# Patient Record
Sex: MALE | Race: WHITE | URBAN - METROPOLITAN AREA
[De-identification: names, ages, dates, MRNs, and addresses within clinical notes are randomized per-mention and may not be internally consistent; named-entity substitution may affect disease eponyms.]

---

## 2017-03-22 ENCOUNTER — EMERGENCY (EMERGENCY)
Facility: HOSPITAL | Age: 82
LOS: 0 days | Discharge: HOME | End: 2017-03-22
Attending: EMERGENCY MEDICINE | Admitting: EMERGENCY MEDICINE
Payer: MEDICARE

## 2017-03-22 VITALS
HEART RATE: 72 BPM | SYSTOLIC BLOOD PRESSURE: 144 MMHG | TEMPERATURE: 98 F | DIASTOLIC BLOOD PRESSURE: 51 MMHG | OXYGEN SATURATION: 97 %

## 2017-03-22 VITALS
DIASTOLIC BLOOD PRESSURE: 51 MMHG | HEIGHT: 72 IN | HEART RATE: 78 BPM | WEIGHT: 160.06 LBS | RESPIRATION RATE: 16 BRPM | TEMPERATURE: 98 F | SYSTOLIC BLOOD PRESSURE: 169 MMHG | OXYGEN SATURATION: 169 %

## 2017-03-22 DIAGNOSIS — Z98.89 OTHER SPECIFIED POSTPROCEDURAL STATES: Chronic | ICD-10-CM

## 2017-03-22 DIAGNOSIS — R68.83 CHILLS (WITHOUT FEVER): ICD-10-CM

## 2017-03-22 DIAGNOSIS — Z85.51 PERSONAL HISTORY OF MALIGNANT NEOPLASM OF BLADDER: Chronic | ICD-10-CM

## 2017-03-22 DIAGNOSIS — R53.1 WEAKNESS: ICD-10-CM

## 2017-03-22 DIAGNOSIS — I12.0 HYPERTENSIVE CHRONIC KIDNEY DISEASE WITH STAGE 5 CHRONIC KIDNEY DISEASE OR END STAGE RENAL DISEASE: ICD-10-CM

## 2017-03-22 DIAGNOSIS — N18.6 END STAGE RENAL DISEASE: ICD-10-CM

## 2017-03-22 DIAGNOSIS — J18.9 PNEUMONIA, UNSPECIFIED ORGANISM: ICD-10-CM

## 2017-03-22 DIAGNOSIS — I77.0 ARTERIOVENOUS FISTULA, ACQUIRED: Chronic | ICD-10-CM

## 2017-03-22 LAB
ALBUMIN SERPL ELPH-MCNC: 4.1 G/DL — SIGNIFICANT CHANGE UP (ref 3.3–5)
ALP SERPL-CCNC: 91 U/L — SIGNIFICANT CHANGE UP (ref 40–120)
ALT FLD-CCNC: 15 U/L — SIGNIFICANT CHANGE UP (ref 12–78)
ANION GAP SERPL CALC-SCNC: 10 MMOL/L — SIGNIFICANT CHANGE UP (ref 5–17)
APPEARANCE UR: CLEAR — SIGNIFICANT CHANGE UP
AST SERPL-CCNC: 19 U/L — SIGNIFICANT CHANGE UP (ref 15–37)
BACTERIA # UR AUTO: (no result)
BASOPHILS # BLD AUTO: 0.1 K/UL — SIGNIFICANT CHANGE UP (ref 0–0.2)
BASOPHILS NFR BLD AUTO: 0.6 % — SIGNIFICANT CHANGE UP (ref 0–2)
BILIRUB SERPL-MCNC: 0.7 MG/DL — SIGNIFICANT CHANGE UP (ref 0.2–1.2)
BILIRUB UR-MCNC: NEGATIVE — SIGNIFICANT CHANGE UP
BUN SERPL-MCNC: 23 MG/DL — SIGNIFICANT CHANGE UP (ref 7–23)
CALCIUM SERPL-MCNC: 8.9 MG/DL — SIGNIFICANT CHANGE UP (ref 8.5–10.1)
CHLORIDE SERPL-SCNC: 106 MMOL/L — SIGNIFICANT CHANGE UP (ref 96–108)
CO2 SERPL-SCNC: 24 MMOL/L — SIGNIFICANT CHANGE UP (ref 22–31)
COLOR SPEC: YELLOW — SIGNIFICANT CHANGE UP
CREAT SERPL-MCNC: 3.69 MG/DL — HIGH (ref 0.5–1.3)
DIFF PNL FLD: (no result)
EOSINOPHIL # BLD AUTO: 0 K/UL — SIGNIFICANT CHANGE UP (ref 0–0.5)
EOSINOPHIL NFR BLD AUTO: 0.2 % — SIGNIFICANT CHANGE UP (ref 0–6)
EPI CELLS # UR: SIGNIFICANT CHANGE UP
GLUCOSE SERPL-MCNC: 218 MG/DL — HIGH (ref 70–99)
GLUCOSE UR QL: NEGATIVE MG/DL — SIGNIFICANT CHANGE UP
HCT VFR BLD CALC: 35.5 % — LOW (ref 39–50)
HGB BLD-MCNC: 12 G/DL — LOW (ref 13–17)
KETONES UR-MCNC: NEGATIVE — SIGNIFICANT CHANGE UP
LEUKOCYTE ESTERASE UR-ACNC: (no result)
LYMPHOCYTES # BLD AUTO: 0.8 K/UL — LOW (ref 1–3.3)
LYMPHOCYTES # BLD AUTO: 5.5 % — LOW (ref 13–44)
MCHC RBC-ENTMCNC: 33.2 PG — SIGNIFICANT CHANGE UP (ref 27–34)
MCHC RBC-ENTMCNC: 33.9 GM/DL — SIGNIFICANT CHANGE UP (ref 32–36)
MCV RBC AUTO: 98.1 FL — SIGNIFICANT CHANGE UP (ref 80–100)
MONOCYTES # BLD AUTO: 1.1 K/UL — HIGH (ref 0–0.9)
MONOCYTES NFR BLD AUTO: 8 % — SIGNIFICANT CHANGE UP (ref 2–14)
NEUTROPHILS # BLD AUTO: 11.8 K/UL — HIGH (ref 1.8–7.4)
NEUTROPHILS NFR BLD AUTO: 85.7 % — HIGH (ref 43–77)
NITRITE UR-MCNC: NEGATIVE — SIGNIFICANT CHANGE UP
PH UR: 8 — SIGNIFICANT CHANGE UP (ref 4.8–8)
PLATELET # BLD AUTO: 201 K/UL — SIGNIFICANT CHANGE UP (ref 150–400)
POTASSIUM SERPL-MCNC: 4.5 MMOL/L — SIGNIFICANT CHANGE UP (ref 3.5–5.3)
POTASSIUM SERPL-SCNC: 4.5 MMOL/L — SIGNIFICANT CHANGE UP (ref 3.5–5.3)
PROT SERPL-MCNC: 7.9 GM/DL — SIGNIFICANT CHANGE UP (ref 6–8.3)
PROT UR-MCNC: 500 MG/DL
RAPID RVP RESULT: SIGNIFICANT CHANGE UP
RBC # BLD: 3.62 M/UL — LOW (ref 4.2–5.8)
RBC # FLD: 13 % — SIGNIFICANT CHANGE UP (ref 10.3–14.5)
RBC CASTS # UR COMP ASSIST: (no result) /HPF (ref 0–4)
SODIUM SERPL-SCNC: 140 MMOL/L — SIGNIFICANT CHANGE UP (ref 135–145)
SP GR SPEC: 1.01 — SIGNIFICANT CHANGE UP (ref 1.01–1.02)
UROBILINOGEN FLD QL: NEGATIVE MG/DL — SIGNIFICANT CHANGE UP
WBC # BLD: 13.7 K/UL — HIGH (ref 3.8–10.5)
WBC # FLD AUTO: 13.7 K/UL — HIGH (ref 3.8–10.5)
WBC UR QL: (no result)

## 2017-03-22 PROCEDURE — 71020: CPT | Mod: 26

## 2017-03-22 PROCEDURE — 99284 EMERGENCY DEPT VISIT MOD MDM: CPT

## 2017-03-22 RX ORDER — AZITHROMYCIN 500 MG/1
1 TABLET, FILM COATED ORAL
Qty: 4 | Refills: 0 | OUTPATIENT
Start: 2017-03-22 | End: 2017-03-26

## 2017-03-22 RX ORDER — AZITHROMYCIN 500 MG/1
500 TABLET, FILM COATED ORAL ONCE
Qty: 0 | Refills: 0 | Status: COMPLETED | OUTPATIENT
Start: 2017-03-22 | End: 2017-03-22

## 2017-03-22 RX ORDER — CEFTRIAXONE 500 MG/1
1 INJECTION, POWDER, FOR SOLUTION INTRAMUSCULAR; INTRAVENOUS ONCE
Qty: 0 | Refills: 0 | Status: COMPLETED | OUTPATIENT
Start: 2017-03-22 | End: 2017-03-22

## 2017-03-22 RX ADMIN — AZITHROMYCIN 500 MILLIGRAM(S): 500 TABLET, FILM COATED ORAL at 15:57

## 2017-03-22 RX ADMIN — CEFTRIAXONE 100 GRAM(S): 500 INJECTION, POWDER, FOR SOLUTION INTRAMUSCULAR; INTRAVENOUS at 15:57

## 2017-03-22 NOTE — ED ADULT NURSE NOTE - OBJECTIVE STATEMENT
Pt presented to ED after referral from PCP. As per pt, pt received a call from Dr. office for questionable PNA. Upon arrival, pt afebrile. Breathing spontaneously on RA. No SOB or cough. PT A&Ox3. DONIS x4. Follows commands. B/L equal chest expansion. BBS clear. VS WNL. Fistula to left arm. +bruits and thrill. HD on MWF. presented after Dialysis today. Voiding freely. Skin Warm, dry, and intact. Will continue to monitor.

## 2017-03-22 NOTE — ED ADULT NURSE NOTE - ED STAT RN HANDOFF DETAILS
Received care of patient from [facility] Track RN, Jeancarlos Fatima. Patient resting comfortably will continue to monitor. Awaiting test results. Patient in no acute distress at this time.

## 2017-03-22 NOTE — ED STATDOCS - PROGRESS NOTE DETAILS
RANDA Gerber: Patient has been seen, evaluated and orders have been written by the attending in intake. Patient is stable.  I will follow up the results of orders written and I will continue to evaluate/observe the patient.

## 2017-03-22 NOTE — ED STATDOCS - NS ED MD SCRIBE ATTENDING SCRIBE SECTIONS
REVIEW OF SYSTEMS/DISPOSITION/PAST MEDICAL/SURGICAL/SOCIAL HISTORY/PROGRESS NOTE/PHYSICAL EXAM/RESULTS/HISTORY OF PRESENT ILLNESS

## 2017-03-22 NOTE — ED STATDOCS - ATTENDING CONTRIBUTION TO CARE
I, Jeancarlos Snell, performed the initial face to face bedside interview with this patient regarding history of present illness, review of symptoms and relevant past medical, social and family history.  I completed an independent physical examination.  I was the initial provider who evaluated this patient. I have signed out the follow up of any pending tests (i.e. labs, radiological studies) to the ACP.  I have communicated the patient’s plan of care and disposition with the ACP.  The history, relevant review of systems, past medical and surgical history, medical decision making, and physical examination was documented by the scribe in my presence and I attest to the accuracy of the documentation.

## 2017-03-22 NOTE — ED STATDOCS - OBJECTIVE STATEMENT
83 y/o male with PMHx of ESRD on dialysis had full treatment today, DM, CAD with stents, HTN presents to the ED c/o chills that started this morning. Pt states that he was told to come to the ED after dialysis today. He reports generalized weakness. Currently pt has no other complaints and denies cough, fever, chest pain, SOB and abd pain. Nephrologist Dr. Clancy.

## 2017-03-22 NOTE — ED STATDOCS - MEDICAL DECISION MAKING DETAILS
Plan blood work and CXR. Plan blood work and CXR.  Channing MDM:  84yr old male on dialysis.  Had chills before but NO symptoms now.  No fever, SOB, cough.  RR is regular.  Labs show mild leukocytosis and CXR shows RLL infiltrate.  Will treat pt for CAP. Will give IV dose and d/c home with Levaquin.  Pt will be informed he must return ASAP if symptoms worsen.  Given is well appearance despite age and medical problems, and lack of any symptoms now, I feel outpatient treatment appropriate at this time, which pt agrees.  Any SOB, fever, vomiting, inability to take po abx, or worsening condition - pt is to return immediately.

## 2017-09-25 ENCOUNTER — INPATIENT (INPATIENT)
Facility: HOSPITAL | Age: 82
LOS: 3 days | Discharge: HOME | End: 2017-09-29
Attending: FAMILY MEDICINE | Admitting: FAMILY MEDICINE
Payer: MEDICARE

## 2017-09-25 VITALS — WEIGHT: 154.98 LBS

## 2017-09-25 DIAGNOSIS — Z85.51 PERSONAL HISTORY OF MALIGNANT NEOPLASM OF BLADDER: Chronic | ICD-10-CM

## 2017-09-25 DIAGNOSIS — Z98.89 OTHER SPECIFIED POSTPROCEDURAL STATES: Chronic | ICD-10-CM

## 2017-09-25 DIAGNOSIS — I77.0 ARTERIOVENOUS FISTULA, ACQUIRED: Chronic | ICD-10-CM

## 2017-09-25 LAB
ADD ON TEST-SPECIMEN IN LAB: SIGNIFICANT CHANGE UP
ALBUMIN SERPL ELPH-MCNC: 3.4 G/DL — SIGNIFICANT CHANGE UP (ref 3.3–5)
ALLERGY+IMMUNOLOGY DIAG STUDY NOTE: SIGNIFICANT CHANGE UP
ALLERGY+IMMUNOLOGY DIAG STUDY NOTE: SIGNIFICANT CHANGE UP
ALP SERPL-CCNC: 68 U/L — SIGNIFICANT CHANGE UP (ref 40–120)
ALT FLD-CCNC: 13 U/L — SIGNIFICANT CHANGE UP (ref 12–78)
ANION GAP SERPL CALC-SCNC: 14 MMOL/L — SIGNIFICANT CHANGE UP (ref 5–17)
ANISOCYTOSIS BLD QL: SIGNIFICANT CHANGE UP
ANTIBODY INTERPRETATION 2: SIGNIFICANT CHANGE UP
ANTIBODY INTERPRETATION 2: SIGNIFICANT CHANGE UP
ANTIBODY INTERPRETATION 3: SIGNIFICANT CHANGE UP
APTT BLD: 29.6 SEC — SIGNIFICANT CHANGE UP (ref 27.5–37.4)
AST SERPL-CCNC: 7 U/L — LOW (ref 15–37)
BASO STIPL BLD QL SMEAR: SLIGHT — SIGNIFICANT CHANGE UP
BASOPHILS # BLD AUTO: 0.1 K/UL — SIGNIFICANT CHANGE UP (ref 0–0.2)
BASOPHILS NFR BLD AUTO: 1.5 % — SIGNIFICANT CHANGE UP (ref 0–2)
BILIRUB SERPL-MCNC: 0.5 MG/DL — SIGNIFICANT CHANGE UP (ref 0.2–1.2)
BLD GP AB SCN SERPL QL: (no result)
BUN SERPL-MCNC: 91 MG/DL — HIGH (ref 7–23)
CALCIUM SERPL-MCNC: 8.4 MG/DL — LOW (ref 8.5–10.1)
CHLORIDE SERPL-SCNC: 107 MMOL/L — SIGNIFICANT CHANGE UP (ref 96–108)
CO2 SERPL-SCNC: 20 MMOL/L — LOW (ref 22–31)
CREAT SERPL-MCNC: 9.67 MG/DL — HIGH (ref 0.5–1.3)
DIR ANTIGLOB POLYSPECIFIC INTERPRETATION: SIGNIFICANT CHANGE UP
EOSINOPHIL # BLD AUTO: 0.3 K/UL — SIGNIFICANT CHANGE UP (ref 0–0.5)
EOSINOPHIL NFR BLD AUTO: 4 % — SIGNIFICANT CHANGE UP (ref 0–6)
GLUCOSE SERPL-MCNC: 163 MG/DL — HIGH (ref 70–99)
HCT VFR BLD CALC: 16.7 % — CRITICAL LOW (ref 39–50)
HGB BLD-MCNC: 5.5 G/DL — CRITICAL LOW (ref 13–17)
INR BLD: 0.97 RATIO — SIGNIFICANT CHANGE UP (ref 0.88–1.16)
LG PLATELETS BLD QL AUTO: SLIGHT — SIGNIFICANT CHANGE UP
LYMPHOCYTES # BLD AUTO: 1.3 K/UL — SIGNIFICANT CHANGE UP (ref 1–3.3)
LYMPHOCYTES # BLD AUTO: 17 % — SIGNIFICANT CHANGE UP (ref 13–44)
MACROCYTES BLD QL: SLIGHT — SIGNIFICANT CHANGE UP
MAGNESIUM SERPL-MCNC: 2.4 MG/DL — SIGNIFICANT CHANGE UP (ref 1.6–2.6)
MANUAL DIF COMMENT BLD-IMP: SIGNIFICANT CHANGE UP
MCHC RBC-ENTMCNC: 33.3 GM/DL — SIGNIFICANT CHANGE UP (ref 32–36)
MCHC RBC-ENTMCNC: 33.5 PG — SIGNIFICANT CHANGE UP (ref 27–34)
MCV RBC AUTO: 100.6 FL — HIGH (ref 80–100)
MICROCYTES BLD QL: SLIGHT — SIGNIFICANT CHANGE UP
MONOCYTES # BLD AUTO: 0.6 K/UL — SIGNIFICANT CHANGE UP (ref 0–0.9)
MONOCYTES NFR BLD AUTO: 8 % — SIGNIFICANT CHANGE UP (ref 2–14)
NEUTROPHILS # BLD AUTO: 4.6 K/UL — SIGNIFICANT CHANGE UP (ref 1.8–7.4)
NEUTROPHILS NFR BLD AUTO: 70 % — SIGNIFICANT CHANGE UP (ref 43–77)
NEUTS BAND # BLD: 1 % — SIGNIFICANT CHANGE UP (ref 0–8)
PHOSPHATE SERPL-MCNC: 4.1 MG/DL — SIGNIFICANT CHANGE UP (ref 2.5–4.5)
PLAT MORPH BLD: (no result)
PLATELET # BLD AUTO: 153 K/UL — SIGNIFICANT CHANGE UP (ref 150–400)
PLATELET COUNT - ESTIMATE: NORMAL — SIGNIFICANT CHANGE UP
POLYCHROMASIA BLD QL SMEAR: SLIGHT — SIGNIFICANT CHANGE UP
POTASSIUM SERPL-MCNC: 5.1 MMOL/L — SIGNIFICANT CHANGE UP (ref 3.5–5.3)
POTASSIUM SERPL-SCNC: 5.1 MMOL/L — SIGNIFICANT CHANGE UP (ref 3.5–5.3)
PROT SERPL-MCNC: 5.9 GM/DL — LOW (ref 6–8.3)
PROTHROM AB SERPL-ACNC: 10.5 SEC — SIGNIFICANT CHANGE UP (ref 9.8–12.7)
RBC # BLD: 1.66 M/UL — LOW (ref 4.2–5.8)
RBC # FLD: 14.5 % — SIGNIFICANT CHANGE UP (ref 10.3–14.5)
RBC BLD AUTO: (no result)
SCHISTOCYTES BLD QL AUTO: SLIGHT — SIGNIFICANT CHANGE UP
SODIUM SERPL-SCNC: 141 MMOL/L — SIGNIFICANT CHANGE UP (ref 135–145)
TROPONIN I SERPL-MCNC: 0.03 NG/ML — SIGNIFICANT CHANGE UP (ref 0.01–0.04)
TYPE + AB SCN PNL BLD: SIGNIFICANT CHANGE UP
WBC # BLD: 6.8 K/UL — SIGNIFICANT CHANGE UP (ref 3.8–10.5)
WBC # FLD AUTO: 6.8 K/UL — SIGNIFICANT CHANGE UP (ref 3.8–10.5)

## 2017-09-25 PROCEDURE — 93010 ELECTROCARDIOGRAM REPORT: CPT

## 2017-09-25 PROCEDURE — 99285 EMERGENCY DEPT VISIT HI MDM: CPT

## 2017-09-25 PROCEDURE — 71010: CPT | Mod: 26

## 2017-09-25 PROCEDURE — 70450 CT HEAD/BRAIN W/O DYE: CPT | Mod: 26

## 2017-09-25 PROCEDURE — 86077 PHYS BLOOD BANK SERV XMATCH: CPT

## 2017-09-25 RX ORDER — PANTOPRAZOLE SODIUM 20 MG/1
8 TABLET, DELAYED RELEASE ORAL
Qty: 80 | Refills: 0 | Status: DISCONTINUED | OUTPATIENT
Start: 2017-09-25 | End: 2017-09-28

## 2017-09-25 RX ORDER — METOPROLOL TARTRATE 50 MG
50 TABLET ORAL DAILY
Qty: 0 | Refills: 0 | Status: DISCONTINUED | OUTPATIENT
Start: 2017-09-25 | End: 2017-09-29

## 2017-09-25 RX ORDER — ERYTHROPOIETIN 10000 [IU]/ML
4000 INJECTION, SOLUTION INTRAVENOUS; SUBCUTANEOUS
Qty: 0 | Refills: 0 | Status: DISCONTINUED | OUTPATIENT
Start: 2017-09-25 | End: 2017-09-29

## 2017-09-25 RX ORDER — INFLUENZA VIRUS VACCINE 15; 15; 15; 15 UG/.5ML; UG/.5ML; UG/.5ML; UG/.5ML
0.5 SUSPENSION INTRAMUSCULAR ONCE
Qty: 0 | Refills: 0 | Status: DISCONTINUED | OUTPATIENT
Start: 2017-09-25 | End: 2017-09-29

## 2017-09-25 RX ORDER — PANTOPRAZOLE SODIUM 20 MG/1
40 TABLET, DELAYED RELEASE ORAL ONCE
Qty: 0 | Refills: 0 | Status: COMPLETED | OUTPATIENT
Start: 2017-09-25 | End: 2017-09-25

## 2017-09-25 RX ORDER — DOXERCALCIFEROL 2.5 UG/1
3.5 CAPSULE ORAL
Qty: 0 | Refills: 0 | Status: DISCONTINUED | OUTPATIENT
Start: 2017-09-25 | End: 2017-09-29

## 2017-09-25 RX ORDER — ERYTHROPOIETIN 10000 [IU]/ML
3000 INJECTION, SOLUTION INTRAVENOUS; SUBCUTANEOUS
Qty: 0 | Refills: 0 | Status: DISCONTINUED | OUTPATIENT
Start: 2017-09-25 | End: 2017-09-29

## 2017-09-25 RX ORDER — GABAPENTIN 400 MG/1
100 CAPSULE ORAL THREE TIMES A DAY
Qty: 0 | Refills: 0 | Status: DISCONTINUED | OUTPATIENT
Start: 2017-09-25 | End: 2017-09-29

## 2017-09-25 RX ADMIN — ERYTHROPOIETIN 3000 UNIT(S): 10000 INJECTION, SOLUTION INTRAVENOUS; SUBCUTANEOUS at 18:11

## 2017-09-25 RX ADMIN — DOXERCALCIFEROL 3.5 MICROGRAM(S): 2.5 CAPSULE ORAL at 18:09

## 2017-09-25 RX ADMIN — ERYTHROPOIETIN 4000 UNIT(S): 10000 INJECTION, SOLUTION INTRAVENOUS; SUBCUTANEOUS at 18:10

## 2017-09-25 RX ADMIN — PANTOPRAZOLE SODIUM 40 MILLIGRAM(S): 20 TABLET, DELAYED RELEASE ORAL at 10:52

## 2017-09-25 RX ADMIN — PANTOPRAZOLE SODIUM 10 MG/HR: 20 TABLET, DELAYED RELEASE ORAL at 11:49

## 2017-09-25 RX ADMIN — PANTOPRAZOLE SODIUM 10 MG/HR: 20 TABLET, DELAYED RELEASE ORAL at 21:20

## 2017-09-25 RX ADMIN — GABAPENTIN 100 MILLIGRAM(S): 400 CAPSULE ORAL at 21:19

## 2017-09-25 NOTE — CONSULT NOTE ADULT - ASSESSMENT
acute blood loss anemia.  DW pt and wife  no clear source for sudden drop with brown stool  PPI gtt, keep HGB>8  HD    stool OB (+).  taking Plavix for secondary cardiovascular prophylaxis.  hx perforated gastric ulcer 20 years ago.      ESRD. HD        CAD-PCI RCA KAREN 12/2016.    type 2 DM.    -transfuse 2 units PRBCs w/ HD.  -Protonix gtt.  -trend HH.  -hold Plavix.        EGD DW pt and wife  await repeat labs and consider eval

## 2017-09-25 NOTE — CONSULT NOTE ADULT - SUBJECTIVE AND OBJECTIVE BOX
Patient is a 85y old  Male who presents with a chief complaint of weakness. (25 Sep 2017 13:56)      HPI:  85M.  c/o weakness and fatigue.  onset approximately 1 month ago.  past few days, started experiencing shortness of breath, dyspnea upon exertion and more profound weakness.  feeling poorly during last HD session of Friday and advised to go to ED if he continued to feel poorly.  today, wife called to cancel his HD session, and they proceeded to ED.  Hbg 5.5.  stool OB (+) but no eugenia bleeding.  ED ordered to transfuse 2 units PRBCs and placed on Protonix gtt.  arrangements for HD today have been orchestrated w/ Nephrology.    pt with fatigue an mild SUTHERLAND but neg cp or sob, neg change in BM  has HX of perf PUD and has been on ASA and plavix  BM unchanged and brown per pt    Hx per pt and wife    PMHx:  ESRD-HD MWF;  HFpEF;  CAD-PCI KAREN 12/2016;  HTN;  DM;  AOCD;  bladder CA;  perforated gastric ulcer;  choledocolithiasis; acute cholangitis.    PSHx:  12/04/2016 ERCP;  12/11/2016 laparoscopic cholecystectomy;  1990s gastric ulcer resection;  neobladder construction;  RUE AVF.    Rx:  reviewed.    ALL:  lisinopril.    SocHx:  .  lives in the community with his wife in a private residence.    FH:  NC. (25 Sep 2017 13:56)      PAST MEDICAL & SURGICAL HISTORY:  ESRD on dialysis  HTN (hypertension)  No significant past surgical history      MEDICATIONS  (STANDING):  pantoprazole Infusion 8 mG/Hr (10 mL/Hr) IV Continuous <Continuous>  gabapentin 100 milliGRAM(s) Oral three times a day  metoprolol succinate ER 50 milliGRAM(s) Oral daily  doxercalciferol Injectable 3.5 MICROGram(s) IV Push <User Schedule>  epoetin norman Injectable 3000 Unit(s) IV Push <User Schedule>  epoetin norman Injectable 4000 Unit(s) IV Push <User Schedule>    MEDICATIONS  (PRN):      Allergies    lisinopril (Other)    Intolerances        SOCIAL HISTORY:lives with wife    FAMILY HISTORY:NC      REVIEW OF SYSTEMS:    CONSTITUTIONAL: No weakness, fevers or chills, pos fatigue  EYES/ENT: No visual changes;  No vertigo or throat pain   NECK: No pain or stiffness  RESPIRATORY: No cough, wheezing, hemoptysis; No shortness of breath  CARDIOVASCULAR: No chest pain or palpitations  GENITOURINARY: No dysuria, frequency or hematuria  NEUROLOGICAL: No numbness or weakness  SKIN: No itching, burning, rashes, or lesions   All other review of systems is negative unless indicated above.    Vital Signs Last 24 Hrs  T(C): 36.3 (25 Sep 2017 19:04), Max: 36.5 (25 Sep 2017 07:38)  T(F): 97.4 (25 Sep 2017 19:04), Max: 97.7 (25 Sep 2017 07:38)  HR: 78 (25 Sep 2017 19:04) (53 - 78)  BP: 130/99 (25 Sep 2017 19:04) (119/52 - 147/65)  BP(mean): --  RR: 16 (25 Sep 2017 19:04) (16 - 19)  SpO2: 96% (25 Sep 2017 15:44) (96% - 100%)    PHYSICAL EXAM:    Constitutional: NAD, well-developed  HEENT: EOMI, throat clear  Neck: No LAD, supple  Respiratory: CTA and P  Cardiovascular: S1 and S2, RRR, no M  Gastrointestinal: BS+, soft, NT/ND, neg HSM,  Extremities: No peripheral edema, neg clubing, cyanosis  Vascular: 2+ peripheral pulses  Neurological: A/O x 3, no focal deficits  Psychiatric: Normal mood, normal affect  Skin: No rashes  refused repeat rectal exam byme  LABS:  CBC Full  -  ( 25 Sep 2017 08:09 )  WBC Count : 6.8 K/uL  Hemoglobin : 5.5 g/dL  Hematocrit : 16.7 %  Platelet Count - Automated : 153 K/uL  Mean Cell Volume : 100.6 fl  Mean Cell Hemoglobin : 33.5 pg  Mean Cell Hemoglobin Concentration : 33.3 gm/dL  Auto Neutrophil # : 4.6 K/uL  Auto Lymphocyte # : 1.3 K/uL  Auto Monocyte # : 0.6 K/uL  Auto Eosinophil # : 0.3 K/uL  Auto Basophil # : 0.1 K/uL  Auto Neutrophil % : 70.0 %  Auto Lymphocyte % : 17.0 %  Auto Monocyte % : 8.0 %  Auto Eosinophil % : 4.0 %  Auto Basophil % : 1.5 %    09-25    141  |  107  |  91<H>  ----------------------------<  163<H>  5.1   |  20<L>  |  9.67<H>    Ca    8.4<L>      25 Sep 2017 08:09  Phos  4.1     09-25  Mg     2.4     09-25    TPro  5.9<L>  /  Alb  3.4  /  TBili  0.5  /  DBili  x   /  AST  7<L>  /  ALT  13  /  AlkPhos  68  09-25    PT/INR - ( 25 Sep 2017 08:09 )   PT: 10.5 sec;   INR: 0.97 ratio         PTT - ( 25 Sep 2017 08:09 )  PTT:29.6 sec        RADIOLOGY & ADDITIONAL STUDIES:  < from: Xray Chest 1 View AP/PA. (09.25.17 @ 10:11) >  EXAM:  CHEST SINGLE VIEW FRONTAL                            PROCEDURE DATE:  09/25/2017          INTERPRETATION:  Views:1  Comparison: 03/22/17  History: Weakness    The lungs are clear of infiltrates and effusions.     The cardiac and   mediastinal contours appear unremarkable.     Impression:  1. No evidence of acute pulmonary disease.            < end of copied text >

## 2017-09-25 NOTE — CONSULT NOTE ADULT - SUBJECTIVE AND OBJECTIVE BOX
Chief complaints.: Felt weak for several days.    HPI:  86 yo man with ESRD on maintenance HD since 10/2014.   Reports feeling weak for several days.   Pt's wife reports frequent BMs  for about one year.  Pt's wife noted increased problems with pt having Bms right after eating any food.   Pt denies any tarry stool but unclear about this.  Denies any abdominal pain.   Denies any change in Bms recently.   Denies nausea and vomiting.    PMHX and PSHX.  1.ESRD  2.CAD  Post Stent (1/2016)  3.HTN  4.DM  5.Hx of Bladder CA with resection --Neobladder  6.Hx of SBO  7.Cholecystectomy in 2014    FAMILY HISTORY:  Non-contributory      SOCIAL HISTORY :  Allergies    lisinopril (Other)    REVIEW OF  SYSTEMS.  Feeling weak.    Denies SOB  Denies Chest discomfort  Denies abdominal pain  Denies nausea  Denies diarrhea  Did not notice stool color.      MEDICATIONS  (STANDING):  pantoprazole Infusion 8 mG/Hr (10 mL/Hr) IV Continuous <Continuous>  gabapentin 100 milliGRAM(s) Oral three times a day  metoprolol succinate ER 50 milliGRAM(s) Oral daily    MEDICATIONS  (PRN):         Vital Signs Last 24 Hrs  T(C): 36.5 (25 Sep 2017 11:14), Max: 36.5 (25 Sep 2017 07:38)  T(F): 97.7 (25 Sep 2017 11:14), Max: 97.7 (25 Sep 2017 07:38)  HR: 59 (25 Sep 2017 11:14) (59 - 61)  BP: 121/41 (25 Sep 2017 11:14) (121/41 - 123/40)  BP(mean): --  RR: 19 (25 Sep 2017 11:14) (18 - 19)  SpO2: 100% (25 Sep 2017 11:14) (99% - 100%)  Daily     Daily   I&O's Summary      PHYSICAL EXAM:  Alert and comfortable   GEN: No apparent distress  HEENT: WNL  NECK : supple  CV: S1S2 RRR  LUNGS: clear to aus  ABD: soft  EXT: no edema    LABS:                        5.5    6.8   )-----------( 153      ( 25 Sep 2017 08:09 )             16.7     09-25    141  |  107  |  91<H>  ----------------------------<  163<H>  5.1   |  20<L>  |  9.67<H>    Ca    8.4<L>      25 Sep 2017 08:09  Mg     2.4     09-25    TPro  5.9<L>  /  Alb  3.4  /  TBili  0.5  /  DBili  x   /  AST  7<L>  /  ALT  13  /  AlkPhos  68  09-25    PT/INR - ( 25 Sep 2017 08:09 )   PT: 10.5 sec;   INR: 0.97 ratio         PTT - ( 25 Sep 2017 08:09 )  PTT:29.6 sec    Magnesium, Serum: 2.4 mg/dL (09-25 @ 08:09)

## 2017-09-25 NOTE — H&P ADULT - GASTROINTESTINAL DETAILS
normal/no masses palpable/nontender/no distention/soft/bowel sounds normal/no bruit/no rebound tenderness/no guarding/no rigidity

## 2017-09-25 NOTE — H&P ADULT - NSHPPHYSICALEXAM_GEN_ALL_CORE
Vital Signs Last 24 Hrs  T(C): 36.5 (25 Sep 2017 11:14), Max: 36.5 (25 Sep 2017 07:38)  T(F): 97.7 (25 Sep 2017 11:14), Max: 97.7 (25 Sep 2017 07:38)  HR: 59 (25 Sep 2017 11:14) (59 - 61)  BP: 121/41 (25 Sep 2017 11:14) (121/41 - 123/40)  BP(mean): --  RR: 19 (25 Sep 2017 11:14) (18 - 19)  SpO2: 100% (25 Sep 2017 11:14) (99% - 100%)

## 2017-09-25 NOTE — H&P ADULT - HISTORY OF PRESENT ILLNESS
85M.  c/o weakness and fatigue.  onset approximately 1 month ago.  past few days, started experiencing shortness of breath, dyspnea upon exertion and more profound weakness.  feeling poorly during last HD session of Friday and advised to go to ED if he continued to feel poorly.  today, wife called to cancel his HD session, and they proceeded to ED.  Hbg 5.5.  stool OB (+) but no eugenia bleeding.  ED ordered to transfuse 2 units PRBCs and placed on Protonix gtt.  arrangements for HD today have been orchestrated w/ Nephrology.    PMHx:  ESRD-HD MWF;  HFpEF;  CAD-PCI KAREN 12/2016;  HTN;  DM;  AOCD;  bladder CA;  perforated gastric ulcer;  choledocolithiasis; acute cholangitis.    PSHx:  12/04/2016 ERCP;  12/11/2016 laparoscopic cholecystectomy;  1990s gastric ulcer resection;  neobladder construction;  RUE AVF.    Rx:  reviewed.    ALL:  lisinopril.    SocHx:  .  lives in the community with his wife in a private residence.    FH:  [place].

## 2017-09-25 NOTE — ED PROVIDER NOTE - OBJECTIVE STATEMENT
85M pmhx ESRD on dialysis (MWF) which he didn't attend, c/o SOB, dyspnea on exertion, and weakness over past week. Reports that stool has been dark brown. Denies smoking. Pt has no other complaints and denies CP, fever/chills, n/v/d and HA.

## 2017-09-25 NOTE — ED ADULT TRIAGE NOTE - CHIEF COMPLAINT QUOTE
I have been feeling weak x 1 week. I was supposed to go to dialysis   today. Denies nausea  or vomiting

## 2017-09-25 NOTE — H&P ADULT - ASSESSMENT
acute blood loss anemia.    stool OB (+).  taking Plavix for secondary cardiovascular prophylaxis.  hx perforated gastric ulcer 20 years ago.  hx AOCD.    ESRD.    HFpEF.    CAD-PCI RCA KAREN 12/2016.    type 2 DM.    -transfuse 2 units PRBCs w/ HD.  -Protonix gtt.  -trend HH.  -hold Plavix.  -clear liquid diet.  -A1C.  monitor FSBG.  correction insulin coverage.  -admit to telemetry delgado.  -d/w Joesph Kam + Nitish and Aston.

## 2017-09-25 NOTE — ED PROVIDER NOTE - MEDICAL DECISION MAKING DETAILS
85M pmhx ESRD on dialysis (MWF) which he didn't attend, c/o SOB, dyspnea on exertion, and weakness over past week. Plan protonics, 2 units of PRBC, Admit. Consult GI.

## 2017-09-25 NOTE — CONSULT NOTE ADULT - ASSESSMENT
84 yo man with ESRD admitted with weakness.  Likely due to GI blood loss.  Positive stool occult heme.  Will plan HD today with transfusion.  Pt's Vital signs stable.  Fluid and electrolytes stable  GI evaluation.

## 2017-09-26 LAB
ANION GAP SERPL CALC-SCNC: 7 MMOL/L — SIGNIFICANT CHANGE UP (ref 5–17)
BUN SERPL-MCNC: 43 MG/DL — HIGH (ref 7–23)
CALCIUM SERPL-MCNC: 7.7 MG/DL — LOW (ref 8.5–10.1)
CHLORIDE SERPL-SCNC: 104 MMOL/L — SIGNIFICANT CHANGE UP (ref 96–108)
CO2 SERPL-SCNC: 29 MMOL/L — SIGNIFICANT CHANGE UP (ref 22–31)
CREAT SERPL-MCNC: 5.8 MG/DL — HIGH (ref 0.5–1.3)
GLUCOSE SERPL-MCNC: 128 MG/DL — HIGH (ref 70–99)
HAV IGM SER-ACNC: SIGNIFICANT CHANGE UP
HBV CORE IGM SER-ACNC: SIGNIFICANT CHANGE UP
HBV SURFACE AG SER-ACNC: SIGNIFICANT CHANGE UP
HCT VFR BLD CALC: 22.7 % — LOW (ref 39–50)
HCT VFR BLD CALC: 25.3 % — LOW (ref 39–50)
HCT VFR BLD CALC: 30 % — LOW (ref 39–50)
HCV AB S/CO SERPL IA: 0.13 S/CO — SIGNIFICANT CHANGE UP
HCV AB SERPL-IMP: SIGNIFICANT CHANGE UP
HGB BLD-MCNC: 10.1 G/DL — LOW (ref 13–17)
HGB BLD-MCNC: 8 G/DL — LOW (ref 13–17)
HGB BLD-MCNC: 8.4 G/DL — LOW (ref 13–17)
MCHC RBC-ENTMCNC: 32.4 PG — SIGNIFICANT CHANGE UP (ref 27–34)
MCHC RBC-ENTMCNC: 32.6 PG — SIGNIFICANT CHANGE UP (ref 27–34)
MCHC RBC-ENTMCNC: 33.1 GM/DL — SIGNIFICANT CHANGE UP (ref 32–36)
MCHC RBC-ENTMCNC: 33.7 GM/DL — SIGNIFICANT CHANGE UP (ref 32–36)
MCHC RBC-ENTMCNC: 33.7 PG — SIGNIFICANT CHANGE UP (ref 27–34)
MCHC RBC-ENTMCNC: 35.1 GM/DL — SIGNIFICANT CHANGE UP (ref 32–36)
MCV RBC AUTO: 96 FL — SIGNIFICANT CHANGE UP (ref 80–100)
MCV RBC AUTO: 96.8 FL — SIGNIFICANT CHANGE UP (ref 80–100)
MCV RBC AUTO: 97.8 FL — SIGNIFICANT CHANGE UP (ref 80–100)
PLATELET # BLD AUTO: 141 K/UL — LOW (ref 150–400)
PLATELET # BLD AUTO: 144 K/UL — LOW (ref 150–400)
PLATELET # BLD AUTO: 148 K/UL — LOW (ref 150–400)
POTASSIUM SERPL-MCNC: 4.2 MMOL/L — SIGNIFICANT CHANGE UP (ref 3.5–5.3)
POTASSIUM SERPL-SCNC: 4.2 MMOL/L — SIGNIFICANT CHANGE UP (ref 3.5–5.3)
RBC # BLD: 2.37 M/UL — LOW (ref 4.2–5.8)
RBC # BLD: 2.59 M/UL — LOW (ref 4.2–5.8)
RBC # BLD: 3.1 M/UL — LOW (ref 4.2–5.8)
RBC # FLD: 15.7 % — HIGH (ref 10.3–14.5)
RBC # FLD: 16 % — HIGH (ref 10.3–14.5)
RBC # FLD: 16.7 % — HIGH (ref 10.3–14.5)
SODIUM SERPL-SCNC: 140 MMOL/L — SIGNIFICANT CHANGE UP (ref 135–145)
WBC # BLD: 5.8 K/UL — SIGNIFICANT CHANGE UP (ref 3.8–10.5)
WBC # BLD: 5.9 K/UL — SIGNIFICANT CHANGE UP (ref 3.8–10.5)
WBC # BLD: 6.8 K/UL — SIGNIFICANT CHANGE UP (ref 3.8–10.5)
WBC # FLD AUTO: 5.8 K/UL — SIGNIFICANT CHANGE UP (ref 3.8–10.5)
WBC # FLD AUTO: 5.9 K/UL — SIGNIFICANT CHANGE UP (ref 3.8–10.5)
WBC # FLD AUTO: 6.8 K/UL — SIGNIFICANT CHANGE UP (ref 3.8–10.5)

## 2017-09-26 RX ORDER — ATORVASTATIN CALCIUM 80 MG/1
40 TABLET, FILM COATED ORAL AT BEDTIME
Qty: 0 | Refills: 0 | Status: DISCONTINUED | OUTPATIENT
Start: 2017-09-26 | End: 2017-09-29

## 2017-09-26 RX ADMIN — DOXERCALCIFEROL 3.5 MICROGRAM(S): 2.5 CAPSULE ORAL at 16:52

## 2017-09-26 RX ADMIN — GABAPENTIN 100 MILLIGRAM(S): 400 CAPSULE ORAL at 06:32

## 2017-09-26 RX ADMIN — ATORVASTATIN CALCIUM 40 MILLIGRAM(S): 80 TABLET, FILM COATED ORAL at 21:20

## 2017-09-26 RX ADMIN — GABAPENTIN 100 MILLIGRAM(S): 400 CAPSULE ORAL at 21:20

## 2017-09-26 RX ADMIN — ERYTHROPOIETIN 4000 UNIT(S): 10000 INJECTION, SOLUTION INTRAVENOUS; SUBCUTANEOUS at 16:49

## 2017-09-26 RX ADMIN — ERYTHROPOIETIN 3000 UNIT(S): 10000 INJECTION, SOLUTION INTRAVENOUS; SUBCUTANEOUS at 16:49

## 2017-09-26 RX ADMIN — Medication 50 MILLIGRAM(S): at 06:32

## 2017-09-26 NOTE — CONSULT NOTE ADULT - SUBJECTIVE AND OBJECTIVE BOX
Patient is a 85y old  Male who presents with a chief complaint of Weakness/SUTHERLAND (25 Sep 2017 20:30)      HPI:  Patient is 85-year-old he has a history of hypertension, type II diabetes mellitus, high cholesterol, or any artery disease status post drug-eluting stent placed in December 2016, end-stage renal disease he is on hemodialysis, bladder cancer, he was admitted with complains of increasing fatigue and shortness of breath for the past 2-3 weeks. His symptoms have been gradually worsening. On admission he was diagnosed to have severe anemia and guaiac positive stool. He denies any abdominal pain, blood in the urine or stool. He denies any exertional chest pain. But he states he gets tired and short of breath easily. He has received blood transfusion and he feels better. He is currently comfortable and is in no acute distress he is in normal sinus rhythm on telemetry.          PAST MEDICAL & SURGICAL HISTORY:  ESRD on dialysis  HTN (hypertension)  Bladder cancer. Perforated gastric ulcer in the past.  Cholecystectomy.  Gastric ulcer resection in the past.  Type II diabetes mellitus.  coronary artery disease status post stents in the past        PREVIOUS DIAGNOSTIC TESTING:      ECHO  FINDINGS: December 2015     CONCLUSION:  --There is mild left ventricular hypertrophy.  --Global LV wall motion is borderline normal. The mid inferolateral segment and mid inferior   segment are hypokinetic.  --LV ejection fraction is borderline normal.  --The left ventricular filling pattern is normal.  --The left ventricular ejection fraction is 5o to 55%.  --The aortic valve is calcified. There is mild to moderate aortic stenosis. The estimated   aortic valve area is 1.40 cm².  --There is mitral annular calcification. There is mild mitral regurgitation.  --There is mild tricuspid regurgitation.  --The right atrial pressure is 6 - 10 mm Hg. There is moderate pulmonary hypertension.  --There is a small to moderate pericardial effusion.        MEDICATIONS  (STANDING):  pantoprazole Infusion 8 mG/Hr (10 mL/Hr) IV Continuous <Continuous>  gabapentin 100 milliGRAM(s) Oral three times a day  metoprolol succinate ER 50 milliGRAM(s) Oral daily  doxercalciferol Injectable 3.5 MICROGram(s) IV Push <User Schedule>  epoetin norman Injectable 3000 Unit(s) IV Push <User Schedule>  epoetin norman Injectable 4000 Unit(s) IV Push <User Schedule>  influenza   Vaccine 0.5 milliLiter(s) IntraMuscular once    MEDICATIONS  (PRN):      FAMILY HISTORY: Father disease at age of 89 with age related issues. Mother disease at age of 96.      Social history he quit smoking in 1995 and he denies any excessive alcohol consumption.          REVIEW OF SYSTEM:  Pertinent items are noted in HPI.  Constitutional	Negative for chills, fevers, sweats.    Eyes: 	Negative for visual disturbance.  Ears, nose, mouth, throat, and face: Negative for epistaxis, nasal congestion, sore throat and tinnitus.  Neck:	Negative for enlargement, pain and difficulty in swallowing  Respiration : Negative for cough,  pleuritic chest pain and wheezing  Cardiovascular: Negative for chest pain,  and palpitations , he complains of generalized weakness and shortness of breath on mild exertion.    Gastrointestinal : Negative for abdominal pain, diarrhea, nausea and vomiting  Genitourinary: Negative for dysuria, frequency and urinary incontinence .  Skin: Negative for  rash, pruritus, swelling, dryness .  	  Hematologic/lymphatic: Negative for bleeding and easy bruising  Musculoskeletal: Negative for arthralgias, back pain and muscle weakness.  Neurological: Negative for dizziness, headaches, seizures and tremors  Behavioral/Psych: Negative for mood change, depression.  Endocrine:	Negative for blurry vision, polydipsia and polyuria, diaphoresis.   Allergic/Immunologic:	Negative for anaphylaxis, angioedema and urticaria.      Vital Signs Last 24 Hrs  T(C): 36.9 (26 Sep 2017 04:18), Max: 36.9 (26 Sep 2017 04:18)  T(F): 98.5 (26 Sep 2017 04:18), Max: 98.5 (26 Sep 2017 04:18)  HR: 60 (26 Sep 2017 04:18) (53 - 78)  BP: 135/37 (26 Sep 2017 04:18) (119/52 - 147/65)  BP(mean): --  RR: 16 (26 Sep 2017 04:18) (16 - 19)  SpO2: 100% (26 Sep 2017 04:18) (96% - 100%)        PHYSICAL EXAM  General Appearance: cooperative, no acute distress,   HEENT: PERRL, conjunctiva clear, EOM's intact, non injected pharynx, no exudate .  Neck: Supple, , no adenopathy, thyroid: not enlarged, no carotid bruit or JVD  Back: Symmetric, no  tenderness,no soft tissue tenderness  Lungs: Clear to auscultation bilateral,no adventitious breath sounds, normal   expiratory phase  Heart: Regular rate and rhythm, S1, S2 normal ,grade 1 to 2/6 ejection systolic murmur, rub or gallop  Abdomen: Soft, non-tender, bowel sounds active , no hepatosplenomegaly  Extremities: no cyanosis or edema, no joint swelling  Skin: Skin color, texture normal, no rashes   Neurologic: Alert and oriented X3 , cranial nerves intact, sensory and motor normal,        INTERPRETATION OF TELEMETRY: NSR    ECG: NSR early transition.        LABS:                          8.0    5.8   )-----------( 148      ( 26 Sep 2017 05:23 )             22.7     09-26    140  |  104  |  43<H>  ----------------------------<  128<H>  4.2   |  29  |  5.80<H>    Ca    7.7<L>      26 Sep 2017 05:23  Phos  4.1     09-25  Mg     2.4     09-25    TPro  5.9<L>  /  Alb  3.4  /  TBili  0.5  /  DBili  x   /  AST  7<L>  /  ALT  13  /  AlkPhos  68  09-25    CARDIAC MARKERS ( 25 Sep 2017 08:09 )  0.034 ng/mL / x     / x     / x     / x              PT/INR - ( 25 Sep 2017 08:09 )   PT: 10.5 sec;   INR: 0.97 ratio         PTT - ( 25 Sep 2017 08:09 )  PTT:29.6 sec          RADIOLOGY & ADDITIONAL STUDIES: Patient is a 85y old  Male who presents with a chief complaint of Weakness/S[place] (25 Sep 2017 20:30)      HPI:  Patient is 85-year-old he has a history of hypertension, type II diabetes mellitus, high cholesterol, or any artery disease status post drug-eluting stent placed to proximal RCAin January 2016, end-stage renal disease he is on hemodialysis, bladder cancer, he was admitted with complains of increasing fatigue and shortness of breath for the past 2-3 weeks. His symptoms have been gradually worsening. On admission he was diagnosed to have severe anemia and guaiac positive stool. He denies any abdominal pain, blood in the urine or stool. He denies any exertional chest pain. But he states he gets tired and short of breath easily. He has received blood transfusion and he feels better. He is currently comfortable and is in no acute distress he is in normal sinus rhythm on telemetry.          PAST MEDICAL & SURGICAL HISTORY:  ESRD on dialysis  HTN (hypertension)  Bladder cancer. Perforated gastric ulcer in the past.  Cholecystectomy.  Gastric ulcer resection in the past.  Type II diabetes mellitus.  coronary artery disease status post stents in the past        PREVIOUS DIAGNOSTIC TESTING:      ECHO  FINDINGS: January 2016     CONCLUSION:  --There is mild left ventricular hypertrophy.  --Global LV wall motion is borderline normal. The mid inferolateral segment and mid inferior   segment are hypokinetic.  --LV ejection fraction is borderline normal.  --The left ventricular filling pattern is normal.  --The left ventricular ejection fraction is 5o to 55%.  --The aortic valve is calcified. There is mild to moderate aortic stenosis. The estimated   aortic valve area is 1.40 cm².  --There is mitral annular calcification. There is mild mitral regurgitation.  --There is mild tricuspid regurgitation.  --The right atrial pressure is 6 - 10 mm Hg. There is moderate pulmonary hypertension.  --There is a small to moderate pericardial effusion.        MEDICATIONS  (STANDING):  pantoprazole Infusion 8 mG/Hr (10 mL/Hr) IV Continuous <Continuous>  gabapentin 100 milliGRAM(s) Oral three times a day  metoprolol succinate ER 50 milliGRAM(s) Oral daily  doxercalciferol Injectable 3.5 MICROGram(s) IV Push <User Schedule>  epoetin norman Injectable 3000 Unit(s) IV Push <User Schedule>  epoetin norman Injectable 4000 Unit(s) IV Push <User Schedule>  influenza   Vaccine 0.5 milliLiter(s) IntraMuscular once    MEDICATIONS  (PRN):      FAMILY HISTORY: Father disease at age of 89 with age related issues. Mother disease at age of 96.      Social history he quit smoking in 1995 and he denies any excessive alcohol consumption.          REVIEW OF SYSTEM:  Pertinent items are noted in HPI.  Constitutional	Negative for chills, fevers, sweats.    Eyes: 	Negative for visual disturbance.  Ears, nose, mouth, throat, and face: Negative for epistaxis, nasal congestion, sore throat and tinnitus.  Neck:	Negative for enlargement, pain and difficulty in swallowing  Respiration : Negative for cough,  pleuritic chest pain and wheezing  Cardiovascular: Negative for chest pain,  and palpitations , he complains of generalized weakness and shortness of breath on mild exertion.    Gastrointestinal : Negative for abdominal pain, diarrhea, nausea and vomiting  Genitourinary: Negative for dysuria, frequency and urinary incontinence .  Skin: Negative for  rash, pruritus, swelling, dryness .  	  Hematologic/lymphatic: Negative for bleeding and easy bruising  Musculoskeletal: Negative for arthralgias, back pain and muscle weakness.  Neurological: Negative for dizziness, headaches, seizures and tremors  Behavioral/Psych: Negative for mood change, depression.  Endocrine:	Negative for blurry vision, polydipsia and polyuria, diaphoresis.   Allergic/Immunologic:	Negative for anaphylaxis, angioedema and urticaria.      Vital Signs Last 24 Hrs  T(C): 36.9 (26 Sep 2017 04:18), Max: 36.9 (26 Sep 2017 04:18)  T(F): 98.5 (26 Sep 2017 04:18), Max: 98.5 (26 Sep 2017 04:18)  HR: 60 (26 Sep 2017 04:18) (53 - 78)  BP: 135/37 (26 Sep 2017 04:18) (119/52 - 147/65)  BP(mean): --  RR: 16 (26 Sep 2017 04:18) (16 - 19)  SpO2: 100% (26 Sep 2017 04:18) (96% - 100%)        PHYSICAL EXAM  General Appearance: cooperative, no acute distress,   HEENT: PERRL, conjunctiva clear, EOM's intact, non injected pharynx, no exudate .  Neck: Supple, , no adenopathy, thyroid: not enlarged, no carotid bruit or JVD  Back: Symmetric, no  tenderness,no soft tissue tenderness  Lungs: Clear to auscultation bilateral,no adventitious breath sounds, normal   expiratory phase  Heart: Regular rate and rhythm, S1, S2 normal ,grade 1 to 2/6 ejection systolic murmur, rub or gallop  Abdomen: Soft, non-tender, bowel sounds active , no hepatosplenomegaly  Extremities: no cyanosis or edema, no joint swelling  Skin: Skin color, texture normal, no rashes   Neurologic: Alert and oriented X3 , cranial nerves intact, sensory and motor normal,        INTERPRETATION OF TELEMETRY: NSR    ECG: NSR early transition.        LABS:                          8.0    5.8   )-----------( 148      ( 26 Sep 2017 05:23 )             22.7     09-26    140  |  104  |  43<H>  ----------------------------<  128<H>  4.2   |  29  |  5.80<H>    Ca    7.7<L>      26 Sep 2017 05:23  Phos  4.1     09-25  Mg     2.4     09-25    TPro  5.9<L>  /  Alb  3.4  /  TBili  0.5  /  DBili  x   /  AST  7<L>  /  ALT  13  /  AlkPhos  68  09-25    CARDIAC MARKERS ( 25 Sep 2017 08:09 )  0.034 ng/mL / x     / x     / x     / x              PT/INR - ( 25 Sep 2017 08:09 )   PT: 10.5 sec;   INR: 0.97 ratio         PTT - ( 25 Sep 2017 08:09 )  PTT:29.6 sec          RADIOLOGY & ADDITIONAL STUDIES:

## 2017-09-26 NOTE — CONSULT NOTE ADULT - ASSESSMENT
GI bleed he has guaiac positive stool.  Dyspnea because of severe anemia.  Coronary artery disease status post stents in the past and he received drug-eluting stent to proximal RCA January 2016. No recent angina or congestive heart failure.  End-stage renal disease he is on hemodialysis.  Essential hypertension.  High cholesterol.  Peripheral arterial disease.  Diabetes mellitus.  Suggest  Continue with current medications.  Follow-up with H&H he will need further transfusion.  Follow-up with electrolytes.  Overall cardiac status is stable, there is no absolute contraindication from cardiac standpoint of view for endoscopy.  Due to multiple comorbid conditions he certainly at a moderate risk for any invasive intervention. All risks options discussed at length with the patient.  Discussed with the hospitalist and gastroenterologist. GI bleed he has guaiac positive stool.  Dyspnea because of severe anemia.  Coronary artery disease status post stents in the past and he received drug-eluting stent to proximal RCA January 2016. No recent angina or congestive heart failure.  Mild-to-moderate aortic stenosis.  End-stage renal disease he is on hemodialysis.  Essential hypertension.  High cholesterol.  Peripheral arterial disease.  Diabetes mellitus.  Suggest  Continue with current medications.  Follow-up with H&H he will need further transfusion.  Follow-up with electrolytes.  Overall cardiac status is stable, there is no absolute contraindication from cardiac standpoint of view for endoscopy.  Due to multiple comorbid conditions he certainly at a moderate risk for any invasive intervention. All risks options discussed at length with the patient.  Discussed with the hospitalist and gastroenterologist.

## 2017-09-27 ENCOUNTER — RESULT REVIEW (OUTPATIENT)
Age: 82
End: 2017-09-27

## 2017-09-27 LAB
ANION GAP SERPL CALC-SCNC: 8 MMOL/L — SIGNIFICANT CHANGE UP (ref 5–17)
BUN SERPL-MCNC: 20 MG/DL — SIGNIFICANT CHANGE UP (ref 7–23)
CALCIUM SERPL-MCNC: 8 MG/DL — LOW (ref 8.5–10.1)
CHLORIDE SERPL-SCNC: 104 MMOL/L — SIGNIFICANT CHANGE UP (ref 96–108)
CO2 SERPL-SCNC: 26 MMOL/L — SIGNIFICANT CHANGE UP (ref 22–31)
CREAT SERPL-MCNC: 4.41 MG/DL — HIGH (ref 0.5–1.3)
GLUCOSE SERPL-MCNC: 103 MG/DL — HIGH (ref 70–99)
HCT VFR BLD CALC: 27.5 % — LOW (ref 39–50)
HCT VFR BLD CALC: 27.7 % — LOW (ref 39–50)
HCT VFR BLD CALC: 28.2 % — LOW (ref 39–50)
HGB BLD-MCNC: 9.3 G/DL — LOW (ref 13–17)
HGB BLD-MCNC: 9.5 G/DL — LOW (ref 13–17)
HGB BLD-MCNC: 9.5 G/DL — LOW (ref 13–17)
MCHC RBC-ENTMCNC: 32.2 PG — SIGNIFICANT CHANGE UP (ref 27–34)
MCHC RBC-ENTMCNC: 32.8 PG — SIGNIFICANT CHANGE UP (ref 27–34)
MCHC RBC-ENTMCNC: 33.2 PG — SIGNIFICANT CHANGE UP (ref 27–34)
MCHC RBC-ENTMCNC: 33.5 GM/DL — SIGNIFICANT CHANGE UP (ref 32–36)
MCHC RBC-ENTMCNC: 33.8 GM/DL — SIGNIFICANT CHANGE UP (ref 32–36)
MCHC RBC-ENTMCNC: 34.4 GM/DL — SIGNIFICANT CHANGE UP (ref 32–36)
MCV RBC AUTO: 96.1 FL — SIGNIFICANT CHANGE UP (ref 80–100)
MCV RBC AUTO: 96.6 FL — SIGNIFICANT CHANGE UP (ref 80–100)
MCV RBC AUTO: 97.1 FL — SIGNIFICANT CHANGE UP (ref 80–100)
PLATELET # BLD AUTO: 135 K/UL — LOW (ref 150–400)
PLATELET # BLD AUTO: 140 K/UL — LOW (ref 150–400)
PLATELET # BLD AUTO: 141 K/UL — LOW (ref 150–400)
POTASSIUM SERPL-MCNC: 3.8 MMOL/L — SIGNIFICANT CHANGE UP (ref 3.5–5.3)
POTASSIUM SERPL-SCNC: 3.8 MMOL/L — SIGNIFICANT CHANGE UP (ref 3.5–5.3)
RBC # BLD: 2.85 M/UL — LOW (ref 4.2–5.8)
RBC # BLD: 2.88 M/UL — LOW (ref 4.2–5.8)
RBC # BLD: 2.91 M/UL — LOW (ref 4.2–5.8)
RBC # FLD: 15.2 % — HIGH (ref 10.3–14.5)
RBC # FLD: 15.3 % — HIGH (ref 10.3–14.5)
RBC # FLD: 15.4 % — HIGH (ref 10.3–14.5)
SODIUM SERPL-SCNC: 138 MMOL/L — SIGNIFICANT CHANGE UP (ref 135–145)
WBC # BLD: 4.7 K/UL — SIGNIFICANT CHANGE UP (ref 3.8–10.5)
WBC # BLD: 5.1 K/UL — SIGNIFICANT CHANGE UP (ref 3.8–10.5)
WBC # BLD: 5.3 K/UL — SIGNIFICANT CHANGE UP (ref 3.8–10.5)
WBC # FLD AUTO: 4.7 K/UL — SIGNIFICANT CHANGE UP (ref 3.8–10.5)
WBC # FLD AUTO: 5.1 K/UL — SIGNIFICANT CHANGE UP (ref 3.8–10.5)
WBC # FLD AUTO: 5.3 K/UL — SIGNIFICANT CHANGE UP (ref 3.8–10.5)

## 2017-09-27 PROCEDURE — 88312 SPECIAL STAINS GROUP 1: CPT | Mod: 26

## 2017-09-27 PROCEDURE — 88342 IMHCHEM/IMCYTCHM 1ST ANTB: CPT | Mod: 26

## 2017-09-27 PROCEDURE — 88305 TISSUE EXAM BY PATHOLOGIST: CPT | Mod: 26

## 2017-09-27 RX ORDER — SOD SULF/SODIUM/NAHCO3/KCL/PEG
4000 SOLUTION, RECONSTITUTED, ORAL ORAL ONCE
Qty: 0 | Refills: 0 | Status: COMPLETED | OUTPATIENT
Start: 2017-09-27 | End: 2017-09-27

## 2017-09-27 RX ORDER — ONDANSETRON 8 MG/1
4 TABLET, FILM COATED ORAL EVERY 6 HOURS
Qty: 0 | Refills: 0 | Status: DISCONTINUED | OUTPATIENT
Start: 2017-09-27 | End: 2017-09-29

## 2017-09-27 RX ADMIN — PANTOPRAZOLE SODIUM 10 MG/HR: 20 TABLET, DELAYED RELEASE ORAL at 16:39

## 2017-09-27 RX ADMIN — Medication 4000 MILLILITER(S): at 18:08

## 2017-09-27 RX ADMIN — ATORVASTATIN CALCIUM 40 MILLIGRAM(S): 80 TABLET, FILM COATED ORAL at 22:13

## 2017-09-27 RX ADMIN — GABAPENTIN 100 MILLIGRAM(S): 400 CAPSULE ORAL at 15:06

## 2017-09-27 RX ADMIN — GABAPENTIN 100 MILLIGRAM(S): 400 CAPSULE ORAL at 22:13

## 2017-09-27 NOTE — DIETITIAN INITIAL EVALUATION ADULT. - OTHER INFO
Consult for dialysis. Pt denies chewing and swallowing difficulty. Pt denies n/v/d/c. pt reports great appetite. Pt with good knowledge of renal diet. Pt wife at home prepares meals.

## 2017-09-28 ENCOUNTER — RESULT REVIEW (OUTPATIENT)
Age: 82
End: 2017-09-28

## 2017-09-28 LAB
ALBUMIN SERPL ELPH-MCNC: 3.1 G/DL — LOW (ref 3.3–5)
ALBUMIN SERPL ELPH-MCNC: 3.2 G/DL — LOW (ref 3.3–5)
ANION GAP SERPL CALC-SCNC: 10 MMOL/L — SIGNIFICANT CHANGE UP (ref 5–17)
ANION GAP SERPL CALC-SCNC: 10 MMOL/L — SIGNIFICANT CHANGE UP (ref 5–17)
ANION GAP SERPL CALC-SCNC: 14 MMOL/L — SIGNIFICANT CHANGE UP (ref 5–17)
BUN SERPL-MCNC: 27 MG/DL — HIGH (ref 7–23)
BUN SERPL-MCNC: 27 MG/DL — HIGH (ref 7–23)
BUN SERPL-MCNC: 31 MG/DL — HIGH (ref 7–23)
CALCIUM SERPL-MCNC: 7.9 MG/DL — LOW (ref 8.5–10.1)
CALCIUM SERPL-MCNC: 8 MG/DL — LOW (ref 8.5–10.1)
CALCIUM SERPL-MCNC: 8.2 MG/DL — LOW (ref 8.5–10.1)
CHLORIDE SERPL-SCNC: 101 MMOL/L — SIGNIFICANT CHANGE UP (ref 96–108)
CHLORIDE SERPL-SCNC: 104 MMOL/L — SIGNIFICANT CHANGE UP (ref 96–108)
CHLORIDE SERPL-SCNC: 104 MMOL/L — SIGNIFICANT CHANGE UP (ref 96–108)
CO2 SERPL-SCNC: 18 MMOL/L — LOW (ref 22–31)
CO2 SERPL-SCNC: 24 MMOL/L — SIGNIFICANT CHANGE UP (ref 22–31)
CO2 SERPL-SCNC: 24 MMOL/L — SIGNIFICANT CHANGE UP (ref 22–31)
CREAT SERPL-MCNC: 6.37 MG/DL — HIGH (ref 0.5–1.3)
CREAT SERPL-MCNC: 6.38 MG/DL — HIGH (ref 0.5–1.3)
CREAT SERPL-MCNC: 6.86 MG/DL — HIGH (ref 0.5–1.3)
GLUCOSE SERPL-MCNC: 93 MG/DL — SIGNIFICANT CHANGE UP (ref 70–99)
GLUCOSE SERPL-MCNC: 94 MG/DL — SIGNIFICANT CHANGE UP (ref 70–99)
GLUCOSE SERPL-MCNC: 96 MG/DL — SIGNIFICANT CHANGE UP (ref 70–99)
HCT VFR BLD CALC: 27.2 % — LOW (ref 39–50)
HGB BLD-MCNC: 9.4 G/DL — LOW (ref 13–17)
MCHC RBC-ENTMCNC: 33.2 PG — SIGNIFICANT CHANGE UP (ref 27–34)
MCHC RBC-ENTMCNC: 34.7 GM/DL — SIGNIFICANT CHANGE UP (ref 32–36)
MCV RBC AUTO: 95.9 FL — SIGNIFICANT CHANGE UP (ref 80–100)
PHOSPHATE SERPL-MCNC: 4.2 MG/DL — SIGNIFICANT CHANGE UP (ref 2.5–4.5)
PHOSPHATE SERPL-MCNC: 4.4 MG/DL — SIGNIFICANT CHANGE UP (ref 2.5–4.5)
PLATELET # BLD AUTO: 156 K/UL — SIGNIFICANT CHANGE UP (ref 150–400)
POTASSIUM SERPL-MCNC: 3.9 MMOL/L — SIGNIFICANT CHANGE UP (ref 3.5–5.3)
POTASSIUM SERPL-MCNC: 3.9 MMOL/L — SIGNIFICANT CHANGE UP (ref 3.5–5.3)
POTASSIUM SERPL-MCNC: 4.4 MMOL/L — SIGNIFICANT CHANGE UP (ref 3.5–5.3)
POTASSIUM SERPL-SCNC: 3.9 MMOL/L — SIGNIFICANT CHANGE UP (ref 3.5–5.3)
POTASSIUM SERPL-SCNC: 3.9 MMOL/L — SIGNIFICANT CHANGE UP (ref 3.5–5.3)
POTASSIUM SERPL-SCNC: 4.4 MMOL/L — SIGNIFICANT CHANGE UP (ref 3.5–5.3)
RBC # BLD: 2.84 M/UL — LOW (ref 4.2–5.8)
RBC # FLD: 14.8 % — HIGH (ref 10.3–14.5)
SODIUM SERPL-SCNC: 133 MMOL/L — LOW (ref 135–145)
SODIUM SERPL-SCNC: 138 MMOL/L — SIGNIFICANT CHANGE UP (ref 135–145)
SODIUM SERPL-SCNC: 138 MMOL/L — SIGNIFICANT CHANGE UP (ref 135–145)
WBC # BLD: 6.3 K/UL — SIGNIFICANT CHANGE UP (ref 3.8–10.5)
WBC # FLD AUTO: 6.3 K/UL — SIGNIFICANT CHANGE UP (ref 3.8–10.5)

## 2017-09-28 PROCEDURE — 72191 CT ANGIOGRAPH PELV W/O&W/DYE: CPT | Mod: 26

## 2017-09-28 PROCEDURE — 88305 TISSUE EXAM BY PATHOLOGIST: CPT | Mod: 26

## 2017-09-28 RX ORDER — POTASSIUM CHLORIDE 20 MEQ
20 PACKET (EA) ORAL
Qty: 0 | Refills: 0 | Status: COMPLETED | OUTPATIENT
Start: 2017-09-28 | End: 2017-09-28

## 2017-09-28 RX ORDER — PANTOPRAZOLE SODIUM 20 MG/1
40 TABLET, DELAYED RELEASE ORAL
Qty: 0 | Refills: 0 | Status: DISCONTINUED | OUTPATIENT
Start: 2017-09-28 | End: 2017-09-29

## 2017-09-28 RX ADMIN — Medication 20 MILLIEQUIVALENT(S): at 23:10

## 2017-09-28 RX ADMIN — Medication 50 MILLIGRAM(S): at 05:37

## 2017-09-28 RX ADMIN — ATORVASTATIN CALCIUM 40 MILLIGRAM(S): 80 TABLET, FILM COATED ORAL at 21:38

## 2017-09-28 RX ADMIN — GABAPENTIN 100 MILLIGRAM(S): 400 CAPSULE ORAL at 05:37

## 2017-09-28 RX ADMIN — GABAPENTIN 100 MILLIGRAM(S): 400 CAPSULE ORAL at 21:37

## 2017-09-28 RX ADMIN — PANTOPRAZOLE SODIUM 40 MILLIGRAM(S): 20 TABLET, DELAYED RELEASE ORAL at 21:36

## 2017-09-28 RX ADMIN — Medication 20 MILLIEQUIVALENT(S): at 21:53

## 2017-09-29 ENCOUNTER — TRANSCRIPTION ENCOUNTER (OUTPATIENT)
Age: 82
End: 2017-09-29

## 2017-09-29 VITALS
SYSTOLIC BLOOD PRESSURE: 141 MMHG | HEART RATE: 66 BPM | RESPIRATION RATE: 18 BRPM | TEMPERATURE: 98 F | DIASTOLIC BLOOD PRESSURE: 44 MMHG | OXYGEN SATURATION: 100 %

## 2017-09-29 LAB
ADD ON TEST-SPECIMEN IN LAB: SIGNIFICANT CHANGE UP
ANION GAP SERPL CALC-SCNC: 10 MMOL/L — SIGNIFICANT CHANGE UP (ref 5–17)
BUN SERPL-MCNC: 19 MG/DL — SIGNIFICANT CHANGE UP (ref 7–23)
CALCIUM SERPL-MCNC: 8 MG/DL — LOW (ref 8.5–10.1)
CHLORIDE SERPL-SCNC: 106 MMOL/L — SIGNIFICANT CHANGE UP (ref 96–108)
CO2 SERPL-SCNC: 23 MMOL/L — SIGNIFICANT CHANGE UP (ref 22–31)
CREAT SERPL-MCNC: 5.59 MG/DL — HIGH (ref 0.5–1.3)
GLUCOSE SERPL-MCNC: 86 MG/DL — SIGNIFICANT CHANGE UP (ref 70–99)
HCT VFR BLD CALC: 27 % — LOW (ref 39–50)
HGB BLD-MCNC: 9 G/DL — LOW (ref 13–17)
MAGNESIUM SERPL-MCNC: 2.2 MG/DL — SIGNIFICANT CHANGE UP (ref 1.6–2.6)
MCHC RBC-ENTMCNC: 32.4 PG — SIGNIFICANT CHANGE UP (ref 27–34)
MCHC RBC-ENTMCNC: 33.2 GM/DL — SIGNIFICANT CHANGE UP (ref 32–36)
MCV RBC AUTO: 97.5 FL — SIGNIFICANT CHANGE UP (ref 80–100)
PLATELET # BLD AUTO: 138 K/UL — LOW (ref 150–400)
POTASSIUM SERPL-MCNC: 4.1 MMOL/L — SIGNIFICANT CHANGE UP (ref 3.5–5.3)
POTASSIUM SERPL-SCNC: 4.1 MMOL/L — SIGNIFICANT CHANGE UP (ref 3.5–5.3)
RBC # BLD: 2.77 M/UL — LOW (ref 4.2–5.8)
RBC # FLD: 15.6 % — HIGH (ref 10.3–14.5)
SODIUM SERPL-SCNC: 139 MMOL/L — SIGNIFICANT CHANGE UP (ref 135–145)
SURGICAL PATHOLOGY FINAL REPORT - CH: SIGNIFICANT CHANGE UP
WBC # BLD: 4.3 K/UL — SIGNIFICANT CHANGE UP (ref 3.8–10.5)
WBC # FLD AUTO: 4.3 K/UL — SIGNIFICANT CHANGE UP (ref 3.8–10.5)

## 2017-09-29 RX ORDER — PANTOPRAZOLE SODIUM 20 MG/1
1 TABLET, DELAYED RELEASE ORAL
Qty: 30 | Refills: 0
Start: 2017-09-29

## 2017-09-29 RX ORDER — ASPIRIN/CALCIUM CARB/MAGNESIUM 324 MG
81 TABLET ORAL DAILY
Qty: 0 | Refills: 0 | Status: DISCONTINUED | OUTPATIENT
Start: 2017-09-29 | End: 2017-09-29

## 2017-09-29 RX ORDER — CLOPIDOGREL BISULFATE 75 MG/1
1 TABLET, FILM COATED ORAL
Qty: 0 | Refills: 0 | COMMUNITY

## 2017-09-29 RX ORDER — MAGNESIUM OXIDE 400 MG ORAL TABLET 241.3 MG
400 TABLET ORAL ONCE
Qty: 0 | Refills: 0 | Status: COMPLETED | OUTPATIENT
Start: 2017-09-29 | End: 2017-09-29

## 2017-09-29 RX ORDER — ASPIRIN/CALCIUM CARB/MAGNESIUM 324 MG
1 TABLET ORAL
Qty: 0 | Refills: 0 | DISCHARGE
Start: 2017-09-29

## 2017-09-29 RX ADMIN — GABAPENTIN 100 MILLIGRAM(S): 400 CAPSULE ORAL at 05:18

## 2017-09-29 RX ADMIN — PANTOPRAZOLE SODIUM 40 MILLIGRAM(S): 20 TABLET, DELAYED RELEASE ORAL at 05:20

## 2017-09-29 RX ADMIN — Medication 50 MILLIGRAM(S): at 05:18

## 2017-09-29 RX ADMIN — ERYTHROPOIETIN 4000 UNIT(S): 10000 INJECTION, SOLUTION INTRAVENOUS; SUBCUTANEOUS at 11:21

## 2017-09-29 RX ADMIN — GABAPENTIN 100 MILLIGRAM(S): 400 CAPSULE ORAL at 13:06

## 2017-09-29 RX ADMIN — ERYTHROPOIETIN 3000 UNIT(S): 10000 INJECTION, SOLUTION INTRAVENOUS; SUBCUTANEOUS at 11:21

## 2017-09-29 RX ADMIN — DOXERCALCIFEROL 3.5 MICROGRAM(S): 2.5 CAPSULE ORAL at 11:28

## 2017-09-29 RX ADMIN — MAGNESIUM OXIDE 400 MG ORAL TABLET 400 MILLIGRAM(S): 241.3 TABLET ORAL at 17:46

## 2017-09-29 RX ADMIN — Medication 81 MILLIGRAM(S): at 13:10

## 2017-09-29 NOTE — DISCHARGE NOTE ADULT - CARE PROVIDER_API CALL
Black Kay), Internal Medicine  [address]  [address]  [place] [address]  Phone: [phone]  Fax: [phone]    Bc Kam), Gastroenterology  [address] [place] [address]  Phone: [phone]  Fax: [phone]

## 2017-09-29 NOTE — DISCHARGE NOTE ADULT - MEDICATION SUMMARY - MEDICATIONS TO TAKE
I will START or STAY ON the medications listed below when I get home from the hospital:    aspirin 81 mg oral delayed release tablet  -- 1 tab(s) by mouth once a day  -- Indication: For cad    gabapentin 100 mg oral capsule  -- 1 cap(s) by mouth 3 times a day  -- Indication: For Neuropathy    atorvastatin 40 mg oral tablet  -- 1 tab(s) by mouth once a day (at bedtime)  -- Indication: For dyslipidemia    metoprolol succinate 50 mg oral tablet, extended release  -- 1 tab(s) by mouth once a day  -- Indication: For htn    pantoprazole 40 mg oral delayed release tablet  -- 1 tab(s) by mouth once a day (before a meal)  -- Indication: For gi bleed

## 2017-09-29 NOTE — PROGRESS NOTE ADULT - SUBJECTIVE AND OBJECTIVE BOX
NEPHROLOGY INTERVAL HPI/OVERNIGHT EVENTS:    9/28  for CT enterography today  pt should be dialyzed after procedure due to not tolerating extra volume  d/w pt and wife    9/27 SY  Post EGD.  Per Dr Dillon, no clear source of bleeding.  For colonoscopy in am.  Ok for colon prep with Golytely..  Pt feeling fairly ok.  No SOB.    9/26  s/p hd and transfusion yesterday  feels well, d/w dR dillon  for upper endoscopy tomorrow  Will repeat cbc at 11 am and hd w possible transfusion at  1 pm  feels well today    9/25 SY  Seen on dialysis.  Hd initiated via AVF uneventfully.    HPI:  84 yo man with ESRD on maintenance HD since 10/2014.   Reports feeling weak for several days.   Pt's wife reports frequent BMs  for about one year.  Pt's wife noted increased problems with pt having Bms right after eating any food.   Pt denies any tarry stool but unclear about this.  Denies any abdominal pain.   Denies any change in Bms recently.   Denies nausea and vomiting.    PMHX and PSHX.  1.ESRD  2.CAD  Post Stent (1/2016)  3.HTN  4.DM  5.Hx of Bladder CA with resection --Neobladder  6.Hx of SBO      MEDICATIONS  (STANDING):  gabapentin 100 milliGRAM(s) Oral three times a day  metoprolol succinate ER 50 milliGRAM(s) Oral daily  doxercalciferol Injectable 3.5 MICROGram(s) IV Push <User Schedule>  epoetin norman Injectable 3000 Unit(s) IV Push <User Schedule>  epoetin norman Injectable 4000 Unit(s) IV Push <User Schedule>  influenza   Vaccine 0.5 milliLiter(s) IntraMuscular once  atorvastatin 40 milliGRAM(s) Oral at bedtime  potassium chloride    Tablet ER 20 milliEquivalent(s) Oral every 2 hours  pantoprazole    Tablet 40 milliGRAM(s) Oral before breakfast    MEDICATIONS  (PRN):  ondansetron Injectable 4 milliGRAM(s) IV Push every 6 hours PRN Nausea and/or Vomiting            Vital Signs Last 24 Hrs  T(C): 36.1 (28 Sep 2017 16:08), Max: 36.8 (28 Sep 2017 10:22)  T(F): 97 (28 Sep 2017 16:08), Max: 98.3 (28 Sep 2017 10:22)  HR: 61 (28 Sep 2017 16:36) (56 - 65)  BP: 155/57 (28 Sep 2017 16:36) (152/44 - 168/49)  BP(mean): --  RR: 16 (28 Sep 2017 16:08) (16 - 17)  SpO2: 96% (28 Sep 2017 10:22) (95% - 100%)        PHYSICAL EXAM:  Alert and appropriate  GENERAL: No apparent distress  CHEST/LUNG: clear to aus  HEART: S1S2 RRR  ABDOMEN: soft  EXTREMITIES: no edema  SKIN:     LABS:                                   9.4    6.3   )-----------( 156      ( 28 Sep 2017 16:15 )             27.2       09-28    133<L>  |  101  |  31<H>  ----------------------------<  94  4.4   |  18<L>  |  6.86<H>    Ca    8.2<L>      28 Sep 2017 16:15  Phos  4.2     09-28    TPro  x   /  Alb  3.2<L>  /  TBili  x   /  DBili  x   /  AST  x   /  ALT  x   /  AlkPhos  x   09-28
PCP- [facility] [place]    CC- Patient is a 85y old  Male who presents with a chief complaint of Weakness/[place]    HPI:  85M.  c/o weakness and fatigue.  Onset approximately 1 month ago.  past few days, started experiencing shortness of breath, dyspnea upon exertion and more profound weakness.  feeling poorly during last HD session of Friday and advised to go to ED if he continued to feel poorly.  today, wife called to cancel his HD session, and they proceeded to ED.  Hbg 5.5.  stool OB (+) but no eugenia bleeding.  ED ordered to transfuse 2 units PRBCs and placed on Protonix gtt.  arrangements for HD today have been orchestrated w/ Nephrology.    9/26/17- states had black stool last night, none today  9/27/17- no dark stools. HH stable. S/p EGD- few erosions, no acute source of bleeding    Review of system- All 10 systems reviewed and is as per HPI otherwise negative.     Vital Signs Last 24 Hrs  T(C): 36.7 (27 Sep 2017 10:55), Max: 36.7 (27 Sep 2017 10:55)  T(F): 98 (27 Sep 2017 10:55), Max: 98 (27 Sep 2017 10:55)  HR: 61 (27 Sep 2017 10:55) (52 - 68)  BP: 160/43 (27 Sep 2017 10:55) (127/54 - 167/44)  BP(mean): 75 (27 Sep 2017 05:03) (75 - 75)  RR: 18 (27 Sep 2017 10:55) (16 - 18)  SpO2: 97% (27 Sep 2017 10:55) (97% - 100%)    LABS:                         9.5    4.7   )-----------( 140      ( 27 Sep 2017 12:05 )             28.2     27 Sep 2017 05:32    138    |  104    |  20     ----------------------------<  103    3.8     |  26     |  4.41     Ca    8.0        27 Sep 2017 05:32    CAPILLARY BLOOD GLUCOSE  103 (27 Sep 2017 05:34)  116 (26 Sep 2017 21:19)    PHYSICAL EXAM:  GENERAL: NAD, well-groomed, well-developed  HEAD:  Atraumatic, Normocephalic  EYES: EOMI, PERRLA, conjunctiva and sclera clear  HEENT: Moist mucous membranes  NECK: Supple, No JVD  NERVOUS SYSTEM:  Alert & Oriented X3, Motor Strength 5/5 B/L upper and lower extremities; DTRs 2+ intact and symmetric  CHEST/LUNG: Clear to auscultation bilaterally; No rales, rhonchi, wheezing, or rubs  HEART: Regular rate and rhythm; No murmurs, rubs, or gallops  ABDOMEN: Soft, Nontender, Nondistended; Bowel sounds present  GENITOURINARY- Voiding, no palpable bladder  EXTREMITIES:  2+ Peripheral Pulses, No clubbing, cyanosis, or edema  MUSCULOSKELTAL- No muscle tenderness, Muscle tone normal, No joint tenderness, no Joint swelling, Joint range of motion-normal  SKIN-no rash, no lesion  CNS- alert, oriented X3, non focal     pantoprazole Infusion 8 mG/Hr IV Continuous <Continuous>  gabapentin 100 milliGRAM(s) Oral three times a day  metoprolol succinate ER 50 milliGRAM(s) Oral daily  doxercalciferol Injectable 3.5 MICROGram(s) IV Push <User Schedule>  epoetin norman Injectable 3000 Unit(s) IV Push <User Schedule>  epoetin norman Injectable 4000 Unit(s) IV Push <User Schedule>  influenza   Vaccine 0.5 milliLiter(s) IntraMuscular once  atorvastatin 40 milliGRAM(s) Oral at bedtime    Assessment/Plan  #GI bleed/anemia acute blood loss  S/p 3 units PRBC transfusion total  HH stable  EGD- hiatal hernia, mild erosions, no active bleeding  For colonoscopy tomorrow  Hold Plavix    #CAD s/p stent  Card eval appreciated  Anti-platelets on hold in view of acute bleeding    #Chr diastolic CHF- compensated    #ESRD on HD  Renal f/u appreciated  For HD as per schedule    #Dispo- for colonoscopy in am
Chief complaints.: Felt weak for several days.    HPI:  86 yo man with ESRD on maintenance HD since 10/2014.   Reports feeling weak for several days.   Pt's wife reports frequent BMs  for about one year.  Pt's wife noted increased problems with pt having Bms right after eating any food.   Pt denies any tarry stool but unclear about this.  Denies any abdominal pain.   Denies any change in Bms recently.   Denies nausea and vomiting.    9/26  s/p hd and transfusion yesterday  feels well, d/w dR dillon  for upper endoscopy tomorrow  Will repeat cbc at 11 am and hd w possible transfusion at  1 pm  feels well today    PMHX and PSHX.  1.ESRD  2.CAD  Post Stent (1/2016)  3.HTN  4.DM  5.Hx of Bladder CA with resection --Neobladder  6.Hx of SBO  7.Cholecystectomy in 2014    FAMILY HISTORY:  Non-contributory      SOCIAL HISTORY :  Allergies    lisinopril (Other)    REVIEW OF  SYSTEMS.  Feeling weak.    Denies SOB  Denies Chest discomfort  Denies abdominal pain  Denies nausea  Denies diarrhea  Did not notice stool color.      MEDICATIONS  (STANDING):  pantoprazole Infusion 8 mG/Hr (10 mL/Hr) IV Continuous <Continuous>  gabapentin 100 milliGRAM(s) Oral three times a day  metoprolol succinate ER 50 milliGRAM(s) Oral daily  doxercalciferol Injectable 3.5 MICROGram(s) IV Push <User Schedule>  epoetin norman Injectable 3000 Unit(s) IV Push <User Schedule>  epoetin norman Injectable 4000 Unit(s) IV Push <User Schedule>  influenza   Vaccine 0.5 milliLiter(s) IntraMuscular once    Vital Signs Last 24 Hrs  T(C): 36.9 (26 Sep 2017 04:18), Max: 36.9 (26 Sep 2017 04:18)  T(F): 98.5 (26 Sep 2017 04:18), Max: 98.5 (26 Sep 2017 04:18)  HR: 60 (26 Sep 2017 04:18) (53 - 78)  BP: 135/37 (26 Sep 2017 04:18) (119/52 - 147/65)  BP(mean): --  RR: 16 (26 Sep 2017 04:18) (16 - 19)  SpO2: 100% (26 Sep 2017 04:18) (96% - 100%)         PHYSICAL EXAM:  Alert and comfortable   GEN: No apparent distress  HEENT: WNL  NECK : supple  CV: S1S2 RRR  LUNGS: clear to aus  ABD: soft  EXT: no edema    LABS:                                   8.0    5.8   )-----------( 148      ( 26 Sep 2017 05:23 )             22.7     09-26    140  |  104  |  43<H>  ----------------------------<  128<H>  4.2   |  29  |  5.80<H>    Ca    7.7<L>      26 Sep 2017 05:23  Phos  4.1     09-25  Mg     2.4     09-25    TPro  5.9<L>  /  Alb  3.4  /  TBili  0.5  /  DBili  x   /  AST  7<L>  /  ALT  13  /  AlkPhos  68  09-25         PTT - ( 25 Sep 2017 08:09 )  PTT:29.6 sec    Magnesium, Serum: 2.4 mg/dL (09-25 @ 08:09)
HPI:  Patient is 85-year-old he has a history of hypertension, type II diabetes mellitus, high cholesterol, or any artery disease status post drug-eluting stent placed to proximal RCA , January 2016, end-stage renal disease he is on hemodialysis, bladder cancer, he was admitted with complains of increasing fatigue and shortness of breath for the past 2-3 weeks.  On admission he was diagnosed to have severe anemia and guaiac positive stool. After admission he has received blood transfusion and he feels improved. He status post upper endoscopy /colonoscopy . He denies any chest pain or shortness of breath. He continues to be normal sinus rhythm on telemetry. No blood in the urine, stool or black colored stool.  He is hemodynamically stable.          PAST MEDICAL & SURGICAL HISTORY:  ESRD on dialysis  HTN (hypertension)  Bladder cancer. Perforated gastric ulcer in the past.  Cholecystectomy.  Gastric ulcer resection in the past.  Type II diabetes mellitus.  coronary artery disease status post stents in the past        PREVIOUS DIAGNOSTIC TESTING:      ECHO  FINDINGS: January 2016     CONCLUSION:  --There is mild left ventricular hypertrophy.  --Global LV wall motion is borderline normal. The mid inferolateral segment and mid inferior   segment are hypokinetic.  --LV ejection fraction is borderline normal.  --The left ventricular filling pattern is normal.  --The left ventricular ejection fraction is 5o to 55%.  --The aortic valve is calcified. There is mild to moderate aortic stenosis. The estimated   aortic valve area is 1.40 cm².  --There is mitral annular calcification. There is mild mitral regurgitation.  --There is mild tricuspid regurgitation.  --The right atrial pressure is 6 - 10 mm Hg. There is moderate pulmonary hypertension.  --There is a small to moderate pericardial effusio)      Medications  pantoprazole Infusion 8 mG/Hr (10 mL/Hr) IV Continuous <Continuous>  gabapentin 100 milliGRAM(s) Oral three times a day  metoprolol succinate ER 50 milliGRAM(s) Oral daily  doxercalciferol Injectable 3.5 MICROGram(s) IV Push <User Schedule>  epoetin norman Injectable 3000 Unit(s) IV Push <User Schedule>  epoetin norman Injectable 4000 Unit(s) IV Push <User Schedule>  influenza   Vaccine 0.5 milliLiter(s) IntraMuscular once  atorvastatin 40 milliGRAM(s) Oral at bedtime  potassium chloride    Tablet ER 20 milliEquivalent(s) Oral every 2 hours    MEDICATIONS  (PRN):  ondansetron Injectable 4 milliGRAM(s) IV Push every 6 hours PRN Nausea and/or Vomiting          REVIEW OF SYSTEM:  Pertinent items are noted in HPI.  Constitutional	Negative for chills, fevers, sweats.    Eyes: 	Negative for visual disturbance.  Ears, nose, mouth, throat, and face: Negative for epistaxis, nasal congestion, sore throat and tinnitus.  Neck:	Negative for enlargement, pain and difficulty in swallowing  Respiration : Negative for cough, dyspnea on exertion, pleuritic chest pain and wheezing  Cardiovascular: Negative for chest pain, dyspnea and palpitations    Gastrointestinal : Negative for abdominal pain, diarrhea, nausea and vomiting  Genitourinary: Negative for dysuria, frequency and urinary incontinence .  Skin: Negative for  rash, pruritus, swelling, dryness .  	  Hematologic/lymphatic: Negative for bleeding and easy bruising  Musculoskeletal: Negative for arthralgias, back pain and muscle weakness.  Neurological: Negative for dizziness, headaches, seizures and tremors  Behavioral/Psych: Negative for mood change, depression.  Endocrine:	Negative for blurry vision, polydipsia and polyuria, diaphoresis.   Allergic/Immunologic:	Negative for anaphylaxis, angioedema and urticaria.      Vital Signs Last 24 Hrs  T(C): 36.7 (28 Sep 2017 05:21), Max: 36.7 (27 Sep 2017 10:55)  T(F): 98 (28 Sep 2017 05:21), Max: 98 (27 Sep 2017 10:55)  HR: 65 (28 Sep 2017 05:21) (56 - 65)  BP: 159/44 (28 Sep 2017 05:21) (159/44 - 168/49)  BP(mean): --  RR: 16 (27 Sep 2017 21:03) (16 - 18)  SpO2: 97% (28 Sep 2017 05:21) (95% - 100%)        PHYSICAL EXAM  General Appearance: cooperative, no acute distress,   HEENT: PERRL, conjunctiva clear, EOM's intact, non injected pharynx, no exudate .  Neck: Supple, , no adenopathy, thyroid: not enlarged, no carotid bruit or JVD  Back: Symmetric, no  tenderness,no soft tissue tenderness  Lungs: Clear to auscultation bilateral,no adventitious breath sounds, normal   expiratory phase  Heart: Regular rate and rhythm, S1, S2 normal, grade 1/6 holosystolic  murmur,  no rub or gallop  Abdomen: Soft, non-tender, bowel sounds active , no hepatosplenomegaly  Extremities: no cyanosis or edema, no joint swelling  Skin: Skin color, texture normal, no rashes   Neurologic: Alert and oriented X3 , cranial nerves intact, sensory and motor normal,        INTERPRETATION OF TELEMETRY: NSR            LABS:                          9.5    4.7   )-----------( 140      ( 27 Sep 2017 12:05 )             28.2     09-28    138  |  104  |  27<H>  ----------------------------<  96  3.9   |  24  |  6.37<H>    Ca    8.0<L>      28 Sep 2017 04:36  Phos  4.4     09-28    TPro  x   /  Alb  3.1<L>  /  TBili  x   /  DBili  x   /  AST  x   /  ALT  x   /  AlkPhos  x   09-28
HPI:  Patient is 85-year-old he has a history of hypertension, type II diabetes mellitus, high cholesterol, or any artery disease status post drug-eluting stent placed to proximal RCA , January 2016, end-stage renal disease he is on hemodialysis, bladder cancer, he was admitted with complains of increasing fatigue and shortness of breath for the past 2-3 weeks.  On admission he was diagnosed to have severe anemia and guaiac positive stool. After admission he has received blood transfusion and he feels improved. He status post upper endoscopy /colonoscopy . No blood in the stool or black colored stool. His H&H has been stable. He he denies any chest pain or shortness of breath. He is here a week is stable. He is in normal sinus rhythm on telemetry. He is undergoing dialysis. I discussed with gastroenterologist , it  is okay to start Ecotrin 81 mg by mouth once daily.        PAST MEDICAL & SURGICAL HISTORY:  ESRD on dialysis  HTN (hypertension)  Bladder cancer. Perforated gastric ulcer in the past.  Cholecystectomy.  Gastric ulcer resection in the past.  Type II diabetes mellitus.  coronary artery disease status post stents in the past        PREVIOUS DIAGNOSTIC TESTING:      ECHO  FINDINGS: January 2016     CONCLUSION:  --There is mild left ventricular hypertrophy.  --Global LV wall motion is borderline normal. The mid inferolateral segment and mid inferior   segment are hypokinetic.  --LV ejection fraction is borderline normal.  --The left ventricular filling pattern is normal.  --The left ventricular ejection fraction is 5o to 55%.  --The aortic valve is calcified. There is mild to moderate aortic stenosis. The estimated   aortic valve area is 1.40 cm².  --There is mitral annular calcification. There is mild mitral regurgitation.  --There is mild tricuspid regurgitation.  --The right atrial pressure is 6 - 10 mm Hg. There is moderate pulmonary hypertension.  --There is a small to moderate pericardial effusio)      MEDICATIONS  (STANDING):  gabapentin 100 milliGRAM(s) Oral three times a day  metoprolol succinate ER 50 milliGRAM(s) Oral daily  doxercalciferol Injectable 3.5 MICROGram(s) IV Push <User Schedule>  epoetin norman Injectable 3000 Unit(s) IV Push <User Schedule>  epoetin norman Injectable 4000 Unit(s) IV Push <User Schedule>  influenza   Vaccine 0.5 milliLiter(s) IntraMuscular once  atorvastatin 40 milliGRAM(s) Oral at bedtime  pantoprazole    Tablet 40 milliGRAM(s) Oral before breakfast  aspirin enteric coated 81 milliGRAM(s) Oral daily    MEDICATIONS  (PRN):  ondansetron Injectable 4 milliGRAM(s) IV Push every 6 hours PRN Nausea and/or Vomiting        REVIEW OF SYSTEM:  Pertinent items are noted in HPI.  Constitutional	Negative for chills, fevers, sweats.    Eyes: 	Negative for visual disturbance.  Ears, nose, mouth, throat, and face: Negative for epistaxis, nasal congestion, sore throat and tinnitus.  Neck:	Negative for enlargement, pain and difficulty in swallowing  Respiration : Negative for cough, dyspnea on exertion, pleuritic chest pain and wheezing  Cardiovascular: Negative for chest pain, dyspnea and palpitations    Gastrointestinal : Negative for abdominal pain, diarrhea, nausea and vomiting  Genitourinary: Negative for dysuria, frequency and urinary incontinence .  Skin: Negative for  rash, pruritus, swelling, dryness .  	  Hematologic/lymphatic: Negative for bleeding and easy bruising  Musculoskeletal: Negative for arthralgias, back pain and muscle weakness.  Neurological: Negative for dizziness, headaches, seizures and tremors  Behavioral/Psych: Negative for mood change, depression.  Endocrine:	Negative for blurry vision, polydipsia and polyuria, diaphoresis.   Allergic/Immunologic:	Negative for anaphylaxis, angioedema and urticaria.      Vital Signs Last 24 Hrs  T(C): 36.1 (29 Sep 2017 08:37), Max: 36.8 (28 Sep 2017 10:22)  T(F): 97 (29 Sep 2017 08:37), Max: 98.3 (28 Sep 2017 10:22)  HR: 58 (29 Sep 2017 09:25) (53 - 70)  BP: 148/59 (29 Sep 2017 09:25) (143/61 - 161/62)  BP(mean): --  RR: 17 (29 Sep 2017 08:37) (16 - 18)  SpO2: 96% (28 Sep 2017 10:22) (96% - 96%)      PHYSICAL EXAM  General Appearance: cooperative, no acute distress,   HEENT: PERRL, conjunctiva clear, EOM's intact, non injected pharynx, no exudate .  Neck: Supple, , no adenopathy, thyroid: not enlarged, no carotid bruit or JVD  Back: Symmetric, no  tenderness,no soft tissue tenderness  Lungs: Clear to auscultation bilateral,no adventitious breath sounds, normal   expiratory phase  Heart: Regular rate and rhythm, S1, S2 normal, grade 1/6 holosystolic  murmur, no  rub or gallop  Abdomen: Soft, non-tender, bowel sounds active , no hepatosplenomegaly  Extremities: no cyanosis or edema, no joint swelling  Skin: Skin color, texture normal, no rashes   Neurologic: Alert and oriented X3 , cranial nerves intact, sensory and motor normal,        INTERPRETATION OF TELEMETRY: NSR            LABS:                          9.0    4.3   )-----------( 138      ( 29 Sep 2017 06:14 )             27.0     09-29    139  |  106  |  19  ----------------------------<  86  4.1   |  23  |  5.59<H>    Ca    8.0<L>      29 Sep 2017 06:14  Phos  4.2     09-28    TPro  x   /  Alb  3.2<L>  /  TBili  x   /  DBili  x   /  AST  x   /  ALT  x   /  AlkPhos  x   09-28
NEPHROLOGY INTERVAL HPI/OVERNIGHT EVENTS:    9/29  feels well  no source of bleed noted on CT enterography  for outpt w/u  tolerating hd    9/28  for CT enterography today  pt should be dialyzed after procedure due to not tolerating extra volume  d/w pt and wife    9/27 SY  Post EGD.  Per Dr Dillon, no clear source of bleeding.  For colonoscopy in am.  Ok for colon prep with Golytely..  Pt feeling fairly ok.  No SOB.    9/26  s/p hd and transfusion yesterday  feels well, d/w dR dillon  for upper endoscopy tomorrow  Will repeat cbc at 11 am and hd w possible transfusion at  1 pm  feels well today    9/25 SY  Seen on dialysis.  Hd initiated via AVF uneventfully.    HPI:  86 yo man with ESRD on maintenance HD since 10/2014.   Reports feeling weak for several days.   Pt's wife reports frequent BMs  for about one year.  Pt's wife noted increased problems with pt having Bms right after eating any food.   Pt denies any tarry stool but unclear about this.  Denies any abdominal pain.   Denies any change in Bms recently.   Denies nausea and vomiting.    PMHX and PSHX.  1.ESRD  2.CAD  Post Stent (1/2016)  3.HTN  4.DM  5.Hx of Bladder CA with resection --Neobladder  6.Hx of SBO      MEDICATIONS  (STANDING):  gabapentin 100 milliGRAM(s) Oral three times a day  metoprolol succinate ER 50 milliGRAM(s) Oral daily  doxercalciferol Injectable 3.5 MICROGram(s) IV Push <User Schedule>  epoetin norman Injectable 3000 Unit(s) IV Push <User Schedule>  epoetin norman Injectable 4000 Unit(s) IV Push <User Schedule>  influenza   Vaccine 0.5 milliLiter(s) IntraMuscular once  atorvastatin 40 milliGRAM(s) Oral at bedtime  potassium chloride    Tablet ER 20 milliEquivalent(s) Oral every 2 hours  pantoprazole    Tablet 40 milliGRAM(s) Oral before breakfast    MEDICATIONS  (PRN):  ondansetron Injectable 4 milliGRAM(s) IV Push every 6 hours PRN Nausea and/or Vomiting            Vital Signs Last 24 Hrs  T(C): 36.1 (29 Sep 2017 08:37), Max: 36.8 (28 Sep 2017 18:34)  T(F): 97 (29 Sep 2017 08:37), Max: 98.3 (28 Sep 2017 18:34)  HR: 66 (29 Sep 2017 10:24) (53 - 70)  BP: 154/60 (29 Sep 2017 10:24) (143/61 - 161/62)  BP(mean): --  RR: 17 (29 Sep 2017 08:37) (16 - 18)  SpO2: --        PHYSICAL EXAM:  Alert and appropriate  GENERAL: No apparent distress  CHEST/LUNG: clear to aus  HEART: S1S2 RRR  ABDOMEN: soft  EXTREMITIES: no edema  SKIN:     LABS:                                              9.0    4.3   )-----------( 138      ( 29 Sep 2017 06:14 )             27.0       09-29    139  |  106  |  19  ----------------------------<  86  4.1   |  23  |  5.59<H>    Ca    8.0<L>      29 Sep 2017 06:14  Phos  4.2     09-28    TPro  x   /  Alb  3.2<L>  /  TBili  x   /  DBili  x   /  AST  x   /  ALT  x   /  AlkPhos  x   09-28
NEPHROLOGY INTERVAL HPI/OVERNIGHT EVENTS:  9/25 SY  Seen on dialysis.  Hd initiated via AVF uneventfully.    HPI:  86 yo man with ESRD on maintenance HD since 10/2014.   Reports feeling weak for several days.   Pt's wife reports frequent BMs  for about one year.  Pt's wife noted increased problems with pt having Bms right after eating any food.   Pt denies any tarry stool but unclear about this.  Denies any abdominal pain.   Denies any change in Bms recently.   Denies nausea and vomiting.    PMHX and PSHX.  1.ESRD  2.CAD  Post Stent (1/2016)  3.HTN  4.DM  5.Hx of Bladder CA with resection --Neobladder  6.Hx of SBO  7.Cholecystectomy in 2014    MEDICATIONS  (STANDING):  pantoprazole Infusion 8 mG/Hr (10 mL/Hr) IV Continuous <Continuous>  gabapentin 100 milliGRAM(s) Oral three times a day  metoprolol succinate ER 50 milliGRAM(s) Oral daily  doxercalciferol Injectable 3.5 MICROGram(s) IV Push <User Schedule>  epoetin norman Injectable 3000 Unit(s) IV Push <User Schedule>  epoetin norman Injectable 4000 Unit(s) IV Push <User Schedule>    MEDICATIONS  (PRN):          Vital Signs Last 24 Hrs  T(C): 36.1 (25 Sep 2017 14:57), Max: 36.5 (25 Sep 2017 07:38)  T(F): 97 (25 Sep 2017 14:57), Max: 97.7 (25 Sep 2017 07:38)  HR: 62 (25 Sep 2017 14:57) (58 - 64)  BP: 147/65 (25 Sep 2017 14:57) (121/41 - 147/65)  BP(mean): --  RR: 17 (25 Sep 2017 14:57) (16 - 19)  SpO2: 99% (25 Sep 2017 14:01) (99% - 100%)  Daily     Daily       PHYSICAL EXAM:  Alert. comfortable  GENERAL: No acute distress  CHEST/LUNG: clear to aus  HEART: S1S2 RRR  ABDOMEN: soft  EXTREMITIES: no edema  SKIN:     LABS:                        5.5    6.8   )-----------( 153      ( 25 Sep 2017 08:09 )             16.7     09-25    141  |  107  |  91<H>  ----------------------------<  163<H>  5.1   |  20<L>  |  9.67<H>    Ca    8.4<L>      25 Sep 2017 08:09  Mg     2.4     09-25    TPro  5.9<L>  /  Alb  3.4  /  TBili  0.5  /  DBili  x   /  AST  7<L>  /  ALT  13  /  AlkPhos  68  09-25    PT/INR - ( 25 Sep 2017 08:09 )   PT: 10.5 sec;   INR: 0.97 ratio         PTT - ( 25 Sep 2017 08:09 )  PTT:29.6 sec    Magnesium, Serum: 2.4 mg/dL (09-25 @ 08:09)          RADIOLOGY & ADDITIONAL TESTS:
NEPHROLOGY INTERVAL HPI/OVERNIGHT EVENTS:  9/27 SY  Post EGD.  Per Dr Steiner, no clear source of bleeding.  For colonoscopy in am.  Ok for colon prep with Golytely..  Pt feeling fairly ok.  No SOB.    9/26  s/p hd and transfusion yesterday  feels well, d/w dR dillon  for upper endoscopy tomorrow  Will repeat cbc at 11 am and hd w possible transfusion at  1 pm  feels well today    9/25 SY  Seen on dialysis.  Hd initiated via AVF uneventfully.    HPI:  86 yo man with ESRD on maintenance HD since 10/2014.   Reports feeling weak for several days.   Pt's wife reports frequent BMs  for about one year.  Pt's wife noted increased problems with pt having Bms right after eating any food.   Pt denies any tarry stool but unclear about this.  Denies any abdominal pain.   Denies any change in Bms recently.   Denies nausea and vomiting.    PMHX and PSHX.  1.ESRD  2.CAD  Post Stent (1/2016)  3.HTN  4.DM  5.Hx of Bladder CA with resection --Neobladder  6.Hx of SBO      MEDICATIONS  (STANDING):  pantoprazole Infusion 8 mG/Hr (10 mL/Hr) IV Continuous <Continuous>  gabapentin 100 milliGRAM(s) Oral three times a day  metoprolol succinate ER 50 milliGRAM(s) Oral daily  doxercalciferol Injectable 3.5 MICROGram(s) IV Push <User Schedule>  epoetin norman Injectable 3000 Unit(s) IV Push <User Schedule>  epoetin norman Injectable 4000 Unit(s) IV Push <User Schedule>  influenza   Vaccine 0.5 milliLiter(s) IntraMuscular once  atorvastatin 40 milliGRAM(s) Oral at bedtime  polyethylene glycol/electrolyte Solution. 4000 milliLiter(s) Oral once    MEDICATIONS  (PRN):  ondansetron Injectable 4 milliGRAM(s) IV Push every 6 hours PRN Nausea and/or Vomiting          Vital Signs Last 24 Hrs  T(C): 36.4 (27 Sep 2017 05:03), Max: 36.9 (26 Sep 2017 10:31)  T(F): 97.5 (27 Sep 2017 05:03), Max: 98.4 (26 Sep 2017 10:31)  HR: 62 (27 Sep 2017 05:03) (52 - 68)  BP: 157/45 (27 Sep 2017 05:03) (127/39 - 167/44)  BP(mean): 75 (27 Sep 2017 05:03) (75 - 75)  RR: 17 (27 Sep 2017 05:03) (16 - 17)  SpO2: 100% (27 Sep 2017 05:03) (98% - 100%)  Daily     Daily     09-26 @ 07:01  -  09-27 @ 07:00  --------------------------------------------------------  IN: 274 mL / OUT: 0 mL / NET: 274 mL        PHYSICAL EXAM:  Alert and appropriate  GENERAL: No apparent distress  CHEST/LUNG: clear to aus  HEART: S1S2 RRR  ABDOMEN: soft  EXTREMITIES: no edema  SKIN:     LABS:                        9.5    5.1   )-----------( 141      ( 27 Sep 2017 05:32 )             27.5     09-27    138  |  104  |  20  ----------------------------<  103<H>  3.8   |  26  |  4.41<H>    Ca    8.0<L>      27 Sep 2017 05:32                  RADIOLOGY & ADDITIONAL TESTS:
PCP- [facility] [place]    CC- Patient is a 85y old  Male who presents with a chief complaint of Weakness/SUTHERLAND    HPI:  85M.  c/o weakness and fatigue.  Onset approximately 1 month ago.  past few days, started experiencing shortness of breath, dyspnea upon exertion and more profound weakness.  feeling poorly during last HD session of Friday and advised to go to ED if he continued to feel poorly.  today, wife called to cancel his HD session, and they proceeded to ED.  Hbg 5.5.  stool OB (+) but no eugenia bleeding.  ED ordered to transfuse 2 units PRBCs and placed on Protonix gtt.  arrangements for HD today have been orchestrated w/ Nephrology.    9/26/17- states had black stool last night, none today  9/27/17- no dark stools. HH stable. S/p EGD- few erosions, no acute source of bleeding  9/28: diverticuli on colonscopy, pt upset source not identified, CT enterography today followed by RRT.  9/29:  CT enterography neg for source. h/h stable    Review of system- All 10 systems reviewed and is as per HPI otherwise negative.     Vital Signs Last 24 Hrs  T(C): 36.7 (27 Sep 2017 10:55), Max: 36.7 (27 Sep 2017 10:55)  T(F): 98 (27 Sep 2017 10:55), Max: 98 (27 Sep 2017 10:55)  HR: 61 (27 Sep 2017 10:55) (52 - 68)  BP: 160/43 (27 Sep 2017 10:55) (127/54 - 167/44)  BP(mean): 75 (27 Sep 2017 05:03) (75 - 75)  RR: 18 (27 Sep 2017 10:55) (16 - 18)  SpO2: 97% (27 Sep 2017 10:55) (97% - 100%)    LABS:                         9.5    4.7   )-----------( 140      ( 27 Sep 2017 12:05 )             28.2     27 Sep 2017 05:32    138    |  104    |  20     ----------------------------<  103    3.8     |  26     |  4.41     Ca    8.0        27 Sep 2017 05:32    CAPILLARY BLOOD GLUCOSE  103 (27 Sep 2017 05:34)  116 (26 Sep 2017 21:19)    PHYSICAL EXAM:  GENERAL: NAD, well-groomed, well-developed  HEAD:  Atraumatic, Normocephalic  EYES: EOMI, PERRLA, conjunctiva and sclera clear  HEENT: Moist mucous membranes  NECK: Supple, No JVD  NERVOUS SYSTEM:  Alert & Oriented X3, Motor Strength 5/5 B/L upper and lower extremities; DTRs 2+ intact and symmetric  CHEST/LUNG: Clear to auscultation bilaterally; No rales, rhonchi, wheezing, or rubs  HEART: Regular rate and rhythm; No murmurs, rubs, or gallops  ABDOMEN: Soft, Nontender, Nondistended; Bowel sounds present  GENITOURINARY- Voiding, no palpable bladder  EXTREMITIES:  2+ Peripheral Pulses, No clubbing, cyanosis, or edema  MUSCULOSKELTAL- No muscle tenderness, Muscle tone normal, No joint tenderness, no Joint swelling, Joint range of motion-normal  SKIN-no rash, no lesion  CNS- alert, oriented X3, non focal     pantoprazole Infusion 8 mG/Hr IV Continuous <Continuous>  gabapentin 100 milliGRAM(s) Oral three times a day  metoprolol succinate ER 50 milliGRAM(s) Oral daily  doxercalciferol Injectable 3.5 MICROGram(s) IV Push <User Schedule>  epoetin norman Injectable 3000 Unit(s) IV Push <User Schedule>  epoetin norman Injectable 4000 Unit(s) IV Push <User Schedule>  influenza   Vaccine 0.5 milliLiter(s) IntraMuscular once  atorvastatin 40 milliGRAM(s) Oral at bedtime    Assessment/Plan  #GI bleed/anemia acute blood loss-etiology unknwon  S/p 3 units PRBC transfusion total  HH stable  EGD- hiatal hernia, mild erosions, no active bleeding  colonoscopy: multiple diverticuli  CT enterography: neg  RRT to follow  resume ASA 81 , no plavix, capsule endo as outpt    #CAD s/p stent  Card eval appreciated      #Chr diastolic CHF- compensated    #ESRD on HD  Renal f/u appreciated  For HD as per schedule      PCP notified, dc time > 35 min
PCP- [facility] [place] [facility] [place]    CC- Patient is a 85y old  Male who presents with a chief complaint of Weakness/SUTHERLAND    HPI:  85M.  c/o weakness and fatigue.  onset approximately 1 month ago.  past few days, started experiencing shortness of breath, dyspnea upon exertion and more profound weakness.  feeling poorly during last HD session of Friday and advised to go to ED if he continued to feel poorly.  today, wife called to cancel his HD session, and they proceeded to ED.  Hbg 5.5.  stool OB (+) but no eugenia bleeding.  ED ordered to transfuse 2 units PRBCs and placed on Protonix gtt.  arrangements for HD today have been orchestrated w/ Nephrology.    9/26/17- states had black stool last night, none today    Review of system- All 10 systems reviewed and is as per HPI otherwise negative.     T(C): 36.9 (09-26-17 @ 10:31), Max: 36.9 (09-26-17 @ 04:18)  HR: 55 (09-26-17 @ 10:31) (53 - 78)  BP: 127/39 (09-26-17 @ 10:31) (119/52 - 147/65)  RR: 16 (09-26-17 @ 10:31) (16 - 18)  SpO2: 99% (09-26-17 @ 10:31) (96% - 100%)  Wt(kg): --    LABS:                        8.4    5.9   )-----------( 144      ( 26 Sep 2017 11:02 )             25.3     09-26    140  |  104  |  43<H>  ----------------------------<  128<H>  4.2   |  29  |  5.80<H>    Ca    7.7<L>      26 Sep 2017 05:23  Phos  4.1     09-25  Mg     2.4     09-25    TPro  5.9<L>  /  Alb  3.4  /  TBili  0.5  /  DBili  x   /  AST  7<L>  /  ALT  13  /  AlkPhos  68  09-25  PT/INR - ( 25 Sep 2017 08:09 )   PT: 10.5 sec;   INR: 0.97 ratio    PTT - ( 25 Sep 2017 08:09 )  PTT:29.6 sec    CARDIAC MARKERS ( 25 Sep 2017 08:09 )  0.034 ng/mL / x     / x     / x     / x        PHYSICAL EXAM:  GENERAL: NAD, well-groomed, well-developed  HEAD:  Atraumatic, Normocephalic  EYES: EOMI, PERRLA, conjunctiva and sclera clear  HEENT: Moist mucous membranes  NECK: Supple, No JVD  NERVOUS SYSTEM:  Alert & Oriented X3, Motor Strength 5/5 B/L upper and lower extremities; DTRs 2+ intact and symmetric  CHEST/LUNG: Clear to auscultation bilaterally; No rales, rhonchi, wheezing, or rubs  HEART: Regular rate and rhythm; No murmurs, rubs, or gallops  ABDOMEN: Soft, Nontender, Nondistended; Bowel sounds present  GENITOURINARY- Voiding, no palpable bladder  EXTREMITIES:  2+ Peripheral Pulses, No clubbing, cyanosis, or edema  MUSCULOSKELTAL- No muscle tenderness, Muscle tone normal, No joint tenderness, no Joint swelling, Joint range of motion-normal  SKIN-no rash, no lesion  CNS- alert, oriented X3, non focal     pantoprazole Infusion 8 mG/Hr IV Continuous <Continuous>  gabapentin 100 milliGRAM(s) Oral three times a day  metoprolol succinate ER 50 milliGRAM(s) Oral daily  doxercalciferol Injectable 3.5 MICROGram(s) IV Push <User Schedule>  epoetin norman Injectable 3000 Unit(s) IV Push <User Schedule>  epoetin norman Injectable 4000 Unit(s) IV Push <User Schedule>  influenza   Vaccine 0.5 milliLiter(s) IntraMuscular once  atorvastatin 40 milliGRAM(s) Oral at bedtime    Assessment/Plan  #GI bleed/anemia acute blood loss  For transfusion 1 Unit with HD today  GI eval appreciated- for EGD tomorrow  Cont Protonix drip  Monitor HH  Hold Plavix    #CAD s/p stent  Card eval appreciated  Anti-platelets on hold in view of acute bleeding    #Chr diastolic CHF- compensated    #ESRD on HD  Renal f/u appreciated  For HD as per schedule    #Dispo- as above
Patient is a 85y old  Male who presents with a chief complaint of Weakness/SUTHERLAND (25 Sep 2017 20:30)      HPI:  85M.  c/o weakness and fatigue.  onset approximately 1 month ago.  past few days, started experiencing shortness of breath, dyspnea upon exertion and more profound weakness.  feeling poorly during last HD session of Friday and advised to go to ED if he continued to feel poorly.  today, wife called to cancel his HD session, and they proceeded to ED.  Hbg 5.5.  stool OB (+) but no eugenia bleeding.  ED ordered to transfuse 2 units PRBCs and placed on Protonix gtt.  arrangements for HD today have been orchestrated w/ Nephrology.      pt comf  had 2 BM, darkper RN and on mrectal exam had brown stool  neg abd pain  mild reflux last night with laying flat    PMHx:  ESRD-HD MWF;  HFpEF;  CAD-PCI KAREN 12/2016;  HTN;  DM;  AOCD;  bladder CA;  perforated gastric ulcer;  choledocolithiasis; acute cholangitis.    PSHx:  12/04/2016 ERCP;  12/11/2016 laparoscopic cholecystectomy;  1990s gastric ulcer resection;  neobladder construction;  RUE AVF.    Rx:  reviewed.    ALL:  lisinopril.    SocHx:  .  lives in the community with his wife in a private residence.    FH:  NC. (25 Sep 2017 13:56)      PAST MEDICAL & SURGICAL HISTORY:  ESRD on dialysis  HTN (hypertension)  No significant past surgical history      MEDICATIONS  (STANDING):  pantoprazole Infusion 8 mG/Hr (10 mL/Hr) IV Continuous <Continuous>  gabapentin 100 milliGRAM(s) Oral three times a day  metoprolol succinate ER 50 milliGRAM(s) Oral daily  doxercalciferol Injectable 3.5 MICROGram(s) IV Push <User Schedule>  epoetin norman Injectable 3000 Unit(s) IV Push <User Schedule>  epoetin norman Injectable 4000 Unit(s) IV Push <User Schedule>  influenza   Vaccine 0.5 milliLiter(s) IntraMuscular once  atorvastatin 40 milliGRAM(s) Oral at bedtime    MEDICATIONS  (PRN):      Allergies    lisinopril (Other)    Intolerances        SOCIAL HISTORY:NC    FAMILY HISTORY: NC      REVIEW OF SYSTEMS:    CONSTITUTIONAL: No weakness, fevers or chills  EYES/ENT: No visual changes;  No vertigo or throat pain   NECK: No pain or stiffness  RESPIRATORY: No cough, wheezing, hemoptysis; No shortness of breath  CARDIOVASCULAR: No chest pain or palpitations  GENITOURINARY: No dysuria, frequency or hematuria  NEUROLOGICAL: No numbness or weakness  SKIN: No itching, burning, rashes, or lesions   All other review of systems is negative unless indicated above.    Vital Signs Last 24 Hrs  T(C): 36.1 (26 Sep 2017 17:04), Max: 36.9 (26 Sep 2017 04:18)  T(F): 97 (26 Sep 2017 17:04), Max: 98.5 (26 Sep 2017 04:18)  HR: 64 (26 Sep 2017 17:04) (52 - 78)  BP: 135/52 (26 Sep 2017 17:04) (127/39 - 163/56)  BP(mean): --  RR: 16 (26 Sep 2017 17:04) (16 - 17)  SpO2: 99% (26 Sep 2017 10:31) (99% - 100%)    PHYSICAL EXAM:    Constitutional: NAD, well-developed  HEENT: EOMI, throat clear  Neck: No LAD, supple  Respiratory: CTA and P  Cardiovascular: S1 and S2, RRR, no M  Gastrointestinal: BS+, soft, NT/ND, neg HSM,  Extremities: No peripheral edema, neg clubing, cyanosis  Vascular: 2+ peripheral pulses  Neurological: A/O x 3, no focal deficits  Psychiatric: Normal mood, normal affect  Skin: No rashes    LABS:  CBC Full  -  ( 26 Sep 2017 11:02 )  WBC Count : 5.9 K/uL  Hemoglobin : 8.4 g/dL  Hematocrit : 25.3 %  Platelet Count - Automated : 144 K/uL  Mean Cell Volume : 97.8 fl  Mean Cell Hemoglobin : 32.4 pg  Mean Cell Hemoglobin Concentration : 33.1 gm/dL  Auto Neutrophil # : x  Auto Lymphocyte # : x  Auto Monocyte # : x  Auto Eosinophil # : x  Auto Basophil # : x  Auto Neutrophil % : x  Auto Lymphocyte % : x  Auto Monocyte % : x  Auto Eosinophil % : x  Auto Basophil % : x    09-26    140  |  104  |  43<H>  ----------------------------<  128<H>  4.2   |  29  |  5.80<H>    Ca    7.7<L>      26 Sep 2017 05:23  Phos  4.1     09-25  Mg     2.4     09-25    TPro  5.9<L>  /  Alb  3.4  /  TBili  0.5  /  DBili  x   /  AST  7<L>  /  ALT  13  /  AlkPhos  68  09-25    PT/INR - ( 25 Sep 2017 08:09 )   PT: 10.5 sec;   INR: 0.97 ratio         PTT - ( 25 Sep 2017 08:09 )  PTT:29.6 sec    09-25 @ 15:00  Hep A Igm Nonreact  Hep A total ab, IgA and M --  Hep B core Ab total --  Hep B core IgM Nonreact  Hep B DNA PCR --  Hep BSAg --  Hep BSAb --  Hep BSAb --  HCV Ab --  HCV RNA Log --  HCV RNA interp --                RADIOLOGY & ADDITIONAL STUDIES:
Patient is a 85y old  Male who presents with a chief complaint of Weakness/SUTHERLAND (25 Sep 2017 20:30)      HPI:  85M.  c/o weakness and fatigue.  onset approximately 1 month ago.  past few days, started experiencing shortness of breath, dyspnea upon exertion and more profound weakness.  feeling poorly during last HD session of Friday and advised to go to ED if he continued to feel poorly.  today, wife called to cancel his HD session, and they proceeded to ED.  Hbg 5.5.  stool OB (+) but no eugenia bleeding.  ED ordered to transfuse 2 units PRBCs and placed on Protonix gtt.  arrangements for HD today have been orchestrated w/ Nephrology.    PMHx:  ESRD-HD MWF;  HFpEF;  CAD-PCI KAREN 12/2016;  HTN;  DM;  AOCD;  bladder CA;  perforated gastric ulcer;  choledocolithiasis; acute cholangitis.    PSHx:  12/04/2016 ERCP;  12/11/2016 laparoscopic cholecystectomy;  1990s gastric ulcer resection;  neobladder construction;  RUE AVF.        pt comf   neg abd pain, mild reflux that inc after eating, last night only  neg CP or SOB    ALL:  lisinopril.    SocHx:  .  lives in the community with his wife in a private residence.    FH:  NC. (25 Sep 2017 13:56)      PAST MEDICAL & SURGICAL HISTORY:  ESRD on dialysis  HTN (hypertension)  No significant past surgical history      MEDICATIONS  (STANDING):  gabapentin 100 milliGRAM(s) Oral three times a day  metoprolol succinate ER 50 milliGRAM(s) Oral daily  doxercalciferol Injectable 3.5 MICROGram(s) IV Push <User Schedule>  epoetin norman Injectable 3000 Unit(s) IV Push <User Schedule>  epoetin norman Injectable 4000 Unit(s) IV Push <User Schedule>  influenza   Vaccine 0.5 milliLiter(s) IntraMuscular once  atorvastatin 40 milliGRAM(s) Oral at bedtime  pantoprazole    Tablet 40 milliGRAM(s) Oral before breakfast    MEDICATIONS  (PRN):  ondansetron Injectable 4 milliGRAM(s) IV Push every 6 hours PRN Nausea and/or Vomiting      Allergies    lisinopril (Other)    Intolerances        SOCIAL HISTORY:NC    FAMILY HISTORY:NC      REVIEW OF SYSTEMS:    CONSTITUTIONAL: No weakness, fevers or chills  EYES/ENT: No visual changes;  No vertigo or throat pain   NECK: No pain or stiffness  RESPIRATORY: No cough, wheezing, hemoptysis; No shortness of breath  CARDIOVASCULAR: No chest pain or palpitations  GENITOURINARY: No dysuria, frequency or hematuria  NEUROLOGICAL: No numbness or weakness  SKIN: No itching, burning, rashes, or lesions   All other review of systems is negative unless indicated above.    Vital Signs Last 24 Hrs  T(C): 36.1 (29 Sep 2017 08:37), Max: 36.8 (28 Sep 2017 10:22)  T(F): 97 (29 Sep 2017 08:37), Max: 98.3 (28 Sep 2017 10:22)  HR: 53 (29 Sep 2017 09:04) (53 - 70)  BP: 148/56 (29 Sep 2017 09:04) (143/61 - 161/62)  BP(mean): --  RR: 17 (29 Sep 2017 08:37) (16 - 18)  SpO2: 96% (28 Sep 2017 10:22) (96% - 96%)    PHYSICAL EXAM:    Constitutional: NAD, well-developed  HEENT: EOMI, throat clear  Neck: No LAD, supple  Respiratory: CTA and P  Cardiovascular: S1 and S2, RRR, no M  Gastrointestinal: BS+, soft, NT/ND, neg HSM,  Extremities: No peripheral edema, neg clubing, cyanosis  Vascular: 2+ peripheral pulses  Neurological: A/O x 3, no focal deficits  Psychiatric: Normal mood, normal affect  Skin: No rashes    LABS:  CBC Full  -  ( 29 Sep 2017 06:14 )  WBC Count : 4.3 K/uL  Hemoglobin : 9.0 g/dL  Hematocrit : 27.0 %  Platelet Count - Automated : 138 K/uL  Mean Cell Volume : 97.5 fl  Mean Cell Hemoglobin : 32.4 pg  Mean Cell Hemoglobin Concentration : 33.2 gm/dL  Auto Neutrophil # : x  Auto Lymphocyte # : x  Auto Monocyte # : x  Auto Eosinophil # : x  Auto Basophil # : x  Auto Neutrophil % : x  Auto Lymphocyte % : x  Auto Monocyte % : x  Auto Eosinophil % : x  Auto Basophil % : x    09-29    139  |  106  |  19  ----------------------------<  86  4.1   |  23  |  5.59<H>    Ca    8.0<L>      29 Sep 2017 06:14  Phos  4.2     09-28    TPro  x   /  Alb  3.2<L>  /  TBili  x   /  DBili  x   /  AST  x   /  ALT  x   /  AlkPhos  x   09-28 09-25 @ 15:00  Hep A Igm Nonreact  Hep A total ab, IgA and M --  Hep B core Ab total --  Hep B core IgM Nonreact  Hep B DNA PCR --  Hep BSAg --  Hep BSAb --  Hep BSAb --  HCV Ab --  HCV RNA Log --  HCV RNA interp --                RADIOLOGY & ADDITIONAL STUDIES:  < from: CT Enterography w/ Oral Cont and w/ IV Cont (09.28.17 @ 15:48) >  EXAM:  CT ENTEROGRAPHY OC IC                            PROCEDURE DATE:  09/28/2017          INTERPRETATION:  CLINICAL INFORMATION: GI bleed. End-stage renal disease.    COMPARISON: December 10, 2014.    PROCEDURE:   CT of the Abdomen and Pelvis was performed with and without intravenous   contrast.  Precontrast, Arterial and Delayed phases were performed.  Intravenous contrast: 90 mL Omnipaque 350. 10 mL discarded.  Oral contrast: None.  Sagittal and coronal reformats were performed as well asMIPS.    FINDINGS:    LOWER CHEST: Trace bilateral pleural effusions with basilar atelectasis.    LIVER: Within normal limits.  BILE DUCTS: Normal caliber.   GALLBLADDER: Status post cholecystectomy.  SPLEEN: Within normal limits.  PANCREAS: Cystic lesion in the tail of the pancreas, measuring 1.5 cm.  ADRENALS: Within normal limits.  KIDNEYS/URETERS: Atrophic bilateral kidneys. Bilateral hypodense lesions   are too small to characterize. No hydronephrosis.     BLADDER: Cystectomy with neobladder.  REPRODUCTIVE ORGANS: Prostatectomy.    BOWEL: No source for active GI bleed identified on CT. No bowel   obstruction. Air-fluid levels throughout the colon and small bowel,   probably diarrhea. Colonic diverticulosis.  PERITONEUM: Small volume perihepatic ascites.  VESSELS:  Atherosclerotic change of the abdominal aorta and its branches.  RETROPERITONEUM: No lymphadenopathy.    ABDOMINAL WALL: Within normal limits.  BONES: Orthopedic hardware in the right hip. Degenerative changes of the   spine.    IMPRESSION: No source of active GI bleed identified on CT.             < end of copied text >
HPI:  Patient is 85-year-old he has a history of hypertension, type II diabetes mellitus, high cholesterol, or any artery disease status post drug-eluting stent placed to proximal RCA , January 2016, end-stage renal disease he is on hemodialysis, bladder cancer, he was admitted with complains of increasing fatigue and shortness of breath for the past 2-3 weeks.  On admission he was diagnosed to have severe anemia and guaiac positive stool. After admission he has received blood transfusion and he feels improved. He status post upper endoscopy. He denies any chest pain or shortness of breath. He continues to be normal sinus rhythm on telemetry. No blood in the urine, stool or black colored stool. His H&H is stable and he is hemodynamically stable.          PAST MEDICAL & SURGICAL HISTORY:  ESRD on dialysis  HTN (hypertension)  Bladder cancer. Perforated gastric ulcer in the past.  Cholecystectomy.  Gastric ulcer resection in the past.  Type II diabetes mellitus.  coronary artery disease status post stents in the past        PREVIOUS DIAGNOSTIC TESTING:      ECHO  FINDINGS: January 2016     CONCLUSION:  --There is mild left ventricular hypertrophy.  --Global LV wall motion is borderline normal. The mid inferolateral segment and mid inferior   segment are hypokinetic.  --LV ejection fraction is borderline normal.  --The left ventricular filling pattern is normal.  --The left ventricular ejection fraction is 5o to 55%.  --The aortic valve is calcified. There is mild to moderate aortic stenosis. The estimated   aortic valve area is 1.40 cm².  --There is mitral annular calcification. There is mild mitral regurgitation.  --There is mild tricuspid regurgitation.  --The right atrial pressure is 6 - 10 mm Hg. There is moderate pulmonary hypertension.  --There is a small to moderate pericardial effusion.      MEDICATIONS  (STANDING):  pantoprazole Infusion 8 mG/Hr (10 mL/Hr) IV Continuous <Continuous>  gabapentin 100 milliGRAM(s) Oral three times a day  metoprolol succinate ER 50 milliGRAM(s) Oral daily  doxercalciferol Injectable 3.5 MICROGram(s) IV Push <User Schedule>  epoetin norman Injectable 3000 Unit(s) IV Push <User Schedule>  epoetin norman Injectable 4000 Unit(s) IV Push <User Schedule>  influenza   Vaccine 0.5 milliLiter(s) IntraMuscular once  atorvastatin 40 milliGRAM(s) Oral at bedtime  polyethylene glycol/electrolyte Solution. 4000 milliLiter(s) Oral once    MEDICATIONS  (PRN):  ondansetron Injectable 4 milliGRAM(s) IV Push every 6 hours PRN Nausea and/or Vomiting        REVIEW OF SYSTEM:  Pertinent items are noted in HPI.  Constitutional	Negative for chills, fevers, sweats.    Eyes: 	Negative for visual disturbance.  Ears, nose, mouth, throat, and face: Negative for epistaxis, nasal congestion, sore throat and tinnitus.  Neck:	Negative for enlargement, pain and difficulty in swallowing  Respiration : Negative for cough, dyspnea on exertion, pleuritic chest pain and wheezing  Cardiovascular: Negative for chest pain, dyspnea and palpitations    Gastrointestinal : Negative for abdominal pain, diarrhea, nausea and vomiting  Genitourinary: Negative for dysuria, frequency and urinary incontinence .  Skin: Negative for  rash, pruritus, swelling, dryness .  	  Hematologic/lymphatic: Negative for bleeding and easy bruising  Musculoskeletal: Negative for arthralgias, back pain and muscle weakness.  Neurological: Negative for dizziness, headaches, seizures and tremors  Behavioral/Psych: Negative for mood change, depression.  Endocrine:	Negative for blurry vision, polydipsia and polyuria, diaphoresis.   Allergic/Immunologic:	Negative for anaphylaxis, angioedema and urticaria.      Vital Signs Last 24 Hrs  T(C): 36.4 (27 Sep 2017 05:03), Max: 36.9 (26 Sep 2017 10:31)  T(F): 97.5 (27 Sep 2017 05:03), Max: 98.4 (26 Sep 2017 10:31)  HR: 62 (27 Sep 2017 05:03) (52 - 68)  BP: 157/45 (27 Sep 2017 05:03) (127/39 - 167/44)  BP(mean): 75 (27 Sep 2017 05:03) (75 - 75)  RR: 17 (27 Sep 2017 05:03) (16 - 17)  SpO2: 100% (27 Sep 2017 05:03) (98% - 100%)          PHYSICAL EXAM  General Appearance: cooperative, no acute distress,   HEENT: PERRL, conjunctiva clear, EOM's intact, non injected pharynx, no exudate, .  Neck: Supple, , no adenopathy, thyroid: not enlarged, no carotid bruit or JVD  Back: Symmetric, no  tenderness,no soft tissue tenderness  Lungs: Clear to auscultation bilateral,no adventitious breath sounds, normal   expiratory phase  Heart: Regular rate and rhythm, S1, S2 normal, grade 1/6 ejection systolic murmur, no  rub or gallop  Abdomen: Soft, non-tender, bowel sounds active , no hepatosplenomegaly  Extremities: no cyanosis or edema, no joint swelling  Skin: Skin color, texture normal, no rashes   Neurologic: Alert and oriented X3 , cranial nerves intact, sensory and motor normal,        INTERPRETATION OF TELEMETRY: NSR          LABS:                          9.5    5.1   )-----------( 141      ( 27 Sep 2017 05:32 )             27.5     09-27    138  |  104  |  20  ----------------------------<  103<H>  3.8   |  26  |  4.41<H>    Ca    8.0<L>      27 Sep 2017 05:32
[place] [facility] [place] Patient is a 85y old  Male who presents with a chief complaint of Weakness/SUTHERLAND    HPI:  85M.  c/o weakness and fatigue.  Onset approximately 1 month ago.  past few days, started experiencing shortness of breath, dyspnea upon exertion and more profound weakness.  feeling poorly during last HD session of Friday and advised to go to ED if he continued to feel poorly.  today, wife called to cancel his HD session, and they proceeded to ED.  Hbg 5.5.  stool OB (+) but no eugenia bleeding.  ED ordered to transfuse 2 units PRBCs and placed on Protonix gtt.  arrangements for HD today have been orchestrated w/ Nephrology.    9/26/17- states had black stool last night, none today  9/27/17- no dark stools. HH stable. S/p EGD- few erosions, no acute source of bleeding  9/28: diverticuli on colonscopy, pt upset source not identified, CT enterography today followed by RRT.    Review of system- All 10 systems reviewed and is as per HPI otherwise negative.     Vital Signs Last 24 Hrs  T(C): 36.7 (27 Sep 2017 10:55), Max: 36.7 (27 Sep 2017 10:55)  T(F): 98 (27 Sep 2017 10:55), Max: 98 (27 Sep 2017 10:55)  HR: 61 (27 Sep 2017 10:55) (52 - 68)  BP: 160/43 (27 Sep 2017 10:55) (127/54 - 167/44)  BP(mean): 75 (27 Sep 2017 05:03) (75 - 75)  RR: 18 (27 Sep 2017 10:55) (16 - 18)  SpO2: 97% (27 Sep 2017 10:55) (97% - 100%)    LABS:                         9.5    4.7   )-----------( 140      ( 27 Sep 2017 12:05 )             28.2     27 Sep 2017 05:32    138    |  104    |  20     ----------------------------<  103    3.8     |  26     |  4.41     Ca    8.0        27 Sep 2017 05:32    CAPILLARY BLOOD GLUCOSE  103 (27 Sep 2017 05:34)  116 (26 Sep 2017 21:19)    PHYSICAL EXAM:  GENERAL: NAD, well-groomed, well-developed  HEAD:  Atraumatic, Normocephalic  EYES: EOMI, PERRLA, conjunctiva and sclera clear  HEENT: Moist mucous membranes  NECK: Supple, No JVD  NERVOUS SYSTEM:  Alert & Oriented X3, Motor Strength 5/5 B/L upper and lower extremities; DTRs 2+ intact and symmetric  CHEST/LUNG: Clear to auscultation bilaterally; No rales, rhonchi, wheezing, or rubs  HEART: Regular rate and rhythm; No murmurs, rubs, or gallops  ABDOMEN: Soft, Nontender, Nondistended; Bowel sounds present  GENITOURINARY- Voiding, no palpable bladder  EXTREMITIES:  2+ Peripheral Pulses, No clubbing, cyanosis, or edema  MUSCULOSKELTAL- No muscle tenderness, Muscle tone normal, No joint tenderness, no Joint swelling, Joint range of motion-normal  SKIN-no rash, no lesion  CNS- alert, oriented X3, non focal     pantoprazole Infusion 8 mG/Hr IV Continuous <Continuous>  gabapentin 100 milliGRAM(s) Oral three times a day  metoprolol succinate ER 50 milliGRAM(s) Oral daily  doxercalciferol Injectable 3.5 MICROGram(s) IV Push <User Schedule>  epoetin norman Injectable 3000 Unit(s) IV Push <User Schedule>  epoetin norman Injectable 4000 Unit(s) IV Push <User Schedule>  influenza   Vaccine 0.5 milliLiter(s) IntraMuscular once  atorvastatin 40 milliGRAM(s) Oral at bedtime    Assessment/Plan  #GI bleed/anemia acute blood loss-etiology unknwon  S/p 3 units PRBC transfusion total  HH stable  EGD- hiatal hernia, mild erosions, no active bleeding  colonoscopy: multiple diverticuli  CT enterography: awaiting results  RRT to follow  to start ASA when GI allows    #CAD s/p stent  Card eval appreciated  Anti-platelets on hold in view of acute bleeding    #Chr diastolic CHF- compensated    #ESRD on HD  Renal f/u appreciated  For HD as per schedule

## 2017-09-29 NOTE — DISCHARGE NOTE ADULT - PATIENT PORTAL LINK FT
“You can access the [employer] Patient Portal, offered by [facility], by registering with the following website: [email or web address]”

## 2017-09-29 NOTE — PROGRESS NOTE ADULT - PROVIDER SPECIALTY LIST ADULT
Cardiology
Cardiology
Gastroenterology
Hospitalist
Hospitalist
Nephrology
Cardiology
Gastroenterology
Nephrology
Hospitalist
Hospitalist

## 2017-09-29 NOTE — DISCHARGE NOTE ADULT - CARE PLAN
Principal Discharge DX:	Upper GI bleed  Goal:	s/p egd/colonscopy  Instructions for follow-up, activity and diet:	capsule endoscopy

## 2017-09-29 NOTE — PROGRESS NOTE ADULT - ASSESSMENT
GI bleed he has guaiac positive stool. He has been stable since admission. His H&H is stable.  Coronary artery disease status post stents in the past and he received drug-eluting stent to proximal RCA January 2016. No recent angina or congestive heart failure.  Mild-to-moderate aortic stenosis.  End-stage renal disease he is on hemodialysis.  Essential hypertension.  High cholesterol.  Peripheral arterial disease.  Diabetes mellitus.  Suggest  Continue with current medications.  Follow-up with H&H he will need further transfusion.  Follow-up with electrolytes.  Overall cardiac status is stable, there is no absolute contraindication from cardiac standpoint of view for colonoscopy.  Due to multiple comorbid conditions he certainly at a moderate risk for any invasive intervention. All risks options discussed at length with the patient.  Discussed with the hospitalist and gastroenterologist.
acute blood loss anemia.    stool OB (+).  taking Plavix for secondary cardiovascular prophylaxis.  hx perforated gastric ulcer 20 years ago.  hx AOCD.    ESRD.    HFpEF.    CAD-PCI RCA KAREN 12/2016.    type 2 DM.    -transfuse 2 units PRBCs w/ HD.  -Protonix gtt.  -trend HH.  -hold Plavix.  -clear liquid diet.  -A1C.  monitor FSBG.  correction insulin coverage.  -admit to telemetry delgado.  -d/w Joesph Kam + Nitish and Aston.
acute blood loss anemia.  Dw renal and cardiology  likely multifactorial anemia, ESRD and GI loss    stool OB (+).  taking Plavix for secondary cardiovascular prophylaxis.  hx perforated gastric ulcer 20 years ago.      ESRD. HD        CAD-PCI RCA KAREN 12/2016.    type 2 DM.    -transfused 2 units PRBCs w/ HD.  -Protonix PO daily  -trend HH.  -hold Plavix.,      EGD, colon, CT enterography s cause  Pill cam as out pt and risk missed Dx s it DW pt and he wants to think it over for a while    risk bleed vs stent obstruction DW pt nd cardiology and will use ASA, dose to be decided by cardiology and would follow HCT closely at HD    FUwith me in few weeks
84 yo man with ESRD admitted with weakness.  Likely due to GI blood loss.  Positive stool occult heme.  Will plan HD today with transfusion.  Pt's Vital signs stable.  Fluid and electrolytes stable  GI evaluation.    9/26  s/p hd and transfusion yesterday  feels well, d/w dR dillon  for upper endoscopy tomorrow  Will repeat cbc at 11 am and hd w possible transfusion at  1 pm  on clear liquids    9/27 SY  --ESRD  S/p HD on mon and tues.  Fluid and Electrolytes stable with limited po intake due to colon prep.  Will plan next HD on Friday unless further blood loss necessitates transfusion.  --GI  post EGD with negative findings.   For colonoscopy in am.    9/28  for CT enterography today  pt should be dialyzed after procedure due to not tolerating extra volume  d/w pt and wife  hd in am as well
acute blood loss anemia.    stool OB (+).  taking Plavix for secondary cardiovascular prophylaxis.  hx perforated gastric ulcer 20 years ago.  hx AOCD.    ESRD.    HFpEF.    CAD-PCI RCA KAREN 12/2016.    type 2 DM.    -transfuse 2 units PRBCs w/ HD.  -Protonix gtt.  -trend HH.  -hold Plavix.  -clear liquid diet.  -A1C.  monitor FSBG.  correction insulin coverage.  -admit to telemetry delgado.  -d/w Joesph Kam + Nitish and Aston.
84 yo man with ESRD admitted with weakness.  Likely due to GI blood loss.  Positive stool occult heme.  Will plan HD today with transfusion.  Pt's Vital signs stable.  Fluid and electrolytes stable  GI evaluation.    9/26  s/p hd and transfusion yesterday  feels well, d/w dR dillon  for upper endoscopy tomorrow  Will repeat cbc at 11 am and hd w possible transfusion at  1 pm  on clear liquids    9/27 SY  --ESRD  S/p HD on mon and tues.  Fluid and Electrolytes stable with limited po intake due to colon prep.  Will plan next HD on Friday unless further blood loss necessitates transfusion.  --GI  post EGD with negative findings.   For colonoscopy in am.    9/28  for CT enterography today  pt should be dialyzed after procedure due to not tolerating extra volume  d/w pt and wife  hd in am as well    9/29  gi bleed for outpt evaluation  stable on hd  no further bleed noted
86 yo man with ESRD admitted with weakness.  Likely due to GI blood loss.  Positive stool occult heme.  Will plan HD today with transfusion.  Pt's Vital signs stable.  Fluid and electrolytes stable  GI evaluation.    9/25 SY  --ESRD  Tolerating tx well.  Plan to transfuse 2 units PRBC.  --Likely GIB.   Transfuse.  GI evaluation.
86 yo man with ESRD admitted with weakness.  Likely due to GI blood loss.  Positive stool occult heme.  Will plan HD today with transfusion.  Pt's Vital signs stable.  Fluid and electrolytes stable  GI evaluation.    9/26  s/p hd and transfusion yesterday  feels well, d/w dR dillon  for upper endoscopy tomorrow  Will repeat cbc at 11 am and hd w possible transfusion at  1 pm  on clear liquids
86 yo man with ESRD admitted with weakness.  Likely due to GI blood loss.  Positive stool occult heme.  Will plan HD today with transfusion.  Pt's Vital signs stable.  Fluid and electrolytes stable  GI evaluation.    9/26  s/p hd and transfusion yesterday  feels well, d/w dR dillon  for upper endoscopy tomorrow  Will repeat cbc at 11 am and hd w possible transfusion at  1 pm  on clear liquids    9/27 SY  --ESRD  S/p HD on mon and tues.  Fluid and Electrolytes stable with limited po intake due to colon prep.  Will plan next HD on Friday unless further blood loss necessitates transfusion.  --GI  post EGD with negative findings.   For colonoscopy in am.
GI bleed he had guaiac positive stool. He has been stable since admission. His H&H is stable.  Coronary artery disease status post stents in the past and he received drug-eluting stent to proximal RCA January 2016. No recent angina or congestive heart failure.  Mild-to-moderate aortic stenosis.  End-stage renal disease he is on hemodialysis.  Essential hypertension.  High cholesterol.  Peripheral arterial disease.  Diabetes mellitus.  Suggest  Continue with current medications.  Replace potassium.  Follow-up with H&H he will need further transfusion.  Follow-up with electrolytes.  Start aspirin 81 mg PO QD one is okay with gastroenterologist.  Ambulate.  Discussed with the hospitalist and gastroenterologist.
GI bleed he had guaiac positive stool. He has been stable since admission. His H&H is stable.  Coronary artery disease status post stents in the past and he received drug-eluting stent to proximal RCA January 2016. No recent angina or congestive heart failure.  Mild-to-moderate aortic stenosis.  End-stage renal disease he is on hemodialysis.  Essential hypertension.  High cholesterol.  Peripheral arterial disease.  Diabetes mellitus.  Suggest  Continue with current medications.  Restart Ecotrin 81 mg by mouth once daily.  Follow-up with H&H he will need further transfusion.  Follow-up with electrolytes.  Ambulate.  Discussed with the hospitalist and gastroenterologist.  I shall of follow-upwhen necessary.
acute blood loss anemia.    stool OB (+).  taking Plavix for secondary cardiovascular prophylaxis.  hx perforated gastric ulcer 20 years ago.  hx AOCD.    ESRD.    HFpEF.    CAD-PCI RCA KAREN 12/2016.    type 2 DM.    -transfuse 2 units PRBCs w/ HD.  -Protonix gtt.  -trend HH.  -hold Plavix.  -clear liquid diet.  -A1C.  monitor FSBG.  correction insulin coverage.  -admit to telemetry delgado.  -d/w Joesph Kam + Nitish and Aston.
acute blood loss anemia.    stool OB (+).  taking Plavix for secondary cardiovascular prophylaxis.  hx perforated gastric ulcer 20 years ago.  hx AOCD.    ESRD.    HFpEF.    CAD-PCI RCA KAREN 12/2016.    type 2 DM.    -transfuse 2 units PRBCs w/ HD.  -Protonix gtt.  -trend HH.  -hold Plavix.  -clear liquid diet.  -A1C.  monitor FSBG.  correction insulin coverage.  -admit to telemetry delgado.  -d/w Joesph Kam + Nitish and Aston.
· Assessment		  acute blood loss anemia.  Dw renal an plan EGD    stool OB (+).  taking Plavix for secondary cardiovascular prophylaxis.  hx perforated gastric ulcer 20 years ago.  hx AOCD.    ESRD. HD        CAD-PCI RCA KAREN 12/2016.    type 2 DM.    -transfuse 2 units PRBCs w/ HD.  -Protonix gtt.  -trend HH.  -hold Plavix.  -clear liquid diet, NPO after MN    EGD A B R KRISHAN pt

## 2017-09-29 NOTE — DISCHARGE NOTE ADULT - MEDICATION SUMMARY - MEDICATIONS TO STOP TAKING
I will STOP taking the medications listed below when I get home from the hospital:    Levaquin 750 mg oral tablet  -- 1 tab(s) by mouth every 24 hours  -- Avoid prolonged or excessive exposure to direct and/or artificial sunlight while taking this medication.  Do not take dairy products, antacids, or iron preparations within one hour of this medication.  Finish all this medication unless otherwise directed by prescriber.  May cause drowsiness or dizziness.  Medication should be taken with plenty of water.    clopidogrel 75 mg oral tablet  -- 1 tab(s) by mouth once a day

## 2017-09-29 NOTE — DISCHARGE NOTE ADULT - HOSPITAL COURSE
85M.  c/o weakness and fatigue.  Onset approximately 1 month ago.  past few days, started experiencing shortness of breath, dyspnea upon exertion and more profound weakness.  feeling poorly during last HD session of Friday and advised to go to ED if he continued to feel poorly.  today, wife called to cancel his HD session, and they proceeded to ED.  Hbg 5.5.  stool OB (+) but no eugenia bleeding.  ED ordered to transfuse 2 units PRBCs and placed on Protonix gtt.  s/p egd/colonscopy and CT enterography which did not localize source. Plan for outpt capusle endoscopy. May resume ASA 81 QD. Hold Plavix.

## 2017-10-02 LAB — SURGICAL PATHOLOGY FINAL REPORT - CH: SIGNIFICANT CHANGE UP

## 2017-10-03 DIAGNOSIS — K57.30 DIVERTICULOSIS OF LARGE INTESTINE WITHOUT PERFORATION OR ABSCESS WITHOUT BLEEDING: ICD-10-CM

## 2017-10-03 DIAGNOSIS — Z85.51 PERSONAL HISTORY OF MALIGNANT NEOPLASM OF BLADDER: ICD-10-CM

## 2017-10-03 DIAGNOSIS — E11.40 TYPE 2 DIABETES MELLITUS WITH DIABETIC NEUROPATHY, UNSPECIFIED: ICD-10-CM

## 2017-10-03 DIAGNOSIS — I13.2 HYPERTENSIVE HEART AND CHRONIC KIDNEY DISEASE WITH HEART FAILURE AND WITH STAGE 5 CHRONIC KIDNEY DISEASE, OR END STAGE RENAL DISEASE: ICD-10-CM

## 2017-10-03 DIAGNOSIS — D12.3 BENIGN NEOPLASM OF TRANSVERSE COLON: ICD-10-CM

## 2017-10-03 DIAGNOSIS — K92.2 GASTROINTESTINAL HEMORRHAGE, UNSPECIFIED: ICD-10-CM

## 2017-10-03 DIAGNOSIS — K25.9 GASTRIC ULCER, UNSPECIFIED AS ACUTE OR CHRONIC, WITHOUT HEMORRHAGE OR PERFORATION: ICD-10-CM

## 2017-10-03 DIAGNOSIS — K44.9 DIAPHRAGMATIC HERNIA WITHOUT OBSTRUCTION OR GANGRENE: ICD-10-CM

## 2017-10-03 DIAGNOSIS — R53.1 WEAKNESS: ICD-10-CM

## 2017-10-03 DIAGNOSIS — D12.5 BENIGN NEOPLASM OF SIGMOID COLON: ICD-10-CM

## 2017-10-03 DIAGNOSIS — D12.0 BENIGN NEOPLASM OF CECUM: ICD-10-CM

## 2017-10-03 DIAGNOSIS — I25.10 ATHEROSCLEROTIC HEART DISEASE OF NATIVE CORONARY ARTERY WITHOUT ANGINA PECTORIS: ICD-10-CM

## 2017-10-03 DIAGNOSIS — I50.32 CHRONIC DIASTOLIC (CONGESTIVE) HEART FAILURE: ICD-10-CM

## 2017-10-03 DIAGNOSIS — Z79.84 LONG TERM (CURRENT) USE OF ORAL HYPOGLYCEMIC DRUGS: ICD-10-CM

## 2017-10-03 DIAGNOSIS — E78.5 HYPERLIPIDEMIA, UNSPECIFIED: ICD-10-CM

## 2017-10-03 DIAGNOSIS — D62 ACUTE POSTHEMORRHAGIC ANEMIA: ICD-10-CM

## 2017-10-03 DIAGNOSIS — D12.4 BENIGN NEOPLASM OF DESCENDING COLON: ICD-10-CM

## 2017-10-03 DIAGNOSIS — N18.6 END STAGE RENAL DISEASE: ICD-10-CM

## 2017-10-03 DIAGNOSIS — Z99.2 DEPENDENCE ON RENAL DIALYSIS: ICD-10-CM

## 2018-07-23 ENCOUNTER — EMERGENCY (EMERGENCY)
Facility: HOSPITAL | Age: 83
LOS: 0 days | Discharge: HOME | End: 2018-07-23
Attending: EMERGENCY MEDICINE | Admitting: EMERGENCY MEDICINE
Payer: MEDICARE

## 2018-07-23 VITALS
OXYGEN SATURATION: 100 % | RESPIRATION RATE: 16 BRPM | TEMPERATURE: 98 F | SYSTOLIC BLOOD PRESSURE: 187 MMHG | DIASTOLIC BLOOD PRESSURE: 68 MMHG | WEIGHT: 164.91 LBS | HEART RATE: 74 BPM

## 2018-07-23 VITALS
DIASTOLIC BLOOD PRESSURE: 98 MMHG | HEART RATE: 81 BPM | OXYGEN SATURATION: 100 % | SYSTOLIC BLOOD PRESSURE: 165 MMHG | TEMPERATURE: 98 F | RESPIRATION RATE: 15 BRPM

## 2018-07-23 DIAGNOSIS — Z98.89 OTHER SPECIFIED POSTPROCEDURAL STATES: Chronic | ICD-10-CM
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
## 2018-07-23 DIAGNOSIS — T82.838A HEMORRHAGE DUE TO VASCULAR PROSTHETIC DEVICES, IMPLANTS AND GRAFTS, INITIAL ENCOUNTER: ICD-10-CM

## 2018-07-23 DIAGNOSIS — I77.0 ARTERIOVENOUS FISTULA, ACQUIRED: Chronic | ICD-10-CM

## 2018-07-23 DIAGNOSIS — Z99.2 DEPENDENCE ON RENAL DIALYSIS: ICD-10-CM

## 2018-07-23 DIAGNOSIS — N18.6 END STAGE RENAL DISEASE: ICD-10-CM

## 2018-07-23 DIAGNOSIS — Z85.51 PERSONAL HISTORY OF MALIGNANT NEOPLASM OF BLADDER: ICD-10-CM

## 2018-07-23 DIAGNOSIS — Z85.51 PERSONAL HISTORY OF MALIGNANT NEOPLASM OF BLADDER: Chronic | ICD-10-CM

## 2018-07-23 DIAGNOSIS — I12.0 HYPERTENSIVE CHRONIC KIDNEY DISEASE WITH STAGE 5 CHRONIC KIDNEY DISEASE OR END STAGE RENAL DISEASE: ICD-10-CM

## 2018-07-23 PROBLEM — I10 ESSENTIAL (PRIMARY) HYPERTENSION: Chronic | Status: ACTIVE | Noted: 2017-03-26

## 2018-07-23 PROCEDURE — 99283 EMERGENCY DEPT VISIT LOW MDM: CPT

## 2018-07-23 NOTE — ED ADULT NURSE NOTE - PMH
Bladder cancer    CKD (chronic kidney disease), stage 4 (severe)    ESRD on dialysis    HTN (hypertension)

## 2018-07-23 NOTE — ED PROVIDER NOTE - CARE PLAN
Principal Discharge DX:	Vascular device, implant, or graft complication, initial encounter  Secondary Diagnosis:	Dialysis complication, initial encounter

## 2018-07-23 NOTE — ED PROVIDER NOTE - CARDIAC, MLM
Normal rate, regular rhythm.  Heart sounds S1, S2.  No murmurs, rubs or gallops. Normal radial pulses.

## 2018-07-23 NOTE — ED ADULT NURSE NOTE - OBJECTIVE STATEMENT
Pt snt by dialysis for fistula not clotting after dialysis, intermittent bleeding x 1 hour.   Pt with compression device on lt arm fistula.

## 2018-07-23 NOTE — ED PROVIDER NOTE - MEDICAL DECISION MAKING DETAILS
Elderly male +ESRD on 81 asa BIBA from dialysis facility regarding intermittent continuos bleeding from AVF access site. Pt arrived with spring loaded pressure clamp in place pt denies any symptoms during incident. Pt asymptomatic during ED exam. Plan to discuss with renal.

## 2018-07-23 NOTE — ED PROVIDER NOTE - CONSTITUTIONAL, MLM
normal... Elderly white male not acutely ill. Well appearing, well nourished, awake, alert, oriented to person, place, time/situation and in no apparent distress.

## 2018-07-23 NOTE — ED PROVIDER NOTE - SKIN, MLM
Skin normal color for race, warm, dry and intact. No evidence of rash. No tactile warmth, no pallor, no diaphoresis.

## 2018-07-23 NOTE — ED PROVIDER NOTE - OBJECTIVE STATEMENT
87 y/o male with a PMHx of  HTN, ESRD, bladder cancer presents to the ED BIBA as an outpatient from dialysis facility c/o intermittent bleeding through AVF site for an hour today. Reports significant bleeding at first, slowed down, but still persistent. No SOB, CP, dizziness, LOC, N/V/D. brenna ended at about 1:30 Pt has not further complains  Renal doctor- Dr. Clancy. PCP- Dr. Faustin. Allergic to Lisinopril 87 y/o male with a PMHx of  HTN, ESRD, bladder cancer presents to the ED BIBA as an outpatient from dialysis facility c/o intermittent bleeding through AVF site for an hour today. Dialysis ended at about 1:30 pm today. Reports significant bleeding at first, slowed down, but still persistent. No SOB, CP, dizziness, LOC, N/V/D.  Pt has not further complains  Renal doctor- Dr. Clancy. PCP- Dr. Faustin. Allergic to Lisinopril 85 y/o male with a PMHx of  HTN, ESRD, bladder cancer presents to the ED BIBA as an outpatient from dialysis facility c/o intermittent bleeding through AVF site for an hour today.  Full routine course of HD ended at about 1:30 pm today. Reports significant bleeding at first, slowed down, but still intermittently recurs. Clamp applied by HD RN to bleeding site & left in place.  No SOB, CP, dizziness, LOC, N/V/D.  Pt has not further complains  Renal doctor- Dr. Clancy. PCP- Dr. Kay. Allergic to Lisinopril

## 2018-07-23 NOTE — ED PROVIDER NOTE - PSH
AV fistula  right (never used)  History of appendectomy    History of bladder cancer  s/p resection with neobladder  History of exploratory laparotomy  peritonitis  No significant past surgical history

## 2018-07-23 NOTE — ED PROVIDER NOTE - MUSCULOSKELETAL, MLM
Spine appears normal, range of motion is not limited, no muscle or joint tenderness. DONIS x4. No focal deficits, or swelling. +spring loading clamp right upper arm overlying AVF. No bleeding through clamp. Right arm distal no motor sensory deficits.

## 2018-07-23 NOTE — ED PROVIDER NOTE - PROGRESS NOTE DETAILS
Raghu LIMA for ED attending, Dr. Del Cid: paging Dr. Clancy. Dr. Del Cid:  Case d/w Dr. Moon: suggested pt may need suture at the AVF bleeding access site.  Clamp removed, dressing lifted: no active bleeding noted.  Will continue to observe. Dr. Del Cid:  No bleeding through bandage, pt in [facility] [place].  Pt & wife refuse for ne to remove bandage & reassess further, demanding D/C.  Will D/C.

## 2018-10-31 ENCOUNTER — INPATIENT (INPATIENT)
Facility: HOSPITAL | Age: 83
LOS: 6 days | Discharge: NURSING, REHAB OR LONG-TERM CARE | End: 2018-11-07
Attending: HOSPITALIST | Admitting: HOSPITALIST
Payer: MEDICARE

## 2018-10-31 VITALS
HEART RATE: 113 BPM | WEIGHT: 199.96 LBS | RESPIRATION RATE: 28 BRPM | SYSTOLIC BLOOD PRESSURE: 214 MMHG | HEIGHT: 67 IN | DIASTOLIC BLOOD PRESSURE: 84 MMHG | OXYGEN SATURATION: 89 %

## 2018-10-31 DIAGNOSIS — R74.8 ABNORMAL LEVELS OF OTHER SERUM ENZYMES: ICD-10-CM

## 2018-10-31 DIAGNOSIS — I77.0 ARTERIOVENOUS FISTULA, ACQUIRED: Chronic | ICD-10-CM

## 2018-10-31 DIAGNOSIS — E11.22 TYPE 2 DIABETES MELLITUS WITH DIABETIC CHRONIC KIDNEY DISEASE: ICD-10-CM

## 2018-10-31 DIAGNOSIS — Z85.51 PERSONAL HISTORY OF MALIGNANT NEOPLASM OF BLADDER: Chronic | ICD-10-CM

## 2018-10-31 DIAGNOSIS — J96.01 ACUTE RESPIRATORY FAILURE WITH HYPOXIA: ICD-10-CM

## 2018-10-31 DIAGNOSIS — I10 ESSENTIAL (PRIMARY) HYPERTENSION: ICD-10-CM

## 2018-10-31 DIAGNOSIS — Z98.89 OTHER SPECIFIED POSTPROCEDURAL STATES: Chronic | ICD-10-CM

## 2018-10-31 DIAGNOSIS — I50.9 HEART FAILURE, UNSPECIFIED: ICD-10-CM

## 2018-10-31 DIAGNOSIS — Z29.9 ENCOUNTER FOR PROPHYLACTIC MEASURES, UNSPECIFIED: ICD-10-CM

## 2018-10-31 DIAGNOSIS — N18.6 END STAGE RENAL DISEASE: ICD-10-CM

## 2018-10-31 DIAGNOSIS — D72.829 ELEVATED WHITE BLOOD CELL COUNT, UNSPECIFIED: ICD-10-CM

## 2018-10-31 LAB
ALBUMIN SERPL ELPH-MCNC: 3.8 G/DL — SIGNIFICANT CHANGE UP (ref 3.3–5)
ALP SERPL-CCNC: 105 U/L — SIGNIFICANT CHANGE UP (ref 40–120)
ALT FLD-CCNC: 29 U/L — SIGNIFICANT CHANGE UP (ref 12–78)
ANION GAP SERPL CALC-SCNC: 11 MMOL/L — SIGNIFICANT CHANGE UP (ref 5–17)
APTT BLD: 34.3 SEC — SIGNIFICANT CHANGE UP (ref 27.5–36.3)
AST SERPL-CCNC: 15 U/L — SIGNIFICANT CHANGE UP (ref 15–37)
BASE EXCESS BLDA CALC-SCNC: -11 MMOL/L — LOW (ref -2–2)
BASE EXCESS BLDV CALC-SCNC: -2 MMOL/L — SIGNIFICANT CHANGE UP (ref -2–2)
BILIRUB SERPL-MCNC: 0.6 MG/DL — SIGNIFICANT CHANGE UP (ref 0.2–1.2)
BLOOD GAS COMMENTS ARTERIAL: SIGNIFICANT CHANGE UP
BUN SERPL-MCNC: 74 MG/DL — HIGH (ref 7–23)
CALCIUM SERPL-MCNC: 9.1 MG/DL — SIGNIFICANT CHANGE UP (ref 8.5–10.1)
CHLORIDE SERPL-SCNC: 110 MMOL/L — HIGH (ref 96–108)
CK SERPL-CCNC: 103 U/L — SIGNIFICANT CHANGE UP (ref 26–308)
CO2 SERPL-SCNC: 21 MMOL/L — LOW (ref 22–31)
CREAT SERPL-MCNC: 7.41 MG/DL — HIGH (ref 0.5–1.3)
GAS PNL BLDA: SIGNIFICANT CHANGE UP
GLUCOSE BLDC GLUCOMTR-MCNC: 156 MG/DL — HIGH (ref 70–99)
GLUCOSE BLDC GLUCOMTR-MCNC: 238 MG/DL — HIGH (ref 70–99)
GLUCOSE SERPL-MCNC: 237 MG/DL — HIGH (ref 70–99)
HAV IGM SER-ACNC: SIGNIFICANT CHANGE UP
HBV CORE IGM SER-ACNC: SIGNIFICANT CHANGE UP
HBV SURFACE AG SER-ACNC: SIGNIFICANT CHANGE UP
HCO3 BLDA-SCNC: 18 MMOL/L — LOW (ref 21–29)
HCO3 BLDV-SCNC: 26 MMOL/L — SIGNIFICANT CHANGE UP (ref 21–29)
HCT VFR BLD CALC: 38.2 % — LOW (ref 39–50)
HCV AB S/CO SERPL IA: 0.18 S/CO — SIGNIFICANT CHANGE UP
HCV AB SERPL-IMP: SIGNIFICANT CHANGE UP
HGB BLD-MCNC: 12 G/DL — LOW (ref 13–17)
INR BLD: 1 RATIO — SIGNIFICANT CHANGE UP (ref 0.88–1.16)
MCHC RBC-ENTMCNC: 31.4 GM/DL — LOW (ref 32–36)
MCHC RBC-ENTMCNC: 32.2 PG — SIGNIFICANT CHANGE UP (ref 27–34)
MCV RBC AUTO: 102.4 FL — HIGH (ref 80–100)
NRBC # BLD: 0 /100 WBCS — SIGNIFICANT CHANGE UP (ref 0–0)
NT-PROBNP SERPL-SCNC: HIGH PG/ML (ref 0–450)
PCO2 BLDA: 58 MMHG — HIGH (ref 32–46)
PCO2 BLDV: 63 MMHG — HIGH (ref 35–50)
PH BLDA: 7.12 — CRITICAL LOW (ref 7.35–7.45)
PH BLDV: 7.24 — LOW (ref 7.35–7.45)
PLATELET # BLD AUTO: 261 K/UL — SIGNIFICANT CHANGE UP (ref 150–400)
PO2 BLDA: 133 MMHG — HIGH (ref 74–108)
PO2 BLDV: 97 MMHG — HIGH (ref 25–45)
POTASSIUM SERPL-MCNC: 5 MMOL/L — SIGNIFICANT CHANGE UP (ref 3.5–5.3)
POTASSIUM SERPL-SCNC: 5 MMOL/L — SIGNIFICANT CHANGE UP (ref 3.5–5.3)
PROT SERPL-MCNC: 7.7 GM/DL — SIGNIFICANT CHANGE UP (ref 6–8.3)
PROTHROM AB SERPL-ACNC: 11.1 SEC — SIGNIFICANT CHANGE UP (ref 10–12.9)
RBC # BLD: 3.73 M/UL — LOW (ref 4.2–5.8)
RBC # FLD: 13.6 % — SIGNIFICANT CHANGE UP (ref 10.3–14.5)
SAO2 % BLDA: 96 % — SIGNIFICANT CHANGE UP (ref 92–96)
SAO2 % BLDV: 95 % — HIGH (ref 67–88)
SODIUM SERPL-SCNC: 142 MMOL/L — SIGNIFICANT CHANGE UP (ref 135–145)
TROPONIN I SERPL-MCNC: 0.06 NG/ML — HIGH (ref 0.01–0.04)
TROPONIN I SERPL-MCNC: 1.16 NG/ML — HIGH (ref 0.01–0.04)
TROPONIN I SERPL-MCNC: 1.58 NG/ML — HIGH (ref 0.01–0.04)
WBC # BLD: 19.33 K/UL — HIGH (ref 3.8–10.5)
WBC # FLD AUTO: 19.33 K/UL — HIGH (ref 3.8–10.5)

## 2018-10-31 PROCEDURE — 71045 X-RAY EXAM CHEST 1 VIEW: CPT | Mod: 26,77

## 2018-10-31 PROCEDURE — 99291 CRITICAL CARE FIRST HOUR: CPT

## 2018-10-31 PROCEDURE — 71045 X-RAY EXAM CHEST 1 VIEW: CPT | Mod: 26

## 2018-10-31 RX ORDER — INSULIN LISPRO 100/ML
5 VIAL (ML) SUBCUTANEOUS
Qty: 0 | Refills: 0 | Status: DISCONTINUED | OUTPATIENT
Start: 2018-10-31 | End: 2018-11-07

## 2018-10-31 RX ORDER — HEPARIN SODIUM 5000 [USP'U]/ML
5000 INJECTION INTRAVENOUS; SUBCUTANEOUS EVERY 12 HOURS
Qty: 0 | Refills: 0 | Status: DISCONTINUED | OUTPATIENT
Start: 2018-10-31 | End: 2018-11-07

## 2018-10-31 RX ORDER — INSULIN LISPRO 100/ML
VIAL (ML) SUBCUTANEOUS
Qty: 0 | Refills: 0 | Status: DISCONTINUED | OUTPATIENT
Start: 2018-10-31 | End: 2018-11-07

## 2018-10-31 RX ORDER — SODIUM CHLORIDE 9 MG/ML
1000 INJECTION, SOLUTION INTRAVENOUS
Qty: 0 | Refills: 0 | Status: DISCONTINUED | OUTPATIENT
Start: 2018-10-31 | End: 2018-11-07

## 2018-10-31 RX ORDER — PANTOPRAZOLE SODIUM 20 MG/1
40 TABLET, DELAYED RELEASE ORAL
Qty: 0 | Refills: 0 | Status: DISCONTINUED | OUTPATIENT
Start: 2018-10-31 | End: 2018-11-07

## 2018-10-31 RX ORDER — IPRATROPIUM/ALBUTEROL SULFATE 18-103MCG
3 AEROSOL WITH ADAPTER (GRAM) INHALATION ONCE
Qty: 0 | Refills: 0 | Status: COMPLETED | OUTPATIENT
Start: 2018-10-31 | End: 2018-10-31

## 2018-10-31 RX ORDER — CLOPIDOGREL BISULFATE 75 MG/1
75 TABLET, FILM COATED ORAL ONCE
Qty: 0 | Refills: 0 | Status: COMPLETED | OUTPATIENT
Start: 2018-10-31 | End: 2018-10-31

## 2018-10-31 RX ORDER — METOPROLOL TARTRATE 50 MG
25 TABLET ORAL DAILY
Qty: 0 | Refills: 0 | Status: DISCONTINUED | OUTPATIENT
Start: 2018-10-31 | End: 2018-11-02

## 2018-10-31 RX ORDER — ATORVASTATIN CALCIUM 80 MG/1
40 TABLET, FILM COATED ORAL AT BEDTIME
Qty: 0 | Refills: 0 | Status: DISCONTINUED | OUTPATIENT
Start: 2018-10-31 | End: 2018-11-07

## 2018-10-31 RX ORDER — DEXTROSE 50 % IN WATER 50 %
12.5 SYRINGE (ML) INTRAVENOUS ONCE
Qty: 0 | Refills: 0 | Status: DISCONTINUED | OUTPATIENT
Start: 2018-10-31 | End: 2018-11-07

## 2018-10-31 RX ORDER — FUROSEMIDE 40 MG
40 TABLET ORAL ONCE
Qty: 0 | Refills: 0 | Status: COMPLETED | OUTPATIENT
Start: 2018-10-31 | End: 2018-10-31

## 2018-10-31 RX ORDER — CALCIUM ACETATE 667 MG
1334 TABLET ORAL
Qty: 0 | Refills: 0 | Status: DISCONTINUED | OUTPATIENT
Start: 2018-10-31 | End: 2018-11-07

## 2018-10-31 RX ORDER — METOPROLOL TARTRATE 50 MG
1 TABLET ORAL
Qty: 0 | Refills: 0 | COMMUNITY

## 2018-10-31 RX ORDER — ALBUMIN HUMAN 25 %
50 VIAL (ML) INTRAVENOUS
Qty: 0 | Refills: 0 | Status: DISCONTINUED | OUTPATIENT
Start: 2018-10-31 | End: 2018-11-07

## 2018-10-31 RX ORDER — SODIUM BICARBONATE 1 MEQ/ML
650 SYRINGE (ML) INTRAVENOUS DAILY
Qty: 0 | Refills: 0 | Status: DISCONTINUED | OUTPATIENT
Start: 2018-10-31 | End: 2018-11-07

## 2018-10-31 RX ORDER — GABAPENTIN 400 MG/1
300 CAPSULE ORAL AT BEDTIME
Qty: 0 | Refills: 0 | Status: DISCONTINUED | OUTPATIENT
Start: 2018-10-31 | End: 2018-11-07

## 2018-10-31 RX ORDER — DOCUSATE SODIUM 100 MG
100 CAPSULE ORAL THREE TIMES A DAY
Qty: 0 | Refills: 0 | Status: DISCONTINUED | OUTPATIENT
Start: 2018-10-31 | End: 2018-11-07

## 2018-10-31 RX ORDER — INSULIN LISPRO 100/ML
VIAL (ML) SUBCUTANEOUS AT BEDTIME
Qty: 0 | Refills: 0 | Status: DISCONTINUED | OUTPATIENT
Start: 2018-10-31 | End: 2018-11-07

## 2018-10-31 RX ORDER — DEXTROSE 50 % IN WATER 50 %
15 SYRINGE (ML) INTRAVENOUS ONCE
Qty: 0 | Refills: 0 | Status: DISCONTINUED | OUTPATIENT
Start: 2018-10-31 | End: 2018-11-07

## 2018-10-31 RX ORDER — GLUCAGON INJECTION, SOLUTION 0.5 MG/.1ML
1 INJECTION, SOLUTION SUBCUTANEOUS ONCE
Qty: 0 | Refills: 0 | Status: DISCONTINUED | OUTPATIENT
Start: 2018-10-31 | End: 2018-11-07

## 2018-10-31 RX ORDER — DEXTROSE 50 % IN WATER 50 %
25 SYRINGE (ML) INTRAVENOUS ONCE
Qty: 0 | Refills: 0 | Status: DISCONTINUED | OUTPATIENT
Start: 2018-10-31 | End: 2018-11-07

## 2018-10-31 RX ORDER — ASPIRIN/CALCIUM CARB/MAGNESIUM 324 MG
324 TABLET ORAL ONCE
Qty: 0 | Refills: 0 | Status: COMPLETED | OUTPATIENT
Start: 2018-10-31 | End: 2018-10-31

## 2018-10-31 RX ORDER — NITROGLYCERIN 6.5 MG
1 CAPSULE, EXTENDED RELEASE ORAL ONCE
Qty: 0 | Refills: 0 | Status: COMPLETED | OUTPATIENT
Start: 2018-10-31 | End: 2018-10-31

## 2018-10-31 RX ORDER — NIFEDIPINE 30 MG
90 TABLET, EXTENDED RELEASE 24 HR ORAL DAILY
Qty: 0 | Refills: 0 | Status: DISCONTINUED | OUTPATIENT
Start: 2018-10-31 | End: 2018-11-02

## 2018-10-31 RX ORDER — ASPIRIN/CALCIUM CARB/MAGNESIUM 324 MG
81 TABLET ORAL DAILY
Qty: 0 | Refills: 0 | Status: DISCONTINUED | OUTPATIENT
Start: 2018-10-31 | End: 2018-11-07

## 2018-10-31 RX ORDER — INSULIN GLARGINE 100 [IU]/ML
27 INJECTION, SOLUTION SUBCUTANEOUS AT BEDTIME
Qty: 0 | Refills: 0 | Status: DISCONTINUED | OUTPATIENT
Start: 2018-10-31 | End: 2018-11-07

## 2018-10-31 RX ORDER — LIDOCAINE 4 G/100G
1 CREAM TOPICAL ONCE
Qty: 0 | Refills: 0 | Status: COMPLETED | OUTPATIENT
Start: 2018-10-31 | End: 2018-10-31

## 2018-10-31 RX ORDER — GABAPENTIN 400 MG/1
1 CAPSULE ORAL
Qty: 0 | Refills: 0 | COMMUNITY

## 2018-10-31 RX ORDER — ONDANSETRON 8 MG/1
4 TABLET, FILM COATED ORAL EVERY 6 HOURS
Qty: 0 | Refills: 0 | Status: DISCONTINUED | OUTPATIENT
Start: 2018-10-31 | End: 2018-11-07

## 2018-10-31 RX ADMIN — Medication 50 MILLILITER(S): at 09:35

## 2018-10-31 RX ADMIN — ATORVASTATIN CALCIUM 40 MILLIGRAM(S): 80 TABLET, FILM COATED ORAL at 21:26

## 2018-10-31 RX ADMIN — INSULIN GLARGINE 27 UNIT(S): 100 INJECTION, SOLUTION SUBCUTANEOUS at 21:22

## 2018-10-31 RX ADMIN — Medication 3 MILLILITER(S): at 19:20

## 2018-10-31 RX ADMIN — LIDOCAINE 1 APPLICATION(S): 4 CREAM TOPICAL at 21:22

## 2018-10-31 RX ADMIN — Medication 4: at 17:23

## 2018-10-31 RX ADMIN — HEPARIN SODIUM 5000 UNIT(S): 5000 INJECTION INTRAVENOUS; SUBCUTANEOUS at 17:32

## 2018-10-31 RX ADMIN — Medication 5 UNIT(S): at 17:24

## 2018-10-31 RX ADMIN — Medication 1 INCH(S): at 04:25

## 2018-10-31 RX ADMIN — Medication 50 MILLILITER(S): at 10:48

## 2018-10-31 RX ADMIN — CLOPIDOGREL BISULFATE 75 MILLIGRAM(S): 75 TABLET, FILM COATED ORAL at 14:42

## 2018-10-31 RX ADMIN — Medication 1334 MILLIGRAM(S): at 17:32

## 2018-10-31 RX ADMIN — Medication 324 MILLIGRAM(S): at 14:42

## 2018-10-31 RX ADMIN — Medication 100 MILLIGRAM(S): at 21:26

## 2018-10-31 RX ADMIN — Medication 50 MILLILITER(S): at 09:05

## 2018-10-31 RX ADMIN — GABAPENTIN 300 MILLIGRAM(S): 400 CAPSULE ORAL at 21:26

## 2018-10-31 RX ADMIN — Medication 25 MILLIGRAM(S): at 17:33

## 2018-10-31 RX ADMIN — Medication 1 MILLIGRAM(S): at 04:25

## 2018-10-31 RX ADMIN — Medication 40 MILLIGRAM(S): at 04:19

## 2018-10-31 NOTE — CONSULT NOTE ADULT - ASSESSMENT
85 yo man with ESRD presenting with decompensated CHF.     Unclear fluid overloaded state due to recent increased intake with decreased UF vs cardiac decompensation.  HD this am.  Due to continued distress, will increase DT to 4 hours to allow increased UF.  Further recommendations pending clinical response.

## 2018-10-31 NOTE — ED ADULT TRIAGE NOTE - CHIEF COMPLAINT QUOTE
woke up in resp distress, unable to tolerated NRB or bipap mask for EMS.  pt is due for dialysis today.

## 2018-10-31 NOTE — ED PROVIDER NOTE - OBJECTIVE STATEMENT
85 y/o male in ED c/o worsening sob x 1 day.   states scheduled for dialysis this AM but sob became worse.    pt denies any fever, HA, cp, v/d/abd pain.   states positive nausea.   no sick contacts or recent travel

## 2018-10-31 NOTE — ED PROVIDER NOTE - CRITICAL CARE PROVIDED
direct patient care (not related to procedure)/consultation with other physicians/consult w/ pt's family directly relating to pts condition

## 2018-10-31 NOTE — ED ADULT NURSE NOTE - NSIMPLEMENTINTERV_GEN_ALL_ED
Implemented All Fall Risk Interventions:  [place] to call system. Call bell, personal items and telephone within reach. Instruct patient to call for assistance. Room bathroom lighting operational. Non-slip footwear when patient is off stretcher. Physically safe environment: no spills, clutter or unnecessary equipment. Stretcher in lowest position, wheels locked, appropriate side rails in place. Provide visual cue, wrist band, yellow gown, etc. Monitor gait and stability. Monitor for mental status changes and reorient to person, place, and time. Review medications for side effects contributing to fall risk. Reinforce activity limits and safety measures with patient and family.

## 2018-10-31 NOTE — PATIENT PROFILE ADULT - NSSTREETDRUGUSE_GEN_A_NUR
All lines and monitors DC'd.  Discharge instructions given, questions answered.  Ambulatory out of ER, escorted by RN.  Pt reports all belongings in possession.  Instructed not to drive after taking pain meds and pt verbalizes understanding.  Rx x Zero given.      never used

## 2018-10-31 NOTE — CONSULT NOTE ADULT - SUBJECTIVE AND OBJECTIVE BOX
Chief complaints.  Presented with one day hx of worsening SOB.   Denies Chest discomfort.    HPI:  87 yo man with PMHX of ESRD, CAD, DM and HTN on maintenance HD since 10/2014.  Pt presented with one day hx of SOB.  Pt apparently gained about 6 kg fluid weight over weekend.   Was able to tolerate only 3.5 kg fluid removal.   Recently had EDW increased due to severe leg cramping with attempts to maintain EDW lower.  Reports worsening SOB for one day.  Unable to go for outpatient HD tx and came to ED.  Bipap mask in place with improved 02 saturation.   Denies any chest discomfort.        PMHX and PSHX.  1.ESRD  2.CAD  Post Stent (1/2016)  3.HTN  4.DM  5.Hx of Bladder CA with resection --Neobladder  6.Hx of SBO  7.Cholecystectomy in 2014  8.Hx of CHF  9.Hx of perforated Gastric cancer--post resection.    FAMILY HISTORY:  No pertinent family history in first degree relatives      SOCIAL HISTORY :  no current hx of smoking or ETOH.  Lives at home with his wife.  Allergies    lisinopril (Other)    Intolerances    lisinopril (Diarrhea)    Review of Systems :  Unable to obtain due to discomfort.      MEDICATIONS  (STANDING):    MEDICATIONS  (PRN):         Vital Signs Last 24 Hrs  T(C): 36.4 (31 Oct 2018 08:00), Max: 36.4 (31 Oct 2018 04:15)  T(F): 97.5 (31 Oct 2018 08:00), Max: 97.5 (31 Oct 2018 04:15)  HR: 75 (31 Oct 2018 08:00) (75 - 115)  BP: 169/78 (31 Oct 2018 08:00) (147/61 - 214/84)  BP(mean): --  RR: 12 (31 Oct 2018 08:00) (12 - 32)  SpO2: 96% (31 Oct 2018 05:30) (89% - 100%)  Daily Height in cm: 170.18 (31 Oct 2018 04:13)    Daily   I&O's Summary      PHYSICAL EXAM:  Alert and responsive.  appropriate  GEN: moderate distress with Bipap mask in place.  HEENT: WNL  NECK : supple  CV: S1S2 RRR  LUNGS: Bilateral  rhonchi  ABD: soft  EXT: 3-4 + edema    LABS:                        12.0   19.33 )-----------( 261      ( 31 Oct 2018 04:18 )             38.2     10-31    142  |  110<H>  |  74<H>  ----------------------------<  237<H>  5.0   |  21<L>  |  7.41<H>    Ca    9.1      31 Oct 2018 04:18    TPro  7.7  /  Alb  3.8  /  TBili  0.6  /  DBili  x   /  AST  15  /  ALT  29  /  AlkPhos  105  10-31    PT/INR - ( 31 Oct 2018 04:18 )   PT: 11.1 sec;   INR: 1.00 ratio         PTT - ( 31 Oct 2018 04:18 )  PTT:34.3 sec      ABG - ( 31 Oct 2018 04:50 )  pH, Arterial: 7.12  pH, Blood: x     /  pCO2: 58    /  pO2: 133   / HCO3: 18    / Base Excess: -11.0 /  SaO2: 96

## 2018-10-31 NOTE — H&P ADULT - PROBLEM SELECTOR PLAN 7
sq heparin pt states he's not on home meds, however considering  on initial labs,   will place on sq basal insulin w/ TIDAC/HS and SSI coverage  monitor FS and titrate as needed  check HA1c  carb controlled diet

## 2018-10-31 NOTE — H&P ADULT - PROBLEM SELECTOR PROBLEM 7
Prophylactic measure Type 2 diabetes mellitus with chronic kidney disease, without long-term current use of insulin, unspecified CKD stage

## 2018-10-31 NOTE — ED ADULT NURSE REASSESSMENT NOTE - NS ED NURSE REASSESS COMMENT FT1
Pt rec'd from [facility]. AxOx3, forgetful at times. No acute distress. No resp distress, 4LNC in place, 100%O2SAT. Awaiting for bed assignment and admitting orders. Pt eating at this time. Safety and comfort maintained.

## 2018-10-31 NOTE — H&P ADULT - PROBLEM SELECTOR PLAN 2
unknown type, likely systolic, will check TTE  strict i/o's, daily wt  restrict fluid/sodium intake  cardio cs

## 2018-10-31 NOTE — H&P ADULT - PROBLEM SELECTOR PLAN 3
in setting of ESRD, pt denies chest pain, however in light of CHF exacerbation, possibly precipitating factor  start on asa, plavix, c/w home dose statin, bb,   cardio cs  check serial trops  TTE

## 2018-10-31 NOTE — H&P ADULT - HISTORY OF PRESENT ILLNESS
HPI:  86M w/PMH ESRD on HD MWF, T2DM, HTN, CAD, presents c/o sudden onset of dyspnea that woke him up at 0200.  He was unable to speak.  His wife called EMS immediately and pt brought into ED, found in fluid overload.  He was placed on bipap, given lasix.    Renal came in and pt had HD over night.  At the time of my exam, pt states he's much more comfortable and can now complete sentences and is now on NC 4L.  He denies any chest pain, denies any prior similar episode.  He denies any worsening cough, fever/chills, n/v/d, abd pain.  He normally uses 1 pillow to sleep.  He endorses increased LE edema and cramping.    In ED trop 0.06-->1.16, given asa/plavix, ED MD d/w cardio and agreed w/ medical management that pt received at this time.  EKG w/ sinus tachycardia, HR now improved.     PAST MEDICAL & SURGICAL HISTORY:  ESRD on hemodialysis   HTN (hypertension)  Bladder cancer  History of exploratory laparotomy: peritonitis  History of appendectomy  AV fistula: right (never used)  History of bladder cancer: s/p resection with neobladder      Review of Systems:   CONSTITUTIONAL: No fever.  EYES: No eye pain or discharge.  ENMT:  No sinus or throat pain  NECK: No pain or stiffness  RESPIRATORY: No cough, wheezing, chills or hemoptysis; ++ shortness of breath  CARDIOVASCULAR: No chest pain, palpitations, dizziness, ++leg swelling  GASTROINTESTINAL: No abdominal or epigastric pain. No nausea, vomiting, or hematemesis; No diarrhea or constipation. No melena or hematochezia.  GENITOURINARY: does not make urine  NEUROLOGICAL: No headaches, memory loss, loss of strength, numbness, or tremors  SKIN: No rashes.  LYMPH NODES: No enlarged glands  MUSCULOSKELETAL: No muscle, back, or extremity pain, ++leg cramping   PSYCHIATRIC: No depression, anxiety, mood swings, or difficulty sleeping      Allergies    lisinopril (Other)    Intolerances    lisinopril (Diarrhea)      Social History:     FAMILY HISTORY:  No pertinent family history in first degree relatives- unknown to pt      Home Medications:  aspirin 81 mg oral delayed release tablet: 1 tab(s) orally once a day (29 Sep 2017 13:30)  calcium acetate 667 mg oral tablet: 2 tab(s) orally 3 times a day (17 Oct 2017 13:46)  gabapentin 100 mg oral capsule: 1 cap(s) orally 3 times a day (25 Sep 2017 13:11)  Lipitor 80 mg oral tablet: 1 tab(s) orally once a day (at bedtime) (17 Oct 2017 13:46)  magnesium hydroxide 400 mg oral tablet, chewable: 1  orally  (17 Oct 2017 13:46)  metoprolol succinate 50 mg oral tablet, extended release: 1 tab(s) orally once a day (25 Sep 2017 13:11)  NIFEdipine 90 mg oral tablet, extended release: 1 tab(s) orally once a day (17 Oct 2017 13:46)  sodium bicarbonate 650 mg oral tablet: 1 tab(s) orally once a day (17 Oct 2017 13:46)      MEDICATIONS  (STANDING):  aspirin  chewable 324 milliGRAM(s) Oral once  clopidogrel Tablet 75 milliGRAM(s) Oral once  dextrose 5%. 1000 milliLiter(s) (50 mL/Hr) IV Continuous <Continuous>  dextrose 50% Injectable 12.5 Gram(s) IV Push once  dextrose 50% Injectable 25 Gram(s) IV Push once  dextrose 50% Injectable 25 Gram(s) IV Push once  docusate sodium 100 milliGRAM(s) Oral three times a day  heparin  Injectable 5000 Unit(s) SubCutaneous every 12 hours  insulin glargine Injectable (LANTUS) 27 Unit(s) SubCutaneous at bedtime  insulin lispro (HumaLOG) corrective regimen sliding scale   SubCutaneous three times a day before meals  insulin lispro (HumaLOG) corrective regimen sliding scale   SubCutaneous at bedtime  insulin lispro Injectable (HumaLOG) 5 Unit(s) SubCutaneous before breakfast  insulin lispro Injectable (HumaLOG) 5 Unit(s) SubCutaneous before lunch  insulin lispro Injectable (HumaLOG) 5 Unit(s) SubCutaneous before dinner    MEDICATIONS  (PRN):  albumin human 25% IVPB 50 milliLiter(s) IV Intermittent <User Schedule> PRN sbp < or = 120 mmHg  dextrose 40% Gel 15 Gram(s) Oral once PRN Blood Glucose LESS THAN 70 milliGRAM(s)/deciliter  glucagon  Injectable 1 milliGRAM(s) IntraMuscular once PRN Glucose LESS THAN 70 milligrams/deciliter  ondansetron Injectable 4 milliGRAM(s) IV Push every 6 hours PRN Nausea      CAPILLARY BLOOD GLUCOSE      POCT Blood Glucose.: 260 mg/dL (31 Oct 2018 04:15)    I&O's Summary      PHYSICAL EXAM:  Vital Signs Last 24 Hrs  T(C): 36.5 (31 Oct 2018 12:02), Max: 36.5 (31 Oct 2018 12:02)  T(F): 97.7 (31 Oct 2018 12:02), Max: 97.7 (31 Oct 2018 12:02)  HR: 97 (31 Oct 2018 13:27) (60 - 115)  BP: 163/78 (31 Oct 2018 13:27) (101/58 - 214/84)  BP(mean): --  RR: 20 (31 Oct 2018 13:27) (12 - 32)  SpO2: 94% (31 Oct 2018 13:27) (89% - 100%)  GENERAL: NAD, well-developed  HEAD:  Atraumatic, Normocephalic  EYES: EOMI, PERRLA, conjunctiva and sclera clear  ENT: [place], no nasal discharge, throat clear, poor dentition  NECK: Supple, No JVD, NO LAD, no thyromegaly  CHEST/LUNG: Clear to auscultation bilaterally; No wheeze  HEART: Regular rate and rhythm; No murmurs, rubs, or gallops  ABDOMEN: Soft, Nontender, Nondistended; Bowel sounds present, no HSM  EXTREMITIES:  2+ Peripheral Pulses, No clubbing, cyanosis, 3+ pitting LE edema   PSYCH: AAOx3, normal behavior   NEUROLOGY: non-focal, sensory and cn2-12 grossly intact  SKIN: No visible rashes or lesions    LABS:                        12.0   19.33 )-----------( 261      ( 31 Oct 2018 04:18 )             38.2     10-31    142  |  110<H>  |  74<H>  ----------------------------<  237<H>  5.0   |  21<L>  |  7.41<H>    Ca    9.1      31 Oct 2018 04:18    TPro  7.7  /  Alb  3.8  /  TBili  0.6  /  DBili  x   /  AST  15  /  ALT  29  /  AlkPhos  105  10-31    PT/INR - ( 31 Oct 2018 04:18 )   PT: 11.1 sec;   INR: 1.00 ratio         PTT - ( 31 Oct 2018 04:18 )  PTT:34.3 sec  CARDIAC MARKERS ( 31 Oct 2018 10:50 )  1.160 ng/mL / x     / x     / x     / x      CARDIAC MARKERS ( 31 Oct 2018 04:18 )  0.061 ng/mL / x     / 103 U/L / x     / x              RADIOLOGY & ADDITIONAL TESTS:    Imaging Personally Reviewed:  diffuse airspace opacities b/l   EKG Personally Reviewed:  sinus tachy

## 2018-10-31 NOTE — ED PROVIDER NOTE - PROGRESS NOTE DETAILS
case d/w Dr Madrigal (renal) and will take to dialysis at 0600 case d/w Dr Roque (ICU attending) and recommend evaluation after dialysis for disposition results noted.   pt with no fever, cough or cp likely pulmonary edema.  unlikely PNA pt significantly improved s/p HD, now off bipap.  d/w Dr. Maria recs ASA and plavix 75 and cont to trend CE.  d/w Dr. Parsons

## 2018-10-31 NOTE — H&P ADULT - ASSESSMENT
86M w/PMH ESRD on HD MWF, T2DM, HTN, CAD, presents c/o sudden onset of dyspnea that woke him up at 0200.  He was unable to speak.    + found in fluid overload.     In ED trop 0.06-->1.16, given asa/plavix, ED MD d/w cardio and agreed w/ medical management that pt received at this time.  EKG w/ sinus tachycardia, HR now improved.

## 2018-11-01 LAB
ALLERGY+IMMUNOLOGY DIAG STUDY NOTE: SIGNIFICANT CHANGE UP
ALLERGY+IMMUNOLOGY DIAG STUDY NOTE: SIGNIFICANT CHANGE UP
ANION GAP SERPL CALC-SCNC: 8 MMOL/L — SIGNIFICANT CHANGE UP (ref 5–17)
ANTIBODY INTERPRETATION 2: SIGNIFICANT CHANGE UP
BUN SERPL-MCNC: 42 MG/DL — HIGH (ref 7–23)
CALCIUM SERPL-MCNC: 8.5 MG/DL — SIGNIFICANT CHANGE UP (ref 8.5–10.1)
CHLORIDE SERPL-SCNC: 105 MMOL/L — SIGNIFICANT CHANGE UP (ref 96–108)
CO2 SERPL-SCNC: 27 MMOL/L — SIGNIFICANT CHANGE UP (ref 22–31)
CREAT SERPL-MCNC: 5.11 MG/DL — HIGH (ref 0.5–1.3)
DIR ANTIGLOB POLYSPECIFIC INTERPRETATION: SIGNIFICANT CHANGE UP
GLUCOSE BLDC GLUCOMTR-MCNC: 102 MG/DL — HIGH (ref 70–99)
GLUCOSE BLDC GLUCOMTR-MCNC: 117 MG/DL — HIGH (ref 70–99)
GLUCOSE BLDC GLUCOMTR-MCNC: 120 MG/DL — HIGH (ref 70–99)
GLUCOSE BLDC GLUCOMTR-MCNC: 137 MG/DL — HIGH (ref 70–99)
GLUCOSE SERPL-MCNC: 126 MG/DL — HIGH (ref 70–99)
HBA1C BLD-MCNC: 7.4 % — HIGH (ref 4–5.6)
HCT VFR BLD CALC: 29.9 % — LOW (ref 39–50)
HGB BLD-MCNC: 9.7 G/DL — LOW (ref 13–17)
MCHC RBC-ENTMCNC: 32.4 GM/DL — SIGNIFICANT CHANGE UP (ref 32–36)
MCHC RBC-ENTMCNC: 32.7 PG — SIGNIFICANT CHANGE UP (ref 27–34)
MCV RBC AUTO: 100.7 FL — HIGH (ref 80–100)
NRBC # BLD: 0 /100 WBCS — SIGNIFICANT CHANGE UP (ref 0–0)
PLATELET # BLD AUTO: 170 K/UL — SIGNIFICANT CHANGE UP (ref 150–400)
POTASSIUM SERPL-MCNC: 4.4 MMOL/L — SIGNIFICANT CHANGE UP (ref 3.5–5.3)
POTASSIUM SERPL-SCNC: 4.4 MMOL/L — SIGNIFICANT CHANGE UP (ref 3.5–5.3)
RBC # BLD: 2.97 M/UL — LOW (ref 4.2–5.8)
RBC # FLD: 13.9 % — SIGNIFICANT CHANGE UP (ref 10.3–14.5)
SODIUM SERPL-SCNC: 140 MMOL/L — SIGNIFICANT CHANGE UP (ref 135–145)
WBC # BLD: 6.97 K/UL — SIGNIFICANT CHANGE UP (ref 3.8–10.5)
WBC # FLD AUTO: 6.97 K/UL — SIGNIFICANT CHANGE UP (ref 3.8–10.5)

## 2018-11-01 PROCEDURE — 93306 TTE W/DOPPLER COMPLETE: CPT | Mod: 26

## 2018-11-01 PROCEDURE — 86077 PHYS BLOOD BANK SERV XMATCH: CPT

## 2018-11-01 PROCEDURE — 93010 ELECTROCARDIOGRAM REPORT: CPT | Mod: 77

## 2018-11-01 PROCEDURE — 93010 ELECTROCARDIOGRAM REPORT: CPT

## 2018-11-01 RX ORDER — LOSARTAN POTASSIUM 100 MG/1
25 TABLET, FILM COATED ORAL DAILY
Qty: 0 | Refills: 0 | Status: DISCONTINUED | OUTPATIENT
Start: 2018-11-01 | End: 2018-11-03

## 2018-11-01 RX ORDER — CLOPIDOGREL BISULFATE 75 MG/1
75 TABLET, FILM COATED ORAL DAILY
Qty: 0 | Refills: 0 | Status: DISCONTINUED | OUTPATIENT
Start: 2018-11-01 | End: 2018-11-07

## 2018-11-01 RX ADMIN — Medication 90 MILLIGRAM(S): at 06:02

## 2018-11-01 RX ADMIN — GABAPENTIN 300 MILLIGRAM(S): 400 CAPSULE ORAL at 21:49

## 2018-11-01 RX ADMIN — CLOPIDOGREL BISULFATE 75 MILLIGRAM(S): 75 TABLET, FILM COATED ORAL at 10:27

## 2018-11-01 RX ADMIN — Medication 1334 MILLIGRAM(S): at 09:35

## 2018-11-01 RX ADMIN — Medication 81 MILLIGRAM(S): at 10:27

## 2018-11-01 RX ADMIN — INSULIN GLARGINE 27 UNIT(S): 100 INJECTION, SOLUTION SUBCUTANEOUS at 21:49

## 2018-11-01 RX ADMIN — HEPARIN SODIUM 5000 UNIT(S): 5000 INJECTION INTRAVENOUS; SUBCUTANEOUS at 06:01

## 2018-11-01 RX ADMIN — Medication 1334 MILLIGRAM(S): at 16:25

## 2018-11-01 RX ADMIN — Medication 5 UNIT(S): at 18:46

## 2018-11-01 RX ADMIN — ATORVASTATIN CALCIUM 40 MILLIGRAM(S): 80 TABLET, FILM COATED ORAL at 21:49

## 2018-11-01 RX ADMIN — Medication 100 MILLIGRAM(S): at 06:02

## 2018-11-01 RX ADMIN — Medication 650 MILLIGRAM(S): at 18:47

## 2018-11-01 RX ADMIN — Medication 5 UNIT(S): at 16:24

## 2018-11-01 RX ADMIN — PANTOPRAZOLE SODIUM 40 MILLIGRAM(S): 20 TABLET, DELAYED RELEASE ORAL at 06:03

## 2018-11-01 RX ADMIN — Medication 25 MILLIGRAM(S): at 06:02

## 2018-11-01 RX ADMIN — Medication 1334 MILLIGRAM(S): at 18:47

## 2018-11-01 NOTE — DIETITIAN INITIAL EVALUATION ADULT. - ENERGY NEEDS
Ht.   67   "        Wt.  91      kg               BMI   31.3              IBW      67 kg               Pt is at   136 %  IBW

## 2018-11-01 NOTE — DIETITIAN INITIAL EVALUATION ADULT. - PERTINENT MEDS FT
MEDICATIONS  (STANDING):  aspirin enteric coated 81 milliGRAM(s) Oral daily  atorvastatin 40 milliGRAM(s) Oral at bedtime  calcium acetate 1334 milliGRAM(s) Oral three times a day with meals  clopidogrel Tablet 75 milliGRAM(s) Oral daily  dextrose 5%. 1000 milliLiter(s) (50 mL/Hr) IV Continuous <Continuous>  dextrose 50% Injectable 12.5 Gram(s) IV Push once  dextrose 50% Injectable 25 Gram(s) IV Push once  dextrose 50% Injectable 25 Gram(s) IV Push once  docusate sodium 100 milliGRAM(s) Oral three times a day  gabapentin 300 milliGRAM(s) Oral at bedtime  heparin  Injectable 5000 Unit(s) SubCutaneous every 12 hours  insulin glargine Injectable (LANTUS) 27 Unit(s) SubCutaneous at bedtime  insulin lispro (HumaLOG) corrective regimen sliding scale   SubCutaneous three times a day before meals  insulin lispro (HumaLOG) corrective regimen sliding scale   SubCutaneous at bedtime  insulin lispro Injectable (HumaLOG) 5 Unit(s) SubCutaneous before breakfast  insulin lispro Injectable (HumaLOG) 5 Unit(s) SubCutaneous before lunch  insulin lispro Injectable (HumaLOG) 5 Unit(s) SubCutaneous before dinner  metoprolol succinate ER 25 milliGRAM(s) Oral daily  NIFEdipine XL 90 milliGRAM(s) Oral daily  pantoprazole    Tablet 40 milliGRAM(s) Oral before breakfast  sodium bicarbonate 650 milliGRAM(s) Oral daily    MEDICATIONS  (PRN):  albumin human 25% IVPB 50 milliLiter(s) IV Intermittent <User Schedule> PRN sbp < or = 120 mmHg  dextrose 40% Gel 15 Gram(s) Oral once PRN Blood Glucose LESS THAN 70 milliGRAM(s)/deciliter  glucagon  Injectable 1 milliGRAM(s) IntraMuscular once PRN Glucose LESS THAN 70 milligrams/deciliter  ondansetron Injectable 4 milliGRAM(s) IV Push every 6 hours PRN Nausea

## 2018-11-01 NOTE — PROVIDER CONTACT NOTE (CRITICAL VALUE NOTIFICATION) - ASSESSMENT
Pt is not currently and had not complain of chest pain throughout the night. pt blood pressure remains with in normal limits.

## 2018-11-01 NOTE — CHART NOTE - NSCHARTNOTEFT_GEN_A_CORE
Nurse Practitioner Progress note:     HPI:  HPI:  86M w/PMH ESRD on HD MWF, T2DM, HTN, CAD, presents c/o sudden onset of dyspnea that woke him up at 0200.  He was unable to speak.  His wife called EMS immediately and pt brought into ED, found in fluid overload.  He was placed on bipap, given lasix.    Renal came in and pt had HD over night.  At the time of my exam, pt states he's much more comfortable and can now complete sentences and is now on NC 4L.  He denies any chest pain, denies any prior similar episode.  He denies any worsening cough, fever/chills, n/v/d, abd pain.  He normally uses 1 pillow to sleep.  He endorses increased LE edema and cramping.    In ED trop 0.06-->1.16, given asa/plavix, ED MD d/w cardio and agreed w/ medical management that pt received at this time.  EKG w/ sinus tachycardia, HR now improved. Pt. now referred for [facility] for further evaluation.     PAST MEDICAL & SURGICAL HISTORY:  ESRD on hemodialysis   HTN (hypertension)  Bladder cancer  History of exploratory laparotomy: peritonitis  History of appendectomy  AV fistula: right (never used)  History of bladder cancer: s/p resection with neobladder          Allergies: lisinopril (Other)  Social History:     FAMILY HISTORY:  No pertinent family history in first degree relatives- unknown to pt      Home Medications:  aspirin 81 mg oral delayed release tablet: 1 tab(s) orally once a day (29 Sep 2017 13:30)  calcium acetate 667 mg oral tablet: 2 tab(s) orally 3 times a day (17 Oct 2017 13:46)  gabapentin 100 mg oral capsule: 1 cap(s) orally 3 times a day (25 Sep 2017 13:11)  Lipitor 80 mg oral tablet: 1 tab(s) orally once a day (at bedtime) (17 Oct 2017 13:46)  magnesium hydroxide 400 mg oral tablet, chewable: 1  orally  (17 Oct 2017 13:46)  metoprolol succinate 50 mg oral tablet, extended release: 1 tab(s) orally once a day (25 Sep 2017 13:11)  NIFEdipine 90 mg oral tablet, extended release: 1 tab(s) orally once a day (17 Oct 2017 13:46)  sodium bicarbonate 650 mg oral tablet: 1 tab(s) orally once a day (17 Oct 2017 13:46)      MEDICATIONS  (STANDING):  aspirin  chewable 324 milliGRAM(s) Oral once  clopidogrel Tablet 75 milliGRAM(s) Oral once  dextrose 5%. 1000 milliLiter(s) (50 mL/Hr) IV Continuous <Continuous>  dextrose 50% Injectable 12.5 Gram(s) IV Push once  dextrose 50% Injectable 25 Gram(s) IV Push once  dextrose 50% Injectable 25 Gram(s) IV Push once  docusate sodium 100 milliGRAM(s) Oral three times a day  heparin  Injectable 5000 Unit(s) SubCutaneous every 12 hours  insulin glargine Injectable (LANTUS) 27 Unit(s) SubCutaneous at bedtime  insulin lispro (HumaLOG) corrective regimen sliding scale   SubCutaneous three times a day before meals  insulin lispro (HumaLOG) corrective regimen sliding scale   SubCutaneous at bedtime  insulin lispro Injectable (HumaLOG) 5 Unit(s) SubCutaneous before breakfast  insulin lispro Injectable (HumaLOG) 5 Unit(s) SubCutaneous before lunch  insulin lispro Injectable (HumaLOG) 5 Unit(s) SubCutaneous before dinner    MEDICATIONS  (PRN):  albumin human 25% IVPB 50 milliLiter(s) IV Intermittent <User Schedule> PRN sbp < or = 120 mmHg  dextrose 40% Gel 15 Gram(s) Oral once PRN Blood Glucose LESS THAN 70 milliGRAM(s)/deciliter  glucagon  Injectable 1 milliGRAM(s) IntraMuscular once PRN Glucose LESS THAN 70 milligrams/deciliter  ondansetron Injectable 4 milliGRAM(s) IV Push every 6 hours PRN Nausea            LABS:                        12.0   19.33 )-----------( 261      ( 31 Oct 2018 04:18 )             38.2     10-31    142  |  110<H>  |  74<H>  ----------------------------<  237<H>  5.0   |  21<L>  |  7.41<H>    Ca    9.1      31 Oct 2018 04:18    TPro  7.7  /  Alb  3.8  /  TBili  0.6  /  DBili  x   /  AST  15  /  ALT  29  /  AlkPhos  105  10-31    PT/INR - ( 31 Oct 2018 04:18 )   PT: 11.1 sec;   INR: 1.00 ratio         PTT - ( 31 Oct 2018 04:18 )  PTT:34.3 sec  CARDIAC MARKERS ( 31 Oct 2018 10:50 )  1.160 ng/mL / x     / x     / x     / x      CARDIAC MARKERS ( 31 Oct 2018 04:18 )  0.061 ng/mL / x     / 103 U/L / x     / x          T(C): 36 (11-01-18 @ 10:47), Max: 36.8 (10-31-18 @ 15:45)  HR: 89 (11-01-18 @ 12:55) (74 - 115)  BP: 146/55 (11-01-18 @ 12:55) (83/42 - 163/78)  RR: 16 (11-01-18 @ 12:55) (16 - 24)  SpO2: 96% (11-01-18 @ 12:55) (92% - 98%)  Wt(kg): --    PHYSICAL EXAM:  Neurologic: Non-focal, AxOx3.  No neuro deficits  Vascular: Peripheral pulses palpable 2+ bilaterally  Procedure Site: Rt. groin perclose closure device noted site benign soft no bleeding no hematoma +1PP Rt. femoral venous sheath pulled manual pressure applied x20 minutes site benign soft no bleeding no hematoma +1PP    12 lead EKG:  	    LABS:	 	                        9.7    6.97  )-----------( 170      ( 01 Nov 2018 06:48 )             29.9   11-01    140  |  105  |  42<H>  ----------------------------<  126<H>  4.4   |  27  |  5.11<H>    Ca    8.5      01 Nov 2018 06:48    TPro  7.7  /  Alb  3.8  /  TBili  0.6  /  DBili  x   /  AST  15  /  ALT  29  /  AlkPhos  105  10-31        PROCEDURE RESULTS:   Cardiac Cath Lab - Adult (11.01.18 @ 12:03) >   Impression   Diagnostic Conclusions   One Vessel coronary artery disease (LAD) .   Mildly reduced left ventricular systolic function with segmental wall   motion abnormalities. Estimated LV ejection fraction is 45 %.   Mild aortic valve stenosis.   Moderate pulmonary artery hypertension.   Elevated pulmonary capillary wedge pressure.   Interventional Conclusions   Successful Coronary Intervention KAREN of proximal LAD.   Successful Coronary Intervention KAREN of D 1 .     Recommendations     Percutaneous coronary intervention of LAD today - infarct related artery.     Aggressive medical management of coronary artery disease and its   underlying risk factors.     Aspirin 81 mg PO daily .   Continue clopidogrel (Plavix) 75 mg PO daily.      ASSESSMENT/PLAN: 	        -Admit to CICU  -VS, labs, diet, activity as per PCI orders  -IV hydration  -Encourage PO fluids  -ASA 81mg  -Plavix 75mg  -Toprol XL 25mg   -Lipitor 40mg   -Procardia XL 90mg   -Plan of care D/W pt. and MD  -Discussed therapeutic lifestyle changes to reduce risk factors such as following a cardiac diet, weight loss, maintaining a healthy weight, exercise, smoking cessation, medication compliance, and regular follow-up  with MD to know our numbers (BP, cholesterol, weight, and glucose  - Follow-up AM labs/EKG/site check  -Follow-up with attending Nurse Practitioner Progress note:     HPI:  86M w/PMH ESRD on HD MWF, T2DM, HTN, CAD, presents c/o sudden onset of dyspnea that woke him up at 0200.  He was unable to speak.  His wife called EMS immediately and pt brought into ED, found in fluid overload.  He was placed on bipap, given lasix.    Renal came in and pt had HD over night.  At the time of my exam, pt states he's much more comfortable and can now complete sentences and is now on NC 4L.  He denies any chest pain, denies any prior similar episode.  He denies any worsening cough, fever/chills, n/v/d, abd pain.  He normally uses 1 pillow to sleep.  He endorses increased LE edema and cramping.    In ED trop 0.06-->1.16, given asa/plavix, ED MD d/w cardio and agreed w/ medical management that pt received at this time.  EKG w/ sinus tachycardia, HR now improved. Pt. now referred for [facility] for further evaluation.     PAST MEDICAL & SURGICAL HISTORY:  ESRD on hemodialysis   HTN (hypertension)  Bladder cancer  History of exploratory laparotomy: peritonitis  History of appendectomy  AV fistula: right (never used)  History of bladder cancer: s/p resection with neobladder          Allergies: lisinopril (Other)  Social History:     FAMILY HISTORY:  No pertinent family history in first degree relatives- unknown to pt      Home Medications:  aspirin 81 mg oral delayed release tablet: 1 tab(s) orally once a day (29 Sep 2017 13:30)  calcium acetate 667 mg oral tablet: 2 tab(s) orally 3 times a day (17 Oct 2017 13:46)  gabapentin 100 mg oral capsule: 1 cap(s) orally 3 times a day (25 Sep 2017 13:11)  Lipitor 80 mg oral tablet: 1 tab(s) orally once a day (at bedtime) (17 Oct 2017 13:46)  magnesium hydroxide 400 mg oral tablet, chewable: 1  orally  (17 Oct 2017 13:46)  metoprolol succinate 50 mg oral tablet, extended release: 1 tab(s) orally once a day (25 Sep 2017 13:11)  NIFEdipine 90 mg oral tablet, extended release: 1 tab(s) orally once a day (17 Oct 2017 13:46)  sodium bicarbonate 650 mg oral tablet: 1 tab(s) orally once a day (17 Oct 2017 13:46)      MEDICATIONS  (STANDING):  aspirin  chewable 324 milliGRAM(s) Oral once  clopidogrel Tablet 75 milliGRAM(s) Oral once  dextrose 5%. 1000 milliLiter(s) (50 mL/Hr) IV Continuous <Continuous>  dextrose 50% Injectable 12.5 Gram(s) IV Push once  dextrose 50% Injectable 25 Gram(s) IV Push once  dextrose 50% Injectable 25 Gram(s) IV Push once  docusate sodium 100 milliGRAM(s) Oral three times a day  heparin  Injectable 5000 Unit(s) SubCutaneous every 12 hours  insulin glargine Injectable (LANTUS) 27 Unit(s) SubCutaneous at bedtime  insulin lispro (HumaLOG) corrective regimen sliding scale   SubCutaneous three times a day before meals  insulin lispro (HumaLOG) corrective regimen sliding scale   SubCutaneous at bedtime  insulin lispro Injectable (HumaLOG) 5 Unit(s) SubCutaneous before breakfast  insulin lispro Injectable (HumaLOG) 5 Unit(s) SubCutaneous before lunch  insulin lispro Injectable (HumaLOG) 5 Unit(s) SubCutaneous before dinner    MEDICATIONS  (PRN):  albumin human 25% IVPB 50 milliLiter(s) IV Intermittent <User Schedule> PRN sbp < or = 120 mmHg  dextrose 40% Gel 15 Gram(s) Oral once PRN Blood Glucose LESS THAN 70 milliGRAM(s)/deciliter  glucagon  Injectable 1 milliGRAM(s) IntraMuscular once PRN Glucose LESS THAN 70 milligrams/deciliter  ondansetron Injectable 4 milliGRAM(s) IV Push every 6 hours PRN Nausea            LABS:                        12.0   19.33 )-----------( 261      ( 31 Oct 2018 04:18 )             38.2     10-31    142  |  110<H>  |  74<H>  ----------------------------<  237<H>  5.0   |  21<L>  |  7.41<H>    Ca    9.1      31 Oct 2018 04:18    TPro  7.7  /  Alb  3.8  /  TBili  0.6  /  DBili  x   /  AST  15  /  ALT  29  /  AlkPhos  105  10-31    PT/INR - ( 31 Oct 2018 04:18 )   PT: 11.1 sec;   INR: 1.00 ratio         PTT - ( 31 Oct 2018 04:18 )  PTT:34.3 sec  CARDIAC MARKERS ( 31 Oct 2018 10:50 )  1.160 ng/mL / x     / x     / x     / x      CARDIAC MARKERS ( 31 Oct 2018 04:18 )  0.061 ng/mL / x     / 103 U/L / x     / x          T(C): 36 (11-01-18 @ 10:47), Max: 36.8 (10-31-18 @ 15:45)  HR: 89 (11-01-18 @ 12:55) (74 - 115)  BP: 146/55 (11-01-18 @ 12:55) (83/42 - 163/78)  RR: 16 (11-01-18 @ 12:55) (16 - 24)  SpO2: 96% (11-01-18 @ 12:55) (92% - 98%)  Wt(kg): --    PHYSICAL EXAM:  Neurologic: Non-focal, AxOx3.  No neuro deficits  Vascular: Peripheral pulses palpable 2+ bilaterally  Procedure Site: Rt. groin perclose closure device noted site benign soft no bleeding no hematoma +1PP Rt. femoral venous sheath pulled manual pressure applied x20 minutes site benign soft no bleeding no hematoma +1PP    12 lead EKG:  	    LABS:	 	                        9.7    6.97  )-----------( 170      ( 01 Nov 2018 06:48 )             29.9   11-01    140  |  105  |  42<H>  ----------------------------<  126<H>  4.4   |  27  |  5.11<H>    Ca    8.5      01 Nov 2018 06:48    TPro  7.7  /  Alb  3.8  /  TBili  0.6  /  DBili  x   /  AST  15  /  ALT  29  /  AlkPhos  105  10-31        PROCEDURE RESULTS:   Cardiac Cath Lab - Adult (11.01.18 @ 12:03) >   Impression   Diagnostic Conclusions   One Vessel coronary artery disease (LAD) .   Mildly reduced left ventricular systolic function with segmental wall   motion abnormalities. Estimated LV ejection fraction is 45 %.   Mild aortic valve stenosis.   Moderate pulmonary artery hypertension.   Elevated pulmonary capillary wedge pressure.   Interventional Conclusions   Successful Coronary Intervention KAREN of proximal LAD.   Successful Coronary Intervention KAREN of D 1 .             ASSESSMENT/PLAN: 	  86M w/PMH ESRD on HD MWF, T2DM, HTN, CAD, presents c/o sudden onset of dyspnea that woke him up at 0200  In ED trop 0.06-->1.16, given asa/plavix, ED MD d/w cardio and agreed w/ medical management that pt received at this time.  EKG w/ sinus tachycardia, HR now improved. S/P LHC     -Admit to CICU  -VS, labs, diet, activity as per PCI orders  -IV hydration  -Encourage PO fluids  -Aggressive medical management of coronary artery disease and its underlying risk factors.  -ASA 81mg  -Plavix 75mg  -Toprol XL 25mg   -Lipitor 40mg   -Procardia XL 90mg   -Pt. to be dialyzed today  -Plan of care D/W pt. and MD  -Discussed therapeutic lifestyle changes to reduce risk factors such as following a cardiac diet, weight loss, maintaining a healthy weight, exercise, smoking cessation, medication compliance, and regular follow-up  with MD to know our numbers (BP, cholesterol, weight, and glucose)  - Follow-up AM labs/EKG/site check  -Follow-up with attending

## 2018-11-01 NOTE — CHART NOTE - NSCHARTNOTEFT_GEN_A_CORE
Notified by RN this AM, 10/31 1814 troponin is trending up. 1.580 -> 1.160.  No CP at this time or overnight.     CARDIAC MARKERS ( 31 Oct 2018 18:14 )  1.580 ng/mL / x     / x     / x     / x      CARDIAC MARKERS ( 31 Oct 2018 10:50 )  1.160 ng/mL / x     / x     / x     / x      CARDIAC MARKERS ( 31 Oct 2018 04:18 )  0.061 ng/mL / x     / 103 U/L / x     / x        Dr. Frazier, PGY-3 spoke with Dr. Maria concerning test results. Dr. Maria will evaluate this AM. No heparin drip at this time as patient does not have CP. Patient NPO now for possible cath today.

## 2018-11-01 NOTE — PROGRESS NOTE ADULT - SUBJECTIVE AND OBJECTIVE BOX
Patient is a 86y old  Male who presents with a chief complaint of [place] (01 Nov 2018 12:01)      SUBJECTIVE:   HPI:  86M w/PMH ESRD on HD MWF, T2DM, HTN, CAD, presents c/o sudden onset of dyspnea that woke him up at 0200.  Found to be in pulm edema secondary to acute on chronic decompensated systolic CHF with rising troponins and EKG changes suggetive of NSTEMI.     11/1: s/p LHC with KAREN to LAD/D1. Seen in recovery unit. Tolerated procedure well. VSS    PAST MEDICAL & SURGICAL HISTORY:  ESRD on hemodialysis   HTN (hypertension)  Bladder cancer  History of exploratory laparotomy: peritonitis  History of appendectomy  AV fistula: right (never used)  History of bladder cancer: s/p resection with neobladder      Review of Systems:   CONSTITUTIONAL: No fever.  EYES: No eye pain or discharge.  ENMT:  No sinus or throat pain  NECK: No pain or stiffness  RESPIRATORY: No cough, wheezing, chills or hemoptysis; ++ shortness of breath  CARDIOVASCULAR: No chest pain, palpitations, dizziness, ++leg swelling  GASTROINTESTINAL: No abdominal or epigastric pain. No nausea, vomiting, or hematemesis; No diarrhea or constipation. No melena or hematochezia.  GENITOURINARY: does not make urine  NEUROLOGICAL: No headaches, memory loss, loss of strength, numbness, or tremors  SKIN: No rashes.  LYMPH NODES: No enlarged glands  MUSCULOSKELETAL: No muscle, back, or extremity pain, ++leg cramping   PSYCHIATRIC: No depression, anxiety, mood swings, or difficulty sleeping      Allergies    lisinopril (Other)    Intolerances    lisinopril (Diarrhea)      Social History:     FAMILY HISTORY:  No pertinent family history in first degree relatives- unknown to pt        CAPILLARY BLOOD GLUCOSE      POCT Blood Glucose.: 260 mg/dL (31 Oct 2018 04:15)    I&O's Summary    ICU Vital Signs Last 24 Hrs  T(C): 36 (01 Nov 2018 10:47), Max: 36.8 (31 Oct 2018 15:45)  T(F): 96.8 (01 Nov 2018 10:47), Max: 98.3 (31 Oct 2018 15:45)  HR: 83 (01 Nov 2018 13:40) (74 - 115)  BP: 141/55 (01 Nov 2018 13:40) (83/42 - 161/68)  BP(mean): --  ABP: --  ABP(mean): --  RR: 16 (01 Nov 2018 13:40) (16 - 24)  SpO2: 95% (01 Nov 2018 13:40) (92% - 98%)      GENERAL: NAD, well-developed  HEAD:  Atraumatic, Normocephalic  EYES: EOMI, PERRLA, conjunctiva and sclera clear  ENT: [place], no nasal discharge, throat clear, poor dentition  NECK: Supple, No JVD, NO LAD, no thyromegaly  CHEST/LUNG: Clear to auscultation bilaterally; No wheeze  HEART: Regular rate and rhythm; No murmurs, rubs, or gallops  ABDOMEN: Soft, Nontender, Nondistended; Bowel sounds present, no HSM  EXTREMITIES:  2+ Peripheral Pulses, No clubbing, cyanosis, 1+ pitting LE edema   PSYCH: AAOx3, normal behavior   NEUROLOGY: non-focal, sensory and cn2-12 grossly intact  SKIN: No visible rashes or lesions    LABS:                        12.0   19.33 )-----------( 261      ( 31 Oct 2018 04:18 )             38.2     10-31    142  |  110<H>  |  74<H>  ----------------------------<  237<H>  5.0   |  21<L>  |  7.41<H>    Ca    9.1      31 Oct 2018 04:18    TPro  7.7  /  Alb  3.8  /  TBili  0.6  /  DBili  x   /  AST  15  /  ALT  29  /  AlkPhos  105  10-31    PT/INR - ( 31 Oct 2018 04:18 )   PT: 11.1 sec;   INR: 1.00 ratio         PTT - ( 31 Oct 2018 04:18 )  PTT:34.3 sec  CARDIAC MARKERS ( 31 Oct 2018 10:50 )  1.160 ng/mL / x     / x     / x     / x      CARDIAC MARKERS ( 31 Oct 2018 04:18 )  0.061 ng/mL / x     / 103 U/L / x     / x              RADIOLOGY & ADDITIONAL TESTS:    Imaging Personally Reviewed:  diffuse airspace opacities b/l   EKG Personally Reviewed:  sinus tachy (31 Oct 2018 14:20)            RADIOLOGY/EKG:    Total D/C time > 30 min

## 2018-11-01 NOTE — DIETITIAN INITIAL EVALUATION ADULT. - OTHER INFO
General Consult for pt.  Pt is 86M w/PMH ESRD on HD MWF, T2DM, HTN, CAD, presents c/o sudden onset of dyspnea that woke him up.  unable to speak. Pt brought into ED, found in fluid overload.  He was placed on bipap, given lasix.  Pt had HD over night.  Pt  endorses increased LE edema and cramping. Acute respiratory failure w hypoxia, CHF, elevated troponins, ESRD on dialysis, DM2.  Pt reports excellent PO intake PTA, currently wearing BIPAP and NPO.  Pt given written information on CKD for Diabetic Pts, information on CHF.  Pt picked up phone call and didn't want to speak with this RD at time, will return for further ed.  Suggest advance diet to renal/consistent Carbohydrate.  Add renal MVI. Monitor intake as pt wears BIPAP, is sob.  Suggest check HgbA1c as pt was admitted with BG > 200.  Will monitor PO intake, tolerance, labs and weight.

## 2018-11-01 NOTE — DIETITIAN INITIAL EVALUATION ADULT. - PROBLEM SELECTOR PLAN 7
pt states he's not on home meds, however considering  on initial labs,   will place on sq basal insulin w/ TIDAC/HS and SSI coverage  monitor FS and titrate as needed  check HA1c  carb controlled diet

## 2018-11-01 NOTE — PROGRESS NOTE ADULT - ASSESSMENT
86M w/PMH ESRD on HD MWF, T2DM, HTN, CAD, presents c/o sudden onset of dyspnea that woke him up at 0200.  Found to be in pulm edema secondary to acute on chronic decompensated systolic CHF with rising troponins and EKG changes suggetive of NSTEMI.     A:  Acute hypoxemic resp failure- multi factorial  Acute systolic CHF  NSTEMI  Anemia- suspect AOCD  ESRD  Pulm Edema  AS  Bladder CA    P:  S/p LHC with stent to LAD/D1  DAPT  Statins  BP control with BB/ACE-I  ESRD per schedule  Echo, serial trops, BMP  Monitor fluid status  CCU  Bladder CA-> outpt managment  DVTpx  TT: 51 min

## 2018-11-01 NOTE — DIETITIAN INITIAL EVALUATION ADULT. - PERTINENT LABORATORY DATA
11-01 Na140 mmol/L Glu 126 mg/dL<H> K+ 4.4 mmol/L Cr  5.11 mg/dL<H> BUN 42 mg/dL<H> Phos n/a   Alb n/a   PAB n/a

## 2018-11-01 NOTE — PROGRESS NOTE ADULT - ASSESSMENT
87 yo man with ESRD presenting with decompensated CHF.     Unclear fluid overloaded state due to recent increased intake with decreased UF vs cardiac decompensation.  HD this am.  Due to continued distress, will increase DT to 4 hours to allow increased UF.  Further recommendations pending clinical response.    11/1  s/p HD last night  feels better today  for cardiac cath, d/w pts son  d/w Dr Talbert

## 2018-11-01 NOTE — PACU DISCHARGE NOTE - COMMENTS
pt to cicu via transport, rn, and [facility].  Pt verbalizes understanding of limitations in activity.  Report to Mey stanley

## 2018-11-01 NOTE — CONSULT NOTE ADULT - ASSESSMENT
Acute diastolic congestive heart failure/pulmonary edema.  Coronary artery disease status post drug-eluting stent to proximal RCA in January 2016. He has acute non-STEMI.  End-stage renal disease he is on hemodialysis.  Moderate aortic stenosis.  High cholesterol.  Hypertension.  Peripheral artery disease.  Diabetes mellitus.  History of GI bleed in the past.  Suggest  Continue with current cardiac medications.  Follow-up with CPK, troponin I and EKG.  Due to aortic stenosis, coronary artery disease, acute anemia edema he will need cardiac catheterization. All risks options discussed with patient and his wife. And the consent for the procedure.  Discussed with , hospitalist and nephrologist.  Intake and output daily weights.  Restrict IV fluids to 1.2 L per day.  Echocardiogram to evaluate aortic stenosis.

## 2018-11-01 NOTE — PROGRESS NOTE ADULT - SUBJECTIVE AND OBJECTIVE BOX
Chief complaints.  Presented with one day hx of worsening SOB.   Denies Chest discomfort.    HPI:  87 yo man with PMHX of ESRD, CAD, DM and HTN on maintenance HD since 10/2014.  Pt presented with one day hx of SOB.  Pt apparently gained about 6 kg fluid weight over weekend.   Was able to tolerate only 3.5 kg fluid removal.   Recently had EDW increased due to severe leg cramping with attempts to maintain EDW lower.  Reports worsening SOB for one day.  Unable to go for outpatient HD tx and came to ED.  Bipap mask in place with improved 02 saturation.   Denies any chest discomfort.      11/1  Pt had sob last night, required supplemental o2  CXR showed persistent CHF  dialyzed x 2 hours , tolerated 1.5 kg fluid removal  feels much better this am  ECHO shows hypokinesis of anterior wall   for cath this am  HD after cath      PMHX and PSHX.  1.ESRD  2.CAD  Post Stent (1/2016)  3.HTN  4.DM  5.Hx of Bladder CA with resection --Neobladder  6.Hx of SBO  7.Cholecystectomy in 2014  8.Hx of CHF  9.Hx of perforated Gastric cancer--post resection.    FAMILY HISTORY:  No pertinent family history in first degree relatives      SOCIAL HISTORY :  no current hx of smoking or ETOH.  Lives at home with his wife.  Allergies    lisinopril (Other)    Intolerances    lisinopril (Diarrhea)    Review of Systems :  Unable to obtain due to discomfort.  Vital Signs Last 24 Hrs  T(C): 36 (01 Nov 2018 10:47), Max: 36.8 (31 Oct 2018 15:45)  T(F): 96.8 (01 Nov 2018 10:47), Max: 98.3 (31 Oct 2018 15:45)  HR: 86 (01 Nov 2018 10:47) (74 - 115)  BP: 134/55 (01 Nov 2018 10:47) (83/42 - 163/78)  BP(mean): --  RR: 16 (01 Nov 2018 10:47) (16 - 24)  SpO2: 97% (01 Nov 2018 10:47) (92% - 98%)      HEENt no jvd  Lungs bilat crackles  heart RR  abd soft, nontender  ext no edema                             9.7    6.97  )-----------( 170      ( 01 Nov 2018 06:48 )             29.9     11-01    140  |  105  |  42<H>  ----------------------------<  126<H>  4.4   |  27  |  5.11<H>    Ca    8.5      01 Nov 2018 06:48    TPro  7.7  /  Alb  3.8  /  TBili  0.6  /  DBili  x   /  AST  15  /  ALT  29  /  AlkPhos  105  10-31

## 2018-11-02 LAB
ANION GAP SERPL CALC-SCNC: 9 MMOL/L — SIGNIFICANT CHANGE UP (ref 5–17)
BASOPHILS # BLD AUTO: 0.03 K/UL — SIGNIFICANT CHANGE UP (ref 0–0.2)
BASOPHILS NFR BLD AUTO: 0.4 % — SIGNIFICANT CHANGE UP (ref 0–2)
BUN SERPL-MCNC: 43 MG/DL — HIGH (ref 7–23)
CALCIUM SERPL-MCNC: 8.3 MG/DL — LOW (ref 8.5–10.1)
CHLORIDE SERPL-SCNC: 102 MMOL/L — SIGNIFICANT CHANGE UP (ref 96–108)
CHOLEST SERPL-MCNC: 84 MG/DL — SIGNIFICANT CHANGE UP (ref 10–199)
CO2 SERPL-SCNC: 30 MMOL/L — SIGNIFICANT CHANGE UP (ref 22–31)
CREAT SERPL-MCNC: 4.86 MG/DL — HIGH (ref 0.5–1.3)
EOSINOPHIL # BLD AUTO: 0.19 K/UL — SIGNIFICANT CHANGE UP (ref 0–0.5)
EOSINOPHIL NFR BLD AUTO: 2.8 % — SIGNIFICANT CHANGE UP (ref 0–6)
GLUCOSE BLDC GLUCOMTR-MCNC: 100 MG/DL — HIGH (ref 70–99)
GLUCOSE BLDC GLUCOMTR-MCNC: 162 MG/DL — HIGH (ref 70–99)
GLUCOSE BLDC GLUCOMTR-MCNC: 257 MG/DL — HIGH (ref 70–99)
GLUCOSE BLDC GLUCOMTR-MCNC: 91 MG/DL — SIGNIFICANT CHANGE UP (ref 70–99)
GLUCOSE SERPL-MCNC: 75 MG/DL — SIGNIFICANT CHANGE UP (ref 70–99)
HCT VFR BLD CALC: 28 % — LOW (ref 39–50)
HDLC SERPL-MCNC: 28 MG/DL — LOW
HGB BLD-MCNC: 9.2 G/DL — LOW (ref 13–17)
IMM GRANULOCYTES NFR BLD AUTO: 0.4 % — SIGNIFICANT CHANGE UP (ref 0–1.5)
LIPID PNL WITH DIRECT LDL SERPL: 25 MG/DL — SIGNIFICANT CHANGE UP
LYMPHOCYTES # BLD AUTO: 1.18 K/UL — SIGNIFICANT CHANGE UP (ref 1–3.3)
LYMPHOCYTES # BLD AUTO: 17.1 % — SIGNIFICANT CHANGE UP (ref 13–44)
MCHC RBC-ENTMCNC: 32.9 GM/DL — SIGNIFICANT CHANGE UP (ref 32–36)
MCHC RBC-ENTMCNC: 33 PG — SIGNIFICANT CHANGE UP (ref 27–34)
MCV RBC AUTO: 100.4 FL — HIGH (ref 80–100)
MONOCYTES # BLD AUTO: 0.85 K/UL — SIGNIFICANT CHANGE UP (ref 0–0.9)
MONOCYTES NFR BLD AUTO: 12.3 % — SIGNIFICANT CHANGE UP (ref 2–14)
NEUTROPHILS # BLD AUTO: 4.61 K/UL — SIGNIFICANT CHANGE UP (ref 1.8–7.4)
NEUTROPHILS NFR BLD AUTO: 67 % — SIGNIFICANT CHANGE UP (ref 43–77)
NRBC # BLD: 0 /100 WBCS — SIGNIFICANT CHANGE UP (ref 0–0)
PLATELET # BLD AUTO: 163 K/UL — SIGNIFICANT CHANGE UP (ref 150–400)
POTASSIUM SERPL-MCNC: 4.1 MMOL/L — SIGNIFICANT CHANGE UP (ref 3.5–5.3)
POTASSIUM SERPL-SCNC: 4.1 MMOL/L — SIGNIFICANT CHANGE UP (ref 3.5–5.3)
RBC # BLD: 2.79 M/UL — LOW (ref 4.2–5.8)
RBC # FLD: 13.7 % — SIGNIFICANT CHANGE UP (ref 10.3–14.5)
SODIUM SERPL-SCNC: 141 MMOL/L — SIGNIFICANT CHANGE UP (ref 135–145)
TOTAL CHOLESTEROL/HDL RATIO MEASUREMENT: 3 RATIO — LOW (ref 3.4–9.6)
TRIGL SERPL-MCNC: 155 MG/DL — HIGH (ref 10–149)
TROPONIN I SERPL-MCNC: 6.76 NG/ML — HIGH (ref 0.01–0.04)
WBC # BLD: 6.89 K/UL — SIGNIFICANT CHANGE UP (ref 3.8–10.5)
WBC # FLD AUTO: 6.89 K/UL — SIGNIFICANT CHANGE UP (ref 3.8–10.5)

## 2018-11-02 PROCEDURE — 93010 ELECTROCARDIOGRAM REPORT: CPT

## 2018-11-02 RX ORDER — LOSARTAN POTASSIUM 100 MG/1
25 TABLET, FILM COATED ORAL ONCE
Qty: 0 | Refills: 0 | Status: COMPLETED | OUTPATIENT
Start: 2018-11-02 | End: 2018-11-03

## 2018-11-02 RX ORDER — METOCLOPRAMIDE HCL 10 MG
10 TABLET ORAL EVERY 8 HOURS
Qty: 0 | Refills: 0 | Status: DISCONTINUED | OUTPATIENT
Start: 2018-11-02 | End: 2018-11-07

## 2018-11-02 RX ORDER — CARVEDILOL PHOSPHATE 80 MG/1
1 CAPSULE, EXTENDED RELEASE ORAL
Qty: 60 | Refills: 0
Start: 2018-11-02

## 2018-11-02 RX ORDER — METOPROLOL TARTRATE 50 MG
1 TABLET ORAL
Qty: 0 | Refills: 0 | COMMUNITY

## 2018-11-02 RX ORDER — LOSARTAN POTASSIUM 100 MG/1
25 TABLET, FILM COATED ORAL DAILY
Qty: 0 | Refills: 0 | Status: DISCONTINUED | OUTPATIENT
Start: 2018-11-02 | End: 2018-11-02

## 2018-11-02 RX ORDER — CARVEDILOL PHOSPHATE 80 MG/1
6.25 CAPSULE, EXTENDED RELEASE ORAL EVERY 12 HOURS
Qty: 0 | Refills: 0 | Status: DISCONTINUED | OUTPATIENT
Start: 2018-11-02 | End: 2018-11-03

## 2018-11-02 RX ORDER — CARVEDILOL PHOSPHATE 80 MG/1
12.5 CAPSULE, EXTENDED RELEASE ORAL EVERY 12 HOURS
Qty: 0 | Refills: 0 | Status: DISCONTINUED | OUTPATIENT
Start: 2018-11-02 | End: 2018-11-02

## 2018-11-02 RX ORDER — INSULIN GLARGINE 100 [IU]/ML
20 INJECTION, SOLUTION SUBCUTANEOUS
Qty: 100 | Refills: 0
Start: 2018-11-02

## 2018-11-02 RX ORDER — INSULIN GLARGINE 100 [IU]/ML
27 INJECTION, SOLUTION SUBCUTANEOUS
Qty: 0 | Refills: 0 | COMMUNITY
Start: 2018-11-02

## 2018-11-02 RX ORDER — ERYTHROPOIETIN 10000 [IU]/ML
6000 INJECTION, SOLUTION INTRAVENOUS; SUBCUTANEOUS
Qty: 0 | Refills: 0 | Status: DISCONTINUED | OUTPATIENT
Start: 2018-11-02 | End: 2018-11-07

## 2018-11-02 RX ORDER — CLOPIDOGREL BISULFATE 75 MG/1
1 TABLET, FILM COATED ORAL
Qty: 30 | Refills: 0
Start: 2018-11-02

## 2018-11-02 RX ORDER — LOSARTAN POTASSIUM 100 MG/1
1 TABLET, FILM COATED ORAL
Qty: 30 | Refills: 0 | OUTPATIENT
Start: 2018-11-02

## 2018-11-02 RX ADMIN — Medication 81 MILLIGRAM(S): at 15:12

## 2018-11-02 RX ADMIN — Medication 100 MILLIGRAM(S): at 21:00

## 2018-11-02 RX ADMIN — Medication 1334 MILLIGRAM(S): at 15:12

## 2018-11-02 RX ADMIN — Medication 100 MILLIGRAM(S): at 17:40

## 2018-11-02 RX ADMIN — HEPARIN SODIUM 5000 UNIT(S): 5000 INJECTION INTRAVENOUS; SUBCUTANEOUS at 05:33

## 2018-11-02 RX ADMIN — HEPARIN SODIUM 5000 UNIT(S): 5000 INJECTION INTRAVENOUS; SUBCUTANEOUS at 18:08

## 2018-11-02 RX ADMIN — CARVEDILOL PHOSPHATE 6.25 MILLIGRAM(S): 80 CAPSULE, EXTENDED RELEASE ORAL at 18:09

## 2018-11-02 RX ADMIN — ATORVASTATIN CALCIUM 40 MILLIGRAM(S): 80 TABLET, FILM COATED ORAL at 21:00

## 2018-11-02 RX ADMIN — Medication 1334 MILLIGRAM(S): at 18:09

## 2018-11-02 RX ADMIN — CLOPIDOGREL BISULFATE 75 MILLIGRAM(S): 75 TABLET, FILM COATED ORAL at 15:13

## 2018-11-02 RX ADMIN — PANTOPRAZOLE SODIUM 40 MILLIGRAM(S): 20 TABLET, DELAYED RELEASE ORAL at 18:09

## 2018-11-02 RX ADMIN — Medication 6: at 18:08

## 2018-11-02 RX ADMIN — LOSARTAN POTASSIUM 25 MILLIGRAM(S): 100 TABLET, FILM COATED ORAL at 15:12

## 2018-11-02 RX ADMIN — GABAPENTIN 300 MILLIGRAM(S): 400 CAPSULE ORAL at 11:08

## 2018-11-02 RX ADMIN — Medication 650 MILLIGRAM(S): at 18:09

## 2018-11-02 RX ADMIN — ERYTHROPOIETIN 6000 UNIT(S): 10000 INJECTION, SOLUTION INTRAVENOUS; SUBCUTANEOUS at 13:16

## 2018-11-02 RX ADMIN — INSULIN GLARGINE 27 UNIT(S): 100 INJECTION, SOLUTION SUBCUTANEOUS at 21:00

## 2018-11-02 NOTE — PROGRESS NOTE ADULT - SUBJECTIVE AND OBJECTIVE BOX
Chief complaints.  Presented with one day hx of worsening SOB.   Denies Chest discomfort.    HPI:  85 yo man with PMHX of ESRD, CAD, DM and HTN on maintenance HD since 10/2014.  Pt presented with one day hx of SOB.  Pt apparently gained about 6 kg fluid weight over weekend.   Was able to tolerate only 3.5 kg fluid removal.   Recently had EDW increased due to severe leg cramping with attempts to maintain EDW lower.  Reports worsening SOB for one day.  Unable to go for outpatient HD tx and came to ED.  Bipap mask in place with improved 02 saturation.   Denies any chest discomfort.      11/2  S/P PCI ofD1 and LAD on Nov 1  feels well  dialyzed yesterday after cath  d/w Dr Talbert  d/w Pts son  HD today, his scheduled session  pt requests 3 kg fluid removal    11/1  Pt had sob last night, required supplemental o2  CXR showed persistent CHF  dialyzed x 2 hours , tolerated 1.5 kg fluid removal  feels much better this am  ECHO shows hypokinesis of anterior wall   for cath this am  HD after cath      PMHX and PSHX.  1.ESRD  2.CAD  Post Stent (1/2016)  3.HTN  4.DM  5.Hx of Bladder CA with resection --Neobladder  6.Hx of SBO  7.Cholecystectomy in 2014  8.Hx of CHF  9.Hx of perforated Gastric cancer--post resection.    FAMILY HISTORY:  No pertinent family history in first degree relatives      SOCIAL HISTORY :  no current hx of smoking or ETOH.  Lives at home with his wife.  Allergies    lisinopril (Other)    Intolerances    lisinopril (Diarrhea)    MEDICATIONS  (STANDING):  aspirin enteric coated 81 milliGRAM(s) Oral daily  atorvastatin 40 milliGRAM(s) Oral at bedtime  calcium acetate 1334 milliGRAM(s) Oral three times a day with meals  carvedilol 6.25 milliGRAM(s) Oral every 12 hours  clopidogrel Tablet 75 milliGRAM(s) Oral daily  dextrose 5%. 1000 milliLiter(s) (50 mL/Hr) IV Continuous <Continuous>  dextrose 50% Injectable 12.5 Gram(s) IV Push once  dextrose 50% Injectable 25 Gram(s) IV Push once  dextrose 50% Injectable 25 Gram(s) IV Push once  docusate sodium 100 milliGRAM(s) Oral three times a day  gabapentin 300 milliGRAM(s) Oral at bedtime  heparin  Injectable 5000 Unit(s) SubCutaneous every 12 hours  insulin glargine Injectable (LANTUS) 27 Unit(s) SubCutaneous at bedtime  insulin lispro (HumaLOG) corrective regimen sliding scale   SubCutaneous three times a day before meals  insulin lispro (HumaLOG) corrective regimen sliding scale   SubCutaneous at bedtime  insulin lispro Injectable (HumaLOG) 5 Unit(s) SubCutaneous before breakfast  insulin lispro Injectable (HumaLOG) 5 Unit(s) SubCutaneous before lunch  insulin lispro Injectable (HumaLOG) 5 Unit(s) SubCutaneous before dinner  losartan 25 milliGRAM(s) Oral daily  pantoprazole    Tablet 40 milliGRAM(s) Oral before breakfast  sodium bicarbonate 650 milliGRAM(s) Oral daily    MEDICATIONS  (PRN):  albumin human 25% IVPB 50 milliLiter(s) IV Intermittent <User Schedule> PRN sbp < or = 120 mmHg  dextrose 40% Gel 15 Gram(s) Oral once PRN Blood Glucose LESS THAN 70 milliGRAM(s)/deciliter  glucagon  Injectable 1 milliGRAM(s) IntraMuscular once PRN Glucose LESS THAN 70 milligrams/deciliter  ondansetron Injectable 4 milliGRAM(s) IV Push every 6 hours PRN Nausea      HEENt no jvd  Lungs bilat crackles  heart RR  abd soft, nontender  ext no edema                  11-02    141  |  102  |  43<H>  ----------------------------<  75  4.1   |  30  |  4.86<H>    Ca    8.3<L>      02 Nov 2018 04:37    CBC Full  -  ( 02 Nov 2018 04:37 )  WBC Count : 6.89 K/uL  Hemoglobin : 9.2 g/dL  Hematocrit : 28.0 %  Platelet Count - Automated : 163 K/uL  Mean Cell Volume : 100.4 fl  Mean Cell Hemoglobin : 33.0 pg  Mean Cell Hemoglobin Concentration : 32.9 gm/dL  Auto Neutrophil # : 4.61 K/uL  Auto Lymphocyte # : 1.18 K/uL  Auto Monocyte # : 0.85 K/uL  Auto Eosinophil # : 0.19 K/uL  Auto Basophil # : 0.03 K/uL  Auto Neutrophil % : 67.0 %  Auto Lymphocyte % : 17.1 %  Auto Monocyte % : 12.3 %  Auto Eosinophil % : 2.8 %  Auto Basophil % : 0.4 %

## 2018-11-02 NOTE — PROGRESS NOTE ADULT - SUBJECTIVE AND OBJECTIVE BOX
Patient is a 86y old  Male who presents with a chief complaint of [place] (01 Nov 2018 12:01)      SUBJECTIVE:   HPI:  86M w/PMH ESRD on HD MWF, T2DM, HTN, CAD, presents c/o sudden onset of dyspnea that woke him up at 0200.  Found to be in pulm edema secondary to acute on chronic decompensated systolic CHF with rising troponins and EKG changes suggetive of NSTEMI.     11/1: s/p LHC with KAREN to LAD/D1. Seen in recovery unit. Tolerated procedure well. VSS  11/2: still remains on HFO and sob. On scheduled HD today    PAST MEDICAL & SURGICAL HISTORY:  ESRD on hemodialysis   HTN (hypertension)  Bladder cancer  History of exploratory laparotomy: peritonitis  History of appendectomy  AV fistula: right (never used)  History of bladder cancer: s/p resection with neobladder      Review of Systems:   CONSTITUTIONAL: No fever.  EYES: No eye pain or discharge.  ENMT:  No sinus or throat pain  NECK: No pain or stiffness  RESPIRATORY: No cough, wheezing, chills or hemoptysis; ++ shortness of breath  CARDIOVASCULAR: No chest pain, palpitations, dizziness, ++leg swelling  GASTROINTESTINAL: No abdominal or epigastric pain. No nausea, vomiting, or hematemesis; No diarrhea or constipation. No melena or hematochezia.  GENITOURINARY: does not make urine  NEUROLOGICAL: No headaches, memory loss, loss of strength, numbness, or tremors  SKIN: No rashes.  LYMPH NODES: No enlarged glands  MUSCULOSKELETAL: No muscle, back, or extremity pain, ++leg cramping   PSYCHIATRIC: No depression, anxiety, mood swings, or difficulty sleeping      Allergies    lisinopril (Other)    Intolerances    lisinopril (Diarrhea)      Social History:     FAMILY HISTORY:  No pertinent family history in first degree relatives- unknown to pt        CAPILLARY BLOOD GLUCOSE      POCT Blood Glucose.: 260 mg/dL (31 Oct 2018 04:15)    I&O's Summary    ICU Vital Signs Last 24 Hrs  T(C): 37.7 (02 Nov 2018 10:56), Max: 37.7 (02 Nov 2018 10:56)  T(F): 99.8 (02 Nov 2018 10:56), Max: 99.8 (02 Nov 2018 10:56)  HR: 82 (02 Nov 2018 12:30) (68 - 100)  BP: 137/54 (02 Nov 2018 12:30) (93/44 - 146/55)  BP(mean): 72 (02 Nov 2018 06:00) (51 - 75)  ABP: --  ABP(mean): --  RR: 17 (02 Nov 2018 12:30) (11 - 25)  SpO2: 99% (02 Nov 2018 12:30) (86% - 100%)        GENERAL: NAD, well-developed  HEAD:  Atraumatic, Normocephalic  EYES: EOMI, PERRLA, conjunctiva and sclera clear  ENT: [place], no nasal discharge, throat clear, poor dentition  NECK: Supple, No JVD, NO LAD, no thyromegaly  CHEST/LUNG: Clear to auscultation bilaterally; No wheeze  HEART: decreased breath sounds at bases  ABDOMEN: Soft, Nontender, Nondistended; Bowel sounds present, no HSM  EXTREMITIES:  2+ Peripheral Pulses, No clubbing, cyanosis, 1+ pitting LE edema   PSYCH: AAOx3, normal behavior   NEUROLOGY: non-focal, sensory and cn2-12 grossly intact  SKIN: No visible rashes or lesions    LABS:                        12.0   19.33 )-----------( 261      ( 31 Oct 2018 04:18 )             38.2     10-31    142  |  110<H>  |  74<H>  ----------------------------<  237<H>  5.0   |  21<L>  |  7.41<H>    Ca    9.1      31 Oct 2018 04:18    TPro  7.7  /  Alb  3.8  /  TBili  0.6  /  DBili  x   /  AST  15  /  ALT  29  /  AlkPhos  105  10-31    PT/INR - ( 31 Oct 2018 04:18 )   PT: 11.1 sec;   INR: 1.00 ratio         PTT - ( 31 Oct 2018 04:18 )  PTT:34.3 sec  CARDIAC MARKERS ( 31 Oct 2018 10:50 )  1.160 ng/mL / x     / x     / x     / x      CARDIAC MARKERS ( 31 Oct 2018 04:18 )  0.061 ng/mL / x     / 103 U/L / x     / x              RADIOLOGY & ADDITIONAL TESTS:    Imaging Personally Reviewed:  diffuse airspace opacities b/l   EKG Personally Reviewed:  sinus tachy (31 Oct 2018 14:20)            RADIOLOGY/EKG:    Total D/C time > 30 min

## 2018-11-02 NOTE — PROGRESS NOTE ADULT - ASSESSMENT
85 yo man with ESRD presenting with decompensated CHF.     Unclear fluid overloaded state due to recent increased intake with decreased UF vs cardiac decompensation.  HD this am.  Due to continued distress, will increase DT to 4 hours to allow increased UF.  Further recommendations pending clinical response.    11/1  s/p HD last night  feels better today  for cardiac cath, d/w pts son  d/w Dr Talbert    11/2  S/P PCI ofD1 and LAD on Nov 1  feels well  dialyzed yesterday after cath  d/w Dr Talbert  d/w Pts son  HD today, his scheduled session  pt requests 3 kg fluid removal  from a renal standpoint pt may be dc   next hd Monday 11/5

## 2018-11-02 NOTE — PROGRESS NOTE ADULT - SUBJECTIVE AND OBJECTIVE BOX
HPI:  Patient is 86-year-old he has a history of hypertension, type II diabetes mellitus, high cholesterol, coronary artery disease status post drug-eluting stent placed to proximal RCA , January 2016, end-stage renal disease he is on hemodialysis, bladder cancer, He has moderate aortic stenosis, patient was admitted with complains of shortness of breath for one day. Patient states he became acutely short of breath on the day of admission and he couldn't even talk and on admission was diagnosed to have acute pulmonary edema. He status post dialysis . After admission he was diagnosed to have acute non-STEMI and he status post cardiac catheterization and he is status post drug-eluting stent to proximal LAD and diagonal. Since admission he feels much better. He is now comfortable and is in no acute distress. He is in normal sinus rhythm on telemetry.    Transthoracic Echocardiogram (11.01.18    Mitral Valve   The mitral valve leaflets appear thickened.   Mild mitral annular calcification is present.   Mild (1+) mitral regurgitation is present.     Aortic Valve   Significant fibrocalcific changes noted to the aortic valve leaflets with   restriction in leaflet excursion. Peak transaortic gradient is 34 mmHg;   this finding is consistent with moderate aortic stenosis. Trace aortic   regurgitation is present.     Tricuspid Valve   Moderate (2+) tricuspid valve regurgitation is present.   The estimated RVSP is 39 mmHg.     Pulmonic Valve   Mild pulmonic valvular regurgitation (1+) is present.     Left Atrium   The left atrium is mildly dilated.     Left Ventricle   The left ventricle is normal in size. There is severe hypokinesis of the   mid anteroseptal and anterior, apical anterior and apical septal   segments.   The left ventricular systolic function is moderately reduced. Estimated   left ventricular ejection fraction is 40-45 %.       Pericardial Effusion   No evidence of pericardial effusion.     Pleural Effusion   Pleural effusion - is present..        PAST MEDICAL & SURGICAL HISTORY:  ESRD on hemodialysis   HTN (hypertension)  Bladder cancer  History of exploratory laparotomy: peritonitis  History of appendectomy  AV fistula: right (never used)  History of bladder cancer: s/p resection with neobladder                            MEDICATIONS  (STANDING):  aspirin enteric coated 81 milliGRAM(s) Oral daily  atorvastatin 40 milliGRAM(s) Oral at bedtime  calcium acetate 1334 milliGRAM(s) Oral three times a day with meals  carvedilol 6.25 milliGRAM(s) Oral every 12 hours  clopidogrel Tablet 75 milliGRAM(s) Oral daily  dextrose 5%. 1000 milliLiter(s) (50 mL/Hr) IV Continuous <Continuous>  dextrose 50% Injectable 12.5 Gram(s) IV Push once  dextrose 50% Injectable 25 Gram(s) IV Push once  dextrose 50% Injectable 25 Gram(s) IV Push once  docusate sodium 100 milliGRAM(s) Oral three times a day  epoetin norman Injectable 6000 Unit(s) IV Push <User Schedule>  gabapentin 300 milliGRAM(s) Oral at bedtime  heparin  Injectable 5000 Unit(s) SubCutaneous every 12 hours  insulin glargine Injectable (LANTUS) 27 Unit(s) SubCutaneous at bedtime  insulin lispro (HumaLOG) corrective regimen sliding scale   SubCutaneous three times a day before meals  insulin lispro (HumaLOG) corrective regimen sliding scale   SubCutaneous at bedtime  insulin lispro Injectable (HumaLOG) 5 Unit(s) SubCutaneous before breakfast  insulin lispro Injectable (HumaLOG) 5 Unit(s) SubCutaneous before lunch  insulin lispro Injectable (HumaLOG) 5 Unit(s) SubCutaneous before dinner  losartan 25 milliGRAM(s) Oral daily  pantoprazole    Tablet 40 milliGRAM(s) Oral before breakfast  sodium bicarbonate 650 milliGRAM(s) Oral daily    MEDICATIONS  (PRN):  albumin human 25% IVPB 50 milliLiter(s) IV Intermittent <User Schedule> PRN sbp < or = 120 mmHg  dextrose 40% Gel 15 Gram(s) Oral once PRN Blood Glucose LESS THAN 70 milliGRAM(s)/deciliter  glucagon  Injectable 1 milliGRAM(s) IntraMuscular once PRN Glucose LESS THAN 70 milligrams/deciliter  ondansetron Injectable 4 milliGRAM(s) IV Push every 6 hours PRN Nausea        REVIEW OF SYSTEM:  Pertinent items are noted in HPI.  Constitutional	Negative for chills, fevers, sweats.    Eyes: 	Negative for visual disturbance.  Ears, nose, mouth, throat, and face: Negative for epistaxis, nasal congestion, sore throat and tinnitus.  Neck:	Negative for enlargement, pain and difficulty in swallowing  Respiration : Negative for cough, pleuritic chest pain and wheezing  Cardiovascular: Negative for chest pain, and palpitations    Gastrointestinal : Negative for abdominal pain, diarrhea, nausea and vomiting  Genitourinary: Negative for dysuria, frequency and urinary incontinence .  Skin: Negative for  rash, pruritus, swelling, dryness .  	  Hematologic/lymphatic: Negative for bleeding and easy bruising  Musculoskeletal: Negative for arthralgias, back pain and muscle weakness.  Neurological: Negative for dizziness, headaches, seizures and tremors  Behavioral/Psych: Negative for mood change, depression.  Endocrine:	Negative for blurry vision, polydipsia and polyuria, diaphoresis.   Allergic/Immunologic:	Negative for anaphylaxis, angioedema and urticaria.      Vital Signs Last 24 Hrs  T(C): 37.7 (02 Nov 2018 10:56), Max: 37.7 (02 Nov 2018 10:56)  T(F): 99.8 (02 Nov 2018 10:56), Max: 99.8 (02 Nov 2018 10:56)  HR: 83 (02 Nov 2018 11:47) (68 - 100)  BP: 136/49 (02 Nov 2018 11:47) (93/44 - 146/55)  BP(mean): 72 (02 Nov 2018 06:00) (51 - 75)  RR: 15 (02 Nov 2018 11:47) (11 - 25)  SpO2: 100% (02 Nov 2018 11:47) (86% - 100%)    I&O's Summary    01 Nov 2018 07:01  -  02 Nov 2018 07:00  --------------------------------------------------------  IN: 200 mL / OUT: 201 mL / NET: -1 mL      PHYSICAL EXAM  General Appearance: cooperative, no acute distress,   HEENT: PERRL, conjunctiva clear, EOM's intact .   Neck: Supple, , no adenopathy, thyroid: not enlarged, no carotid bruit or JVD  Back: Symmetric, no  tenderness,no soft tissue tenderness  Lungs: diminished breath sounds on both bases. No rales or rhonchi's.  Heart: Regular rate and rhythm, S1, S2 normal, 1 to 2/6 ejection systolic murmur, no  rub or gallop  Abdomen: Soft, non-tender, bowel sounds active , no hepatosplenomegaly  Extremities: no cyanosis or edema, no joint swelling  Skin: Skin color, texture normal, no rashes   Neurologic: Alert and oriented X3 , cranial nerves intact, sensory and motor normal,        INTERPRETATION OF TELEMETRY: NSR     ECG: NSR NSSST changes        LABS:                          9.2    6.89  )-----------( 163      ( 02 Nov 2018 04:37 )             28.0     11-02    141  |  102  |  43<H>  ----------------------------<  75  4.1   |  30  |  4.86<H>    Ca    8.3<L>      02 Nov 2018 04:37      CARDIAC MARKERS ( 02 Nov 2018 04:37 )  6.760 ng/mL / x     / x     / x     / x      CARDIAC MARKERS ( 31 Oct 2018 18:14 )  1.580 ng/mL / x     / x     / x     / x          Lipid Panel  84  28  25  155    Pro BNP  [id number] 10-31 @ 04:18  D Dimer  -- 10-31 @ 04:18

## 2018-11-02 NOTE — PROGRESS NOTE ADULT - ASSESSMENT
86M w/PMH ESRD on HD MWF, T2DM, HTN, CAD, presents c/o sudden onset of dyspnea that woke him up at 0200.  Found to be in pulm edema secondary to acute on chronic decompensated systolic CHF with rising troponins and EKG changes suggetive of NSTEMI.     A:  Acute hypoxemic resp failure- multi factorial  Acute systolic CHF  NSTEMI  Anemia- suspect AOCD  ESRD  Pulm Edema  AS  Bladder CA    P:  S/p LHC with stent to LAD/D1  DAPT  Statins  BP control with BB/ACE-I  ESRD per schedule- today. Cont high flow O2. CXR in am  Poss HD in am contingent on response today  Echo, serial trops, BMP  Monitor fluid status  CCU  Bladder CA-> outpt management  DVTpx  TT: 41 min

## 2018-11-02 NOTE — CHART NOTE - NSCHARTNOTEFT_GEN_A_CORE
Nurse Practitioner Progress note:   s/p PCI on 11/1/18     Denies chest pain, shortness of breath at rest,  dizziness or palpitations at this time  A+Ox3  CV: S1S2 reg  Respiratory: CTAB, normal effort  Right groin procedure site dsg removed.  no bleeding, no hematoma, site soft, non tender, positive pedal pulses bilaterally    T(C): 37.1 (11-02-18 @ 06:27), Max: 37.4 (11-01-18 @ 23:50)  HR: 78 (11-02-18 @ 06:00) (68 - 100)  BP: 140/48 (11-02-18 @ 06:00) (93/44 - 146/55)  RR: 18 (11-02-18 @ 06:00) (11 - 25)  SpO2: 94% (11-02-18 @ 06:00) (86% - 98%)  Wt(kg): --  CBC Full  -  ( 02 Nov 2018 04:37 )  WBC Count : 6.89 K/uL  Hemoglobin : 9.2 g/dL  Hematocrit : 28.0 %  Platelet Count - Automated : 163 K/uL  Mean Cell Volume : 100.4 fl  Mean Cell Hemoglobin : 33.0 pg  Mean Cell Hemoglobin Concentration : 32.9 gm/dL  Auto Neutrophil # : 4.61 K/uL  Auto Lymphocyte # : 1.18 K/uL  Auto Monocyte # : 0.85 K/uL  Auto Eosinophil # : 0.19 K/uL  Auto Basophil # : 0.03 K/uL  Auto Neutrophil % : 67.0 %  Auto Lymphocyte % : 17.1 %  Auto Monocyte % : 12.3 %  Auto Eosinophil % : 2.8 %  Auto Basophil % : 0.4 %    11-02    141  |  102  |  43<H>  ----------------------------<  75  4.1   |  30  |  4.86<H>    Ca    8.3<L>      02 Nov 2018 04:37      CARDIAC MARKERS ( 02 Nov 2018 04:37 )  6.760 ng/mL / x     / x     / x     / x      CARDIAC MARKERS ( 31 Oct 2018 18:14 )  1.580 ng/mL / x     / x     / x     / x      CARDIAC MARKERS ( 31 Oct 2018 10:50 )  1.160 ng/mL / x     / x     / x     / x              MEDICATIONS  (STANDING):  aspirin enteric coated 81 milliGRAM(s) Oral daily  atorvastatin 40 milliGRAM(s) Oral at bedtime  calcium acetate 1334 milliGRAM(s) Oral three times a day with meals  carvedilol 6.25 milliGRAM(s) Oral every 12 hours  clopidogrel Tablet 75 milliGRAM(s) Oral daily  dextrose 5%. 1000 milliLiter(s) (50 mL/Hr) IV Continuous <Continuous>  dextrose 50% Injectable 12.5 Gram(s) IV Push once  dextrose 50% Injectable 25 Gram(s) IV Push once  dextrose 50% Injectable 25 Gram(s) IV Push once  docusate sodium 100 milliGRAM(s) Oral three times a day  gabapentin 300 milliGRAM(s) Oral at bedtime  heparin  Injectable 5000 Unit(s) SubCutaneous every 12 hours  insulin glargine Injectable (LANTUS) 27 Unit(s) SubCutaneous at bedtime  insulin lispro (HumaLOG) corrective regimen sliding scale   SubCutaneous three times a day before meals  insulin lispro (HumaLOG) corrective regimen sliding scale   SubCutaneous at bedtime  insulin lispro Injectable (HumaLOG) 5 Unit(s) SubCutaneous before breakfast  insulin lispro Injectable (HumaLOG) 5 Unit(s) SubCutaneous before lunch  insulin lispro Injectable (HumaLOG) 5 Unit(s) SubCutaneous before dinner  losartan 25 milliGRAM(s) Oral daily  pantoprazole    Tablet 40 milliGRAM(s) Oral before breakfast  sodium bicarbonate 650 milliGRAM(s) Oral daily    MEDICATIONS  (PRN):  albumin human 25% IVPB 50 milliLiter(s) IV Intermittent <User Schedule> PRN sbp < or = 120 mmHg  dextrose 40% Gel 15 Gram(s) Oral once PRN Blood Glucose LESS THAN 70 milliGRAM(s)/deciliter  glucagon  Injectable 1 milliGRAM(s) IntraMuscular once PRN Glucose LESS THAN 70 milligrams/deciliter  ondansetron Injectable 4 milliGRAM(s) IV Push every 6 hours PRN Nausea        HPI:  86M w/PMH ESRD on HD MWF, T2DM, HTN, CAD, presents c/o sudden onset of dyspnea that woke him up at 0200.  He was unable to speak.  His wife called EMS immediately and pt brought into ED, found in fluid overload.  He was placed on bipap, given lasix.    Renal came in and pt had HD over night.  At the time of my exam, pt states he's much more comfortable and can now complete sentences and is now on NC 4L.  He denies any chest pain, denies any prior similar episode.  He denies any worsening cough, fever/chills, n/v/d, abd pain.  He normally uses 1 pillow to sleep.  He endorses increased LE edema and cramping.  + Trop  S/P PCI of LAD and D1 on 11/1/18  No overnight events  EKG: NSR   Tele: NSR with rare PVCs    PAST MEDICAL & SURGICAL HISTORY:  ESRD on hemodialysis   HTN (hypertension)  Bladder cancer  History of exploratory laparotomy: peritonitis  History of appendectomy  AV fistula: right (never used)  History of bladder cancer: s/p resection with neobladder        Allergies    lisinopril (Other)    Intolerances    lisinopril (Diarrhea)        MEDICATIONS  (STANDING):  aspirin  chewable 324 milliGRAM(s) Oral once  clopidogrel Tablet 75 milliGRAM(s) Oral once  dextrose 5%. 1000 milliLiter(s) (50 mL/Hr) IV Continuous <Continuous>  dextrose 50% Injectable 12.5 Gram(s) IV Push once  dextrose 50% Injectable 25 Gram(s) IV Push once  dextrose 50% Injectable 25 Gram(s) IV Push once  docusate sodium 100 milliGRAM(s) Oral three times a day  heparin  Injectable 5000 Unit(s) SubCutaneous every 12 hours  insulin glargine Injectable (LANTUS) 27 Unit(s) SubCutaneous at bedtime  insulin lispro (HumaLOG) corrective regimen sliding scale   SubCutaneous three times a day before meals  insulin lispro (HumaLOG) corrective regimen sliding scale   SubCutaneous at bedtime  insulin lispro Injectable (HumaLOG) 5 Unit(s) SubCutaneous before breakfast  insulin lispro Injectable (HumaLOG) 5 Unit(s) SubCutaneous before lunch  insulin lispro Injectable (HumaLOG) 5 Unit(s) SubCutaneous before dinner    MEDICATIONS  (PRN):  albumin human 25% IVPB 50 milliLiter(s) IV Intermittent <User Schedule> PRN sbp < or = 120 mmHg  dextrose 40% Gel 15 Gram(s) Oral once PRN Blood Glucose LESS THAN 70 milliGRAM(s)/deciliter  glucagon  Injectable 1 milliGRAM(s) IntraMuscular once PRN Glucose LESS THAN 70 milligrams/deciliter  ondansetron Injectable 4 milliGRAM(s) IV Push every 6 hours PRN Nausea          ASSESSMENT/PLAN:  86 year old male with PMHx ESRD on HD. S/P PCI ofD1 and LAD on 11/1/18  Coronary artery disease  -Continue ASA, Plavix, statin, BB  -Plan of care discussed with patient  -Follow-up with attending MD   within 1 week  -Discussed therapeutic lifestyle changes to reduce risk factors such as following a cardiac diet, weight loss, maintaining a healthy weight, exercise, smoking cessation, medication compliance, and regular follow-up  with MD to know your numbers (BP, cholesterol, weight, and glucose) Nurse Practitioner Progress note:   s/p PCI on 11/1/18     Denies chest pain, shortness of breath at rest,  dizziness or palpitations at this time  A+Ox3  CV: S1S2 reg  Respiratory: CTAB, normal effort  Right groin procedure site dsg removed.  no bleeding, no hematoma, site soft, non tender, positive pedal pulses bilaterally    T(C): 37.1 (11-02-18 @ 06:27), Max: 37.4 (11-01-18 @ 23:50)  HR: 78 (11-02-18 @ 06:00) (68 - 100)  BP: 140/48 (11-02-18 @ 06:00) (93/44 - 146/55)  RR: 18 (11-02-18 @ 06:00) (11 - 25)  SpO2: 94% (11-02-18 @ 06:00) (86% - 98%)  Wt(kg): --  CBC Full  -  ( 02 Nov 2018 04:37 )  WBC Count : 6.89 K/uL  Hemoglobin : 9.2 g/dL  Hematocrit : 28.0 %  Platelet Count - Automated : 163 K/uL  Mean Cell Volume : 100.4 fl  Mean Cell Hemoglobin : 33.0 pg  Mean Cell Hemoglobin Concentration : 32.9 gm/dL  Auto Neutrophil # : 4.61 K/uL  Auto Lymphocyte # : 1.18 K/uL  Auto Monocyte # : 0.85 K/uL  Auto Eosinophil # : 0.19 K/uL  Auto Basophil # : 0.03 K/uL  Auto Neutrophil % : 67.0 %  Auto Lymphocyte % : 17.1 %  Auto Monocyte % : 12.3 %  Auto Eosinophil % : 2.8 %  Auto Basophil % : 0.4 %    11-02    141  |  102  |  43<H>  ----------------------------<  75  4.1   |  30  |  4.86<H>    Ca    8.3<L>      02 Nov 2018 04:37      CARDIAC MARKERS ( 02 Nov 2018 04:37 )  6.760 ng/mL / x     / x     / x     / x      CARDIAC MARKERS ( 31 Oct 2018 18:14 )  1.580 ng/mL / x     / x     / x     / x      CARDIAC MARKERS ( 31 Oct 2018 10:50 )  1.160 ng/mL / x     / x     / x     / x              MEDICATIONS  (STANDING):  aspirin enteric coated 81 milliGRAM(s) Oral daily  atorvastatin 40 milliGRAM(s) Oral at bedtime  calcium acetate 1334 milliGRAM(s) Oral three times a day with meals  carvedilol 6.25 milliGRAM(s) Oral every 12 hours  clopidogrel Tablet 75 milliGRAM(s) Oral daily  dextrose 5%. 1000 milliLiter(s) (50 mL/Hr) IV Continuous <Continuous>  dextrose 50% Injectable 12.5 Gram(s) IV Push once  dextrose 50% Injectable 25 Gram(s) IV Push once  dextrose 50% Injectable 25 Gram(s) IV Push once  docusate sodium 100 milliGRAM(s) Oral three times a day  gabapentin 300 milliGRAM(s) Oral at bedtime  heparin  Injectable 5000 Unit(s) SubCutaneous every 12 hours  insulin glargine Injectable (LANTUS) 27 Unit(s) SubCutaneous at bedtime  insulin lispro (HumaLOG) corrective regimen sliding scale   SubCutaneous three times a day before meals  insulin lispro (HumaLOG) corrective regimen sliding scale   SubCutaneous at bedtime  insulin lispro Injectable (HumaLOG) 5 Unit(s) SubCutaneous before breakfast  insulin lispro Injectable (HumaLOG) 5 Unit(s) SubCutaneous before lunch  insulin lispro Injectable (HumaLOG) 5 Unit(s) SubCutaneous before dinner  losartan 25 milliGRAM(s) Oral daily  pantoprazole    Tablet 40 milliGRAM(s) Oral before breakfast  sodium bicarbonate 650 milliGRAM(s) Oral daily    MEDICATIONS  (PRN):  albumin human 25% IVPB 50 milliLiter(s) IV Intermittent <User Schedule> PRN sbp < or = 120 mmHg  dextrose 40% Gel 15 Gram(s) Oral once PRN Blood Glucose LESS THAN 70 milliGRAM(s)/deciliter  glucagon  Injectable 1 milliGRAM(s) IntraMuscular once PRN Glucose LESS THAN 70 milligrams/deciliter  ondansetron Injectable 4 milliGRAM(s) IV Push every 6 hours PRN Nausea        HPI:  86M w/PMH ESRD on HD MWF, T2DM, HTN, CAD, presents c/o sudden onset of dyspnea that woke him up at 0200.  He was unable to speak.  His wife called EMS immediately and pt brought into ED, found in fluid overload.  He was placed on bipap, given lasix.    Renal came in and pt had HD over night.  At the time of my exam, pt states he's much more comfortable and can now complete sentences and is now on NC 4L.  He denies any chest pain, denies any prior similar episode.  He denies any worsening cough, fever/chills, n/v/d, abd pain.  He normally uses 1 pillow to sleep.  He endorses increased LE edema and cramping.  + Trop  S/P PCI of LAD and D1 on 11/1/18  No overnight events  EKG: NSR   Tele: NSR with rare PVCs    PAST MEDICAL & SURGICAL HISTORY:  ESRD on hemodialysis   HTN (hypertension)  Bladder cancer  History of exploratory laparotomy: peritonitis  History of appendectomy  AV fistula: right (never used)  History of bladder cancer: s/p resection with neobladder        Allergies    lisinopril (Other)    Intolerances    lisinopril (Diarrhea)        MEDICATIONS  (STANDING):  aspirin  chewable 324 milliGRAM(s) Oral once  clopidogrel Tablet 75 milliGRAM(s) Oral once  dextrose 5%. 1000 milliLiter(s) (50 mL/Hr) IV Continuous <Continuous>  dextrose 50% Injectable 12.5 Gram(s) IV Push once  dextrose 50% Injectable 25 Gram(s) IV Push once  dextrose 50% Injectable 25 Gram(s) IV Push once  docusate sodium 100 milliGRAM(s) Oral three times a day  heparin  Injectable 5000 Unit(s) SubCutaneous every 12 hours  insulin glargine Injectable (LANTUS) 27 Unit(s) SubCutaneous at bedtime  insulin lispro (HumaLOG) corrective regimen sliding scale   SubCutaneous three times a day before meals  insulin lispro (HumaLOG) corrective regimen sliding scale   SubCutaneous at bedtime  insulin lispro Injectable (HumaLOG) 5 Unit(s) SubCutaneous before breakfast  insulin lispro Injectable (HumaLOG) 5 Unit(s) SubCutaneous before lunch  insulin lispro Injectable (HumaLOG) 5 Unit(s) SubCutaneous before dinner    MEDICATIONS  (PRN):  albumin human 25% IVPB 50 milliLiter(s) IV Intermittent <User Schedule> PRN sbp < or = 120 mmHg  dextrose 40% Gel 15 Gram(s) Oral once PRN Blood Glucose LESS THAN 70 milliGRAM(s)/deciliter  glucagon  Injectable 1 milliGRAM(s) IntraMuscular once PRN Glucose LESS THAN 70 milligrams/deciliter  ondansetron Injectable 4 milliGRAM(s) IV Push every 6 hours PRN Nausea          ASSESSMENT/PLAN:  86 year old male with PMHx ESRD on HD. S/P PCI ofD1 and LAD on 11/1/18  Coronary artery disease  -Continue ASA, Plavix, statin, BB  -Plan of care discussed with patient  -Follow-up with attending MD  within 1 week after discharge  -Discussed therapeutic lifestyle changes to reduce risk factors such as following a cardiac diet, weight loss, maintaining a healthy weight, exercise, smoking cessation, medication compliance, and regular follow-up  with MD to know your numbers (BP, cholesterol, weight, and glucose) Nurse Practitioner Progress note:   s/p PCI on 11/1/18     Denies chest pain, shortness of breath at rest,  dizziness or palpitations at this time  A+Ox3  CV: S1S2 reg  Respiratory: CTAB, normal effort  Right groin procedure site dsg removed.  no bleeding, no hematoma, site soft, non tender, positive pedal pulses bilaterally    T(C): 37.1 (11-02-18 @ 06:27), Max: 37.4 (11-01-18 @ 23:50)  HR: 78 (11-02-18 @ 06:00) (68 - 100)  BP: 140/48 (11-02-18 @ 06:00) (93/44 - 146/55)  RR: 18 (11-02-18 @ 06:00) (11 - 25)  SpO2: 94% (11-02-18 @ 06:00) (86% - 98%)  Wt(kg): --  CBC Full  -  ( 02 Nov 2018 04:37 )  WBC Count : 6.89 K/uL  Hemoglobin : 9.2 g/dL  Hematocrit : 28.0 %  Platelet Count - Automated : 163 K/uL  Mean Cell Volume : 100.4 fl  Mean Cell Hemoglobin : 33.0 pg  Mean Cell Hemoglobin Concentration : 32.9 gm/dL  Auto Neutrophil # : 4.61 K/uL  Auto Lymphocyte # : 1.18 K/uL  Auto Monocyte # : 0.85 K/uL  Auto Eosinophil # : 0.19 K/uL  Auto Basophil # : 0.03 K/uL  Auto Neutrophil % : 67.0 %  Auto Lymphocyte % : 17.1 %  Auto Monocyte % : 12.3 %  Auto Eosinophil % : 2.8 %  Auto Basophil % : 0.4 %    11-02    141  |  102  |  43<H>  ----------------------------<  75  4.1   |  30  |  4.86<H>    Ca    8.3<L>      02 Nov 2018 04:37      CARDIAC MARKERS ( 02 Nov 2018 04:37 )  6.760 ng/mL / x     / x     / x     / x      CARDIAC MARKERS ( 31 Oct 2018 18:14 )  1.580 ng/mL / x     / x     / x     / x      CARDIAC MARKERS ( 31 Oct 2018 10:50 )  1.160 ng/mL / x     / x     / x     / x              MEDICATIONS  (STANDING):  aspirin enteric coated 81 milliGRAM(s) Oral daily  atorvastatin 40 milliGRAM(s) Oral at bedtime  calcium acetate 1334 milliGRAM(s) Oral three times a day with meals  carvedilol 6.25 milliGRAM(s) Oral every 12 hours  clopidogrel Tablet 75 milliGRAM(s) Oral daily  dextrose 5%. 1000 milliLiter(s) (50 mL/Hr) IV Continuous <Continuous>  dextrose 50% Injectable 12.5 Gram(s) IV Push once  dextrose 50% Injectable 25 Gram(s) IV Push once  dextrose 50% Injectable 25 Gram(s) IV Push once  docusate sodium 100 milliGRAM(s) Oral three times a day  gabapentin 300 milliGRAM(s) Oral at bedtime  heparin  Injectable 5000 Unit(s) SubCutaneous every 12 hours  insulin glargine Injectable (LANTUS) 27 Unit(s) SubCutaneous at bedtime  insulin lispro (HumaLOG) corrective regimen sliding scale   SubCutaneous three times a day before meals  insulin lispro (HumaLOG) corrective regimen sliding scale   SubCutaneous at bedtime  insulin lispro Injectable (HumaLOG) 5 Unit(s) SubCutaneous before breakfast  insulin lispro Injectable (HumaLOG) 5 Unit(s) SubCutaneous before lunch  insulin lispro Injectable (HumaLOG) 5 Unit(s) SubCutaneous before dinner  losartan 25 milliGRAM(s) Oral daily  pantoprazole    Tablet 40 milliGRAM(s) Oral before breakfast  sodium bicarbonate 650 milliGRAM(s) Oral daily    MEDICATIONS  (PRN):  albumin human 25% IVPB 50 milliLiter(s) IV Intermittent <User Schedule> PRN sbp < or = 120 mmHg  dextrose 40% Gel 15 Gram(s) Oral once PRN Blood Glucose LESS THAN 70 milliGRAM(s)/deciliter  glucagon  Injectable 1 milliGRAM(s) IntraMuscular once PRN Glucose LESS THAN 70 milligrams/deciliter  ondansetron Injectable 4 milliGRAM(s) IV Push every 6 hours PRN Nausea        HPI:  86M w/PMH ESRD on HD MWF, T2DM, HTN, CAD, presents c/o sudden onset of dyspnea that woke him up at 0200.  He was unable to speak.  His wife called EMS immediately and pt brought into ED, found in fluid overload.  He was placed on bipap, given lasix.    Renal came in and pt had HD over night.  At the time of my exam, pt states he's much more comfortable and can now complete sentences and is now on NC 4L.  He denies any chest pain, denies any prior similar episode.  He denies any worsening cough, fever/chills, n/v/d, abd pain.  He normally uses 1 pillow to sleep.  He endorses increased LE edema and cramping.  + Trop  S/P PCI of LAD and D1 on 11/1/18  No overnight events  EKG: NSR   Tele: NSR with rare PVCs    PAST MEDICAL & SURGICAL HISTORY:  ESRD on hemodialysis   HTN (hypertension)  Bladder cancer  History of exploratory laparotomy: peritonitis  History of appendectomy  AV fistula: right (never used)  History of bladder cancer: s/p resection with neobladder        Allergies    lisinopril (Other)    Intolerances    lisinopril (Diarrhea)      ASSESSMENT/PLAN:  86 year old male with PMHx ESRD on HD. S/P PCI ofD1 and LAD on 11/1/18  Coronary artery disease  -Continue ASA, Plavix, statin, BB  -Plan of care discussed with patient  -Follow-up with attending MD  within 1 week after discharge  -Discussed therapeutic lifestyle changes to reduce risk factors such as following a cardiac diet, weight loss, maintaining a healthy weight, exercise, smoking cessation, medication compliance, and regular follow-up  with MD to know your numbers (BP, cholesterol, weight, and glucose)

## 2018-11-02 NOTE — PROGRESS NOTE ADULT - ASSESSMENT
Acute diastolic congestive heart failure/pulmonary edema. He feels improved after hemodialysis and stent to LAD and diagonal 1.  Coronary artery disease status post drug-eluting stent to proximal RCA in January 2016 & drug-eluting stent to LAD  and diagonal 1.  End-stage renal disease he is on hemodialysis.  Moderate aortic stenosis.  High cholesterol.  Hypertension.  Peripheral artery disease.  Diabetes mellitus.  History of GI bleed in the past.  Suggest  Change Lopressor to Coreg 6.25 mg by mouth twice a day.  Losartan 25 mg by mouth once daily if he tolerates.  continue with other cardiac medications.  Due to LV dysfunction, congestive heart failure stop Procardia. A blood pressure increases, may increase losartan to 50 g by mouth once daily.  Patient is not allergic to lisinopril, he gets dizzy with lisinopril.  Discussed with , hospitalist and nephrologist.  Intake and output daily weights.  Restrict IV fluids to 1.2 L per day.  Gradual ambulation.  Discussed with patient's son.

## 2018-11-03 LAB
ANION GAP SERPL CALC-SCNC: 11 MMOL/L — SIGNIFICANT CHANGE UP (ref 5–17)
BUN SERPL-MCNC: 36 MG/DL — HIGH (ref 7–23)
CALCIUM SERPL-MCNC: 9.3 MG/DL — SIGNIFICANT CHANGE UP (ref 8.5–10.1)
CHLORIDE SERPL-SCNC: 102 MMOL/L — SIGNIFICANT CHANGE UP (ref 96–108)
CO2 SERPL-SCNC: 28 MMOL/L — SIGNIFICANT CHANGE UP (ref 22–31)
CREAT SERPL-MCNC: 4.34 MG/DL — HIGH (ref 0.5–1.3)
GLUCOSE BLDC GLUCOMTR-MCNC: 165 MG/DL — HIGH (ref 70–99)
GLUCOSE BLDC GLUCOMTR-MCNC: 204 MG/DL — HIGH (ref 70–99)
GLUCOSE BLDC GLUCOMTR-MCNC: 248 MG/DL — HIGH (ref 70–99)
GLUCOSE BLDC GLUCOMTR-MCNC: 76 MG/DL — SIGNIFICANT CHANGE UP (ref 70–99)
GLUCOSE SERPL-MCNC: 62 MG/DL — LOW (ref 70–99)
POTASSIUM SERPL-MCNC: 3.7 MMOL/L — SIGNIFICANT CHANGE UP (ref 3.5–5.3)
POTASSIUM SERPL-SCNC: 3.7 MMOL/L — SIGNIFICANT CHANGE UP (ref 3.5–5.3)
SODIUM SERPL-SCNC: 141 MMOL/L — SIGNIFICANT CHANGE UP (ref 135–145)

## 2018-11-03 PROCEDURE — 93010 ELECTROCARDIOGRAM REPORT: CPT

## 2018-11-03 PROCEDURE — 76700 US EXAM ABDOM COMPLETE: CPT | Mod: 26

## 2018-11-03 PROCEDURE — 71045 X-RAY EXAM CHEST 1 VIEW: CPT | Mod: 26

## 2018-11-03 PROCEDURE — 71250 CT THORAX DX C-: CPT | Mod: 26

## 2018-11-03 RX ORDER — LOSARTAN POTASSIUM 100 MG/1
50 TABLET, FILM COATED ORAL DAILY
Qty: 0 | Refills: 0 | Status: DISCONTINUED | OUTPATIENT
Start: 2018-11-03 | End: 2018-11-07

## 2018-11-03 RX ORDER — CARVEDILOL PHOSPHATE 80 MG/1
12.5 CAPSULE, EXTENDED RELEASE ORAL EVERY 12 HOURS
Qty: 0 | Refills: 0 | Status: DISCONTINUED | OUTPATIENT
Start: 2018-11-03 | End: 2018-11-05

## 2018-11-03 RX ADMIN — Medication 100 MILLIGRAM(S): at 21:16

## 2018-11-03 RX ADMIN — LOSARTAN POTASSIUM 25 MILLIGRAM(S): 100 TABLET, FILM COATED ORAL at 00:14

## 2018-11-03 RX ADMIN — Medication 1334 MILLIGRAM(S): at 08:58

## 2018-11-03 RX ADMIN — Medication 1334 MILLIGRAM(S): at 16:11

## 2018-11-03 RX ADMIN — Medication 0: at 21:16

## 2018-11-03 RX ADMIN — Medication 1334 MILLIGRAM(S): at 17:04

## 2018-11-03 RX ADMIN — PANTOPRAZOLE SODIUM 40 MILLIGRAM(S): 20 TABLET, DELAYED RELEASE ORAL at 08:58

## 2018-11-03 RX ADMIN — INSULIN GLARGINE 27 UNIT(S): 100 INJECTION, SOLUTION SUBCUTANEOUS at 21:17

## 2018-11-03 RX ADMIN — Medication 650 MILLIGRAM(S): at 16:11

## 2018-11-03 RX ADMIN — ATORVASTATIN CALCIUM 40 MILLIGRAM(S): 80 TABLET, FILM COATED ORAL at 21:16

## 2018-11-03 RX ADMIN — LOSARTAN POTASSIUM 25 MILLIGRAM(S): 100 TABLET, FILM COATED ORAL at 08:59

## 2018-11-03 RX ADMIN — Medication 4: at 17:01

## 2018-11-03 RX ADMIN — CARVEDILOL PHOSPHATE 6.25 MILLIGRAM(S): 80 CAPSULE, EXTENDED RELEASE ORAL at 05:50

## 2018-11-03 RX ADMIN — Medication 100 MILLIGRAM(S): at 16:11

## 2018-11-03 RX ADMIN — HEPARIN SODIUM 5000 UNIT(S): 5000 INJECTION INTRAVENOUS; SUBCUTANEOUS at 17:02

## 2018-11-03 RX ADMIN — Medication 10 MILLIGRAM(S): at 08:59

## 2018-11-03 RX ADMIN — Medication 5 UNIT(S): at 17:02

## 2018-11-03 RX ADMIN — Medication 81 MILLIGRAM(S): at 16:12

## 2018-11-03 RX ADMIN — LOSARTAN POTASSIUM 50 MILLIGRAM(S): 100 TABLET, FILM COATED ORAL at 17:04

## 2018-11-03 RX ADMIN — GABAPENTIN 300 MILLIGRAM(S): 400 CAPSULE ORAL at 21:16

## 2018-11-03 RX ADMIN — Medication 100 MILLIGRAM(S): at 05:50

## 2018-11-03 RX ADMIN — CLOPIDOGREL BISULFATE 75 MILLIGRAM(S): 75 TABLET, FILM COATED ORAL at 16:12

## 2018-11-03 RX ADMIN — CARVEDILOL PHOSPHATE 12.5 MILLIGRAM(S): 80 CAPSULE, EXTENDED RELEASE ORAL at 17:04

## 2018-11-03 RX ADMIN — HEPARIN SODIUM 5000 UNIT(S): 5000 INJECTION INTRAVENOUS; SUBCUTANEOUS at 05:50

## 2018-11-03 NOTE — PROGRESS NOTE ADULT - ASSESSMENT
86M w/PMH ESRD on HD MWF, T2DM, HTN, CAD, presents c/o sudden onset of dyspnea that woke him up at 0200.  Found to be in pulm edema secondary to acute on chronic decompensated systolic CHF with rising troponins and EKG changes suggetive of NSTEMI.     A:  Acute hypoxemic resp failure- multi factorial  Acute systolic CHF  NSTEMI  Anemia- suspect AOCD  ESRD  Pulm Edema  AS  Bladder CA    P:  S/p LHC with stent to LAD/D1  DAPT  Statins  BP control with BB/ACE-I  ESRD per schedule- today. Cont high flow O2. CXR noted. CT chest  HD today - 2L fluid removal per renal reccs  Renal sono to further investigate right flank pain  Echo, serial trops, BMP  Increase coreg for optimal BP control  Monitor fluid status  CCU  Bladder CA-> outpt management  DVTpx  TT: 40 min

## 2018-11-03 NOTE — PROGRESS NOTE ADULT - SUBJECTIVE AND OBJECTIVE BOX
Patient is a 86y old  Male who presents with a chief complaint of [place] (01 Nov 2018 12:01)      SUBJECTIVE:   HPI:  86M w/PMH ESRD on HD MWF, T2DM, HTN, CAD, presents c/o sudden onset of dyspnea that woke him up at 0200.  Found to be in pulm edema secondary to acute on chronic decompensated systolic CHF with rising troponins and EKG changes suggetive of NSTEMI.     11/1: s/p LHC with KAREN to LAD/D1. Seen in recovery unit. Tolerated procedure well. VSS  11/2: still remains on HFO and sob. On scheduled HD today  11/3: still sob and now c/o right flank pain. makes only minute amounts of urine    PAST MEDICAL & SURGICAL HISTORY:  ESRD on hemodialysis   HTN (hypertension)  Bladder cancer  History of exploratory laparotomy: peritonitis  History of appendectomy  AV fistula: right (never used)  History of bladder cancer: s/p resection with neobladder      Review of Systems:   CONSTITUTIONAL: No fever.  EYES: No eye pain or discharge.  ENMT:  No sinus or throat pain  NECK: No pain or stiffness  RESPIRATORY: No cough, wheezing, chills or hemoptysis; ++ shortness of breath  CARDIOVASCULAR: No chest pain, palpitations, dizziness, ++leg swelling  GASTROINTESTINAL: No abdominal or epigastric pain. No nausea, vomiting, or hematemesis; No diarrhea or constipation. No melena or hematochezia.  GENITOURINARY: does not make urine  NEUROLOGICAL: No headaches, memory loss, loss of strength, numbness, or tremors  SKIN: No rashes.  LYMPH NODES: No enlarged glands  MUSCULOSKELETAL: No muscle, back, or extremity pain, ++leg cramping   PSYCHIATRIC: No depression, anxiety, mood swings, or difficulty sleeping      Allergies    lisinopril (Other)    Intolerances    lisinopril (Diarrhea)      Social History:     FAMILY HISTORY:  No pertinent family history in first degree relatives- unknown to pt        CAPILLARY BLOOD GLUCOSE      ICU Vital Signs Last 24 Hrs  T(C): 36.9 (03 Nov 2018 13:51), Max: 37.6 (02 Nov 2018 22:56)  T(F): 98.4 (03 Nov 2018 13:51), Max: 99.7 (02 Nov 2018 22:56)  HR: 97 (03 Nov 2018 13:51) (71 - 97)  BP: 142/47 (03 Nov 2018 13:51) (117/45 - 170/64)  BP(mean): 92 (03 Nov 2018 04:00) (76 - 92)  ABP: --  ABP(mean): --  RR: 22 (03 Nov 2018 13:51) (16 - 25)  SpO2: 96% (03 Nov 2018 13:51) (85% - 100%)        GENERAL: NAD, well-developed  HEAD:  Atraumatic, Normocephalic  EYES: EOMI, PERRLA, conjunctiva and sclera clear  ENT: [place], no nasal discharge, throat clear, poor dentition  NECK: Supple, No JVD, NO LAD, no thyromegaly  CHEST/LUNG: Clear to auscultation bilaterally; No wheeze  HEART: decreased breath sounds at bases  ABDOMEN: Soft, Nontender, Nondistended; Bowel sounds present, no HSM  EXTREMITIES:  2+ Peripheral Pulses, No clubbing, cyanosis, 1+ pitting LE edema   PSYCH: AAOx3, normal behavior   NEUROLOGY: non-focal, sensory and cn2-12 grossly intact  SKIN: No visible rashes or lesions    LABS:                        12.0   19.33 )-----------( 261      ( 31 Oct 2018 04:18 )             38.2     10-31    142  |  110<H>  |  74<H>  ----------------------------<  237<H>  5.0   |  21<L>  |  7.41<H>    Ca    9.1      31 Oct 2018 04:18    TPro  7.7  /  Alb  3.8  /  TBili  0.6  /  DBili  x   /  AST  15  /  ALT  29  /  AlkPhos  105  10-31    PT/INR - ( 31 Oct 2018 04:18 )   PT: 11.1 sec;   INR: 1.00 ratio         PTT - ( 31 Oct 2018 04:18 )  PTT:34.3 sec  CARDIAC MARKERS ( 31 Oct 2018 10:50 )  1.160 ng/mL / x     / x     / x     / x      CARDIAC MARKERS ( 31 Oct 2018 04:18 )  0.061 ng/mL / x     / 103 U/L / x     / x              RADIOLOGY & ADDITIONAL TESTS:    Imaging Personally Reviewed:  diffuse airspace opacities b/l   EKG Personally Reviewed:  sinus tachy (31 Oct 2018 14:20)            RADIOLOGY/EKG:    < from: Xray Chest 1 View- PORTABLE-Routine (11.03.18 @ 08:40) >  Impression:slight improvement of BILATERAL interstitial infiltrates.     < end of copied text >

## 2018-11-03 NOTE — PROGRESS NOTE ADULT - SUBJECTIVE AND OBJECTIVE BOX
NEPHROLOGY INTERVAL HPI/OVERNIGHT EVENTS:    Date of Service: 18 @ 11:21  11/3 SY  Appearing much more comfortable  O2 saturation in low 90's with NC o2.  No acute distress.  Post Stents x 2 on  ( to LAD and diagonal 1)    HPI:  85 yo man with PMHX of ESRD, CAD, DM and HTN on maintenance HD since 10/2014.  Pt presented with one day hx of SOB.  Pt apparently gained about 6 kg fluid weight over weekend.   Was able to tolerate only 3.5 kg fluid removal.   Recently had EDW increased due to severe leg cramping with attempts to maintain EDW lower.  Reports worsening SOB for one day.  Unable to go for outpatient HD tx and came to ED.  Bipap mask in place with improved 02 saturation.   Denies any chest discomfort.      MEDICATIONS  (STANDING):  aspirin enteric coated 81 milliGRAM(s) Oral daily  atorvastatin 40 milliGRAM(s) Oral at bedtime  calcium acetate 1334 milliGRAM(s) Oral three times a day with meals  carvedilol 12.5 milliGRAM(s) Oral every 12 hours  clopidogrel Tablet 75 milliGRAM(s) Oral daily  dextrose 5%. 1000 milliLiter(s) (50 mL/Hr) IV Continuous <Continuous>  dextrose 50% Injectable 12.5 Gram(s) IV Push once  dextrose 50% Injectable 25 Gram(s) IV Push once  dextrose 50% Injectable 25 Gram(s) IV Push once  docusate sodium 100 milliGRAM(s) Oral three times a day  epoetin norman Injectable 6000 Unit(s) IV Push <User Schedule>  gabapentin 300 milliGRAM(s) Oral at bedtime  heparin  Injectable 5000 Unit(s) SubCutaneous every 12 hours  insulin glargine Injectable (LANTUS) 27 Unit(s) SubCutaneous at bedtime  insulin lispro (HumaLOG) corrective regimen sliding scale   SubCutaneous three times a day before meals  insulin lispro (HumaLOG) corrective regimen sliding scale   SubCutaneous at bedtime  insulin lispro Injectable (HumaLOG) 5 Unit(s) SubCutaneous before breakfast  insulin lispro Injectable (HumaLOG) 5 Unit(s) SubCutaneous before lunch  insulin lispro Injectable (HumaLOG) 5 Unit(s) SubCutaneous before dinner  losartan 50 milliGRAM(s) Oral daily  pantoprazole    Tablet 40 milliGRAM(s) Oral before breakfast  sodium bicarbonate 650 milliGRAM(s) Oral daily    MEDICATIONS  (PRN):  albumin human 25% IVPB 50 milliLiter(s) IV Intermittent <User Schedule> PRN sbp < or = 120 mmHg  dextrose 40% Gel 15 Gram(s) Oral once PRN Blood Glucose LESS THAN 70 milliGRAM(s)/deciliter  glucagon  Injectable 1 milliGRAM(s) IntraMuscular once PRN Glucose LESS THAN 70 milligrams/deciliter  metoclopramide Injectable 10 milliGRAM(s) IV Push every 8 hours PRN nausea  ondansetron Injectable 4 milliGRAM(s) IV Push every 6 hours PRN Nausea          Vital Signs Last 24 Hrs  T(C): 37.6 (2018 06:20), Max: 37.6 (2018 22:56)  T(F): 99.6 (2018 06:20), Max: 99.7 (2018 22:56)  HR: 94 (2018 04:00) (71 - 97)  BP: 168/65 (2018 04:00) (126/43 - 170/64)  BP(mean): 92 (2018 04:00) (76 - 92)  RR: 19 (2018 04:00) (15 - 25)  SpO2: 96% (2018 02:00) (85% - 100%)  Daily     Daily Weight in k.2 (2018 06:20)      PHYSICAL EXAM:  Alert and appropriate  GENERAL: no acute distress  CHEST/LUNG: Bilateral rhonchi and rales  HEART: S1S2 RRR  ABDOMEN:soft   EXTREMITIES: improved edema  SKIN:     LABS:                        9.2    6.89  )-----------( 163      ( 2018 04:37 )             28.0     11-03    141  |  102  |  36<H>  ----------------------------<  62<L>  3.7   |  28  |  4.34<H>    Ca    9.3      2018 04:59                  RADIOLOGY & ADDITIONAL TESTS:

## 2018-11-03 NOTE — PROGRESS NOTE ADULT - ASSESSMENT
Acute diastolic congestive heart failure. Acute pulmonary edema.  Coronary artery disease status post drug-eluting stent to proximal RCA in January 2016 & drug-eluting stent to LAD  and diagonal 1 during current admission.  End-stage renal disease, on hemodialysis.  Moderate aortic stenosis.  High cholesterol.  Hypertension.  Peripheral artery disease.  Diabetes mellitus.  History of GI bleed in the past.    Suggest  BP is high. Coreg and Losartan dose to be increased.   continue with other cardiac medications.  Work up right flank pain. D/W Dr Jean. Abd sono + CT abdomen  Intake and output;  daily weights.  Restrict IV fluids to 1.2 L per day.  Gradual ambulation.  Discussed with patient's son. wife Acute diastolic congestive heart failure. Acute pulmonary edema.  Coronary artery disease status post drug-eluting stent to proximal RCA in January 2016 & drug-eluting stent to LAD  and diagonal 1 during current admission.  End-stage renal disease, on hemodialysis.  Moderate aortic stenosis.  High cholesterol.  Hypertension.  Peripheral artery disease.  Diabetes mellitus.  History of GI bleed in the past.    Suggest  BP is high. Coreg and Losartan dose to be increased.   continue with other cardiac medications.  Work up right flank pain. D/W Dr Jean. Abd sono + CT abdomen  Intake and output;  daily weights.  Restrict IV fluids to 1.2 L per day.  Gradual ambulation.  Discussed with patient's wife

## 2018-11-03 NOTE — PROGRESS NOTE ADULT - ASSESSMENT
85 yo man with ESRD presenting with decompensated CHF.     Unclear fluid overloaded state due to recent increased intake with decreased UF vs cardiac decompensation.  HD this am.  Due to continued distress, will increase DT to 4 hours to allow increased UF.  Further recommendations pending clinical response.    11/1  s/p HD last night  feels better today  for cardiac cath, d/w pts son  d/w Dr Talbert    11/2  S/P PCI ofD1 and LAD on Nov 1  feels well  dialyzed yesterday after cath  d/w Dr Talbert  d/w Pts son  HD today, his scheduled session  pt requests 3 kg fluid removal  from a renal standpoint pt may be dc   next hd Monday 11/5    11/3 SY  --ESRD  Continue TIW HD  --CHF : improved but continues with decreased o2 saturation.   Plan for PUF x2 hours today to stabilize resp status.  --CAD  Pot stents x2 .  Stable.

## 2018-11-03 NOTE — PROGRESS NOTE ADULT - SUBJECTIVE AND OBJECTIVE BOX
CURRENT CARDIAC WORKUP:       Echo:  Stress Test:  Cardiac Cath:    Allergies:   lisinopril (Other)      MEDICATIONS  (STANDING):  aspirin enteric coated 81 milliGRAM(s) Oral daily  atorvastatin 40 milliGRAM(s) Oral at bedtime  calcium acetate 1334 milliGRAM(s) Oral three times a day with meals  carvedilol 12.5 milliGRAM(s) Oral every 12 hours  clopidogrel Tablet 75 milliGRAM(s) Oral daily  dextrose 5%. 1000 milliLiter(s) (50 mL/Hr) IV Continuous <Continuous>  dextrose 50% Injectable 12.5 Gram(s) IV Push once  dextrose 50% Injectable 25 Gram(s) IV Push once  dextrose 50% Injectable 25 Gram(s) IV Push once  docusate sodium 100 milliGRAM(s) Oral three times a day  epoetin norman Injectable 6000 Unit(s) IV Push <User Schedule>  gabapentin 300 milliGRAM(s) Oral at bedtime  heparin  Injectable 5000 Unit(s) SubCutaneous every 12 hours  insulin glargine Injectable (LANTUS) 27 Unit(s) SubCutaneous at bedtime  insulin lispro (HumaLOG) corrective regimen sliding scale   SubCutaneous three times a day before meals  insulin lispro (HumaLOG) corrective regimen sliding scale   SubCutaneous at bedtime  insulin lispro Injectable (HumaLOG) 5 Unit(s) SubCutaneous before breakfast  insulin lispro Injectable (HumaLOG) 5 Unit(s) SubCutaneous before lunch  insulin lispro Injectable (HumaLOG) 5 Unit(s) SubCutaneous before dinner  losartan 50 milliGRAM(s) Oral daily  pantoprazole    Tablet 40 milliGRAM(s) Oral before breakfast  sodium bicarbonate 650 milliGRAM(s) Oral daily    MEDICATIONS  (PRN):  albumin human 25% IVPB 50 milliLiter(s) IV Intermittent <User Schedule> PRN sbp < or = 120 mmHg  dextrose 40% Gel 15 Gram(s) Oral once PRN Blood Glucose LESS THAN 70 milliGRAM(s)/deciliter  glucagon  Injectable 1 milliGRAM(s) IntraMuscular once PRN Glucose LESS THAN 70 milligrams/deciliter  metoclopramide Injectable 10 milliGRAM(s) IV Push every 8 hours PRN nausea  ondansetron Injectable 4 milliGRAM(s) IV Push every 6 hours PRN Nausea      ROS:     .ros      Vital Signs Last 24 Hrs  T(C): 37.6 (03 Nov 2018 06:20), Max: 37.6 (02 Nov 2018 22:56)  T(F): 99.6 (03 Nov 2018 06:20), Max: 99.7 (02 Nov 2018 22:56)  HR: 94 (03 Nov 2018 04:00) (71 - 97)  BP: 168/65 (03 Nov 2018 04:00) (126/43 - 170/64)  BP(mean): 92 (03 Nov 2018 04:00) (76 - 92)  RR: 19 (03 Nov 2018 04:00) (15 - 25)  SpO2: 96% (03 Nov 2018 02:00) (85% - 100%)    I&O's Summary      PHYSICAL EXAM:    .phy      INTERPRETATION OF TELEMETRY:    ECG:    LABS:                        9.2    6.89  )-----------( 163      ( 02 Nov 2018 04:37 )             28.0     11-03    141  |  102  |  36<H>  ----------------------------<  62<L>  3.7   |  28  |  4.34<H>    Ca    9.3      03 Nov 2018 04:59        Lipid Panel  Chl 84  HDL 28  LDL 25  Trg 155      11-02 @ 04:37  Trop-I 6.760    10-31 @ 18:14  Trop-I 1.580    10-31 @ 10:50  Trop-I 1.160    10-31 @ 04:18  Trop-I 0.061    Pro BNP  [id number] 10-31 @ 04:18 86-year-old male admitted with c/o shortness of breath sec to acute pulmonary edema. Some improvement with HD. He had elevated CE, EKG changes and wall motion abnormality on the echo. On cardiac cath he had drug-eluting stent to proximal LAD and diagonal. Breathing has improved.    Today he complains of right flank pain. No fever. No GI complaints.      PAST MEDICAL & SURGICAL HISTORY:  ESRD on hemodialysis   HTN (hypertension)  Bladder cancer  History of exploratory laparotomy: peritonitis  History of appendectomy  AV fistula: right (never used)  History of bladder cancer: s/p resection with neobladder    CURRENT CARDIAC WORKUP:       Echo:   LAD territory wall motion abnormality. EF 40-45%, mild MAC, mild MR, at least mild AS, moderate TR, mild PAH. Pleural effusion noted.     Cardiac Cath:  KAREN of prox LAD, D1    Allergies:   lisinopril (Other)      MEDICATIONS  (STANDING):  aspirin enteric coated 81 milliGRAM(s) Oral daily  atorvastatin 40 milliGRAM(s) Oral at bedtime  calcium acetate 1334 milliGRAM(s) Oral three times a day with meals  carvedilol 12.5 milliGRAM(s) Oral every 12 hours  clopidogrel Tablet 75 milliGRAM(s) Oral daily  dextrose 5%. 1000 milliLiter(s) (50 mL/Hr) IV Continuous <Continuous>  dextrose 50% Injectable 12.5 Gram(s) IV Push once  dextrose 50% Injectable 25 Gram(s) IV Push once  dextrose 50% Injectable 25 Gram(s) IV Push once  docusate sodium 100 milliGRAM(s) Oral three times a day  epoetin norman Injectable 6000 Unit(s) IV Push <User Schedule>  gabapentin 300 milliGRAM(s) Oral at bedtime  heparin  Injectable 5000 Unit(s) SubCutaneous every 12 hours  insulin glargine Injectable (LANTUS) 27 Unit(s) SubCutaneous at bedtime  insulin lispro (HumaLOG) corrective regimen sliding scale   SubCutaneous three times a day before meals  insulin lispro (HumaLOG) corrective regimen sliding scale   SubCutaneous at bedtime  insulin lispro Injectable (HumaLOG) 5 Unit(s) SubCutaneous before breakfast  insulin lispro Injectable (HumaLOG) 5 Unit(s) SubCutaneous before lunch  insulin lispro Injectable (HumaLOG) 5 Unit(s) SubCutaneous before dinner  losartan 50 milliGRAM(s) Oral daily  pantoprazole    Tablet 40 milliGRAM(s) Oral before breakfast  sodium bicarbonate 650 milliGRAM(s) Oral daily    MEDICATIONS  (PRN):  albumin human 25% IVPB 50 milliLiter(s) IV Intermittent <User Schedule> PRN sbp < or = 120 mmHg  dextrose 40% Gel 15 Gram(s) Oral once PRN Blood Glucose LESS THAN 70 milliGRAM(s)/deciliter  glucagon  Injectable 1 milliGRAM(s) IntraMuscular once PRN Glucose LESS THAN 70 milligrams/deciliter  metoclopramide Injectable 10 milliGRAM(s) IV Push every 8 hours PRN nausea  ondansetron Injectable 4 milliGRAM(s) IV Push every 6 hours PRN Nausea      ROS:     Detailed ten system ROS was performed and was negative except for history as eluded to above.    no fever  no chills  no nausea  no vomiting  no diarrhea  no constipation  no melena  no hematochezia  no chest pain  no palpitations  no sob at rest  no dyspnea on exertion  no cough  no wheezing  no anorexia  no headache  no dizziness  no syncope  no weakness  no myalgia  no dysuria  no polyuria  no hematuria       Vital Signs Last 24 Hrs  T(C): 37.6 (03 Nov 2018 06:20), Max: 37.6 (02 Nov 2018 22:56)  T(F): 99.6 (03 Nov 2018 06:20), Max: 99.7 (02 Nov 2018 22:56)  HR: 94 (03 Nov 2018 04:00) (71 - 97)  BP: 168/65 (03 Nov 2018 04:00) (126/43 - 170/64)  BP(mean): 92 (03 Nov 2018 04:00) (76 - 92)  RR: 19 (03 Nov 2018 04:00) (15 - 25)  SpO2: 96% (03 Nov 2018 02:00) (85% - 100%)    I&O's Summary      PHYSICAL EXAM:    General:                Comfortable, AAO X 3, in no distress.  HEENT:                  Atraumatic, PERRLA, EOMI, conjunctiva clear.    Neck:                     Supple, no adenopathy, no thyromegaly, no JVD, no bruit.  Back:                     Symmetric, no point tenderness.  Chest:                    Clear, B/L symmetric air entry, no tachypnea  Heart:                     S1, S2 normal, no gallop, no murmur.  Abdomen:              Soft, non-tender, bowel sounds active. No palpable masses.  Extremities:           no cyanosis, no edema. Peripheral pulses normal.  Skin:                      Skin color, texture normal. No rashes.  Neurologic:            Grossly nonfocal.       TELEMETRY:  Normal sinus rhythm with no tachy or vikki events     ECG:  NSR, LVH, IVCD    LABS:                        9.2    6.89  )-----------( 163      ( 02 Nov 2018 04:37 )             28.0     11-03    141  |  102  |  36<H>  ----------------------------<  62<L>  3.7   |  28  |  4.34<H>    Ca    9.3      03 Nov 2018 04:59        Lipid Panel  Chl 84  HDL 28  LDL 25  Trg 155      11-02 @ 04:37  Trop-I 6.760    10-31 @ 18:14  Trop-I 1.580    10-31 @ 10:50  Trop-I 1.160    10-31 @ 04:18  Trop-I 0.061    Pro BNP  [id number] 10-31 @ 04:18

## 2018-11-04 LAB
ANION GAP SERPL CALC-SCNC: 11 MMOL/L — SIGNIFICANT CHANGE UP (ref 5–17)
BUN SERPL-MCNC: 63 MG/DL — HIGH (ref 7–23)
CALCIUM SERPL-MCNC: 8.7 MG/DL — SIGNIFICANT CHANGE UP (ref 8.5–10.1)
CHLORIDE SERPL-SCNC: 101 MMOL/L — SIGNIFICANT CHANGE UP (ref 96–108)
CO2 SERPL-SCNC: 27 MMOL/L — SIGNIFICANT CHANGE UP (ref 22–31)
CREAT SERPL-MCNC: 6.91 MG/DL — HIGH (ref 0.5–1.3)
GLUCOSE BLDC GLUCOMTR-MCNC: 125 MG/DL — HIGH (ref 70–99)
GLUCOSE BLDC GLUCOMTR-MCNC: 190 MG/DL — HIGH (ref 70–99)
GLUCOSE BLDC GLUCOMTR-MCNC: 196 MG/DL — HIGH (ref 70–99)
GLUCOSE BLDC GLUCOMTR-MCNC: 266 MG/DL — HIGH (ref 70–99)
GLUCOSE SERPL-MCNC: 122 MG/DL — HIGH (ref 70–99)
HCT VFR BLD CALC: 30.5 % — LOW (ref 39–50)
HGB BLD-MCNC: 9.8 G/DL — LOW (ref 13–17)
MCHC RBC-ENTMCNC: 31.9 PG — SIGNIFICANT CHANGE UP (ref 27–34)
MCHC RBC-ENTMCNC: 32.1 GM/DL — SIGNIFICANT CHANGE UP (ref 32–36)
MCV RBC AUTO: 99.3 FL — SIGNIFICANT CHANGE UP (ref 80–100)
NRBC # BLD: 0 /100 WBCS — SIGNIFICANT CHANGE UP (ref 0–0)
PLATELET # BLD AUTO: 204 K/UL — SIGNIFICANT CHANGE UP (ref 150–400)
POTASSIUM SERPL-MCNC: 4.2 MMOL/L — SIGNIFICANT CHANGE UP (ref 3.5–5.3)
POTASSIUM SERPL-SCNC: 4.2 MMOL/L — SIGNIFICANT CHANGE UP (ref 3.5–5.3)
RBC # BLD: 3.07 M/UL — LOW (ref 4.2–5.8)
RBC # FLD: 13.8 % — SIGNIFICANT CHANGE UP (ref 10.3–14.5)
SODIUM SERPL-SCNC: 139 MMOL/L — SIGNIFICANT CHANGE UP (ref 135–145)
WBC # BLD: 9.78 K/UL — SIGNIFICANT CHANGE UP (ref 3.8–10.5)
WBC # FLD AUTO: 9.78 K/UL — SIGNIFICANT CHANGE UP (ref 3.8–10.5)

## 2018-11-04 PROCEDURE — 93010 ELECTROCARDIOGRAM REPORT: CPT

## 2018-11-04 RX ADMIN — PANTOPRAZOLE SODIUM 40 MILLIGRAM(S): 20 TABLET, DELAYED RELEASE ORAL at 06:01

## 2018-11-04 RX ADMIN — CARVEDILOL PHOSPHATE 12.5 MILLIGRAM(S): 80 CAPSULE, EXTENDED RELEASE ORAL at 17:36

## 2018-11-04 RX ADMIN — Medication 0: at 21:24

## 2018-11-04 RX ADMIN — Medication 100 MILLIGRAM(S): at 21:24

## 2018-11-04 RX ADMIN — Medication 1334 MILLIGRAM(S): at 07:56

## 2018-11-04 RX ADMIN — Medication 5 UNIT(S): at 12:23

## 2018-11-04 RX ADMIN — Medication 5 UNIT(S): at 17:35

## 2018-11-04 RX ADMIN — Medication 6: at 12:22

## 2018-11-04 RX ADMIN — HEPARIN SODIUM 5000 UNIT(S): 5000 INJECTION INTRAVENOUS; SUBCUTANEOUS at 05:57

## 2018-11-04 RX ADMIN — Medication 100 MILLIGRAM(S): at 05:55

## 2018-11-04 RX ADMIN — Medication 650 MILLIGRAM(S): at 12:28

## 2018-11-04 RX ADMIN — Medication 81 MILLIGRAM(S): at 12:22

## 2018-11-04 RX ADMIN — CLOPIDOGREL BISULFATE 75 MILLIGRAM(S): 75 TABLET, FILM COATED ORAL at 12:21

## 2018-11-04 RX ADMIN — GABAPENTIN 300 MILLIGRAM(S): 400 CAPSULE ORAL at 21:24

## 2018-11-04 RX ADMIN — LOSARTAN POTASSIUM 50 MILLIGRAM(S): 100 TABLET, FILM COATED ORAL at 06:01

## 2018-11-04 RX ADMIN — Medication 2: at 17:35

## 2018-11-04 RX ADMIN — Medication 1334 MILLIGRAM(S): at 17:36

## 2018-11-04 RX ADMIN — Medication 1334 MILLIGRAM(S): at 12:22

## 2018-11-04 RX ADMIN — INSULIN GLARGINE 27 UNIT(S): 100 INJECTION, SOLUTION SUBCUTANEOUS at 21:24

## 2018-11-04 RX ADMIN — ATORVASTATIN CALCIUM 40 MILLIGRAM(S): 80 TABLET, FILM COATED ORAL at 21:24

## 2018-11-04 RX ADMIN — HEPARIN SODIUM 5000 UNIT(S): 5000 INJECTION INTRAVENOUS; SUBCUTANEOUS at 17:42

## 2018-11-04 RX ADMIN — CARVEDILOL PHOSPHATE 12.5 MILLIGRAM(S): 80 CAPSULE, EXTENDED RELEASE ORAL at 05:57

## 2018-11-04 NOTE — PROGRESS NOTE ADULT - ASSESSMENT
Acute diastolic congestive heart failure. Acute pulmonary edema.  Coronary artery disease status post drug-eluting stent to proximal RCA in January 2016 & drug-eluting stent to LAD  and diagonal 1 during current admission.  End-stage renal disease, on hemodialysis.  Moderate aortic stenosis.  High cholesterol.  Hypertension.  Peripheral artery disease.  Diabetes mellitus.  History of GI bleed in the past.    Suggest:  BP is better with Coreg and Losartan dose increased.   continue with other cardiac medications.  Work up of right flank pain with Abd sono and CT chest was negative.   Intake and output;  daily weights.  Restrict IV fluids to 1.2 L per day.  Gradual ambulation.

## 2018-11-04 NOTE — PROGRESS NOTE ADULT - SUBJECTIVE AND OBJECTIVE BOX
CURRENT CARDIAC WORKUP:       Echo:  Stress Test:  Cardiac Cath:    Allergies:   lisinopril (Other)      MEDICATIONS  (STANDING):  aspirin enteric coated 81 milliGRAM(s) Oral daily  atorvastatin 40 milliGRAM(s) Oral at bedtime  calcium acetate 1334 milliGRAM(s) Oral three times a day with meals  carvedilol 12.5 milliGRAM(s) Oral every 12 hours  clopidogrel Tablet 75 milliGRAM(s) Oral daily  dextrose 5%. 1000 milliLiter(s) (50 mL/Hr) IV Continuous <Continuous>  dextrose 50% Injectable 12.5 Gram(s) IV Push once  dextrose 50% Injectable 25 Gram(s) IV Push once  dextrose 50% Injectable 25 Gram(s) IV Push once  docusate sodium 100 milliGRAM(s) Oral three times a day  epoetin norman Injectable 6000 Unit(s) IV Push <User Schedule>  gabapentin 300 milliGRAM(s) Oral at bedtime  heparin  Injectable 5000 Unit(s) SubCutaneous every 12 hours  insulin glargine Injectable (LANTUS) 27 Unit(s) SubCutaneous at bedtime  insulin lispro (HumaLOG) corrective regimen sliding scale   SubCutaneous three times a day before meals  insulin lispro (HumaLOG) corrective regimen sliding scale   SubCutaneous at bedtime  insulin lispro Injectable (HumaLOG) 5 Unit(s) SubCutaneous before breakfast  insulin lispro Injectable (HumaLOG) 5 Unit(s) SubCutaneous before lunch  insulin lispro Injectable (HumaLOG) 5 Unit(s) SubCutaneous before dinner  losartan 50 milliGRAM(s) Oral daily  pantoprazole    Tablet 40 milliGRAM(s) Oral before breakfast  sodium bicarbonate 650 milliGRAM(s) Oral daily    MEDICATIONS  (PRN):  albumin human 25% IVPB 50 milliLiter(s) IV Intermittent <User Schedule> PRN sbp < or = 120 mmHg  dextrose 40% Gel 15 Gram(s) Oral once PRN Blood Glucose LESS THAN 70 milliGRAM(s)/deciliter  glucagon  Injectable 1 milliGRAM(s) IntraMuscular once PRN Glucose LESS THAN 70 milligrams/deciliter  metoclopramide Injectable 10 milliGRAM(s) IV Push every 8 hours PRN nausea  ondansetron Injectable 4 milliGRAM(s) IV Push every 6 hours PRN Nausea      ROS:     .ros      Vital Signs Last 24 Hrs  T(C): 36.4 (04 Nov 2018 06:37), Max: 37 (03 Nov 2018 11:28)  T(F): 97.5 (04 Nov 2018 06:37), Max: 98.6 (03 Nov 2018 11:28)  HR: 68 (04 Nov 2018 09:00) (66 - 97)  BP: 112/42 (04 Nov 2018 09:00) (105/42 - 164/55)  BP(mean): 62 (04 Nov 2018 09:00) (54 - 80)  RR: 17 (04 Nov 2018 09:00) (14 - 22)  SpO2: 94% (04 Nov 2018 09:00) (90% - 100%)    I&O's Summary      PHYSICAL EXAM:    .phy      INTERPRETATION OF TELEMETRY:    ECG:    LABS:                        9.8    9.78  )-----------( 204      ( 04 Nov 2018 05:20 )             30.5     11-04    139  |  101  |  63<H>  ----------------------------<  122<H>  4.2   |  27  |  6.91<H>    Ca    8.7      04 Nov 2018 05:20        Lipid Panel  Chl 84  HDL 28  LDL 25  Trg 155        11-02 @ 04:37  Trop-I 6.760    10-31 @ 18:14  Trop-I 1.580  CK     --  CK MB  --    10-31 @ 10:50  Trop-I 1.160  CK     --  CK MB  --    10-31 @ 04:18  Trop-I 0.061  CK     103  CK MB  --    Pro BNP  [id number] 10-31 @ 04:18  D Dimer  -- 10-31 @ 04:18              RADIOLOGY & ADDITIONAL STUDIES: 86-year-old male admitted with c/o shortness of breath sec to acute pulmonary edema. Some improvement with HD. He had elevated CE, EKG changes and wall motion abnormality on the echo. On cardiac cath he had drug-eluting stent to proximal LAD and diagonal. Breathing has improved.    Today he feels much better. No more flank pain. No fever. No GI complaints. BP is better.      PAST MEDICAL & SURGICAL HISTORY:  ESRD on hemodialysis   HTN (hypertension)  Bladder cancer  History of exploratory laparotomy: peritonitis  History of appendectomy  AV fistula: right (never used)  History of bladder cancer: s/p resection with neobladder    CURRENT CARDIAC WORKUP:       Echo:   LAD territory wall motion abnormality. EF 40-45%, mild MAC, mild MR, at least mild AS, moderate TR, mild PAH. Pleural effusion noted.     Allergies:   lisinopril (Other)      MEDICATIONS  (STANDING):  aspirin enteric coated 81 milliGRAM(s) Oral daily  atorvastatin 40 milliGRAM(s) Oral at bedtime  calcium acetate 1334 milliGRAM(s) Oral three times a day with meals  carvedilol 12.5 milliGRAM(s) Oral every 12 hours  clopidogrel Tablet 75 milliGRAM(s) Oral daily  dextrose 5%. 1000 milliLiter(s) (50 mL/Hr) IV Continuous <Continuous>  dextrose 50% Injectable 12.5 Gram(s) IV Push once  dextrose 50% Injectable 25 Gram(s) IV Push once  dextrose 50% Injectable 25 Gram(s) IV Push once  docusate sodium 100 milliGRAM(s) Oral three times a day  epoetin norman Injectable 6000 Unit(s) IV Push <User Schedule>  gabapentin 300 milliGRAM(s) Oral at bedtime  heparin  Injectable 5000 Unit(s) SubCutaneous every 12 hours  insulin glargine Injectable (LANTUS) 27 Unit(s) SubCutaneous at bedtime  insulin lispro (HumaLOG) corrective regimen sliding scale   SubCutaneous three times a day before meals  insulin lispro (HumaLOG) corrective regimen sliding scale   SubCutaneous at bedtime  insulin lispro Injectable (HumaLOG) 5 Unit(s) SubCutaneous before breakfast  insulin lispro Injectable (HumaLOG) 5 Unit(s) SubCutaneous before lunch  insulin lispro Injectable (HumaLOG) 5 Unit(s) SubCutaneous before dinner  losartan 50 milliGRAM(s) Oral daily  pantoprazole    Tablet 40 milliGRAM(s) Oral before breakfast  sodium bicarbonate 650 milliGRAM(s) Oral daily    MEDICATIONS  (PRN):  albumin human 25% IVPB 50 milliLiter(s) IV Intermittent <User Schedule> PRN sbp < or = 120 mmHg  dextrose 40% Gel 15 Gram(s) Oral once PRN Blood Glucose LESS THAN 70 milliGRAM(s)/deciliter  glucagon  Injectable 1 milliGRAM(s) IntraMuscular once PRN Glucose LESS THAN 70 milligrams/deciliter  metoclopramide Injectable 10 milliGRAM(s) IV Push every 8 hours PRN nausea  ondansetron Injectable 4 milliGRAM(s) IV Push every 6 hours PRN Nausea      ROS:     Detailed ten system ROS was performed and was negative except for history as eluded to above.    no fever  no chills  no nausea  no vomiting  no diarrhea  no constipation  no melena  no hematochezia  no chest pain  no palpitations  no sob at rest  no dyspnea on exertion  no cough  no wheezing  no anorexia  no headache  no dizziness  no syncope  no weakness  no myalgia  no dysuria  no polyuria  no hematuria       Vital Signs Last 24 Hrs  T(C): 36.4 (04 Nov 2018 06:37), Max: 37 (03 Nov 2018 11:28)  T(F): 97.5 (04 Nov 2018 06:37), Max: 98.6 (03 Nov 2018 11:28)  HR: 68 (04 Nov 2018 09:00) (66 - 97)  BP: 112/42 (04 Nov 2018 09:00) (105/42 - 164/55)  BP(mean): 62 (04 Nov 2018 09:00) (54 - 80)  RR: 17 (04 Nov 2018 09:00) (14 - 22)  SpO2: 94% (04 Nov 2018 09:00) (90% - 100%)    I&O's Summary      PHYSICAL EXAM:    General:                Comfortable, AAO X 3, in no distress.  HEENT:                  Atraumatic, PERRLA, EOMI, conjunctiva clear.    Neck:                     Supple, no adenopathy, no thyromegaly, no JVD, no bruit.  Back:                     Symmetric, no point tenderness.  Chest:                    Clear, B/L symmetric air entry, no tachypnea  Heart:                     S1, S2 normal, no gallop, no murmur.  Abdomen:              Soft, non-tender, bowel sounds active. No palpable masses.  Extremities:           no cyanosis, no edema. Peripheral pulses normal.  Skin:                      Skin color, texture normal. No rashes.  Neurologic:            Grossly nonfocal.       TELEMETRY:  Normal sinus rhythm with no tachy or vikki events     ECG:  NSR, LVH, IVCD    LABS:                        9.8    9.78  )-----------( 204      ( 04 Nov 2018 05:20 )             30.5     11-04    139  |  101  |  63<H>  ----------------------------<  122<H>  4.2   |  27  |  6.91<H>    Ca    8.7      04 Nov 2018 05:20        Lipid Panel  Chl 84  HDL 28  LDL 25  Trg 155      RADIOLOGY & ADDITIONAL STUDIES:    US Abdomen Complete (11.03.18 @ 15:41) >  IMPRESSION:  No hydronephrosis or shadowing stone.    CT Chest No Cont (11.03.18 @ 15:04) >  IMPRESSION:  Mild pulmonary edema and small bilateral pleural effusions. Emphysema.

## 2018-11-04 NOTE — PROGRESS NOTE ADULT - ASSESSMENT
87 yo man with ESRD presenting with decompensated CHF.     Unclear fluid overloaded state due to recent increased intake with decreased UF vs cardiac decompensation.  HD this am.  Due to continued distress, will increase DT to 4 hours to allow increased UF.  Further recommendations pending clinical response.    11/1  s/p HD last night  feels better today  for cardiac cath, d/w pts son  d/w Dr Talbert    11/2  S/P PCI ofD1 and LAD on Nov 1  feels well  dialyzed yesterday after cath  d/w Dr Talbert  d/w Pts son  HD today, his scheduled session  pt requests 3 kg fluid removal  from a renal standpoint pt may be dc   next hd Monday 11/5    11/3 SY  --ESRD  Continue TIW HD  --CHF : improved but continues with decreased o2 saturation.   Plan for PUF x2 hours today to stabilize resp status.  --CAD  Post stents x2 .  Stable.    11/4 SY  --ESRD : continue TIW HD  --CHF : improved and stable  --CAD : Post stents x 2.  stable  --Fluid status improved and acceptable

## 2018-11-04 NOTE — PROGRESS NOTE ADULT - SUBJECTIVE AND OBJECTIVE BOX
NEPHROLOGY INTERVAL HPI/OVERNIGHT EVENTS:    Date of Service: 18 @ 08:51   SY  Feeling better.  No new complaints.  Denies SOB.    11/3 SY  Appearing much more comfortable  O2 saturation in low 90's with NC o2.  No acute distress.  Post Stents x 2 on  ( to LAD and diagonal 1)    HPI:  87 yo man with PMHX of ESRD, CAD, DM and HTN on maintenance HD since 10/2014.  Pt presented with one day hx of SOB.  Pt apparently gained about 6 kg fluid weight over weekend.   Was able to tolerate only 3.5 kg fluid removal.   Recently had EDW increased due to severe leg cramping with attempts to maintain EDW lower.  Reports worsening SOB for one day.  Unable to go for outpatient HD tx and came to ED.  Bipap mask in place with improved 02 saturation.   Denies any chest discomfort.      MEDICATIONS  (STANDING):  aspirin enteric coated 81 milliGRAM(s) Oral daily  atorvastatin 40 milliGRAM(s) Oral at bedtime  calcium acetate 1334 milliGRAM(s) Oral three times a day with meals  carvedilol 12.5 milliGRAM(s) Oral every 12 hours  clopidogrel Tablet 75 milliGRAM(s) Oral daily  dextrose 5%. 1000 milliLiter(s) (50 mL/Hr) IV Continuous <Continuous>  dextrose 50% Injectable 12.5 Gram(s) IV Push once  dextrose 50% Injectable 25 Gram(s) IV Push once  dextrose 50% Injectable 25 Gram(s) IV Push once  docusate sodium 100 milliGRAM(s) Oral three times a day  epoetin norman Injectable 6000 Unit(s) IV Push <User Schedule>  gabapentin 300 milliGRAM(s) Oral at bedtime  heparin  Injectable 5000 Unit(s) SubCutaneous every 12 hours  insulin glargine Injectable (LANTUS) 27 Unit(s) SubCutaneous at bedtime  insulin lispro (HumaLOG) corrective regimen sliding scale   SubCutaneous three times a day before meals  insulin lispro (HumaLOG) corrective regimen sliding scale   SubCutaneous at bedtime  insulin lispro Injectable (HumaLOG) 5 Unit(s) SubCutaneous before breakfast  insulin lispro Injectable (HumaLOG) 5 Unit(s) SubCutaneous before lunch  insulin lispro Injectable (HumaLOG) 5 Unit(s) SubCutaneous before dinner  losartan 50 milliGRAM(s) Oral daily  pantoprazole    Tablet 40 milliGRAM(s) Oral before breakfast  sodium bicarbonate 650 milliGRAM(s) Oral daily    MEDICATIONS  (PRN):  albumin human 25% IVPB 50 milliLiter(s) IV Intermittent <User Schedule> PRN sbp < or = 120 mmHg  dextrose 40% Gel 15 Gram(s) Oral once PRN Blood Glucose LESS THAN 70 milliGRAM(s)/deciliter  glucagon  Injectable 1 milliGRAM(s) IntraMuscular once PRN Glucose LESS THAN 70 milligrams/deciliter  metoclopramide Injectable 10 milliGRAM(s) IV Push every 8 hours PRN nausea  ondansetron Injectable 4 milliGRAM(s) IV Push every 6 hours PRN Nausea          Vital Signs Last 24 Hrs  T(C): 36.4 (2018 06:37), Max: 37 (2018 11:28)  T(F): 97.5 (2018 06:37), Max: 98.6 (2018 11:28)  HR: 72 (2018 06:00) (66 - 97)  BP: 111/47 (2018 06:00) (105/42 - 164/55)  BP(mean): 64 (2018 06:00) (54 - 80)  RR: 19 (2018 06:00) (14 - 22)  SpO2: 99% (2018 06:00) (91% - 100%)  Daily     Daily Weight in k.5 (2018 06:37)      PHYSICAL EXAM:  Alert and appropriate  GENERAL: no distress  CHEST/LUNG: bibasilar rhonchi  HEART: S1S2 RRR  ABDOMEN: soft  EXTREMITIES: decreased edema  SKIN:     LABS:                        9.8    9.78  )-----------( 204      ( 2018 05:20 )             30.5     11-    139  |  101  |  63<H>  ----------------------------<  122<H>  4.2   |  27  |  6.91<H>    Ca    8.7      2018 05:20                  RADIOLOGY & ADDITIONAL TESTS:

## 2018-11-04 NOTE — PROGRESS NOTE ADULT - ASSESSMENT
86M w/PMH ESRD on HD MWF, T2DM, HTN, CAD, presents c/o sudden onset of dyspnea that woke him up at 0200.  Found to be in pulm edema secondary to acute on chronic decompensated systolic CHF with rising troponins and EKG changes suggetive of NSTEMI.     A:  Acute hypoxemic resp failure- multi factorial  Acute systolic CHF  NSTEMI  Anemia- suspect AOCD  ESRD  Pulm Edema  AS  Bladder CA    P:  S/p LHC with stent to LAD/D1  DAPT  Statins  BP control with BB/ACE-I  ESRD per schedule- today. Cont high flow O2. CXR noted. CT chest shows improvement of edema/effusion  SW for home O2  HD per renal reccs - 2L fluid removal per renal reccs  Increase coreg for optimal BP control  Monitor fluid status-fluid restrict  Bladder CA-> outpt management  DVTpx  TT: 40 min

## 2018-11-05 LAB
ADD ON TEST-SPECIMEN IN LAB: SIGNIFICANT CHANGE UP
ANION GAP SERPL CALC-SCNC: 13 MMOL/L — SIGNIFICANT CHANGE UP (ref 5–17)
BUN SERPL-MCNC: 93 MG/DL — HIGH (ref 7–23)
CALCIUM SERPL-MCNC: 8.3 MG/DL — LOW (ref 8.5–10.1)
CHLORIDE SERPL-SCNC: 98 MMOL/L — SIGNIFICANT CHANGE UP (ref 96–108)
CO2 SERPL-SCNC: 24 MMOL/L — SIGNIFICANT CHANGE UP (ref 22–31)
CREAT SERPL-MCNC: 8.61 MG/DL — HIGH (ref 0.5–1.3)
CULTURE RESULTS: SIGNIFICANT CHANGE UP
CULTURE RESULTS: SIGNIFICANT CHANGE UP
D DIMER BLD IA.RAPID-MCNC: 387 NG/ML DDU — HIGH
GLUCOSE BLDC GLUCOMTR-MCNC: 147 MG/DL — HIGH (ref 70–99)
GLUCOSE BLDC GLUCOMTR-MCNC: 161 MG/DL — HIGH (ref 70–99)
GLUCOSE BLDC GLUCOMTR-MCNC: 190 MG/DL — HIGH (ref 70–99)
GLUCOSE BLDC GLUCOMTR-MCNC: 90 MG/DL — SIGNIFICANT CHANGE UP (ref 70–99)
GLUCOSE SERPL-MCNC: 93 MG/DL — SIGNIFICANT CHANGE UP (ref 70–99)
HCT VFR BLD CALC: 27.5 % — LOW (ref 39–50)
HGB BLD-MCNC: 8.8 G/DL — LOW (ref 13–17)
MCHC RBC-ENTMCNC: 32 GM/DL — SIGNIFICANT CHANGE UP (ref 32–36)
MCHC RBC-ENTMCNC: 32.5 PG — SIGNIFICANT CHANGE UP (ref 27–34)
MCV RBC AUTO: 101.5 FL — HIGH (ref 80–100)
NRBC # BLD: 0 /100 WBCS — SIGNIFICANT CHANGE UP (ref 0–0)
PLATELET # BLD AUTO: 192 K/UL — SIGNIFICANT CHANGE UP (ref 150–400)
POTASSIUM SERPL-MCNC: 4.4 MMOL/L — SIGNIFICANT CHANGE UP (ref 3.5–5.3)
POTASSIUM SERPL-SCNC: 4.4 MMOL/L — SIGNIFICANT CHANGE UP (ref 3.5–5.3)
RBC # BLD: 2.71 M/UL — LOW (ref 4.2–5.8)
RBC # FLD: 13.7 % — SIGNIFICANT CHANGE UP (ref 10.3–14.5)
SODIUM SERPL-SCNC: 135 MMOL/L — SIGNIFICANT CHANGE UP (ref 135–145)
SPECIMEN SOURCE: SIGNIFICANT CHANGE UP
SPECIMEN SOURCE: SIGNIFICANT CHANGE UP
WBC # BLD: 6.76 K/UL — SIGNIFICANT CHANGE UP (ref 3.8–10.5)
WBC # FLD AUTO: 6.76 K/UL — SIGNIFICANT CHANGE UP (ref 3.8–10.5)

## 2018-11-05 RX ORDER — CARVEDILOL PHOSPHATE 80 MG/1
6.25 CAPSULE, EXTENDED RELEASE ORAL EVERY 12 HOURS
Qty: 0 | Refills: 0 | Status: DISCONTINUED | OUTPATIENT
Start: 2018-11-05 | End: 2018-11-06

## 2018-11-05 RX ORDER — SODIUM CHLORIDE 9 MG/ML
500 INJECTION INTRAMUSCULAR; INTRAVENOUS; SUBCUTANEOUS ONCE
Qty: 0 | Refills: 0 | Status: COMPLETED | OUTPATIENT
Start: 2018-11-05 | End: 2018-11-05

## 2018-11-05 RX ADMIN — Medication 5 UNIT(S): at 17:32

## 2018-11-05 RX ADMIN — Medication 650 MILLIGRAM(S): at 17:30

## 2018-11-05 RX ADMIN — Medication 2: at 17:31

## 2018-11-05 RX ADMIN — ERYTHROPOIETIN 6000 UNIT(S): 10000 INJECTION, SOLUTION INTRAVENOUS; SUBCUTANEOUS at 16:02

## 2018-11-05 RX ADMIN — GABAPENTIN 300 MILLIGRAM(S): 400 CAPSULE ORAL at 23:20

## 2018-11-05 RX ADMIN — CARVEDILOL PHOSPHATE 12.5 MILLIGRAM(S): 80 CAPSULE, EXTENDED RELEASE ORAL at 06:00

## 2018-11-05 RX ADMIN — Medication 100 MILLIGRAM(S): at 06:00

## 2018-11-05 RX ADMIN — SODIUM CHLORIDE 500 MILLILITER(S): 9 INJECTION INTRAMUSCULAR; INTRAVENOUS; SUBCUTANEOUS at 10:50

## 2018-11-05 RX ADMIN — Medication 81 MILLIGRAM(S): at 17:31

## 2018-11-05 RX ADMIN — Medication 1334 MILLIGRAM(S): at 17:32

## 2018-11-05 RX ADMIN — ATORVASTATIN CALCIUM 40 MILLIGRAM(S): 80 TABLET, FILM COATED ORAL at 23:20

## 2018-11-05 RX ADMIN — Medication 1334 MILLIGRAM(S): at 08:53

## 2018-11-05 RX ADMIN — PANTOPRAZOLE SODIUM 40 MILLIGRAM(S): 20 TABLET, DELAYED RELEASE ORAL at 08:53

## 2018-11-05 RX ADMIN — INSULIN GLARGINE 27 UNIT(S): 100 INJECTION, SOLUTION SUBCUTANEOUS at 23:20

## 2018-11-05 RX ADMIN — HEPARIN SODIUM 5000 UNIT(S): 5000 INJECTION INTRAVENOUS; SUBCUTANEOUS at 06:01

## 2018-11-05 RX ADMIN — CLOPIDOGREL BISULFATE 75 MILLIGRAM(S): 75 TABLET, FILM COATED ORAL at 17:31

## 2018-11-05 RX ADMIN — LOSARTAN POTASSIUM 50 MILLIGRAM(S): 100 TABLET, FILM COATED ORAL at 06:00

## 2018-11-05 RX ADMIN — HEPARIN SODIUM 5000 UNIT(S): 5000 INJECTION INTRAVENOUS; SUBCUTANEOUS at 17:32

## 2018-11-05 RX ADMIN — Medication 100 MILLIGRAM(S): at 23:20

## 2018-11-05 RX ADMIN — CARVEDILOL PHOSPHATE 6.25 MILLIGRAM(S): 80 CAPSULE, EXTENDED RELEASE ORAL at 18:16

## 2018-11-05 NOTE — PROGRESS NOTE ADULT - SUBJECTIVE AND OBJECTIVE BOX
Patient is a 86y old  Male who presents with a chief complaint of [place] (01 Nov 2018 12:01)      SUBJECTIVE:   HPI:  86M w/PMH ESRD on HD MWF, T2DM, HTN, CAD, presents c/o sudden onset of dyspnea that woke him up at 0200.  Found to be in pulm edema secondary to acute on chronic decompensated systolic CHF with rising troponins and EKG changes suggetive of NSTEMI.     11/1: s/p LHC with KAREN to LAD/D1. Seen in recovery unit. Tolerated procedure well. VSS  11/2: still remains on HFO and sob. On scheduled HD today  11/3: still sob and now c/o right flank pain. makes only minute amounts of urine  11/4: dropped to 88% RA at rest. no SOB or abd pain  11/5: hypotensive - 80s systolic. Feels weak and tired. Afebrile. No cp or sob. Receiving 500 cc bolus now    PAST MEDICAL & SURGICAL HISTORY:  ESRD on hemodialysis   HTN (hypertension)  Bladder cancer  History of exploratory laparotomy: peritonitis  History of appendectomy  AV fistula: right (never used)  History of bladder cancer: s/p resection with neobladder      Review of Systems:   CONSTITUTIONAL: No fever.  EYES: No eye pain or discharge.  ENMT:  No sinus or throat pain  NECK: No pain or stiffness  RESPIRATORY: No cough, wheezing, chills or hemoptysis; ++ shortness of breath  CARDIOVASCULAR: No chest pain, palpitations, dizziness, ++leg swelling  GASTROINTESTINAL: No abdominal or epigastric pain. No nausea, vomiting, or hematemesis; No diarrhea or constipation. No melena or hematochezia.  GENITOURINARY: does not make urine  NEUROLOGICAL: No headaches, memory loss, loss of strength, numbness, or tremors  SKIN: No rashes.  LYMPH NODES: No enlarged glands  MUSCULOSKELETAL: No muscle, back, or extremity pain, ++leg cramping   PSYCHIATRIC: No depression, anxiety, mood swings, or difficulty sleeping      Allergies    lisinopril (Other)    Intolerances    lisinopril (Diarrhea)      Social History:     FAMILY HISTORY:  No pertinent family history in first degree relatives- unknown to pt        CAPILLARY BLOOD GLUCOSE      ICU Vital Signs Last 24 Hrs  T(C): 36.6 (04 Nov 2018 19:30), Max: 36.8 (04 Nov 2018 17:00)  T(F): 97.9 (04 Nov 2018 19:30), Max: 98.3 (04 Nov 2018 17:00)  HR: 64 (05 Nov 2018 09:00) (59 - 70)  BP: 114/58 (05 Nov 2018 09:00) (105/36 - 143/41)  BP(mean): 66 (05 Nov 2018 09:00) (54 - 75)  ABP: --  ABP(mean): --  RR: 14 (05 Nov 2018 07:00) (13 - 22)  SpO2: 96% (05 Nov 2018 09:00) (95% - 100%)            GENERAL: NAD, well-developed  HEAD:  Atraumatic, Normocephalic  EYES: EOMI, PERRLA, conjunctiva and sclera clear  ENT: [place], no nasal discharge, throat clear, poor dentition  NECK: Supple, No JVD, NO LAD, no thyromegaly  CHEST/LUNG: Clear to auscultation bilaterally; No wheeze  HEART: decreased breath sounds at bases  ABDOMEN: Soft, Nontender, Nondistended; Bowel sounds present, no HSM  EXTREMITIES:  2+ Peripheral Pulses, No clubbing, cyanosis, 1+ pitting LE edema   PSYCH: AAOx3, normal behavior   NEUROLOGY: non-focal, sensory and cn2-12 grossly intact  SKIN: No visible rashes or lesions    LABS:                        12.0   19.33 )-----------( 261      ( 31 Oct 2018 04:18 )             38.2     10-31    142  |  110<H>  |  74<H>  ----------------------------<  237<H>  5.0   |  21<L>  |  7.41<H>    Ca    9.1      31 Oct 2018 04:18    TPro  7.7  /  Alb  3.8  /  TBili  0.6  /  DBili  x   /  AST  15  /  ALT  29  /  AlkPhos  105  10-31    PT/INR - ( 31 Oct 2018 04:18 )   PT: 11.1 sec;   INR: 1.00 ratio       < from: Transthoracic Echocardiogram (11.01.18 @ 10:33) >     Left Ventricle   The left ventricle is normal in size. There is severe hypokinesis of the   mid anteroseptal and anterior, apical anterior and apical septal   segments.   The left ventricular systolic function is moderately reduced. Estimated   left ventricular ejection fraction is 40-45 %.    < end of copied text >    PTT - ( 31 Oct 2018 04:18 )  PTT:34.3 sec  CARDIAC MARKERS ( 31 Oct 2018 10:50 )  1.160 ng/mL / x     / x     / x     / x      CARDIAC MARKERS ( 31 Oct 2018 04:18 )  0.061 ng/mL / x     / 103 U/L / x     / x              RADIOLOGY & ADDITIONAL TESTS:    Imaging Personally Reviewed:  diffuse airspace opacities b/l   EKG Personally Reviewed:  sinus tachy (31 Oct 2018 14:20)            RADIOLOGY/EKG:    < from: Xray Chest 1 View- PORTABLE-Routine (11.03.18 @ 08:40) >  Impression:slight improvement of BILATERAL interstitial infiltrates.     < end of copied text >

## 2018-11-05 NOTE — PHYSICAL THERAPY INITIAL EVALUATION ADULT - GENERAL OBSERVATIONS, REHAB EVAL
Pt rec'd supine in bed, currently on contact precautions for r/o c.diff, pt pleasant and cooperative with PT, no acute complaints.

## 2018-11-05 NOTE — PROGRESS NOTE ADULT - ASSESSMENT
Acute diastolic congestive heart failure/pulmonary edema. He feels improved after hemodialysis and stent to LAD and diagonal 1.  Coronary artery disease status post drug-eluting stent to proximal RCA in January 2016 & drug-eluting stent to LAD  and diagonal 1.  End-stage renal disease he is on hemodialysis.  Moderate aortic stenosis.  High cholesterol.  Hypertension.  Peripheral artery disease.  Diabetes mellitus.  History of GI bleed in the past.  Suggest  continue with other cardiac medications.  Intake and output daily weights.  Restrict IV fluids to 1.2 L per day.  Gradual ambulation.

## 2018-11-05 NOTE — PHYSICAL THERAPY INITIAL EVALUATION ADULT - PERTINENT HX OF CURRENT PROBLEM, REHAB EVAL
86M w/PMH ESRD on HD MWF, T2DM, HTN, CAD, presents c/o sudden onset of dyspnea that woke him up at 0200.  Found to be in pulm edema secondary to acute on chronic decompensated systolic CHF with rising troponins and EKG changes suggestive of NSTEMI.

## 2018-11-05 NOTE — PROGRESS NOTE ADULT - ASSESSMENT
87 yo man with ESRD presenting with decompensated CHF.     Unclear fluid overloaded state due to recent increased intake with decreased UF vs cardiac decompensation.  HD this am.  Due to continued distress, will increase DT to 4 hours to allow increased UF.  Further recommendations pending clinical response.    11/1  s/p HD last night  feels better today  for cardiac cath, d/w pts son  d/w Dr Talbert    11/2  S/P PCI ofD1 and LAD on Nov 1  feels well  dialyzed yesterday after cath  d/w Dr Talbert  d/w Pts son  HD today, his scheduled session  pt requests 3 kg fluid removal  from a renal standpoint pt may be dc   next hd Monday 11/5    11/3 SY  --ESRD  Continue TIW HD  --CHF : improved but continues with decreased o2 saturation.   Plan for PUF x2 hours today to stabilize resp status.  --CAD  Post stents x2 .  Stable.    11/4 SY  --ESRD : continue TIW HD  --CHF : improved and stable  --CAD : Post stents x 2.  stable  --Fluid status improved and acceptable.    11/5 SY  --ESRD : HD order and tx reviewed.  Aim for 1 kg fluid only.  --CHF : well compensated  --CAD : post stents.  Stable.

## 2018-11-05 NOTE — PROVIDER CONTACT NOTE (CHANGE IN STATUS NOTIFICATION) - ASSESSMENT
On assessment, pt up in chair. Report feeling a little fatigued, but denies any lightheadedness or dizziness.

## 2018-11-05 NOTE — PROGRESS NOTE ADULT - SUBJECTIVE AND OBJECTIVE BOX
HPI:  Patient is 86-year-old he has a history of hypertension, type II diabetes mellitus, high cholesterol, coronary artery disease status post drug-eluting stent placed to proximal RCA , January 2016, end-stage renal disease he is on hemodialysis, bladder cancer, He has moderate aortic stenosis, patient was admitted with complains of shortness of breath for one day. Patient states he became acutely short of breath on the day of admission and he couldn't even talk and on admission was diagnosed to have acute pulmonary edema. He status post dialysis . After admission he was diagnosed to have acute non-STEMI and he status post cardiac catheterization and he is status post drug-eluting stent to proximal LAD and diagonal. Since admission he feels  better. He is now comfortable and is in no acute distress. He is in normal sinus rhythm on telemetry.    Transthoracic Echocardiogram (11.01.18    Mitral Valve   The mitral valve leaflets appear thickened.   Mild mitral annular calcification is present.   Mild (1+) mitral regurgitation is present.     Aortic Valve   Significant fibrocalcific changes noted to the aortic valve leaflets with   restriction in leaflet excursion. Peak transaortic gradient is 34 mmHg;   this finding is consistent with moderate aortic stenosis. Trace aortic   regurgitation is present.     Tricuspid Valve   Moderate (2+) tricuspid valve regurgitation is present.   The estimated RVSP is 39 mmHg.     Pulmonic Valve   Mild pulmonic valvular regurgitation (1+) is present.     Left Atrium   The left atrium is mildly dilated.   Left Ventricle   The left ventricle is normal in size. There is severe hypokinesis of the   mid anteroseptal and anterior, apical anterior and apical septal   segments.   The left ventricular systolic function is moderately reduced. Estimated   left ventricular ejection fraction is 40-45 %.       Pericardial Effusion   No evidence of pericardial effusion.     Pleural Effusion   Pleural effusion - is present..                        MEDICATIONS  (STANDING):  aspirin enteric coated 81 milliGRAM(s) Oral daily  atorvastatin 40 milliGRAM(s) Oral at bedtime  calcium acetate 1334 milliGRAM(s) Oral three times a day with meals  carvedilol 6.25 milliGRAM(s) Oral every 12 hours  clopidogrel Tablet 75 milliGRAM(s) Oral daily  dextrose 5%. 1000 milliLiter(s) (50 mL/Hr) IV Continuous <Continuous>  dextrose 50% Injectable 12.5 Gram(s) IV Push once  dextrose 50% Injectable 25 Gram(s) IV Push once  dextrose 50% Injectable 25 Gram(s) IV Push once  docusate sodium 100 milliGRAM(s) Oral three times a day  epoetin norman Injectable 6000 Unit(s) IV Push <User Schedule>  gabapentin 300 milliGRAM(s) Oral at bedtime  heparin  Injectable 5000 Unit(s) SubCutaneous every 12 hours  insulin glargine Injectable (LANTUS) 27 Unit(s) SubCutaneous at bedtime  insulin lispro (HumaLOG) corrective regimen sliding scale   SubCutaneous three times a day before meals  insulin lispro (HumaLOG) corrective regimen sliding scale   SubCutaneous at bedtime  insulin lispro Injectable (HumaLOG) 5 Unit(s) SubCutaneous before breakfast  insulin lispro Injectable (HumaLOG) 5 Unit(s) SubCutaneous before lunch  insulin lispro Injectable (HumaLOG) 5 Unit(s) SubCutaneous before dinner  losartan 50 milliGRAM(s) Oral daily  pantoprazole    Tablet 40 milliGRAM(s) Oral before breakfast  sodium bicarbonate 650 milliGRAM(s) Oral daily    MEDICATIONS  (PRN):  albumin human 25% IVPB 50 milliLiter(s) IV Intermittent <User Schedule> PRN sbp < or = 120 mmHg  dextrose 40% Gel 15 Gram(s) Oral once PRN Blood Glucose LESS THAN 70 milliGRAM(s)/deciliter  glucagon  Injectable 1 milliGRAM(s) IntraMuscular once PRN Glucose LESS THAN 70 milligrams/deciliter  metoclopramide Injectable 10 milliGRAM(s) IV Push every 8 hours PRN nausea  ondansetron Injectable 4 milliGRAM(s) IV Push every 6 hours PRN Nausea          REVIEW OF SYSTEM:  Pertinent items are noted in HPI.  Constitutional	Negative for chills, fevers, sweats.    Eyes: 	Negative for visual disturbance.  Ears, nose, mouth, throat, and face: Negative for epistaxis, nasal congestion, sore throat and tinnitus.  Neck:	Negative for enlargement, pain and difficulty in swallowing  Respiration : Negative for cough,  pleuritic chest pain and wheezing  Cardiovascular: Negative for chest pain,  and palpitations    Gastrointestinal : Negative for abdominal pain, diarrhea, nausea and vomiting  Genitourinary: Negative for dysuria, frequency and urinary incontinence .  Skin: Negative for  rash, pruritus, swelling, dryness .  	  Hematologic/lymphatic: Negative for bleeding and easy bruising  Musculoskeletal: Negative for arthralgias, back pain and muscle weakness.  Neurological: Negative for dizziness, headaches, seizures and tremors  Behavioral/Psych: Negative for mood change, depression.  Endocrine:	Negative for blurry vision, polydipsia and polyuria, diaphoresis.   Allergic/Immunologic:	Negative for anaphylaxis, angioedema and urticaria.      Vital Signs Last 24 Hrs  T(C): 36.7 (05 Nov 2018 16:28), Max: 36.7 (05 Nov 2018 16:28)  T(F): 98 (05 Nov 2018 16:28), Max: 98 (05 Nov 2018 16:28)  HR: 80 (05 Nov 2018 17:30) (54 - 80)  BP: 139/62 (05 Nov 2018 17:30) (90/47 - 139/62)  BP(mean): 80 (05 Nov 2018 17:30) (52 - 80)  RR: 18 (05 Nov 2018 17:30) (13 - 18)  SpO2: 99% (05 Nov 2018 12:00) (75% - 100%)    I&O's Summary    PHYSICAL EXAM  General Appearance: cooperative, no acute distress,   HEENT: PERRL, conjunctiva clear, EOM's intact .   Neck: Supple, , no adenopathy, thyroid: not enlarged, no carotid bruit or JVD  Back: Symmetric, no  tenderness,no soft tissue tenderness  Lungs: Clear to auscultation bilateral,no adventitious breath sounds, normal   expiratory phase  Heart: Regular rate and rhythm, S1, S2 normal, 1/6 HS murmur, no  rub or gallop  Abdomen: Soft, non-tender, bowel sounds active , no hepatosplenomegaly  Extremities: no cyanosis or edema, no joint swelling  Skin: Skin color, texture normal, no rashes   Neurologic: Alert and oriented X3 , cranial nerves intact, sensory and motor normal,        INTERPRETATION OF TELEMETRY: NSR            LABS:                          8.8    6.76  )-----------( 192      ( 05 Nov 2018 12:50 )             27.5     11-05    135  |  98  |  93<H>  ----------------------------<  93  4.4   |  24  |  8.61<H>    Ca    8.3<L>      05 Nov 2018 05:11          Lipid Panel  84  28  25  155    Pro BNP  -- 11-05 @ 12:50  D Dimer  [id number] 11-05 @ 12:50  Pro BNP  [id number] 10-31 @ 04:18  D Dimer  -- 10-31 @ 04:18

## 2018-11-05 NOTE — PROGRESS NOTE ADULT - SUBJECTIVE AND OBJECTIVE BOX
NEPHROLOGY INTERVAL HPI/OVERNIGHT EVENTS:    Date of Service: 11-05-18 @ 12:28  11/5 SY  No acute events overnight.  an episode of hypotension post meds and gettting out of bed this am.  Sitting in chair, comfortable.    Denies SOB.    11/4 SY  Feeling better.  No new complaints.  Denies SOB.    11/3 SY  Appearing much more comfortable  O2 saturation in low 90's with NC o2.  No acute distress.  Post Stents x 2 on 11/1 ( to LAD and diagonal 1)    HPI:  85 yo man with PMHX of ESRD, CAD, DM and HTN on maintenance HD since 10/2014.  Pt presented with one day hx of SOB.  Pt apparently gained about 6 kg fluid weight over weekend.   Was able to tolerate only 3.5 kg fluid removal.   Recently had EDW increased due to severe leg cramping with attempts to maintain EDW lower.  Reports worsening SOB for one day.  Unable to go for outpatient HD tx and came to ED.  Bipap mask in place with improved 02 saturation.   Denies any chest discomfort.        MEDICATIONS  (STANDING):  aspirin enteric coated 81 milliGRAM(s) Oral daily  atorvastatin 40 milliGRAM(s) Oral at bedtime  calcium acetate 1334 milliGRAM(s) Oral three times a day with meals  carvedilol 6.25 milliGRAM(s) Oral every 12 hours  clopidogrel Tablet 75 milliGRAM(s) Oral daily  dextrose 5%. 1000 milliLiter(s) (50 mL/Hr) IV Continuous <Continuous>  dextrose 50% Injectable 12.5 Gram(s) IV Push once  dextrose 50% Injectable 25 Gram(s) IV Push once  dextrose 50% Injectable 25 Gram(s) IV Push once  docusate sodium 100 milliGRAM(s) Oral three times a day  epoetin norman Injectable 6000 Unit(s) IV Push <User Schedule>  gabapentin 300 milliGRAM(s) Oral at bedtime  heparin  Injectable 5000 Unit(s) SubCutaneous every 12 hours  insulin glargine Injectable (LANTUS) 27 Unit(s) SubCutaneous at bedtime  insulin lispro (HumaLOG) corrective regimen sliding scale   SubCutaneous three times a day before meals  insulin lispro (HumaLOG) corrective regimen sliding scale   SubCutaneous at bedtime  insulin lispro Injectable (HumaLOG) 5 Unit(s) SubCutaneous before breakfast  insulin lispro Injectable (HumaLOG) 5 Unit(s) SubCutaneous before lunch  insulin lispro Injectable (HumaLOG) 5 Unit(s) SubCutaneous before dinner  losartan 50 milliGRAM(s) Oral daily  pantoprazole    Tablet 40 milliGRAM(s) Oral before breakfast  sodium bicarbonate 650 milliGRAM(s) Oral daily    MEDICATIONS  (PRN):  albumin human 25% IVPB 50 milliLiter(s) IV Intermittent <User Schedule> PRN sbp < or = 120 mmHg  dextrose 40% Gel 15 Gram(s) Oral once PRN Blood Glucose LESS THAN 70 milliGRAM(s)/deciliter  glucagon  Injectable 1 milliGRAM(s) IntraMuscular once PRN Glucose LESS THAN 70 milligrams/deciliter  metoclopramide Injectable 10 milliGRAM(s) IV Push every 8 hours PRN nausea  ondansetron Injectable 4 milliGRAM(s) IV Push every 6 hours PRN Nausea          Vital Signs Last 24 Hrs  T(C): 36.6 (04 Nov 2018 19:30), Max: 36.8 (04 Nov 2018 17:00)  T(F): 97.9 (04 Nov 2018 19:30), Max: 98.3 (04 Nov 2018 17:00)  HR: 64 (05 Nov 2018 09:00) (59 - 70)  BP: 114/58 (05 Nov 2018 09:00) (105/36 - 143/41)  BP(mean): 66 (05 Nov 2018 09:00) (54 - 75)  RR: 14 (05 Nov 2018 07:00) (13 - 22)  SpO2: 96% (05 Nov 2018 09:00) (95% - 100%)  Daily     Daily       PHYSICAL EXAM:  Alert and appropriate  GENERAL: No distress  CHEST/LUNG: Basilar rhonchi  HEART: S1S2 RRR  ABDOMEN: soft  EXTREMITIES: No edema  SKIN:     LABS:                        9.8    9.78  )-----------( 204      ( 04 Nov 2018 05:20 )             30.5     11-05    135  |  98  |  93<H>  ----------------------------<  93  4.4   |  24  |  8.61<H>    Ca    8.3<L>      05 Nov 2018 05:11                  RADIOLOGY & ADDITIONAL TESTS:

## 2018-11-05 NOTE — PROGRESS NOTE ADULT - NSHPATTENDINGPLANDISCUSS_GEN_ALL_CORE
nursing and patient
Dr Misty Jean
team

## 2018-11-05 NOTE — PROGRESS NOTE ADULT - ASSESSMENT
86M w/PMH ESRD on HD MWF, T2DM, HTN, CAD, presents c/o sudden onset of dyspnea that woke him up at 0200.  Found to be in pulm edema secondary to acute on chronic decompensated systolic CHF with rising troponins and EKG changes suggetive of NSTEMI.     A:  Acute hypoxemic resp failure- multi factorial  Acute systolic CHF-resolving  NSTEMI  Anemia- suspect AOCD  ESRD  Pulm Edema-resolving  Mod AS  Bladder CA    P:  S/p LHC with stent to LAD/D1  DAPT  Statins  BP control with BB/ACE-I  ESRD per schedule- today. Cont high flow O2. CXR noted. CT chest shows improvement of edema/effusion  SW for home O2- chronic but stable systolic CHF requiring continuous O2 at 2l NC continuous flow- o2s 88% on RA  Low suspicion for PE-> check D-dimer  HD per renal reccs - 2L fluid removal per renal reccs  Decrease coreg to 6.25 BID  Monitor fluid status-fluid restrict  Bladder CA-> outpt management  DVTpx  TT: 41 min

## 2018-11-06 LAB
ANION GAP SERPL CALC-SCNC: 9 MMOL/L — SIGNIFICANT CHANGE UP (ref 5–17)
BUN SERPL-MCNC: 53 MG/DL — HIGH (ref 7–23)
C DIFF BY PCR RESULT: SIGNIFICANT CHANGE UP
C DIFF TOX GENS STL QL NAA+PROBE: SIGNIFICANT CHANGE UP
CALCIUM SERPL-MCNC: 8 MG/DL — LOW (ref 8.5–10.1)
CHLORIDE SERPL-SCNC: 101 MMOL/L — SIGNIFICANT CHANGE UP (ref 96–108)
CO2 SERPL-SCNC: 29 MMOL/L — SIGNIFICANT CHANGE UP (ref 22–31)
CREAT SERPL-MCNC: 5.54 MG/DL — HIGH (ref 0.5–1.3)
CULTURE RESULTS: SIGNIFICANT CHANGE UP
GLUCOSE BLDC GLUCOMTR-MCNC: 116 MG/DL — HIGH (ref 70–99)
GLUCOSE BLDC GLUCOMTR-MCNC: 159 MG/DL — HIGH (ref 70–99)
GLUCOSE BLDC GLUCOMTR-MCNC: 170 MG/DL — HIGH (ref 70–99)
GLUCOSE BLDC GLUCOMTR-MCNC: 202 MG/DL — HIGH (ref 70–99)
GLUCOSE SERPL-MCNC: 59 MG/DL — LOW (ref 70–99)
HCT VFR BLD CALC: 26.6 % — LOW (ref 39–50)
HGB BLD-MCNC: 8.5 G/DL — LOW (ref 13–17)
MCHC RBC-ENTMCNC: 32 GM/DL — SIGNIFICANT CHANGE UP (ref 32–36)
MCHC RBC-ENTMCNC: 32.3 PG — SIGNIFICANT CHANGE UP (ref 27–34)
MCV RBC AUTO: 101.1 FL — HIGH (ref 80–100)
NRBC # BLD: 0 /100 WBCS — SIGNIFICANT CHANGE UP (ref 0–0)
PLATELET # BLD AUTO: 192 K/UL — SIGNIFICANT CHANGE UP (ref 150–400)
POTASSIUM SERPL-MCNC: 3.8 MMOL/L — SIGNIFICANT CHANGE UP (ref 3.5–5.3)
POTASSIUM SERPL-SCNC: 3.8 MMOL/L — SIGNIFICANT CHANGE UP (ref 3.5–5.3)
RBC # BLD: 2.63 M/UL — LOW (ref 4.2–5.8)
RBC # FLD: 13.6 % — SIGNIFICANT CHANGE UP (ref 10.3–14.5)
SODIUM SERPL-SCNC: 139 MMOL/L — SIGNIFICANT CHANGE UP (ref 135–145)
SPECIMEN SOURCE: SIGNIFICANT CHANGE UP
WBC # BLD: 6.28 K/UL — SIGNIFICANT CHANGE UP (ref 3.8–10.5)
WBC # FLD AUTO: 6.28 K/UL — SIGNIFICANT CHANGE UP (ref 3.8–10.5)

## 2018-11-06 PROCEDURE — 93010 ELECTROCARDIOGRAM REPORT: CPT

## 2018-11-06 RX ORDER — CARVEDILOL PHOSPHATE 80 MG/1
12.5 CAPSULE, EXTENDED RELEASE ORAL EVERY 12 HOURS
Qty: 0 | Refills: 0 | Status: DISCONTINUED | OUTPATIENT
Start: 2018-11-06 | End: 2018-11-07

## 2018-11-06 RX ORDER — LOSARTAN POTASSIUM 100 MG/1
1 TABLET, FILM COATED ORAL
Qty: 30 | Refills: 0
Start: 2018-11-06

## 2018-11-06 RX ADMIN — HEPARIN SODIUM 5000 UNIT(S): 5000 INJECTION INTRAVENOUS; SUBCUTANEOUS at 18:00

## 2018-11-06 RX ADMIN — CARVEDILOL PHOSPHATE 12.5 MILLIGRAM(S): 80 CAPSULE, EXTENDED RELEASE ORAL at 18:01

## 2018-11-06 RX ADMIN — Medication 1334 MILLIGRAM(S): at 18:00

## 2018-11-06 RX ADMIN — CARVEDILOL PHOSPHATE 6.25 MILLIGRAM(S): 80 CAPSULE, EXTENDED RELEASE ORAL at 06:58

## 2018-11-06 RX ADMIN — PANTOPRAZOLE SODIUM 40 MILLIGRAM(S): 20 TABLET, DELAYED RELEASE ORAL at 10:01

## 2018-11-06 RX ADMIN — Medication 100 MILLIGRAM(S): at 21:45

## 2018-11-06 RX ADMIN — GABAPENTIN 300 MILLIGRAM(S): 400 CAPSULE ORAL at 21:45

## 2018-11-06 RX ADMIN — ATORVASTATIN CALCIUM 40 MILLIGRAM(S): 80 TABLET, FILM COATED ORAL at 21:45

## 2018-11-06 RX ADMIN — Medication 650 MILLIGRAM(S): at 10:01

## 2018-11-06 RX ADMIN — Medication 5 UNIT(S): at 16:57

## 2018-11-06 RX ADMIN — Medication 81 MILLIGRAM(S): at 10:01

## 2018-11-06 RX ADMIN — Medication 1334 MILLIGRAM(S): at 12:12

## 2018-11-06 RX ADMIN — Medication 5 UNIT(S): at 12:09

## 2018-11-06 RX ADMIN — INSULIN GLARGINE 27 UNIT(S): 100 INJECTION, SOLUTION SUBCUTANEOUS at 22:37

## 2018-11-06 RX ADMIN — HEPARIN SODIUM 5000 UNIT(S): 5000 INJECTION INTRAVENOUS; SUBCUTANEOUS at 06:58

## 2018-11-06 RX ADMIN — Medication 5 UNIT(S): at 10:01

## 2018-11-06 RX ADMIN — LOSARTAN POTASSIUM 50 MILLIGRAM(S): 100 TABLET, FILM COATED ORAL at 06:57

## 2018-11-06 RX ADMIN — Medication 1334 MILLIGRAM(S): at 10:01

## 2018-11-06 RX ADMIN — Medication 2: at 16:57

## 2018-11-06 RX ADMIN — CLOPIDOGREL BISULFATE 75 MILLIGRAM(S): 75 TABLET, FILM COATED ORAL at 10:01

## 2018-11-06 RX ADMIN — Medication 2: at 10:01

## 2018-11-06 RX ADMIN — Medication 100 MILLIGRAM(S): at 06:57

## 2018-11-06 NOTE — PROGRESS NOTE ADULT - ASSESSMENT
86M w/PMH ESRD on HD MWF, T2DM, HTN, CAD, presents c/o sudden onset of dyspnea that woke him up at 0200.  Found to be in pulm edema secondary to acute on chronic decompensated systolic CHF with rising troponins and EKG changes suggetive of NSTEMI.     A:  Acute hypoxemic resp failure- multi factorial  Acute systolic CHF-resolving  NSTEMI  Anemia- suspect AOCD  ESRD  Pulm Edema-resolving  Mod AS  Bladder CA    P:  S/p LHC with stent to LAD/D1  DAPT  Statins  BP control with BB/ACE-I  ESRD per schedule- today. Cont high flow O2. CXR noted. CT chest shows improvement of edema/effusion  SW for home O2- chronic but stable systolic CHF requiring continuous O2 at 2l NC continuous flow- o2s 88% on RA  Treated for acute on chronic systolic CHF- yet remains hypoxic due to chronic but stable CHF  Low suspicion for PE-> check D-dimer (nl for this age group-considered negative)  HD per renal reccs - 2L fluid removal per renal reccs  Decrease coreg to 6.25 BID  Monitor fluid status-fluid restrict  Bladder CA-> outpt management  DVTpx  TT: 41 min

## 2018-11-06 NOTE — PROGRESS NOTE ADULT - ASSESSMENT
Acute diastolic congestive heart failure/pulmonary edema. He feels improved since admission.  Coronary artery disease status post drug-eluting stent to proximal RCA in January 2016 & drug-eluting stent to LAD  and diagonal 1.  End-stage renal disease he is on hemodialysis.  Moderate aortic stenosis.  High cholesterol.  Hypertension.  Peripheral artery disease.  Diabetes mellitus.  History of GI bleed in the past.  Suggest  continue with other cardiac medications.  Increase Coreg to 12.5 mg BID .  Intake and output daily weights.  Restrict IV fluids to 1.2 L per day.  Gradual ambulation.  Discussed GC with family & hospitalist .

## 2018-11-06 NOTE — PROGRESS NOTE ADULT - ASSESSMENT
87 yo man with ESRD presenting with decompensated CHF.     Unclear fluid overloaded state due to recent increased intake with decreased UF vs cardiac decompensation.  HD this am.  Due to continued distress, will increase DT to 4 hours to allow increased UF.  Further recommendations pending clinical response.    11/1  s/p HD last night  feels better today  for cardiac cath, d/w pts son  d/w Dr Talbert    11/2  S/P PCI ofD1 and LAD on Nov 1  feels well  dialyzed yesterday after cath  d/w Dr Talbert  d/w Pts son  HD today, his scheduled session  pt requests 3 kg fluid removal  from a renal standpoint pt may be dc   next hd Monday 11/5    11/3 SY  --ESRD  Continue TIW HD  --CHF : improved but continues with decreased o2 saturation.   Plan for PUF x2 hours today to stabilize resp status.  --CAD  Post stents x2 .  Stable.    11/4 SY  --ESRD : continue TIW HD  --CHF : improved and stable  --CAD : Post stents x 2.  stable  --Fluid status improved and acceptable.    11/5 SY  --ESRD : HD order and tx reviewed.  Aim for 1 kg fluid only.  --CHF : well compensated  --CAD : post stents.  Stable.    11/6 SY  --ESRD Continue TIW hd.  --CHF : Improved and stable  --CAD : post stents x2, stable  --GI : follow for further diarrhea, follow up c diff.

## 2018-11-06 NOTE — PROGRESS NOTE ADULT - SUBJECTIVE AND OBJECTIVE BOX
NEPHROLOGY INTERVAL HPI/OVERNIGHT EVENTS:    Date of Service: 18 @ 10:14  11/6 SY  Resting comfortably.  Per RN's report, several watery bms.  Denies abdominal pain.    115 SY  No acute events overnight.  an episode of hypotension post meds and gettting out of bed this am.  Sitting in chair, comfortable.    Denies SOB.    11/4 SY  Feeling better.  No new complaints.  Denies SOB.    3 SY  Appearing much more comfortable  O2 saturation in low 90's with NC o2.  No acute distress.  Post Stents x 2 on  ( to LAD and diagonal 1)    HPI:  85 yo man with PMHX of ESRD, CAD, DM and HTN on maintenance HD since 10/2014.  Pt presented with one day hx of SOB.  Pt apparently gained about 6 kg fluid weight over weekend.   Was able to tolerate only 3.5 kg fluid removal.   Recently had EDW increased due to severe leg cramping with attempts to maintain EDW lower.  Reports worsening SOB for one day.  Unable to go for outpatient HD tx and came to ED.  Bipap mask in place with improved 02 saturation.   Denies any chest discomfort.      MEDICATIONS  (STANDING):  aspirin enteric coated 81 milliGRAM(s) Oral daily  atorvastatin 40 milliGRAM(s) Oral at bedtime  calcium acetate 1334 milliGRAM(s) Oral three times a day with meals  carvedilol 12.5 milliGRAM(s) Oral every 12 hours  clopidogrel Tablet 75 milliGRAM(s) Oral daily  dextrose 5%. 1000 milliLiter(s) (50 mL/Hr) IV Continuous <Continuous>  dextrose 50% Injectable 12.5 Gram(s) IV Push once  dextrose 50% Injectable 25 Gram(s) IV Push once  dextrose 50% Injectable 25 Gram(s) IV Push once  docusate sodium 100 milliGRAM(s) Oral three times a day  epoetin norman Injectable 6000 Unit(s) IV Push <User Schedule>  gabapentin 300 milliGRAM(s) Oral at bedtime  heparin  Injectable 5000 Unit(s) SubCutaneous every 12 hours  insulin glargine Injectable (LANTUS) 27 Unit(s) SubCutaneous at bedtime  insulin lispro (HumaLOG) corrective regimen sliding scale   SubCutaneous three times a day before meals  insulin lispro (HumaLOG) corrective regimen sliding scale   SubCutaneous at bedtime  insulin lispro Injectable (HumaLOG) 5 Unit(s) SubCutaneous before breakfast  insulin lispro Injectable (HumaLOG) 5 Unit(s) SubCutaneous before lunch  insulin lispro Injectable (HumaLOG) 5 Unit(s) SubCutaneous before dinner  losartan 50 milliGRAM(s) Oral daily  pantoprazole    Tablet 40 milliGRAM(s) Oral before breakfast  sodium bicarbonate 650 milliGRAM(s) Oral daily    MEDICATIONS  (PRN):  albumin human 25% IVPB 50 milliLiter(s) IV Intermittent <User Schedule> PRN sbp < or = 120 mmHg  dextrose 40% Gel 15 Gram(s) Oral once PRN Blood Glucose LESS THAN 70 milliGRAM(s)/deciliter  glucagon  Injectable 1 milliGRAM(s) IntraMuscular once PRN Glucose LESS THAN 70 milligrams/deciliter  metoclopramide Injectable 10 milliGRAM(s) IV Push every 8 hours PRN nausea  ondansetron Injectable 4 milliGRAM(s) IV Push every 6 hours PRN Nausea          Vital Signs Last 24 Hrs  T(C): 35.9 (2018 06:37), Max: 36.7 (2018 16:28)  T(F): 96.6 (2018 06:37), Max: 98 (2018 16:28)  HR: 68 (2018 06:00) (54 - 80)  BP: 170/51 (2018 06:00) (90/47 - 170/51)  BP(mean): 77 (2018 06:00) (52 - 80)  RR: 15 (2018 06:00) (13 - 21)  SpO2: 96% (2018 06:00) (75% - 100%)  Daily     Daily Weight in k.2 (2018 06:37)     @ 07:01  -   @ 07:00  --------------------------------------------------------  IN: 500 mL / OUT: 0 mL / NET: 500 mL        PHYSICAL EXAM:  Alert and appropriate  GENERAL: No distress  CHEST/LUNG: Basilar rhonchi  HEART: S1S2 RRR  ABDOMEN: soft  EXTREMITIES: no edema  SKIN:     LABS:                        8.5    6.28  )-----------( 192      ( 2018 05:17 )             26.6     11-06    139  |  101  |  53<H>  ----------------------------<  59<L>  3.8   |  29  |  5.54<H>    Ca    8.0<L>      2018 05:17                  RADIOLOGY & ADDITIONAL TESTS:

## 2018-11-06 NOTE — PROGRESS NOTE ADULT - SUBJECTIVE AND OBJECTIVE BOX
Patient is a 86y old  Male who presents with a chief complaint of [place] (01 Nov 2018 12:01)      SUBJECTIVE:   HPI:  86M w/PMH ESRD on HD MWF, T2DM, HTN, CAD, presents c/o sudden onset of dyspnea that woke him up at 0200.  Found to be in pulm edema secondary to acute on chronic decompensated systolic CHF with rising troponins and EKG changes suggetive of NSTEMI.     11/1: s/p LHC with KAREN to LAD/D1. Seen in recovery unit. Tolerated procedure well. VSS  11/2: still remains on HFO and sob. On scheduled HD today  11/3: still sob and now c/o right flank pain. makes only minute amounts of urine  11/4: dropped to 88% RA at rest. no SOB or abd pain  11/5: hypotensive - 80s systolic. Feels weak and tired. Afebrile. No cp or sob. Receiving 500 cc bolus now  11/6: having diarrhea which in now resolving.    PAST MEDICAL & SURGICAL HISTORY:  ESRD on hemodialysis   HTN (hypertension)  Bladder cancer  History of exploratory laparotomy: peritonitis  History of appendectomy  AV fistula: right (never used)  History of bladder cancer: s/p resection with neobladder      Review of Systems:   CONSTITUTIONAL: No fever.  EYES: No eye pain or discharge.  ENMT:  No sinus or throat pain  NECK: No pain or stiffness  RESPIRATORY: No cough, wheezing, chills or hemoptysis; ++ shortness of breath  CARDIOVASCULAR: No chest pain, palpitations, dizziness, ++leg swelling  GASTROINTESTINAL: No abdominal or epigastric pain. No nausea, vomiting, or hematemesis; No diarrhea or constipation. No melena or hematochezia.  GENITOURINARY: does not make urine  NEUROLOGICAL: No headaches, memory loss, loss of strength, numbness, or tremors  SKIN: No rashes.  LYMPH NODES: No enlarged glands  MUSCULOSKELETAL: No muscle, back, or extremity pain, ++leg cramping   PSYCHIATRIC: No depression, anxiety, mood swings, or difficulty sleeping      Allergies    lisinopril (Other)    Intolerances    lisinopril (Diarrhea)      Social History:     FAMILY HISTORY:  No pertinent family history in first degree relatives- unknown to pt        CAPILLARY BLOOD GLUCOSE      ICU Vital Signs Last 24 Hrs  T(C): 36.4 (06 Nov 2018 11:10), Max: 36.7 (05 Nov 2018 16:28)  T(F): 97.5 (06 Nov 2018 11:10), Max: 98 (05 Nov 2018 16:28)  HR: 64 (06 Nov 2018 11:10) (63 - 80)  BP: 136/43 (06 Nov 2018 11:10) (110/46 - 170/51)  BP(mean): 63 (06 Nov 2018 10:00) (63 - 80)  ABP: --  ABP(mean): --  RR: 18 (06 Nov 2018 11:10) (15 - 22)  SpO2: 100% (06 Nov 2018 11:10) (95% - 100%)              GENERAL: NAD, well-developed  HEAD:  Atraumatic, Normocephalic  EYES: EOMI, PERRLA, conjunctiva and sclera clear  ENT: [place], no nasal discharge, throat clear, poor dentition  NECK: Supple, No JVD, NO LAD, no thyromegaly  CHEST/LUNG: Clear to auscultation bilaterally; No wheeze  HEART: decreased breath sounds at bases  ABDOMEN: Soft, Nontender, Nondistended; Bowel sounds present, no HSM  EXTREMITIES:  2+ Peripheral Pulses, No clubbing, cyanosis, 1+ pitting LE edema   PSYCH: AAOx3, normal behavior   NEUROLOGY: non-focal, sensory and cn2-12 grossly intact  SKIN: No visible rashes or lesions    LABS:                        12.0   19.33 )-----------( 261      ( 31 Oct 2018 04:18 )             38.2     10-31    142  |  110<H>  |  74<H>  ----------------------------<  237<H>  5.0   |  21<L>  |  7.41<H>    Ca    9.1      31 Oct 2018 04:18    TPro  7.7  /  Alb  3.8  /  TBili  0.6  /  DBili  x   /  AST  15  /  ALT  29  /  AlkPhos  105  10-31    PT/INR - ( 31 Oct 2018 04:18 )   PT: 11.1 sec;   INR: 1.00 ratio       < from: Transthoracic Echocardiogram (11.01.18 @ 10:33) >     Left Ventricle   The left ventricle is normal in size. There is severe hypokinesis of the   mid anteroseptal and anterior, apical anterior and apical septal   segments.   The left ventricular systolic function is moderately reduced. Estimated   left ventricular ejection fraction is 40-45 %.    < end of copied text >    PTT - ( 31 Oct 2018 04:18 )  PTT:34.3 sec  CARDIAC MARKERS ( 31 Oct 2018 10:50 )  1.160 ng/mL / x     / x     / x     / x      CARDIAC MARKERS ( 31 Oct 2018 04:18 )  0.061 ng/mL / x     / 103 U/L / x     / x              RADIOLOGY & ADDITIONAL TESTS:    Imaging Personally Reviewed:  diffuse airspace opacities b/l   EKG Personally Reviewed:  sinus tachy (31 Oct 2018 14:20)            RADIOLOGY/EKG:    < from: Xray Chest 1 View- PORTABLE-Routine (11.03.18 @ 08:40) >  Impression:slight improvement of BILATERAL interstitial infiltrates.     < end of copied text >

## 2018-11-06 NOTE — PROGRESS NOTE ADULT - SUBJECTIVE AND OBJECTIVE BOX
HPI:  Patient is 86-year-old he has a history of hypertension, type II diabetes mellitus, high cholesterol, coronary artery disease status post drug-eluting stent placed to proximal RCA , January 2016, end-stage renal disease he is on hemodialysis, bladder cancer, He has moderate aortic stenosis, patient was admitted with complains of shortness of breath for one day. Patient states he became acutely short of breath on the day of admission and he couldn't even talk and on admission was diagnosed to have acute pulmonary edema. He status post dialysis . After admission he was diagnosed to have acute non-STEMI and he status post cardiac catheterization and he is status post drug-eluting stent to proximal LAD and diagonal. Since admission he feels  better. He is now comfortable and is in no acute distress. He is in normal sinus rhythm on telemetry.    Transthoracic Echocardiogram (11.01.18    Mitral Valve   The mitral valve leaflets appear thickened.   Mild mitral annular calcification is present.   Mild (1+) mitral regurgitation is present.     Aortic Valve   Significant fibrocalcific changes noted to the aortic valve leaflets with   restriction in leaflet excursion. Peak transaortic gradient is 34 mmHg;   this finding is consistent with moderate aortic stenosis. Trace aortic   regurgitation is present.     Tricuspid Valve   Moderate (2+) tricuspid valve regurgitation is present.   The estimated RVSP is 39 mmHg.     Pulmonic Valve   Mild pulmonic valvular regurgitation (1+) is present.     Left Atrium   The left atrium is mildly dilated.   Left Ventricle   The left ventricle is normal in size. There is severe hypokinesis of the   mid anteroseptal and anterior, apical anterior and apical septal   segments.   The left ventricular systolic function is moderately reduced. Estimated   left ventricular ejection fraction is 40-45 %.     Pericardial Effusion   No evidence of pericardial effusion.     Pleural Effusion   Pleural effusion - is present..            PAST MEDICAL & SURGICAL HISTORY:  ESRD on dialysis  HTN (hypertension)  CKD (chronic kidney disease), stage 4 (severe)  Bladder cancer  No significant past surgical history  History of exploratory laparotomy: peritonitis  History of appendectomy  AV fistula: right (never used)  History of bladder cancer: s/p resection with neobladder      MEDICATIONS  (STANDING):  aspirin enteric coated 81 milliGRAM(s) Oral daily  atorvastatin 40 milliGRAM(s) Oral at bedtime  calcium acetate 1334 milliGRAM(s) Oral three times a day with meals  carvedilol 12.5 milliGRAM(s) Oral every 12 hours  clopidogrel Tablet 75 milliGRAM(s) Oral daily  dextrose 5%. 1000 milliLiter(s) (50 mL/Hr) IV Continuous <Continuous>  dextrose 50% Injectable 12.5 Gram(s) IV Push once  dextrose 50% Injectable 25 Gram(s) IV Push once  dextrose 50% Injectable 25 Gram(s) IV Push once  docusate sodium 100 milliGRAM(s) Oral three times a day  epoetin norman Injectable 6000 Unit(s) IV Push <User Schedule>  gabapentin 300 milliGRAM(s) Oral at bedtime  heparin  Injectable 5000 Unit(s) SubCutaneous every 12 hours  insulin glargine Injectable (LANTUS) 27 Unit(s) SubCutaneous at bedtime  insulin lispro (HumaLOG) corrective regimen sliding scale   SubCutaneous three times a day before meals  insulin lispro (HumaLOG) corrective regimen sliding scale   SubCutaneous at bedtime  insulin lispro Injectable (HumaLOG) 5 Unit(s) SubCutaneous before breakfast  insulin lispro Injectable (HumaLOG) 5 Unit(s) SubCutaneous before lunch  insulin lispro Injectable (HumaLOG) 5 Unit(s) SubCutaneous before dinner  losartan 50 milliGRAM(s) Oral daily  pantoprazole    Tablet 40 milliGRAM(s) Oral before breakfast  sodium bicarbonate 650 milliGRAM(s) Oral daily    MEDICATIONS  (PRN):  albumin human 25% IVPB 50 milliLiter(s) IV Intermittent <User Schedule> PRN sbp < or = 120 mmHg  dextrose 40% Gel 15 Gram(s) Oral once PRN Blood Glucose LESS THAN 70 milliGRAM(s)/deciliter  glucagon  Injectable 1 milliGRAM(s) IntraMuscular once PRN Glucose LESS THAN 70 milligrams/deciliter  metoclopramide Injectable 10 milliGRAM(s) IV Push every 8 hours PRN nausea  ondansetron Injectable 4 milliGRAM(s) IV Push every 6 hours PRN Nausea        REVIEW OF SYSTEM:  Pertinent items are noted in HPI.  Constitutional	Negative for chills, fevers, sweats.    Eyes: 	Negative for visual disturbance.  Ears, nose, mouth, throat, and face: Negative for epistaxis, nasal congestion, sore throat and tinnitus.  Neck:	Negative for enlargement, pain and difficulty in swallowing  Respiration : Negative for cough, pleuritic chest pain and wheezing  Cardiovascular: Negative for chest pain, dyspnea and palpitations    Gastrointestinal : Negative for abdominal pain, diarrhea, nausea and vomiting  Genitourinary: Negative for dysuria, frequency and urinary incontinence .  Skin: Negative for  rash, pruritus, swelling, dryness .  	  Hematologic/lymphatic: Negative for bleeding and easy bruising  Musculoskeletal: Negative for arthralgias, back pain and muscle weakness.  Neurological: Negative for dizziness, headaches, seizures and tremors  Behavioral/Psych: Negative for mood change, depression.  Endocrine:	Negative for blurry vision, polydipsia and polyuria, diaphoresis.   Allergic/Immunologic:	Negative for anaphylaxis, angioedema and urticaria.      Vital Signs Last 24 Hrs  T(C): 35.9 (06 Nov 2018 06:37), Max: 36.7 (05 Nov 2018 16:28)  T(F): 96.6 (06 Nov 2018 06:37), Max: 98 (05 Nov 2018 16:28)  HR: 68 (06 Nov 2018 06:00) (54 - 80)  BP: 170/51 (06 Nov 2018 06:00) (90/47 - 170/51)  BP(mean): 77 (06 Nov 2018 06:00) (52 - 80)  RR: 15 (06 Nov 2018 06:00) (13 - 21)  SpO2: 96% (06 Nov 2018 06:00) (75% - 100%)    I&O's Summary    05 Nov 2018 07:01  -  06 Nov 2018 07:00  --------------------------------------------------------  IN: 500 mL / OUT: 0 mL / NET: 500 mL      PHYSICAL EXAM  General Appearance: cooperative, no acute distress,   HEENT: PERRL, conjunctiva clear, EOM's intact .   Neck: Supple, , no adenopathy, thyroid: not enlarged, no carotid bruit or JVD  Lungs: Diminished breath sounds both bases , no rales   Heart: Regular rate and rhythm, S1, S2 normal,1/6 HS murmur, no rub or gallop  Abdomen: Soft, non-tender, bowel sounds active , no hepatosplenomegaly  Extremities: no cyanosis or edema, no joint swelling  Skin: Skin color, texture normal, no rashes   Neurologic: Alert and oriented X3 , cranial nerves intact, sensory and motor normal,        INTERPRETATION OF TELEMETRY: NSR     ECG: NSR NSSST changes         LABS:                          8.5    6.28  )-----------( 192      ( 06 Nov 2018 05:17 )             26.6     11-06    139  |  101  |  53<H>  ----------------------------<  59<L>  3.8   |  29  |  5.54<H>    Ca    8.0<L>      06 Nov 2018 05:17            Pro BNP  -- 11-05 @ 12:50  D Dimer  387 11-05 @ 12:50  Pro BNP  [id number] 10-31 @ 04:18  D Dimer  -- 10-31 @ 04:18              RADIOLOGY & ADDITIONAL STUDIES: HPI:  Patient is 86-year-old he has a history of hypertension, type II diabetes mellitus, high cholesterol, coronary artery disease status post drug-eluting stent placed to proximal RCA , January 2016, end-stage renal disease he is on hemodialysis, bladder cancer, He has moderate aortic stenosis, patient was admitted with complains of shortness of breath for one day. Patient states he became acutely short of breath on the day of admission and he couldn't even talk and on admission was diagnosed to have acute pulmonary edema.  After admission he was diagnosed to have acute non-STEMI and he status post cardiac catheterization and he is status post drug-eluting stent to proximal LAD and diagonal. Since admission he feels  better. He is now comfortable and is in no acute distress. He is in normal sinus rhythm on telemetry. He is ambulating without any discomfort & is anxious to go home.    Transthoracic Echocardiogram (11.01.18    Mitral Valve   The mitral valve leaflets appear thickened.   Mild mitral annular calcification is present.   Mild (1+) mitral regurgitation is present.     Aortic Valve   Significant fibrocalcific changes noted to the aortic valve leaflets with   restriction in leaflet excursion. Peak transaortic gradient is 34 mmHg;   this finding is consistent with moderate aortic stenosis. Trace aortic   regurgitation is present.     Tricuspid Valve   Moderate (2+) tricuspid valve regurgitation is present.   The estimated RVSP is 39 mmHg.     Pulmonic Valve   Mild pulmonic valvular regurgitation (1+) is present.     Left Atrium   The left atrium is mildly dilated.   Left Ventricle   The left ventricle is normal in size. There is severe hypokinesis of the   mid anteroseptal and anterior, apical anterior and apical septal   segments.   The left ventricular systolic function is moderately reduced. Estimated   left ventricular ejection fraction is 40-45 %.     Pericardial Effusion   No evidence of pericardial effusion.     Pleural Effusion   Pleural effusion - is present..            PAST MEDICAL & SURGICAL HISTORY:  ESRD on dialysis  HTN (hypertension)  CKD (chronic kidney disease), stage 4 (severe)  Bladder cancer  No significant past surgical history  History of exploratory laparotomy: peritonitis  History of appendectomy  AV fistula: right (never used)  History of bladder cancer: s/p resection with neobladder      MEDICATIONS  (STANDING):  aspirin enteric coated 81 milliGRAM(s) Oral daily  atorvastatin 40 milliGRAM(s) Oral at bedtime  calcium acetate 1334 milliGRAM(s) Oral three times a day with meals  carvedilol 12.5 milliGRAM(s) Oral every 12 hours  clopidogrel Tablet 75 milliGRAM(s) Oral daily  dextrose 5%. 1000 milliLiter(s) (50 mL/Hr) IV Continuous <Continuous>  dextrose 50% Injectable 12.5 Gram(s) IV Push once  dextrose 50% Injectable 25 Gram(s) IV Push once  dextrose 50% Injectable 25 Gram(s) IV Push once  docusate sodium 100 milliGRAM(s) Oral three times a day  epoetin norman Injectable 6000 Unit(s) IV Push <User Schedule>  gabapentin 300 milliGRAM(s) Oral at bedtime  heparin  Injectable 5000 Unit(s) SubCutaneous every 12 hours  insulin glargine Injectable (LANTUS) 27 Unit(s) SubCutaneous at bedtime  insulin lispro (HumaLOG) corrective regimen sliding scale   SubCutaneous three times a day before meals  insulin lispro (HumaLOG) corrective regimen sliding scale   SubCutaneous at bedtime  insulin lispro Injectable (HumaLOG) 5 Unit(s) SubCutaneous before breakfast  insulin lispro Injectable (HumaLOG) 5 Unit(s) SubCutaneous before lunch  insulin lispro Injectable (HumaLOG) 5 Unit(s) SubCutaneous before dinner  losartan 50 milliGRAM(s) Oral daily  pantoprazole    Tablet 40 milliGRAM(s) Oral before breakfast  sodium bicarbonate 650 milliGRAM(s) Oral daily    MEDICATIONS  (PRN):  albumin human 25% IVPB 50 milliLiter(s) IV Intermittent <User Schedule> PRN sbp < or = 120 mmHg  dextrose 40% Gel 15 Gram(s) Oral once PRN Blood Glucose LESS THAN 70 milliGRAM(s)/deciliter  glucagon  Injectable 1 milliGRAM(s) IntraMuscular once PRN Glucose LESS THAN 70 milligrams/deciliter  metoclopramide Injectable 10 milliGRAM(s) IV Push every 8 hours PRN nausea  ondansetron Injectable 4 milliGRAM(s) IV Push every 6 hours PRN Nausea        REVIEW OF SYSTEM:  Pertinent items are noted in HPI.  Constitutional	Negative for chills, fevers, sweats.    Eyes: 	Negative for visual disturbance.  Ears, nose, mouth, throat, and face: Negative for epistaxis, nasal congestion, sore throat and tinnitus.  Neck:	Negative for enlargement, pain and difficulty in swallowing  Respiration : Negative for cough, pleuritic chest pain and wheezing  Cardiovascular: Negative for chest pain, dyspnea and palpitations    Gastrointestinal : Negative for abdominal pain, diarrhea, nausea and vomiting  Genitourinary: Negative for dysuria, frequency and urinary incontinence .  Skin: Negative for  rash, pruritus, swelling, dryness .  	  Hematologic/lymphatic: Negative for bleeding and easy bruising  Musculoskeletal: Negative for arthralgias, back pain and muscle weakness.  Neurological: Negative for dizziness, headaches, seizures and tremors  Behavioral/Psych: Negative for mood change, depression.  Endocrine:	Negative for blurry vision, polydipsia and polyuria, diaphoresis.   Allergic/Immunologic:	Negative for anaphylaxis, angioedema and urticaria.      Vital Signs Last 24 Hrs  T(C): 35.9 (06 Nov 2018 06:37), Max: 36.7 (05 Nov 2018 16:28)  T(F): 96.6 (06 Nov 2018 06:37), Max: 98 (05 Nov 2018 16:28)  HR: 68 (06 Nov 2018 06:00) (54 - 80)  BP: 170/51 (06 Nov 2018 06:00) (90/47 - 170/51)  BP(mean): 77 (06 Nov 2018 06:00) (52 - 80)  RR: 15 (06 Nov 2018 06:00) (13 - 21)  SpO2: 96% (06 Nov 2018 06:00) (75% - 100%)    I&O's Summary    05 Nov 2018 07:01  -  06 Nov 2018 07:00  --------------------------------------------------------  IN: 500 mL / OUT: 0 mL / NET: 500 mL      PHYSICAL EXAM  General Appearance: cooperative, no acute distress,   HEENT: PERRL, conjunctiva clear, EOM's intact .   Neck: Supple, , no adenopathy, thyroid: not enlarged, no carotid bruit or JVD  Lungs: Diminished breath sounds both bases , no rales   Heart: Regular rate and rhythm, S1, S2 normal,1/6 HS murmur, no rub or gallop  Abdomen: Soft, non-tender, bowel sounds active , no hepatosplenomegaly  Extremities: no cyanosis or edema, no joint swelling  Skin: Skin color, texture normal, no rashes   Neurologic: Alert and oriented X3 , cranial nerves intact, sensory and motor normal,        INTERPRETATION OF TELEMETRY: NSR     ECG: NSR NSSST changes         LABS:                          8.5    6.28  )-----------( 192      ( 06 Nov 2018 05:17 )             26.6     11-06    139  |  101  |  53<H>  ----------------------------<  59<L>  3.8   |  29  |  5.54<H>    Ca    8.0<L>      06 Nov 2018 05:17            Pro BNP  -- 11-05 @ 12:50  D Dimer  387 11-05 @ 12:50  Pro BNP  [id number] 10-31 @ 04:18  D Dimer  -- 10-31 @ 04:18              RADIOLOGY & ADDITIONAL STUDIES:

## 2018-11-07 ENCOUNTER — TRANSCRIPTION ENCOUNTER (OUTPATIENT)
Age: 83
End: 2018-11-07

## 2018-11-07 VITALS — SYSTOLIC BLOOD PRESSURE: 163 MMHG | HEART RATE: 73 BPM | DIASTOLIC BLOOD PRESSURE: 50 MMHG

## 2018-11-07 LAB
ALBUMIN SERPL ELPH-MCNC: 2.9 G/DL — LOW (ref 3.3–5)
ANION GAP SERPL CALC-SCNC: 12 MMOL/L — SIGNIFICANT CHANGE UP (ref 5–17)
ANION GAP SERPL CALC-SCNC: 14 MMOL/L — SIGNIFICANT CHANGE UP (ref 5–17)
BUN SERPL-MCNC: 73 MG/DL — HIGH (ref 7–23)
BUN SERPL-MCNC: 74 MG/DL — HIGH (ref 7–23)
CALCIUM SERPL-MCNC: 8.4 MG/DL — LOW (ref 8.5–10.1)
CALCIUM SERPL-MCNC: 8.5 MG/DL — SIGNIFICANT CHANGE UP (ref 8.5–10.1)
CHLORIDE SERPL-SCNC: 98 MMOL/L — SIGNIFICANT CHANGE UP (ref 96–108)
CHLORIDE SERPL-SCNC: 99 MMOL/L — SIGNIFICANT CHANGE UP (ref 96–108)
CO2 SERPL-SCNC: 22 MMOL/L — SIGNIFICANT CHANGE UP (ref 22–31)
CO2 SERPL-SCNC: 24 MMOL/L — SIGNIFICANT CHANGE UP (ref 22–31)
CREAT SERPL-MCNC: 7.18 MG/DL — HIGH (ref 0.5–1.3)
CREAT SERPL-MCNC: 7.6 MG/DL — HIGH (ref 0.5–1.3)
GLUCOSE BLDC GLUCOMTR-MCNC: 113 MG/DL — HIGH (ref 70–99)
GLUCOSE BLDC GLUCOMTR-MCNC: 150 MG/DL — HIGH (ref 70–99)
GLUCOSE BLDC GLUCOMTR-MCNC: 76 MG/DL — SIGNIFICANT CHANGE UP (ref 70–99)
GLUCOSE SERPL-MCNC: 174 MG/DL — HIGH (ref 70–99)
GLUCOSE SERPL-MCNC: 56 MG/DL — LOW (ref 70–99)
HCT VFR BLD CALC: 27.8 % — LOW (ref 39–50)
HCT VFR BLD CALC: 28.4 % — LOW (ref 39–50)
HGB BLD-MCNC: 8.9 G/DL — LOW (ref 13–17)
HGB BLD-MCNC: 9.3 G/DL — LOW (ref 13–17)
MCHC RBC-ENTMCNC: 31.8 PG — SIGNIFICANT CHANGE UP (ref 27–34)
MCHC RBC-ENTMCNC: 32 GM/DL — SIGNIFICANT CHANGE UP (ref 32–36)
MCHC RBC-ENTMCNC: 32.6 PG — SIGNIFICANT CHANGE UP (ref 27–34)
MCHC RBC-ENTMCNC: 32.7 GM/DL — SIGNIFICANT CHANGE UP (ref 32–36)
MCV RBC AUTO: 99.3 FL — SIGNIFICANT CHANGE UP (ref 80–100)
MCV RBC AUTO: 99.6 FL — SIGNIFICANT CHANGE UP (ref 80–100)
NRBC # BLD: 0 /100 WBCS — SIGNIFICANT CHANGE UP (ref 0–0)
NRBC # BLD: 0 /100 WBCS — SIGNIFICANT CHANGE UP (ref 0–0)
PHOSPHATE SERPL-MCNC: 4 MG/DL — SIGNIFICANT CHANGE UP (ref 2.5–4.5)
PLATELET # BLD AUTO: 202 K/UL — SIGNIFICANT CHANGE UP (ref 150–400)
PLATELET # BLD AUTO: 216 K/UL — SIGNIFICANT CHANGE UP (ref 150–400)
POTASSIUM SERPL-MCNC: 3.8 MMOL/L — SIGNIFICANT CHANGE UP (ref 3.5–5.3)
POTASSIUM SERPL-MCNC: 4 MMOL/L — SIGNIFICANT CHANGE UP (ref 3.5–5.3)
POTASSIUM SERPL-SCNC: 3.8 MMOL/L — SIGNIFICANT CHANGE UP (ref 3.5–5.3)
POTASSIUM SERPL-SCNC: 4 MMOL/L — SIGNIFICANT CHANGE UP (ref 3.5–5.3)
RBC # BLD: 2.8 M/UL — LOW (ref 4.2–5.8)
RBC # BLD: 2.85 M/UL — LOW (ref 4.2–5.8)
RBC # FLD: 13.7 % — SIGNIFICANT CHANGE UP (ref 10.3–14.5)
RBC # FLD: 13.7 % — SIGNIFICANT CHANGE UP (ref 10.3–14.5)
SODIUM SERPL-SCNC: 134 MMOL/L — LOW (ref 135–145)
SODIUM SERPL-SCNC: 135 MMOL/L — SIGNIFICANT CHANGE UP (ref 135–145)
WBC # BLD: 5.45 K/UL — SIGNIFICANT CHANGE UP (ref 3.8–10.5)
WBC # BLD: 6.35 K/UL — SIGNIFICANT CHANGE UP (ref 3.8–10.5)
WBC # FLD AUTO: 5.45 K/UL — SIGNIFICANT CHANGE UP (ref 3.8–10.5)
WBC # FLD AUTO: 6.35 K/UL — SIGNIFICANT CHANGE UP (ref 3.8–10.5)

## 2018-11-07 PROCEDURE — 93010 ELECTROCARDIOGRAM REPORT: CPT

## 2018-11-07 RX ORDER — CARVEDILOL PHOSPHATE 80 MG/1
6.25 CAPSULE, EXTENDED RELEASE ORAL ONCE
Qty: 0 | Refills: 0 | Status: COMPLETED | OUTPATIENT
Start: 2018-11-07 | End: 2018-11-07

## 2018-11-07 RX ADMIN — GABAPENTIN 300 MILLIGRAM(S): 400 CAPSULE ORAL at 17:11

## 2018-11-07 RX ADMIN — Medication 650 MILLIGRAM(S): at 17:07

## 2018-11-07 RX ADMIN — CARVEDILOL PHOSPHATE 12.5 MILLIGRAM(S): 80 CAPSULE, EXTENDED RELEASE ORAL at 17:09

## 2018-11-07 RX ADMIN — LOSARTAN POTASSIUM 50 MILLIGRAM(S): 100 TABLET, FILM COATED ORAL at 05:46

## 2018-11-07 RX ADMIN — CLOPIDOGREL BISULFATE 75 MILLIGRAM(S): 75 TABLET, FILM COATED ORAL at 17:07

## 2018-11-07 RX ADMIN — PANTOPRAZOLE SODIUM 40 MILLIGRAM(S): 20 TABLET, DELAYED RELEASE ORAL at 05:46

## 2018-11-07 RX ADMIN — HEPARIN SODIUM 5000 UNIT(S): 5000 INJECTION INTRAVENOUS; SUBCUTANEOUS at 05:46

## 2018-11-07 RX ADMIN — Medication 5 UNIT(S): at 17:08

## 2018-11-07 RX ADMIN — HEPARIN SODIUM 5000 UNIT(S): 5000 INJECTION INTRAVENOUS; SUBCUTANEOUS at 17:10

## 2018-11-07 RX ADMIN — Medication 5 UNIT(S): at 08:53

## 2018-11-07 RX ADMIN — Medication 1334 MILLIGRAM(S): at 17:08

## 2018-11-07 RX ADMIN — Medication 1334 MILLIGRAM(S): at 11:19

## 2018-11-07 RX ADMIN — Medication 100 MILLIGRAM(S): at 05:46

## 2018-11-07 RX ADMIN — Medication 1334 MILLIGRAM(S): at 08:52

## 2018-11-07 RX ADMIN — Medication 5 UNIT(S): at 11:16

## 2018-11-07 RX ADMIN — CARVEDILOL PHOSPHATE 12.5 MILLIGRAM(S): 80 CAPSULE, EXTENDED RELEASE ORAL at 05:45

## 2018-11-07 RX ADMIN — Medication 81 MILLIGRAM(S): at 17:06

## 2018-11-07 RX ADMIN — CARVEDILOL PHOSPHATE 6.25 MILLIGRAM(S): 80 CAPSULE, EXTENDED RELEASE ORAL at 17:59

## 2018-11-07 RX ADMIN — ERYTHROPOIETIN 6000 UNIT(S): 10000 INJECTION, SOLUTION INTRAVENOUS; SUBCUTANEOUS at 15:22

## 2018-11-07 NOTE — DISCHARGE NOTE ADULT - PATIENT PORTAL LINK FT
You can access the [email or web address] Patient Portal, offered by [facility], by registering with the following website: [email or web address]

## 2018-11-07 NOTE — DISCHARGE NOTE ADULT - CARE PROVIDER_API CALL
Jayson Maria (MD), Cardiovascular Disease; Internal Medicine  [address] [place] [address]  Phone: [phone]  Fax: [phone]    Yun, SukHyeon (MD), Nephrology  [address]  [address]  [place] [address]  Phone: [phone]  Fax: [phone]

## 2018-11-07 NOTE — DISCHARGE NOTE ADULT - CARE PLAN
Principal Discharge DX:	Acute respiratory failure with hypoxia  Goal:	secondary to pulmonary edema due to acute on chronic systolic CHF which is now currently resolved  Assessment and plan of treatment:	plan to start BB, continue ARB and start home O2 due to chronic but stable chf

## 2018-11-07 NOTE — DISCHARGE NOTE ADULT - PLAN OF CARE
secondary to pulmonary edema due to acute on chronic systolic CHF which is now currently resolved plan to start BB, continue ARB and start home O2 due to chronic but stable chf

## 2018-11-07 NOTE — PROGRESS NOTE ADULT - ASSESSMENT
87 yo man with ESRD presenting with decompensated CHF.     Unclear fluid overloaded state due to recent increased intake with decreased UF vs cardiac decompensation.  HD this am.  Due to continued distress, will increase DT to 4 hours to allow increased UF.  Further recommendations pending clinical response.    11/1  s/p HD last night  feels better today  for cardiac cath, d/w pts son  d/w Dr Talbert    11/2  S/P PCI ofD1 and LAD on Nov 1  feels well  dialyzed yesterday after cath  d/w Dr Talbert  d/w Pts son  HD today, his scheduled session  pt requests 3 kg fluid removal  from a renal standpoint pt may be dc   next hd Monday 11/5    11/3 SY  --ESRD  Continue TIW HD  --CHF : improved but continues with decreased o2 saturation.   Plan for PUF x2 hours today to stabilize resp status.  --CAD  Post stents x2 .  Stable.    11/4 SY  --ESRD : continue TIW HD  --CHF : improved and stable  --CAD : Post stents x 2.  stable  --Fluid status improved and acceptable.    11/5 SY  --ESRD : HD order and tx reviewed.  Aim for 1 kg fluid only.  --CHF : well compensated  --CAD : post stents.  Stable.    11/6 SY  --ESRD Continue TIW hd.  --CHF : Improved and stable  --CAD : post stents x2, stable  --GI : follow for further diarrhea, follow up c diff.    11/7  No further diarrhea as per pt   for transfer to [facility] after HD, 6 pm tonight

## 2018-11-07 NOTE — PROGRESS NOTE ADULT - REASON FOR ADMISSION
SUTHERLAND
SUTEHRLAND
SUTHERLAND

## 2018-11-07 NOTE — DISCHARGE NOTE ADULT - HOSPITAL COURSE
· Subjective and Objective: 	  Patient is a 86y old  Male who presents with a chief complaint of SUTHERLAND (01 Nov 2018 12:01)      SUBJECTIVE:   HPI:  86M w/PMH ESRD on HD MWF, T2DM, HTN, CAD, presents c/o sudden onset of dyspnea that woke him up at 0200.  Found to be in pulm edema secondary to acute on chronic decompensated systolic CHF with rising troponins and EKG changes suggetive of NSTEMI.   11/1: s/p LHC with KAREN to LAD/D1. Seen in recovery unit. Tolerated procedure well. VSS  11/1: s/p LHC with KAREN to LAD/D1. Seen in recovery unit. Tolerated procedure well. VSSGENERAL: NAD, well-developed  HEAD:  Atraumatic, Normocephalic  EYES: EOMI, PERRLA, conjunctiva and sclera clear  ENT: [place], no nasal discharge, throat clear, poor dentition  NECK: Supple, No JVD, NO LAD, no thyromegaly  CHEST/LUNG: Clear to auscultation bilaterally; No wheeze  HEART: decreased breath sounds at bases  ABDOMEN: Soft, Nontender, Nondistended; Bowel sounds present, no HSM  EXTREMITIES:  2+ Peripheral Pulses, No clubbing, cyanosis, 1+ pitting LE edema   PSYCH: AAOx3, normal behavior   NEUROLOGY: non-focal, sensory and cn2-12 grossly intact  SKIN: No visible rashes or lesionsGENERAL: NAD, well-developed  HEAD:  Atraumatic, Normocephalic  EYES: EOMI, PERRLA, conjunctiva and sclera clear  ENT: [place], no nasal discharge, throat clear, poor dentition  NECK: Supple, No JVD, NO LAD, no thyromegaly  CHEST/LUNG: Clear to auscultation bilaterally; No wheeze  HEART: decreased breath sounds at bases  ABDOMEN: Soft, Nontender, Nondistended; Bowel sounds present, no HSM  EXTREMITIES:  2+ Peripheral Pulses, No clubbing, cyanosis, 1+ pitting LE edema   PSYCH: AAOx3, normal behavior   NEUROLOGY: non-focal, sensory and cn2-12 grossly intact  SKIN: No visible rashes or lesions  Assessment and Plan:   · Assessment		  86M w/PMH ESRD on HD MWF, T2DM, HTN, CAD, presents c/o sudden onset of dyspnea that woke him up at 0200.  Found to be in pulm edema secondary to acute on chronic decompensated systolic CHF with rising troponins and EKG changes suggetive of NSTEMI.     A:  Acute hypoxemic resp failure- multi factorial  Acute systolic CHF-resolving  NSTEMI  Anemia- suspect AOCD  ESRD  Pulm Edema-resolving  Mod AS  Bladder CA    P:  S/p LHC with stent to LAD/D1  DAPT  Statins  BP control with BB/ACE-I, statin  ESRD per schedule- today. Cont high flow O2. CXR noted. CT chest shows improvement of edema/effusion  SW for home O2- chronic but stable systolic CHF requiring continuous O2 at 2l NC continuous flow- o2s 88% on RA  Treated for acute on chronic systolic CHF- yet remains hypoxic due to chronic but stable CHF  Low suspicion for PE-> check D-dimer (nl for this age group-considered negative)  HD per renal reccs -   Decrease coreg to 6.25 BID  Monitor fluid status-fluid restrict  Bladder CA-> outpt management  DVTpx  TT: 41 min

## 2018-11-07 NOTE — PROGRESS NOTE ADULT - SUBJECTIVE AND OBJECTIVE BOX
NEPHROLOGY INTERVAL HPI/OVERNIGHT EVENTS:    Date of Service: 11-06-18 @ 10:14    11/7  seen on hd   feels well  states he does not have diarrhea  for transfer to [facility] 6 pm      11/6 SY  Resting comfortably.  Per RN's report, several watery bms.  Denies abdominal pain.    11/5 SY  No acute events overnight.  an episode of hypotension post meds and gettting out of bed this am.  Sitting in chair, comfortable.    Denies SOB.    11/4 SY  Feeling better.  No new complaints.  Denies SOB.    11/3 SY  Appearing much more comfortable  O2 saturation in low 90's with NC o2.  No acute distress.  Post Stents x 2 on 11/1 ( to LAD and diagonal 1)    HPI:  85 yo man with PMHX of ESRD, CAD, DM and HTN on maintenance HD since 10/2014.  Pt presented with one day hx of SOB.  Pt apparently gained about 6 kg fluid weight over weekend.   Was able to tolerate only 3.5 kg fluid removal.   Recently had EDW increased due to severe leg cramping with attempts to maintain EDW lower.  Reports worsening SOB for one day.  Unable to go for outpatient HD tx and came to ED.  Bipap mask in place with improved 02 saturation.   Denies any chest discomfort.      MEDICATIONS  (STANDING):  aspirin enteric coated 81 milliGRAM(s) Oral daily  atorvastatin 40 milliGRAM(s) Oral at bedtime  calcium acetate 1334 milliGRAM(s) Oral three times a day with meals  carvedilol 12.5 milliGRAM(s) Oral every 12 hours  clopidogrel Tablet 75 milliGRAM(s) Oral daily  dextrose 5%. 1000 milliLiter(s) (50 mL/Hr) IV Continuous <Continuous>  dextrose 50% Injectable 12.5 Gram(s) IV Push once  dextrose 50% Injectable 25 Gram(s) IV Push once  dextrose 50% Injectable 25 Gram(s) IV Push once  docusate sodium 100 milliGRAM(s) Oral three times a day  epoetin norman Injectable 6000 Unit(s) IV Push <User Schedule>  gabapentin 300 milliGRAM(s) Oral at bedtime  heparin  Injectable 5000 Unit(s) SubCutaneous every 12 hours  insulin glargine Injectable (LANTUS) 27 Unit(s) SubCutaneous at bedtime  insulin lispro (HumaLOG) corrective regimen sliding scale   SubCutaneous three times a day before meals  insulin lispro (HumaLOG) corrective regimen sliding scale   SubCutaneous at bedtime  insulin lispro Injectable (HumaLOG) 5 Unit(s) SubCutaneous before breakfast  insulin lispro Injectable (HumaLOG) 5 Unit(s) SubCutaneous before lunch  insulin lispro Injectable (HumaLOG) 5 Unit(s) SubCutaneous before dinner  losartan 50 milliGRAM(s) Oral daily  pantoprazole    Tablet 40 milliGRAM(s) Oral before breakfast  sodium bicarbonate 650 milliGRAM(s) Oral daily    MEDICATIONS  (PRN):  albumin human 25% IVPB 50 milliLiter(s) IV Intermittent <User Schedule> PRN sbp < or = 120 mmHg  dextrose 40% Gel 15 Gram(s) Oral once PRN Blood Glucose LESS THAN 70 milliGRAM(s)/deciliter  glucagon  Injectable 1 milliGRAM(s) IntraMuscular once PRN Glucose LESS THAN 70 milligrams/deciliter  metoclopramide Injectable 10 milliGRAM(s) IV Push every 8 hours PRN nausea  ondansetron Injectable 4 milliGRAM(s) IV Push every 6 hours PRN Nausea          Vital Signs Last 24 Hrs  T(C): 36.1 (07 Nov 2018 12:07), Max: 36.9 (07 Nov 2018 05:26)  T(F): 97 (07 Nov 2018 12:07), Max: 98.4 (07 Nov 2018 05:26)  HR: 64 (07 Nov 2018 15:18) (61 - 73)  BP: 126/59 (07 Nov 2018 15:18) (122/61 - 167/43)  BP(mean): --  RR: 17 (07 Nov 2018 12:07) (16 - 19)  SpO2: 100% (07 Nov 2018 11:32) (85% - 100%)        PHYSICAL EXAM:  Alert and appropriate  GENERAL: No distress  CHEST/LUNG: Basilar rhonchi  HEART: S1S2 RRR  ABDOMEN: soft  EXTREMITIES: no edema  SKIN:     LABS:                                     9.3    5.45  )-----------( 216      ( 07 Nov 2018 12:10 )             28.4       11-07    134<L>  |  98  |  74<H>  ----------------------------<  174<H>  4.0   |  22  |  7.60<H>    Ca    8.5      07 Nov 2018 12:10  Phos  4.0     11-07    TPro  x   /  Alb  2.9<L>  /  TBili  x   /  DBili  x   /  AST  x   /  ALT  x   /  AlkPhos  x   11-07

## 2018-11-07 NOTE — PROGRESS NOTE ADULT - PROVIDER SPECIALTY LIST ADULT
Cardiology
Hospitalist
Nephrology
Cardiology
Nephrology
Hospitalist

## 2018-11-14 DIAGNOSIS — D72.829 ELEVATED WHITE BLOOD CELL COUNT, UNSPECIFIED: ICD-10-CM

## 2018-11-14 DIAGNOSIS — E11.22 TYPE 2 DIABETES MELLITUS WITH DIABETIC CHRONIC KIDNEY DISEASE: ICD-10-CM

## 2018-11-14 DIAGNOSIS — I13.2 HYPERTENSIVE HEART AND CHRONIC KIDNEY DISEASE WITH HEART FAILURE AND WITH STAGE 5 CHRONIC KIDNEY DISEASE, OR END STAGE RENAL DISEASE: ICD-10-CM

## 2018-11-14 DIAGNOSIS — Z99.2 DEPENDENCE ON RENAL DIALYSIS: ICD-10-CM

## 2018-11-14 DIAGNOSIS — E78.5 HYPERLIPIDEMIA, UNSPECIFIED: ICD-10-CM

## 2018-11-14 DIAGNOSIS — Z85.51 PERSONAL HISTORY OF MALIGNANT NEOPLASM OF BLADDER: ICD-10-CM

## 2018-11-14 DIAGNOSIS — R00.0 TACHYCARDIA, UNSPECIFIED: ICD-10-CM

## 2018-11-14 DIAGNOSIS — G62.9 POLYNEUROPATHY, UNSPECIFIED: ICD-10-CM

## 2018-11-14 DIAGNOSIS — I21.4 NON-ST ELEVATION (NSTEMI) MYOCARDIAL INFARCTION: ICD-10-CM

## 2018-11-14 DIAGNOSIS — J96.01 ACUTE RESPIRATORY FAILURE WITH HYPOXIA: ICD-10-CM

## 2018-11-14 DIAGNOSIS — Z79.82 LONG TERM (CURRENT) USE OF ASPIRIN: ICD-10-CM

## 2018-11-14 DIAGNOSIS — I50.23 ACUTE ON CHRONIC SYSTOLIC (CONGESTIVE) HEART FAILURE: ICD-10-CM

## 2018-11-14 DIAGNOSIS — N18.6 END STAGE RENAL DISEASE: ICD-10-CM

## 2018-11-14 DIAGNOSIS — K21.9 GASTRO-ESOPHAGEAL REFLUX DISEASE WITHOUT ESOPHAGITIS: ICD-10-CM

## 2018-11-14 DIAGNOSIS — Z90.49 ACQUIRED ABSENCE OF OTHER SPECIFIED PARTS OF DIGESTIVE TRACT: ICD-10-CM

## 2018-11-25 NOTE — ED ADULT NURSE NOTE - PRO INTERPRETER NEED 2
Problem: Falls - Risk of 
Goal: *Absence of Falls Document Logan Claire Fall Risk and appropriate interventions in the flowsheet. Outcome: Progressing Towards Goal 
Fall Risk Interventions: 
Mobility Interventions: Bed/chair exit alarm, Patient to call before getting OOB Medication Interventions: Bed/chair exit alarm, Patient to call before getting OOB, Teach patient to arise slowly Elimination Interventions: Bed/chair exit alarm, Call light in reach, Patient to call for help with toileting needs History of Falls Interventions: Bed/chair exit alarm Problem: Pressure Injury - Risk of 
Goal: *Prevention of pressure injury Document Shivam Scale and appropriate interventions in the flowsheet. Outcome: Progressing Towards Goal 
Pressure Injury Interventions: 
Sensory Interventions: Assess changes in LOC Moisture Interventions: Absorbent underpads Activity Interventions: Pressure redistribution bed/mattress(bed type), PT/OT evaluation Mobility Interventions: HOB 30 degrees or less, Pressure redistribution bed/mattress (bed type), PT/OT evaluation Nutrition Interventions: Document food/fluid/supplement intake Friction and Shear Interventions: HOB 30 degrees or less English

## 2018-12-03 ENCOUNTER — EMERGENCY (EMERGENCY)
Facility: HOSPITAL | Age: 83
LOS: 0 days | Discharge: HOME | End: 2018-12-03
Attending: EMERGENCY MEDICINE | Admitting: EMERGENCY MEDICINE
Payer: MEDICARE

## 2018-12-03 VITALS
RESPIRATION RATE: 18 BRPM | WEIGHT: 169.98 LBS | SYSTOLIC BLOOD PRESSURE: 172 MMHG | OXYGEN SATURATION: 100 % | DIASTOLIC BLOOD PRESSURE: 51 MMHG | HEART RATE: 80 BPM | TEMPERATURE: 97 F

## 2018-12-03 VITALS
HEART RATE: 82 BPM | DIASTOLIC BLOOD PRESSURE: 52 MMHG | OXYGEN SATURATION: 96 % | RESPIRATION RATE: 18 BRPM | SYSTOLIC BLOOD PRESSURE: 155 MMHG | TEMPERATURE: 98 F

## 2018-12-03 DIAGNOSIS — Z85.51 PERSONAL HISTORY OF MALIGNANT NEOPLASM OF BLADDER: Chronic | ICD-10-CM

## 2018-12-03 DIAGNOSIS — I77.0 ARTERIOVENOUS FISTULA, ACQUIRED: Chronic | ICD-10-CM

## 2018-12-03 DIAGNOSIS — R06.00 DYSPNEA, UNSPECIFIED: ICD-10-CM

## 2018-12-03 DIAGNOSIS — Z99.2 DEPENDENCE ON RENAL DIALYSIS: ICD-10-CM

## 2018-12-03 DIAGNOSIS — Z98.89 OTHER SPECIFIED POSTPROCEDURAL STATES: Chronic | ICD-10-CM

## 2018-12-03 DIAGNOSIS — Z85.51 PERSONAL HISTORY OF MALIGNANT NEOPLASM OF BLADDER: ICD-10-CM

## 2018-12-03 DIAGNOSIS — Z79.82 LONG TERM (CURRENT) USE OF ASPIRIN: ICD-10-CM

## 2018-12-03 DIAGNOSIS — I12.0 HYPERTENSIVE CHRONIC KIDNEY DISEASE WITH STAGE 5 CHRONIC KIDNEY DISEASE OR END STAGE RENAL DISEASE: ICD-10-CM

## 2018-12-03 DIAGNOSIS — E11.22 TYPE 2 DIABETES MELLITUS WITH DIABETIC CHRONIC KIDNEY DISEASE: ICD-10-CM

## 2018-12-03 DIAGNOSIS — I25.10 ATHEROSCLEROTIC HEART DISEASE OF NATIVE CORONARY ARTERY WITHOUT ANGINA PECTORIS: ICD-10-CM

## 2018-12-03 DIAGNOSIS — Z79.4 LONG TERM (CURRENT) USE OF INSULIN: ICD-10-CM

## 2018-12-03 DIAGNOSIS — R06.02 SHORTNESS OF BREATH: ICD-10-CM

## 2018-12-03 DIAGNOSIS — N18.6 END STAGE RENAL DISEASE: ICD-10-CM

## 2018-12-03 DIAGNOSIS — Z79.899 OTHER LONG TERM (CURRENT) DRUG THERAPY: ICD-10-CM

## 2018-12-03 LAB
ALBUMIN SERPL ELPH-MCNC: 3.4 G/DL — SIGNIFICANT CHANGE UP (ref 3.3–5)
ALP SERPL-CCNC: 73 U/L — SIGNIFICANT CHANGE UP (ref 40–120)
ALT FLD-CCNC: 20 U/L — SIGNIFICANT CHANGE UP (ref 12–78)
ANION GAP SERPL CALC-SCNC: 9 MMOL/L — SIGNIFICANT CHANGE UP (ref 5–17)
APTT BLD: 33.9 SEC — SIGNIFICANT CHANGE UP (ref 27.5–36.3)
AST SERPL-CCNC: 15 U/L — SIGNIFICANT CHANGE UP (ref 15–37)
BASOPHILS # BLD AUTO: 0.04 K/UL — SIGNIFICANT CHANGE UP (ref 0–0.2)
BASOPHILS NFR BLD AUTO: 0.6 % — SIGNIFICANT CHANGE UP (ref 0–2)
BILIRUB SERPL-MCNC: 0.6 MG/DL — SIGNIFICANT CHANGE UP (ref 0.2–1.2)
BUN SERPL-MCNC: 87 MG/DL — HIGH (ref 7–23)
CALCIUM SERPL-MCNC: 9 MG/DL — SIGNIFICANT CHANGE UP (ref 8.5–10.1)
CHLORIDE SERPL-SCNC: 102 MMOL/L — SIGNIFICANT CHANGE UP (ref 96–108)
CO2 SERPL-SCNC: 24 MMOL/L — SIGNIFICANT CHANGE UP (ref 22–31)
CREAT SERPL-MCNC: 10.1 MG/DL — HIGH (ref 0.5–1.3)
EOSINOPHIL # BLD AUTO: 0.11 K/UL — SIGNIFICANT CHANGE UP (ref 0–0.5)
EOSINOPHIL NFR BLD AUTO: 1.8 % — SIGNIFICANT CHANGE UP (ref 0–6)
GLUCOSE SERPL-MCNC: 189 MG/DL — HIGH (ref 70–99)
HAV IGM SER-ACNC: SIGNIFICANT CHANGE UP
HBV CORE IGM SER-ACNC: SIGNIFICANT CHANGE UP
HBV SURFACE AG SER-ACNC: SIGNIFICANT CHANGE UP
HCT VFR BLD CALC: 29.2 % — LOW (ref 39–50)
HCV AB S/CO SERPL IA: 0.17 S/CO — SIGNIFICANT CHANGE UP
HCV AB SERPL-IMP: SIGNIFICANT CHANGE UP
HGB BLD-MCNC: 9.2 G/DL — LOW (ref 13–17)
IMM GRANULOCYTES NFR BLD AUTO: 0.5 % — SIGNIFICANT CHANGE UP (ref 0–1.5)
INR BLD: 1.16 RATIO — SIGNIFICANT CHANGE UP (ref 0.88–1.16)
LACTATE SERPL-SCNC: 0.8 MMOL/L — SIGNIFICANT CHANGE UP (ref 0.7–2)
LYMPHOCYTES # BLD AUTO: 0.65 K/UL — LOW (ref 1–3.3)
LYMPHOCYTES # BLD AUTO: 10.5 % — LOW (ref 13–44)
MCHC RBC-ENTMCNC: 31.3 PG — SIGNIFICANT CHANGE UP (ref 27–34)
MCHC RBC-ENTMCNC: 31.5 GM/DL — LOW (ref 32–36)
MCV RBC AUTO: 99.3 FL — SIGNIFICANT CHANGE UP (ref 80–100)
MONOCYTES # BLD AUTO: 0.66 K/UL — SIGNIFICANT CHANGE UP (ref 0–0.9)
MONOCYTES NFR BLD AUTO: 10.6 % — SIGNIFICANT CHANGE UP (ref 2–14)
NEUTROPHILS # BLD AUTO: 4.73 K/UL — SIGNIFICANT CHANGE UP (ref 1.8–7.4)
NEUTROPHILS NFR BLD AUTO: 76 % — SIGNIFICANT CHANGE UP (ref 43–77)
NRBC # BLD: 0 /100 WBCS — SIGNIFICANT CHANGE UP (ref 0–0)
PLATELET # BLD AUTO: 159 K/UL — SIGNIFICANT CHANGE UP (ref 150–400)
POTASSIUM SERPL-MCNC: 5.1 MMOL/L — SIGNIFICANT CHANGE UP (ref 3.5–5.3)
POTASSIUM SERPL-SCNC: 5.1 MMOL/L — SIGNIFICANT CHANGE UP (ref 3.5–5.3)
PROT SERPL-MCNC: 7.6 GM/DL — SIGNIFICANT CHANGE UP (ref 6–8.3)
PROTHROM AB SERPL-ACNC: 12.9 SEC — SIGNIFICANT CHANGE UP (ref 10–12.9)
RAPID RVP RESULT: DETECTED
RBC # BLD: 2.94 M/UL — LOW (ref 4.2–5.8)
RBC # FLD: 13.5 % — SIGNIFICANT CHANGE UP (ref 10.3–14.5)
RSV RNA SPEC QL NAA+PROBE: DETECTED
SODIUM SERPL-SCNC: 135 MMOL/L — SIGNIFICANT CHANGE UP (ref 135–145)
WBC # BLD: 6.22 K/UL — SIGNIFICANT CHANGE UP (ref 3.8–10.5)
WBC # FLD AUTO: 6.22 K/UL — SIGNIFICANT CHANGE UP (ref 3.8–10.5)

## 2018-12-03 PROCEDURE — 71045 X-RAY EXAM CHEST 1 VIEW: CPT | Mod: 26

## 2018-12-03 PROCEDURE — 93010 ELECTROCARDIOGRAM REPORT: CPT

## 2018-12-03 PROCEDURE — 99285 EMERGENCY DEPT VISIT HI MDM: CPT

## 2018-12-03 RX ORDER — HEPARIN SODIUM 5000 [USP'U]/ML
2000 INJECTION INTRAVENOUS; SUBCUTANEOUS
Qty: 0 | Refills: 0 | Status: DISCONTINUED | OUTPATIENT
Start: 2018-12-03 | End: 2018-12-03

## 2018-12-03 RX ORDER — AZITHROMYCIN 500 MG/1
500 TABLET, FILM COATED ORAL EVERY 24 HOURS
Qty: 0 | Refills: 0 | Status: DISCONTINUED | OUTPATIENT
Start: 2018-12-04 | End: 2018-12-03

## 2018-12-03 RX ORDER — ERYTHROPOIETIN 10000 [IU]/ML
2000 INJECTION, SOLUTION INTRAVENOUS; SUBCUTANEOUS
Qty: 0 | Refills: 0 | Status: DISCONTINUED | OUTPATIENT
Start: 2018-12-03 | End: 2018-12-03

## 2018-12-03 RX ORDER — AZITHROMYCIN 500 MG/1
TABLET, FILM COATED ORAL
Qty: 0 | Refills: 0 | Status: DISCONTINUED | OUTPATIENT
Start: 2018-12-03 | End: 2018-12-03

## 2018-12-03 RX ORDER — ERYTHROPOIETIN 10000 [IU]/ML
3000 INJECTION, SOLUTION INTRAVENOUS; SUBCUTANEOUS
Qty: 0 | Refills: 0 | Status: DISCONTINUED | OUTPATIENT
Start: 2018-12-03 | End: 2018-12-03

## 2018-12-03 RX ORDER — CEFTRIAXONE 500 MG/1
1000 INJECTION, POWDER, FOR SOLUTION INTRAMUSCULAR; INTRAVENOUS EVERY 24 HOURS
Qty: 0 | Refills: 0 | Status: DISCONTINUED | OUTPATIENT
Start: 2018-12-03 | End: 2018-12-03

## 2018-12-03 RX ORDER — SODIUM CHLORIDE 9 MG/ML
1000 INJECTION INTRAMUSCULAR; INTRAVENOUS; SUBCUTANEOUS ONCE
Qty: 0 | Refills: 0 | Status: DISCONTINUED | OUTPATIENT
Start: 2018-12-03 | End: 2018-12-03

## 2018-12-03 RX ORDER — AZITHROMYCIN 500 MG/1
500 TABLET, FILM COATED ORAL ONCE
Qty: 0 | Refills: 0 | Status: COMPLETED | OUTPATIENT
Start: 2018-12-03 | End: 2018-12-03

## 2018-12-03 RX ADMIN — AZITHROMYCIN 500 MILLIGRAM(S): 500 TABLET, FILM COATED ORAL at 13:23

## 2018-12-03 RX ADMIN — ERYTHROPOIETIN 2000 UNIT(S): 10000 INJECTION, SOLUTION INTRAVENOUS; SUBCUTANEOUS at 16:00

## 2018-12-03 RX ADMIN — ERYTHROPOIETIN 3000 UNIT(S): 10000 INJECTION, SOLUTION INTRAVENOUS; SUBCUTANEOUS at 16:00

## 2018-12-03 RX ADMIN — AZITHROMYCIN 255 MILLIGRAM(S): 500 TABLET, FILM COATED ORAL at 12:23

## 2018-12-03 RX ADMIN — CEFTRIAXONE 1000 MILLIGRAM(S): 500 INJECTION, POWDER, FOR SOLUTION INTRAMUSCULAR; INTRAVENOUS at 12:19

## 2018-12-03 RX ADMIN — HEPARIN SODIUM 2000 UNIT(S): 5000 INJECTION INTRAVENOUS; SUBCUTANEOUS at 13:50

## 2018-12-03 NOTE — ED ADULT TRIAGE NOTE - CHIEF COMPLAINT QUOTE
pt with complaints of SOB sent from [facility] hx of DM on dialysis M/W/F due today for dialysis

## 2018-12-03 NOTE — ED PROVIDER NOTE - OBJECTIVE STATEMENT
85 y/o male with a PMHx of ESRD on HD MWF, DM (type 2), HTN, CAD presents to the ED c/o intermittent SOB since last night. +cough. Pt was sent from [facility]. Denies fever. Currently in the ED, pt states that he feels normal.

## 2018-12-03 NOTE — ED ADULT NURSE REASSESSMENT NOTE - NS ED NURSE REASSESS COMMENT FT1
Received in stretcher, gown on. Alert & responsive. Oriented x4. VSS.  No distress noted. Quietly resting. Will continue to monitor

## 2018-12-03 NOTE — CONSULT NOTE ADULT - SUBJECTIVE AND OBJECTIVE BOX
Chief complaints.; Started to cough for 1-2 days.  Got worse last night and became SOB.    HPI:  85 yo man with PMHX of ESRD, CAD, DM and HTN on maintenance HD since 10/2014.  S/p Recent hospitalization for CHF.  Hospital course notable for cardiac cath leading to KAREN to LAD and D1 on 11/1.   Pt was d/jareth to [facility] for rehab with much improved fluid status. Reports feeling well until 1-2 days ago when started with cough.  Denies fever. Last night cough worsened with SOB and sent to ED.   Denies chills.  Denies any abdominal discomfort.  Due for HD today.      PMHX and PSHX.  1.ESRD  2.CAD  Post Stent (1/2016)  3.HTN  4.DM  5.Hx of Bladder CA with resection --Neobladder  6.Hx of SBO  7.Cholecystectomy in 2014  8.Hx of CHF  9.Hx of perforated Gastric cancer--post resection.  10. Post Stents to LAD and D1 on 11/1/2018      FAMILY HISTORY:  No pertinent family history in first degree relatives      SOCIAL HISTORY :  no current hx of smoking or ETOH.  Allergies    lisinopril (Other)    Intolerances    lisinopril (Diarrhea)    REVIEW OF SYSTEMS :  Cough improved.  SOB improved since in ED  Denies fever,  Denies chest discomfort  Denies chills.    MEDICATIONS  (STANDING):  azithromycin  IVPB      cefTRIAXone Injectable. 1000 milliGRAM(s) IV Push every 24 hours  epoetin norman Injectable 3000 Unit(s) IV Push <User Schedule>  epoetin norman Injectable 2000 Unit(s) IV Push <User Schedule>  heparin  Injectable 2000 Unit(s) Dialysis. <User Schedule>    MEDICATIONS  (PRN):         Vital Signs Last 24 Hrs  T(C): 36.3 (03 Dec 2018 13:39), Max: 36.4 (03 Dec 2018 13:15)  T(F): 97.4 (03 Dec 2018 13:39), Max: 97.6 (03 Dec 2018 13:15)  HR: 71 (03 Dec 2018 13:41) (69 - 80)  BP: 130/60 (03 Dec 2018 13:41) (130/60 - 172/51)  BP(mean): --  RR: 20 (03 Dec 2018 13:41) (16 - 22)  SpO2: 97% (03 Dec 2018 13:39) (91% - 100%)  Daily     Daily   I&O's Summary      PHYSICAL EXAM:  Alert and appropriate  GEN: No distress  HEENT: WNL  NECK : supple  CV: S1S2 RRR  LUNGS: scattered rhonchi  ABD: soft  EXT: 1+ edema    LABS:                        9.2    6.22  )-----------( 159      ( 03 Dec 2018 09:31 )             29.2     12-03    135  |  102  |  87<H>  ----------------------------<  189<H>  5.1   |  24  |  10.10<H>    Ca    9.0      03 Dec 2018 09:31    TPro  7.6  /  Alb  3.4  /  TBili  0.6  /  DBili  x   /  AST  15  /  ALT  20  /  AlkPhos  73  12-03    PT/INR - ( 03 Dec 2018 09:31 )   PT: 12.9 sec;   INR: 1.16 ratio         PTT - ( 03 Dec 2018 09:31 )  PTT:33.9 sec      < from: Xray Chest 1 View-PORTABLE IMMEDIATE (12.03.18 @ 10:27) >  EXAM:  XR CHEST PORTABLE IMMED 1V                            PROCEDURE DATE:  12/03/2018          INTERPRETATION:  Clinical information: Sepsis    Portable upright chest x-ray from 0928 hours    COMPARISON: November 03, 2018    FINDINGS: The cardiac and mediastinal contours are not well evaluated due   to technique. There is medial right lung base opacity; unsure if this   represents crowded vessels versus lung disease.    IMPRESSION: Right lower lobe opacity could be on the basis of crowding of   vasculature versus atelectasis or pneumonia.                JAMIA MEADOWS   This document has been electronically signed. Dec  3 2018 11:22AM        < end of copied text >

## 2018-12-03 NOTE — CONSULT NOTE ADULT - ASSESSMENT
87 yo man with ESRD post recent hospitalization due to CHF.  --ESRD : HD today.   Order written and consent obtained. Tx per home unit RX.  --PNA on cxr ; continue abtx.  --Hx of CHF: noted with LE edema again : aim for 3 kg UF.

## 2018-12-03 NOTE — ED ADULT NURSE NOTE - OBJECTIVE STATEMENT
C/O SOB since last night. Per patient, difficulty breathing started last night. Denies CP. Hx of DM, ESRD with dialysis M,W,F, AV fistula to right arm, pulmonary edema, CHF, HTN, DM. As per wife at bedside, patient has been coughing x a couple of weeks. Patient placed on 3 liter NC, due to low O2 at RA 90-91%

## 2018-12-03 NOTE — ED ADULT NURSE NOTE - CHIEF COMPLAINT QUOTE
pt with complaints of SOB sent from [facility] hx of DM on dialysis M/W/F due today for dialysis

## 2018-12-03 NOTE — ED PROVIDER NOTE - PROGRESS NOTE DETAILS
Scribe AS for Dr. Jenkins: Spoke with Dr. Juarez, will give abx and dialyze here and send back to [facility] when done. Nayan FOLEY: Received s/o from Dr. Jenkins- cleared by pmd at [facility] to go back at this time.

## 2018-12-03 NOTE — ED ADULT NURSE NOTE - NSIMPLEMENTINTERV_GEN_ALL_ED
Implemented All Fall with Harm Risk Interventions:  [place] to call system. Call bell, personal items and telephone within reach. Instruct patient to call for assistance. Room bathroom lighting operational. Non-slip footwear when patient is off stretcher. Physically safe environment: no spills, clutter or unnecessary equipment. Stretcher in lowest position, wheels locked, appropriate side rails in place. Provide visual cue, wrist band, yellow gown, etc. Monitor gait and stability. Monitor for mental status changes and reorient to person, place, and time. Review medications for side effects contributing to fall risk. Reinforce activity limits and safety measures with patient and family. Provide visual clues: red socks.

## 2018-12-08 LAB
CULTURE RESULTS: SIGNIFICANT CHANGE UP
CULTURE RESULTS: SIGNIFICANT CHANGE UP
SPECIMEN SOURCE: SIGNIFICANT CHANGE UP
SPECIMEN SOURCE: SIGNIFICANT CHANGE UP

## 2018-12-22 NOTE — ED ADULT NURSE NOTE - NS ED NOTE ABUSE RESPONSE YN
History     Chief Complaint   Patient presents with     Fever     Cough     HPI    History obtained from parents    Karine is a 12 month old female who presents at  7:52 AM with fever, cough and decreased energy for 2 days. Tmax 101.6  Parents concerned because pt was unable to sleep well last night due to fevers.  Also has had a few episodes of posttussive emesis.  Recent URI symptoms in other family members including father.      PMHx:  History reviewed. No pertinent past medical history.  History reviewed. No pertinent surgical history.  These were reviewed with the patient/family.    MEDICATIONS were reviewed and are as follows:   No current facility-administered medications for this encounter.      Current Outpatient Medications   Medication     ibuprofen (ADVIL/MOTRIN) 100 MG/5ML suspension     diphenhydrAMINE (BENADRYL CHILDRENS ALLERGY) 12.5 MG/5ML liquid     sodium chloride (OCEAN) 0.65 % nasal spray     ALLERGIES:  Milk digestant [lactase]    IMMUNIZATIONS:  UTD by report.    SOCIAL HISTORY: Karine lives with parents.  She does not attend .      I have reviewed the Medications, Allergies, Past Medical and Surgical History, and Social History in the Epic system.    Review of Systems  Please see HPI for pertinent positives and negatives.  All other systems reviewed and found to be negative.        Physical Exam   Heart Rate: 120  Temp: 97.3  F (36.3  C)  Resp: 24  Weight: 12.8 kg (28 lb 3.5 oz)  SpO2: 98 %    Physical Exam   Appearance: Alert and appropriate, well developed, nontoxic, with moist mucous membranes.  HEENT: Head: Normocephalic and atraumatic. Eyes: PERRL, EOM grossly intact, conjunctivae and sclerae clear. Ears: Tympanic membranes clear bilaterally, without inflammation or effusion. Nose: Nares clear with no active discharge.  Mouth/Throat: mild OP erythema without exudates, symmetric non swollen tonsils.  Neck: Supple, no masses, no meningismus. No significant cervical  lymphadenopathy.  Pulmonary: No grunting, flaring, retractions or stridor. Good air entry, clear to auscultation bilaterally, with no rales, rhonchi, or wheezing.  Cardiovascular: Regular rate and rhythm, normal S1 and S2, with no murmurs.  Warm and well perfused  Abdominal: soft, nontender, nondistended, with no masses.  Neurologic: Alert and oriented, cranial nerves II-XII grossly intact, moving all extremities equally with grossly normal coordination  Extremities/Back: No deformity.  Skin: No significant rashes, ecchymoses, or lacerations.  Genitourinary: normal external female , no diaper rash  Rectal: Deferred    ED Course      Procedures    No results found for this or any previous visit (from the past 24 hour(s)).    Medications   ondansetron (ZOFRAN-ODT) ODT half-tab 2 mg (2 mg Oral Given 12/22/18 0802)       History obtained from family.    Critical care time:  none       Assessments & Plan (with Medical Decision Making)   Pt with symptoms and history to support likely viral upper respiratory infection.  No signs of respiratory distress.  Unlikely bacterial pneumonia.  No signs of otitis media, mastoiditis, meningitis or septic shock.  Patient is not dehydrated.  Stable for discharge home with supportive care.  Recommend f/u with PCP if symptoms not improving in next few days.  Reviewed ED return precautions.      I have reviewed the nursing notes.    I have reviewed the findings, diagnosis, plan and need for follow up with the patient.     Medication List      Started    ibuprofen 100 MG/5ML suspension  Commonly known as:  ADVIL/MOTRIN  10 mg/kg, Oral, EVERY 6 HOURS PRN            Final diagnoses:   Viral URI with cough       12/22/2018   [facility] EMERGENCY DEPARTMENT     Norah Rodriguez MD  12/22/18 4591     Yes

## 2020-03-10 NOTE — ED ADULT NURSE NOTE - NS ED NURSE RECORD ANOTHER HT AND WT
Prevention Guidelines, Women Ages 18 to 39  Screening tests and vaccines are an important part of managing your health. Health counseling is essential, too. Below are guidelines for these, for women ages 18 to 39. Talk with your healthcare provider to make sure youre up-to-date on what you need.  Screening Who needs it How often   Alcohol misuse All women in this age group At routine exams   Blood pressure All women in this age group Every 2 years if your blood pressure is less than 120/80 mm Hg; yearly if your systolic blood pressure is 120 to 139 mm Hg, or your diastolic blood pressure reading is 80 to 89 mm Hg   Breast cancer All women in this age group should talk with their healthcare providers about the need for clinical breast exams (CBE)1 Clinical breast exam every 3 years1   Cervical cancer Women ages 21 and older Women between ages 21 and 29 should have a Pap test every 3 years; women between ages 30 and 65 are advised to have a Pap test plus an HPV test every 5 years   Chlamydia Sexually active women ages 24 and younger, and women at increased risk for infection Every 3 years if you're at risk or have symptoms   Depression All women in this age group At routine exams   Diabetes mellitus, type 2 Adults with no symptoms who are overweight or obese and have 1 or more other risk factors for diabetes At least every 3 years   Gonorrhea Sexually active women at increased risk for infection At routine exams   Hepatitis C Anyone at increased risk At routine exams   HIV All women At routine exams   Obesity All women in this age group At routine exams   Syphilis Women at increased risk for infection - talk with your healthcare provider At routine exams   Tuberculosis Women at increased risk for infection - talk with your healthcare provider Ask your healthcare provider   Vision All women in this age group At least 1 complete exam in your 20s, and 2 in your 30s   Vaccine2 Who needs it How often   Chickenpox  (varicella) All women in this age group who have no record of this infection or vaccine 2 doses; the second dose should be given 4 to 8 weeks after the first dose   Hepatitis A Women at increased risk for infection - talk with your healthcare provider 2 doses given at least 6 months apart   Hepatitis B Women at increased risk for infection - talk with your healthcare provider 3 doses over 6 months; second dose should be given 1 month after the first dose; the third dose should be given at least 2 months after the second dose and at least 4 months after the first dose   Haemophilus influenzae Type B (HIB) Women at increased risk for infection - talk with your healthcare provider 1 to 3 doses   Human papillomavirus (HPV) All women in this age group up to age 26 3 doses; the second dose should be given 1 to 2 months after the first dose and the third dose given 6 months after the first dose   Influenza (flu) All women in this age group Once a year   Measles, mumps, rubella (MMR) All women in this age group who have no record of these infections or vaccines 1 or 2 doses   Meningococcal Women at increased risk for infection - talk with your healthcare provider 1 or more doses   Pneumococcal conjugate vaccine (PCV13) and pneumococcal polysaccharide vaccine (PPSV23) Women at increased risk for infection - talk with your healthcare provider PCV13: 1 dose ages 19 to 65 (protects against 13 types of pneumococcal bacteria)  PPSV23: 1 to 2 doses through age 64, or 1 dose at 65 or older (protects against 23 types of pneumococcal bacteria)      Tetanus/diphtheria/pertussis (Td/Tdap) booster All women in this age group Td every 10 years, or a one-time dose of Tdap instead of a Td booster after age 18, then Td every 10 years   Counseling Who needs it How often   BRCA gene mutation testing for breast and ovarian cancer susceptibility Women with increased risk for having gene mutation When your risk is known   Breast cancer and  chemoprevention Women at high risk for breast cancer When your risk is known   Diet and exercise Women who are overweight or obese When diagnosed, and then at routine exams   Domestic violence Women at the age in which they are able to have children At routine exams   Sexually transmitted infection prevention Women who are sexually active At routine exams   Skin cancer Prevention of skin cancer in fair-skinned adults through age 24 At routine exams   Use of tobacco and the health effects it can cause All women in this age group Every visit   1According to the ACS, women ages 20 to 39 years should have a clinical breast exam (CBE) as part of their routine health exam every 3 years. Breast self-exams are an option for women starting in their 20s. But the  USPSTF does not recommend CBE.  2Those who are 18 years old and not up-to-date on their childhood vaccines should get all appropriate catch-up vaccines recommended by the CDC.  Date Last Reviewed: 8/27/2015  © 0200-2619 [employer]. [address] [place] [address], [place] [address]. All rights reserved. This information is not intended as a substitute for professional medical care. Always follow your healthcare professional's instructions.         Yes

## 2020-08-18 NOTE — ED ADULT NURSE NOTE - CARDIO WDL
[facility]  Sedation/Analgesia History & Physical    Patient: Jeremie Reynoso : 1965  Med Rec#: [id number] Acc#: [id number]   Provider Performing Procedure: Sheyla Robles  Primary Care Physician: Alva Woodson MD    PRE-PROCEDURE   Full CODE [x]Yes  DNR-CCA/DNR-CC []Yes   Brief History/Pre-Procedure Diagnosis:polyp          MEDICAL HISTORY  []CAD/Valve  []Liver Disease  []Lung Disease []Diabetes  []Hypertension []Renal Disease  []Additional information:       has a past medical history of Arthritis, COPD (chronic obstructive pulmonary disease) ([address] Hepatitis, Recovering alcoholic ([address] and Scoliosis. SURGICAL HISTORY   has a past surgical history that includes Dental surgery; Hand replantation; cervical fusion (N/A, 2020); and Tonsillectomy. Additional information:       ALLERGIES   Allergies as of 2020    (No Known Allergies)     Additional information:       MEDICATIONS   Coumadin Use Last 7 Days [x]No []Yes  Antiplatelet drug therapy use last 7 days  [x]No []Yes  Other anticoagulant use last 7 days  [x]No []Yes    Current Facility-Administered Medications:     0.45 % sodium chloride infusion, , Intravenous, Continuous, Sheyla Robles MD, Last Rate: 75 mL/hr at 20 1302  Prior to Admission medications    Medication Sig Start Date End Date Taking?  Authorizing Provider   Cyanocobalamin (VITAMIN B 12) 500 MCG TABS Take 1 tablet by mouth daily   Yes Historical Provider, MD   Omega-3 Fatty Acids (FISH OIL) 1200 MG CPDR Take by mouth daily Omega 3   Yes Historical Provider, MD   acetaminophen (TYLENOL) 500 MG tablet Take 1,000 mg by mouth daily   Yes Historical Provider, MD     Additional information:       PHYSICAL:   Heart:  [x]Regular rate and rhythm  []Other:    Lungs:  [x]Clear    []Other:    Abdomen: [x]Soft    []Other:    Mental Status: [x]Alert & Oriented  []Other:      VITAL SIGNS   Patient Vitals for the past 24 hrs:   BP Temp Temp src Pulse Resp SpO2 Height Weight   08/18/20 1245 136/89 97.7 °F (36.5 °C) Temporal 83 18 97 % 5' 8\" (1.727 m) 156 lb 9.6 oz (71 kg)       PLANNED PROCEDURE  []EGD  [x]Colonoscopy []Flex Sigmoid  []ERCP []EUS   []Cystoscopy  [] CATH [] BRONCH   Consent: I have discussed with the patient and/or the patient representative the indication, alternatives, and the possible risks and/or complications of the planned procedure and the anesthesia methods. The patient and/or patient representative appear to understand and agree to proceed. SEDATION  Planned agent:[]Midazolam []Meperidine []Sublimaze []Morphine  []Diazepam [x]Propofol  []Other:     ASA Classification: Class 2 - A normal healthy patient with mild systemic disease    Airway Assessment: normal    Monitoring and Safety: The patient will be placed on a cardiac monitor and vital signs, pulse oximetry and level of consciousness will be continuously evaluated throughout the procedure. The patient will be closely monitored until recovery from the medications is complete and the patient has returned to baseline status. Respiratory therapy will be on standby during the procedure. [x]Pre-procedure diagnostic studies complete and results available. Comment:    [x]Previous sedation/anesthesia experiences assessed. Comment:    [x]The patient is an appropriate candidate to undergo the planned procedure sedation and anesthesia. (Refer to nursing sedation/analgesia documentation record)  [x]Formulation and discussion of sedation/procedure plan, risks, and expectations with patient and/or responsible adult completed. [x]Patient examined immediately prior to the procedure.  (Refer to nursing sedation/analgesia documentation record)    Judge Finn MD   Electronically signed 8/18/2020 at 1:25 PM Normal rate, regular rhythm, normal S1, S2 heart sounds heard.

## 2020-08-28 ENCOUNTER — INPATIENT (INPATIENT)
Facility: HOSPITAL | Age: 85
LOS: 2 days | Discharge: HOME | DRG: 391 | End: 2020-08-31
Attending: INTERNAL MEDICINE | Admitting: FAMILY MEDICINE
Payer: MEDICARE

## 2020-08-28 VITALS — WEIGHT: 149.91 LBS | HEIGHT: 66 IN

## 2020-08-28 DIAGNOSIS — Z98.89 OTHER SPECIFIED POSTPROCEDURAL STATES: Chronic | ICD-10-CM

## 2020-08-28 DIAGNOSIS — I77.0 ARTERIOVENOUS FISTULA, ACQUIRED: Chronic | ICD-10-CM

## 2020-08-28 DIAGNOSIS — R19.7 DIARRHEA, UNSPECIFIED: ICD-10-CM

## 2020-08-28 DIAGNOSIS — Z85.51 PERSONAL HISTORY OF MALIGNANT NEOPLASM OF BLADDER: Chronic | ICD-10-CM

## 2020-08-28 LAB
ALBUMIN SERPL ELPH-MCNC: 3.5 G/DL — SIGNIFICANT CHANGE UP (ref 3.3–5)
ALP SERPL-CCNC: 63 U/L — SIGNIFICANT CHANGE UP (ref 40–120)
ALT FLD-CCNC: 15 U/L — SIGNIFICANT CHANGE UP (ref 12–78)
ANION GAP SERPL CALC-SCNC: 9 MMOL/L — SIGNIFICANT CHANGE UP (ref 5–17)
APTT BLD: 32.5 SEC — SIGNIFICANT CHANGE UP (ref 27.5–35.5)
AST SERPL-CCNC: 11 U/L — LOW (ref 15–37)
BASOPHILS # BLD AUTO: 0.02 K/UL — SIGNIFICANT CHANGE UP (ref 0–0.2)
BASOPHILS NFR BLD AUTO: 0.2 % — SIGNIFICANT CHANGE UP (ref 0–2)
BILIRUB SERPL-MCNC: 0.7 MG/DL — SIGNIFICANT CHANGE UP (ref 0.2–1.2)
BUN SERPL-MCNC: 23 MG/DL — SIGNIFICANT CHANGE UP (ref 7–23)
CALCIUM SERPL-MCNC: 9.1 MG/DL — SIGNIFICANT CHANGE UP (ref 8.5–10.1)
CHLORIDE SERPL-SCNC: 107 MMOL/L — SIGNIFICANT CHANGE UP (ref 96–108)
CO2 SERPL-SCNC: 24 MMOL/L — SIGNIFICANT CHANGE UP (ref 22–31)
CREAT SERPL-MCNC: 4.73 MG/DL — HIGH (ref 0.5–1.3)
EOSINOPHIL # BLD AUTO: 0.04 K/UL — SIGNIFICANT CHANGE UP (ref 0–0.5)
EOSINOPHIL NFR BLD AUTO: 0.5 % — SIGNIFICANT CHANGE UP (ref 0–6)
GLUCOSE SERPL-MCNC: 191 MG/DL — HIGH (ref 70–99)
HCT VFR BLD CALC: 40.2 % — SIGNIFICANT CHANGE UP (ref 39–50)
HGB BLD-MCNC: 12.8 G/DL — LOW (ref 13–17)
IMM GRANULOCYTES NFR BLD AUTO: 0.3 % — SIGNIFICANT CHANGE UP (ref 0–1.5)
INR BLD: 1.07 RATIO — SIGNIFICANT CHANGE UP (ref 0.88–1.16)
LACTATE SERPL-SCNC: 2.6 MMOL/L — HIGH (ref 0.7–2)
LIDOCAIN IGE QN: 83 U/L — SIGNIFICANT CHANGE UP (ref 73–393)
LYMPHOCYTES # BLD AUTO: 0.31 K/UL — LOW (ref 1–3.3)
LYMPHOCYTES # BLD AUTO: 3.5 % — LOW (ref 13–44)
MCHC RBC-ENTMCNC: 31.7 PG — SIGNIFICANT CHANGE UP (ref 27–34)
MCHC RBC-ENTMCNC: 31.8 GM/DL — LOW (ref 32–36)
MCV RBC AUTO: 99.5 FL — SIGNIFICANT CHANGE UP (ref 80–100)
MONOCYTES # BLD AUTO: 0.93 K/UL — HIGH (ref 0–0.9)
MONOCYTES NFR BLD AUTO: 10.5 % — SIGNIFICANT CHANGE UP (ref 2–14)
NEUTROPHILS # BLD AUTO: 7.53 K/UL — HIGH (ref 1.8–7.4)
NEUTROPHILS NFR BLD AUTO: 85 % — HIGH (ref 43–77)
PLATELET # BLD AUTO: 155 K/UL — SIGNIFICANT CHANGE UP (ref 150–400)
POTASSIUM SERPL-MCNC: 4.7 MMOL/L — SIGNIFICANT CHANGE UP (ref 3.5–5.3)
POTASSIUM SERPL-SCNC: 4.7 MMOL/L — SIGNIFICANT CHANGE UP (ref 3.5–5.3)
PROT SERPL-MCNC: 7.2 GM/DL — SIGNIFICANT CHANGE UP (ref 6–8.3)
PROTHROM AB SERPL-ACNC: 12.5 SEC — SIGNIFICANT CHANGE UP (ref 10.6–13.6)
RBC # BLD: 4.04 M/UL — LOW (ref 4.2–5.8)
RBC # FLD: 14.8 % — HIGH (ref 10.3–14.5)
SARS-COV-2 RNA SPEC QL NAA+PROBE: SIGNIFICANT CHANGE UP
SODIUM SERPL-SCNC: 140 MMOL/L — SIGNIFICANT CHANGE UP (ref 135–145)
WBC # BLD: 8.86 K/UL — SIGNIFICANT CHANGE UP (ref 3.8–10.5)
WBC # FLD AUTO: 8.86 K/UL — SIGNIFICANT CHANGE UP (ref 3.8–10.5)

## 2020-08-28 PROCEDURE — 80074 ACUTE HEPATITIS PANEL: CPT

## 2020-08-28 PROCEDURE — 82962 GLUCOSE BLOOD TEST: CPT

## 2020-08-28 PROCEDURE — 99223 1ST HOSP IP/OBS HIGH 75: CPT

## 2020-08-28 PROCEDURE — 84100 ASSAY OF PHOSPHORUS: CPT

## 2020-08-28 PROCEDURE — 80069 RENAL FUNCTION PANEL: CPT

## 2020-08-28 PROCEDURE — 87493 C DIFF AMPLIFIED PROBE: CPT

## 2020-08-28 PROCEDURE — 85025 COMPLETE CBC W/AUTO DIFF WBC: CPT

## 2020-08-28 PROCEDURE — 97116 GAIT TRAINING THERAPY: CPT | Mod: GP

## 2020-08-28 PROCEDURE — 90935 HEMODIALYSIS ONE EVALUATION: CPT

## 2020-08-28 PROCEDURE — 87507 IADNA-DNA/RNA PROBE TQ 12-25: CPT

## 2020-08-28 PROCEDURE — 83036 HEMOGLOBIN GLYCOSYLATED A1C: CPT

## 2020-08-28 PROCEDURE — 36415 COLL VENOUS BLD VENIPUNCTURE: CPT

## 2020-08-28 PROCEDURE — 83735 ASSAY OF MAGNESIUM: CPT

## 2020-08-28 PROCEDURE — 85027 COMPLETE CBC AUTOMATED: CPT

## 2020-08-28 PROCEDURE — 93010 ELECTROCARDIOGRAM REPORT: CPT | Mod: 76

## 2020-08-28 PROCEDURE — 74177 CT ABD & PELVIS W/CONTRAST: CPT | Mod: 26

## 2020-08-28 PROCEDURE — 97162 PT EVAL MOD COMPLEX 30 MIN: CPT | Mod: GP

## 2020-08-28 PROCEDURE — 80053 COMPREHEN METABOLIC PANEL: CPT

## 2020-08-28 PROCEDURE — 71045 X-RAY EXAM CHEST 1 VIEW: CPT | Mod: 26

## 2020-08-28 PROCEDURE — 86769 SARS-COV-2 COVID-19 ANTIBODY: CPT

## 2020-08-28 PROCEDURE — 83605 ASSAY OF LACTIC ACID: CPT

## 2020-08-28 RX ORDER — VANCOMYCIN HCL 1 G
1000 VIAL (EA) INTRAVENOUS ONCE
Refills: 0 | Status: COMPLETED | OUTPATIENT
Start: 2020-08-28 | End: 2020-08-28

## 2020-08-28 RX ORDER — PIPERACILLIN AND TAZOBACTAM 4; .5 G/20ML; G/20ML
3.38 INJECTION, POWDER, LYOPHILIZED, FOR SOLUTION INTRAVENOUS ONCE
Refills: 0 | Status: COMPLETED | OUTPATIENT
Start: 2020-08-28 | End: 2020-08-28

## 2020-08-28 RX ORDER — SODIUM CHLORIDE 9 MG/ML
2100 INJECTION INTRAMUSCULAR; INTRAVENOUS; SUBCUTANEOUS ONCE
Refills: 0 | Status: DISCONTINUED | OUTPATIENT
Start: 2020-08-28 | End: 2020-08-28

## 2020-08-28 RX ORDER — ONDANSETRON 8 MG/1
4 TABLET, FILM COATED ORAL ONCE
Refills: 0 | Status: COMPLETED | OUTPATIENT
Start: 2020-08-28 | End: 2020-08-28

## 2020-08-28 RX ORDER — SODIUM CHLORIDE 9 MG/ML
500 INJECTION INTRAMUSCULAR; INTRAVENOUS; SUBCUTANEOUS ONCE
Refills: 0 | Status: COMPLETED | OUTPATIENT
Start: 2020-08-28 | End: 2020-08-28

## 2020-08-28 RX ORDER — ACETAMINOPHEN 500 MG
650 TABLET ORAL ONCE
Refills: 0 | Status: COMPLETED | OUTPATIENT
Start: 2020-08-28 | End: 2020-08-28

## 2020-08-28 RX ADMIN — Medication 650 MILLIGRAM(S): at 16:32

## 2020-08-28 RX ADMIN — Medication 650 MILLIGRAM(S): at 15:57

## 2020-08-28 RX ADMIN — Medication 250 MILLIGRAM(S): at 16:32

## 2020-08-28 RX ADMIN — SODIUM CHLORIDE 500 MILLILITER(S): 9 INJECTION INTRAMUSCULAR; INTRAVENOUS; SUBCUTANEOUS at 15:56

## 2020-08-28 RX ADMIN — PIPERACILLIN AND TAZOBACTAM 3.38 GRAM(S): 4; .5 INJECTION, POWDER, LYOPHILIZED, FOR SOLUTION INTRAVENOUS at 16:32

## 2020-08-28 RX ADMIN — PIPERACILLIN AND TAZOBACTAM 200 GRAM(S): 4; .5 INJECTION, POWDER, LYOPHILIZED, FOR SOLUTION INTRAVENOUS at 15:56

## 2020-08-28 RX ADMIN — ONDANSETRON 4 MILLIGRAM(S): 8 TABLET, FILM COATED ORAL at 15:57

## 2020-08-28 NOTE — CONSULT NOTE ADULT - SUBJECTIVE AND OBJECTIVE BOX
NEPHROLOGY CONSULT  HPI:  87 yo m esrd on HD [facility] by wife for diarrhea  Found to have temp and low O2 sat in ED  Pt had episode of diarrhea during dialysis earlier today, but was stable and was taken home by his wife  In the car he had diarrhea and was brought to ED  See in emergency room, feels well no acute distress O2 sat 96% on 5 L O2 NC      PAST MEDICAL & SURGICAL HISTORY:  ESRD on dialysis  HTN (hypertension)  CKD (chronic kidney disease), stage 4 (severe)  Bladder cancer  No significant past surgical history  History of exploratory laparotomy: peritonitis  History of appendectomy  AV fistula: right (never used)  History of bladder cancer: s/p resection with neobladder      FAMILY HISTORY:  No pertinent family history in first degree relatives      MEDICATIONS  (STANDING):    MEDICATIONS  (PRN):      Allergies    lisinopril (Other)    Intolerances    lisinopril (Diarrhea)      I&O's Summary        REVIEW OF SYSTEMS:    CONSTITUTIONAL:  As per HPI.  CONSTITUTIONAL: No weakness, fevers or chills  EYES/ENT: No visual changes;  No vertigo or throat pain   NECK: No pain or stiffness  CARDIOVASCULAR: No chest pain or palpitations  GASTROINTESTINAL: No abdominal or epigastric pain. No nausea, vomiting, or hematemesis; No diarrhea or constipation. No melena or hematochezia.  GENITOURINARY: No dysuria, frequency or hematuria  NEUROLOGICAL: No numbness or weakness  SKIN: No itching, burning, rashes, or lesions   All other review of systems is negative unless indicated above      Vital Signs Last 24 Hrs  T(C): 36.6 (28 Aug 2020 21:45), Max: 38.3 (28 Aug 2020 15:19)  T(F): 97.9 (28 Aug 2020 21:45), Max: 101 (28 Aug 2020 15:19)  HR: 72 (28 Aug 2020 21:45) (63 - 87)  BP: 123/50 (28 Aug 2020 21:45) (99/39 - 123/50)  BP(mean): 58 (28 Aug 2020 19:00) (52 - 58)  RR: 18 (28 Aug 2020 21:45) (16 - 22)  SpO2: 100% (28 Aug 2020 21:45) (92% - 100%)  Daily Height in cm: 167.64 (28 Aug 2020 14:57)    Daily   I&O's Summary      PHYSICAL EXAM:    General:  Alert, well-developed ,No acute distress.    Neuro:  Alert and oriented to person, place, and time. Able to communicate  well. Cranial nerves 2-12 grossly intact. 5/5 strength in all  extremities bilaterally. Sensation intact in all extremities.  Appropriate affect.     HEENT:  No JVD, no masses, Eyes anicteric, No carotid bruits.No lymphadenopathy,    Cardiovascular:  Regular rate and rhythm, with normal S1 and S2. No murmurs, rubs,  or gallops. No JVD.     Lungs:  clear. no rales, no wheezing, .    Abdomen:  Normoactive bowel sounds. Soft, flat, non-tender, and non-distended.  No hepatosplenomegaly, positive bowel sounds    Skin:  Warm, dry, well-perfused. No rashes or other lesions.     Extremities:  2+ pulses in upper and lower extremities. No lower extremity pain or  edema; legs are symmetric in appearance.    LABS:                        12.8   8.86  )-----------( 155      ( 28 Aug 2020 15:40 )             40.2     08-28    140  |  107  |  23  ----------------------------<  191<H>  4.7   |  24  |  4.73<H>    Ca    9.1      28 Aug 2020 15:40    TPro  7.2  /  Alb  3.5  /  TBili  0.7  /  DBili  x   /  AST  11<L>  /  ALT  15  /  AlkPhos  63  08-28    PT/INR - ( 28 Aug 2020 15:40 )   PT: 12.5 sec;   INR: 1.07 ratio         PTT - ( 28 Aug 2020 15:40 )  PTT:32.5 sec

## 2020-08-28 NOTE — ED PROVIDER NOTE - CLINICAL SUMMARY MEDICAL DECISION MAKING FREE TEXT BOX
89 y/o male with v/d, intermittent abd pain and fever will do sepsis workup ct abd pelvis, abx, note to be hypoxic at 80% on room air.

## 2020-08-28 NOTE — ED PROVIDER NOTE - PROGRESS NOTE DETAILS
Scribe Jaclyn Casale for attending Dr Cano: will limit to fluid to 500cc due to pt being end stage renal disease on dialysis. I Jairon Jones attest that this documentation has been prepared under the direction and in the presence of Doctor Cano. Raghu Jones for attending Dr. Caon: Spoke with pt nephrologist Dr. Clancy, will dialyze tomorrow after CT with IV contrast. Will call CT tech to let them know.

## 2020-08-28 NOTE — ED ADULT NURSE NOTE - OBJECTIVE STATEMENT
c/o nausea, diarrhea after dialysis today, also c/o generalized weakness. Pt having difficulty ambulating as usual, found to be febrile and hypoxic 88%RA, improved to 98% with 4L via NC

## 2020-08-28 NOTE — ED PROVIDER NOTE - NS_ ATTENDINGSCRIBEDETAILS _ED_A_ED_FT
Taya Cano MD: The history, relevant review of systems, past medical and surgical history, medical decision making, and physical examination was documented by the scribe in my presence and I attest to the accuracy of the documentation.

## 2020-08-28 NOTE — ED ADULT NURSE NOTE - NSIMPLEMENTINTERV_GEN_ALL_ED
Implemented All Fall with Harm Risk Interventions:  [place] to call system. Call bell, personal items and telephone within reach. Instruct patient to call for assistance. Room bathroom lighting operational. Non-slip footwear when patient is off stretcher. Physically safe environment: no spills, clutter or unnecessary equipment. Stretcher in lowest position, wheels locked, appropriate side rails in place. Provide visual cue, wrist band, yellow gown, etc. Monitor gait and stability. Monitor for mental status changes and reorient to person, place, and time. Review medications for side effects contributing to fall risk. Reinforce activity limits and safety measures with patient and family. Provide visual clues: red socks.

## 2020-08-28 NOTE — CONSULT NOTE ADULT - ASSESSMENT
89 yo m esrd on HD geovanna by wife for diarrhea  Found to have temp and low O2 sat in ED  Pt had episode of diarrhea during dialysis earlier today, but was stable and was taken home by his wife  In the car he had diarrhea and was brought to ED  See in emergency room, feels well no acute distress O2 sat 96% on 5 L O2 NC  Pt has CT abd w IV contrast, did not show any pathology  Will be dialyzed in AM  follow labs

## 2020-08-28 NOTE — ED PROVIDER NOTE - OBJECTIVE STATEMENT
89 y/o male with pmhx of ESRD on dialysis on MWF last HD today, HTN, CKD, bladder CA, CHF, end stage renal disease presents to the ED c/o diarrhea. 2 day hx of loose stool and abd pain, improves after BM. states he's been vomiting 5 times in one day. today felt very weak, unable to ambulate dispute the use of his walker. febrile here in ED. no sick contact, no recent travel, no recent abx use or recent ED admissions.

## 2020-08-29 DIAGNOSIS — Z98.890 OTHER SPECIFIED POSTPROCEDURAL STATES: Chronic | ICD-10-CM

## 2020-08-29 DIAGNOSIS — Z90.49 ACQUIRED ABSENCE OF OTHER SPECIFIED PARTS OF DIGESTIVE TRACT: Chronic | ICD-10-CM

## 2020-08-29 LAB
A1C WITH ESTIMATED AVERAGE GLUCOSE RESULT: 7.5 % — HIGH (ref 4–5.6)
ALBUMIN SERPL ELPH-MCNC: 3.2 G/DL — LOW (ref 3.3–5)
ALP SERPL-CCNC: 55 U/L — SIGNIFICANT CHANGE UP (ref 40–120)
ALT FLD-CCNC: 13 U/L — SIGNIFICANT CHANGE UP (ref 12–78)
ANION GAP SERPL CALC-SCNC: 8 MMOL/L — SIGNIFICANT CHANGE UP (ref 5–17)
AST SERPL-CCNC: 7 U/L — LOW (ref 15–37)
BASOPHILS # BLD AUTO: 0.03 K/UL — SIGNIFICANT CHANGE UP (ref 0–0.2)
BASOPHILS NFR BLD AUTO: 0.4 % — SIGNIFICANT CHANGE UP (ref 0–2)
BILIRUB SERPL-MCNC: 0.5 MG/DL — SIGNIFICANT CHANGE UP (ref 0.2–1.2)
BUN SERPL-MCNC: 30 MG/DL — HIGH (ref 7–23)
C DIFF BY PCR RESULT: SIGNIFICANT CHANGE UP
C DIFF TOX GENS STL QL NAA+PROBE: SIGNIFICANT CHANGE UP
CALCIUM SERPL-MCNC: 8.7 MG/DL — SIGNIFICANT CHANGE UP (ref 8.5–10.1)
CHLORIDE SERPL-SCNC: 106 MMOL/L — SIGNIFICANT CHANGE UP (ref 96–108)
CO2 SERPL-SCNC: 25 MMOL/L — SIGNIFICANT CHANGE UP (ref 22–31)
CREAT SERPL-MCNC: 6.28 MG/DL — HIGH (ref 0.5–1.3)
CULTURE RESULTS: SIGNIFICANT CHANGE UP
EOSINOPHIL # BLD AUTO: 0.25 K/UL — SIGNIFICANT CHANGE UP (ref 0–0.5)
EOSINOPHIL NFR BLD AUTO: 3 % — SIGNIFICANT CHANGE UP (ref 0–6)
ESTIMATED AVERAGE GLUCOSE: 169 MG/DL — HIGH (ref 68–114)
GLUCOSE SERPL-MCNC: 91 MG/DL — SIGNIFICANT CHANGE UP (ref 70–99)
HCT VFR BLD CALC: 39.7 % — SIGNIFICANT CHANGE UP (ref 39–50)
HGB BLD-MCNC: 11.8 G/DL — LOW (ref 13–17)
IMM GRANULOCYTES NFR BLD AUTO: 0.2 % — SIGNIFICANT CHANGE UP (ref 0–1.5)
LYMPHOCYTES # BLD AUTO: 0.77 K/UL — LOW (ref 1–3.3)
LYMPHOCYTES # BLD AUTO: 9.3 % — LOW (ref 13–44)
MAGNESIUM SERPL-MCNC: 2.2 MG/DL — SIGNIFICANT CHANGE UP (ref 1.6–2.6)
MCHC RBC-ENTMCNC: 29.7 GM/DL — LOW (ref 32–36)
MCHC RBC-ENTMCNC: 30.8 PG — SIGNIFICANT CHANGE UP (ref 27–34)
MCV RBC AUTO: 103.7 FL — HIGH (ref 80–100)
MONOCYTES # BLD AUTO: 0.85 K/UL — SIGNIFICANT CHANGE UP (ref 0–0.9)
MONOCYTES NFR BLD AUTO: 10.3 % — SIGNIFICANT CHANGE UP (ref 2–14)
NEUTROPHILS # BLD AUTO: 6.34 K/UL — SIGNIFICANT CHANGE UP (ref 1.8–7.4)
NEUTROPHILS NFR BLD AUTO: 76.8 % — SIGNIFICANT CHANGE UP (ref 43–77)
PHOSPHATE SERPL-MCNC: 4.7 MG/DL — HIGH (ref 2.5–4.5)
PLATELET # BLD AUTO: 143 K/UL — LOW (ref 150–400)
POTASSIUM SERPL-MCNC: 4.5 MMOL/L — SIGNIFICANT CHANGE UP (ref 3.5–5.3)
POTASSIUM SERPL-SCNC: 4.5 MMOL/L — SIGNIFICANT CHANGE UP (ref 3.5–5.3)
PROT SERPL-MCNC: 6.7 GM/DL — SIGNIFICANT CHANGE UP (ref 6–8.3)
RBC # BLD: 3.83 M/UL — LOW (ref 4.2–5.8)
RBC # FLD: 15.2 % — HIGH (ref 10.3–14.5)
SODIUM SERPL-SCNC: 139 MMOL/L — SIGNIFICANT CHANGE UP (ref 135–145)
SPECIMEN SOURCE: SIGNIFICANT CHANGE UP
WBC # BLD: 8.26 K/UL — SIGNIFICANT CHANGE UP (ref 3.8–10.5)
WBC # FLD AUTO: 8.26 K/UL — SIGNIFICANT CHANGE UP (ref 3.8–10.5)

## 2020-08-29 PROCEDURE — 99233 SBSQ HOSP IP/OBS HIGH 50: CPT

## 2020-08-29 PROCEDURE — 99222 1ST HOSP IP/OBS MODERATE 55: CPT

## 2020-08-29 RX ORDER — HEPARIN SODIUM 5000 [USP'U]/ML
2000 INJECTION INTRAVENOUS; SUBCUTANEOUS
Refills: 0 | Status: DISCONTINUED | OUTPATIENT
Start: 2020-08-29 | End: 2020-08-31

## 2020-08-29 RX ORDER — SODIUM CHLORIDE 9 MG/ML
1000 INJECTION, SOLUTION INTRAVENOUS
Refills: 0 | Status: DISCONTINUED | OUTPATIENT
Start: 2020-08-29 | End: 2020-08-31

## 2020-08-29 RX ORDER — ZINC OXIDE 200 MG/G
1 OINTMENT TOPICAL
Refills: 0 | Status: DISCONTINUED | OUTPATIENT
Start: 2020-08-29 | End: 2020-08-31

## 2020-08-29 RX ORDER — DOXERCALCIFEROL 2.5 UG/1
1 CAPSULE ORAL
Refills: 0 | Status: DISCONTINUED | OUTPATIENT
Start: 2020-08-29 | End: 2020-08-29

## 2020-08-29 RX ORDER — DEXTROSE 50 % IN WATER 50 %
12.5 SYRINGE (ML) INTRAVENOUS ONCE
Refills: 0 | Status: DISCONTINUED | OUTPATIENT
Start: 2020-08-29 | End: 2020-08-31

## 2020-08-29 RX ORDER — INSULIN LISPRO 100/ML
VIAL (ML) SUBCUTANEOUS AT BEDTIME
Refills: 0 | Status: DISCONTINUED | OUTPATIENT
Start: 2020-08-29 | End: 2020-08-31

## 2020-08-29 RX ORDER — DEXTROSE 50 % IN WATER 50 %
25 SYRINGE (ML) INTRAVENOUS ONCE
Refills: 0 | Status: DISCONTINUED | OUTPATIENT
Start: 2020-08-29 | End: 2020-08-31

## 2020-08-29 RX ORDER — PIPERACILLIN AND TAZOBACTAM 4; .5 G/20ML; G/20ML
3.38 INJECTION, POWDER, LYOPHILIZED, FOR SOLUTION INTRAVENOUS EVERY 12 HOURS
Refills: 0 | Status: DISCONTINUED | OUTPATIENT
Start: 2020-08-29 | End: 2020-08-30

## 2020-08-29 RX ORDER — CALCIUM ACETATE 667 MG
2001 TABLET ORAL
Refills: 0 | Status: DISCONTINUED | OUTPATIENT
Start: 2020-08-29 | End: 2020-08-31

## 2020-08-29 RX ORDER — HUMAN INSULIN 100 [IU]/ML
10 INJECTION, SUSPENSION SUBCUTANEOUS AT BEDTIME
Refills: 0 | Status: DISCONTINUED | OUTPATIENT
Start: 2020-08-29 | End: 2020-08-31

## 2020-08-29 RX ORDER — ATORVASTATIN CALCIUM 80 MG/1
40 TABLET, FILM COATED ORAL AT BEDTIME
Refills: 0 | Status: DISCONTINUED | OUTPATIENT
Start: 2020-08-29 | End: 2020-08-31

## 2020-08-29 RX ORDER — INSULIN GLARGINE 100 [IU]/ML
5 INJECTION, SOLUTION SUBCUTANEOUS ONCE
Refills: 0 | Status: COMPLETED | OUTPATIENT
Start: 2020-08-29 | End: 2020-08-29

## 2020-08-29 RX ORDER — HEPARIN SODIUM 5000 [USP'U]/ML
2000 INJECTION INTRAVENOUS; SUBCUTANEOUS ONCE
Refills: 0 | Status: COMPLETED | OUTPATIENT
Start: 2020-08-29 | End: 2020-08-29

## 2020-08-29 RX ORDER — GLUCAGON INJECTION, SOLUTION 0.5 MG/.1ML
1 INJECTION, SOLUTION SUBCUTANEOUS ONCE
Refills: 0 | Status: DISCONTINUED | OUTPATIENT
Start: 2020-08-29 | End: 2020-08-31

## 2020-08-29 RX ORDER — HEPARIN SODIUM 5000 [USP'U]/ML
5000 INJECTION INTRAVENOUS; SUBCUTANEOUS EVERY 12 HOURS
Refills: 0 | Status: DISCONTINUED | OUTPATIENT
Start: 2020-08-29 | End: 2020-08-31

## 2020-08-29 RX ORDER — INSULIN LISPRO 100/ML
VIAL (ML) SUBCUTANEOUS
Refills: 0 | Status: DISCONTINUED | OUTPATIENT
Start: 2020-08-29 | End: 2020-08-31

## 2020-08-29 RX ORDER — GABAPENTIN 400 MG/1
300 CAPSULE ORAL AT BEDTIME
Refills: 0 | Status: DISCONTINUED | OUTPATIENT
Start: 2020-08-29 | End: 2020-08-31

## 2020-08-29 RX ORDER — HEPARIN SODIUM 5000 [USP'U]/ML
2000 INJECTION INTRAVENOUS; SUBCUTANEOUS ONCE
Refills: 0 | Status: DISCONTINUED | OUTPATIENT
Start: 2020-08-29 | End: 2020-08-29

## 2020-08-29 RX ORDER — ASPIRIN/CALCIUM CARB/MAGNESIUM 324 MG
81 TABLET ORAL DAILY
Refills: 0 | Status: DISCONTINUED | OUTPATIENT
Start: 2020-08-29 | End: 2020-08-31

## 2020-08-29 RX ORDER — NIFEDIPINE 30 MG
90 TABLET, EXTENDED RELEASE 24 HR ORAL DAILY
Refills: 0 | Status: DISCONTINUED | OUTPATIENT
Start: 2020-08-29 | End: 2020-08-31

## 2020-08-29 RX ORDER — DEXTROSE 50 % IN WATER 50 %
15 SYRINGE (ML) INTRAVENOUS ONCE
Refills: 0 | Status: DISCONTINUED | OUTPATIENT
Start: 2020-08-29 | End: 2020-08-31

## 2020-08-29 RX ORDER — VANCOMYCIN HCL 1 G
125 VIAL (EA) INTRAVENOUS EVERY 6 HOURS
Refills: 0 | Status: DISCONTINUED | OUTPATIENT
Start: 2020-08-29 | End: 2020-08-30

## 2020-08-29 RX ADMIN — Medication 125 MILLIGRAM(S): at 13:05

## 2020-08-29 RX ADMIN — Medication 125 MILLIGRAM(S): at 05:51

## 2020-08-29 RX ADMIN — PIPERACILLIN AND TAZOBACTAM 25 GRAM(S): 4; .5 INJECTION, POWDER, LYOPHILIZED, FOR SOLUTION INTRAVENOUS at 17:31

## 2020-08-29 RX ADMIN — HEPARIN SODIUM 5000 UNIT(S): 5000 INJECTION INTRAVENOUS; SUBCUTANEOUS at 13:07

## 2020-08-29 RX ADMIN — HEPARIN SODIUM 2000 UNIT(S): 5000 INJECTION INTRAVENOUS; SUBCUTANEOUS at 10:35

## 2020-08-29 RX ADMIN — Medication 2001 MILLIGRAM(S): at 13:04

## 2020-08-29 RX ADMIN — Medication 90 MILLIGRAM(S): at 13:04

## 2020-08-29 RX ADMIN — Medication 125 MILLIGRAM(S): at 23:23

## 2020-08-29 RX ADMIN — Medication 2001 MILLIGRAM(S): at 17:31

## 2020-08-29 RX ADMIN — ATORVASTATIN CALCIUM 40 MILLIGRAM(S): 80 TABLET, FILM COATED ORAL at 21:11

## 2020-08-29 RX ADMIN — GABAPENTIN 300 MILLIGRAM(S): 400 CAPSULE ORAL at 21:11

## 2020-08-29 RX ADMIN — HUMAN INSULIN 10 UNIT(S): 100 INJECTION, SUSPENSION SUBCUTANEOUS at 21:12

## 2020-08-29 RX ADMIN — Medication 81 MILLIGRAM(S): at 13:06

## 2020-08-29 RX ADMIN — Medication 2001 MILLIGRAM(S): at 08:43

## 2020-08-29 RX ADMIN — HEPARIN SODIUM 5000 UNIT(S): 5000 INJECTION INTRAVENOUS; SUBCUTANEOUS at 21:11

## 2020-08-29 RX ADMIN — Medication 125 MILLIGRAM(S): at 17:31

## 2020-08-29 NOTE — H&P ADULT - NSICDXPASTSURGICALHX_GEN_ALL_CORE_FT
PAST SURGICAL HISTORY:  History of appendectomy     History of bladder cancer s/p resection with neobladder    History of exploratory laparotomy peritonitis    History of percutaneous angioplasty     S/P arteriovenous (AV) fistula creation Right PAST SURGICAL HISTORY:  History of appendectomy     History of bladder cancer s/p resection with neobladder    History of cholecystectomy     History of exploratory laparotomy Peritonitis    History of percutaneous angioplasty PCI w/ KAREN RCA 12/2016, KAREN to LAD and D1 on 11/1/2018    S/P arteriovenous (AV) fistula creation Right PAST SURGICAL HISTORY:  History of appendectomy     History of bladder cancer s/p resection with neobladder    History of cholecystectomy     History of exploratory laparotomy Perforated Gastric Ulcer, Peritonitis    History of percutaneous angioplasty PCI w/ KAREN RCA 12/2016, KAREN to LAD and D1 on 11/1/2018    S/P arteriovenous (AV) fistula creation Right

## 2020-08-29 NOTE — CONSULT NOTE ADULT - ASSESSMENT
87 yo male with multiple medical issues, admitted with diarrhea, dehydration and fever. Suspect infectious etiology. Awaiting GI PCR and C diff. Continue empiric coverage.

## 2020-08-29 NOTE — CONSULT NOTE ADULT - SUBJECTIVE AND OBJECTIVE BOX
Patient is a 88y old  Male who presents with a chief complaint of Nausea, Vomiting, and Profuse Diarrhea (29 Aug 2020 01:06)      HPI:  87 y/o M PMHx significant for CAD, s/p PCI w/ KAREN to proximal RCA  1/2016, and PCI w/ KAREN to LAD and D1 on 11/1/2018, ESRD on HD MWF, CHF (HFpEF), Diabetes mellitus type 2, and bladder cancer s/p resection and neobladder formation presents to  for further evaluation and management profuse watery diarrhea associated with abdominal pain, nausea, and vomiting (non-bilious/non-bloody). The patient notes associated generalized weakness/malaise with increased difficulty in ambulating despite the use of his walker. In the ED the patient was found to be febrile (Tmax 101'F). Of note the patient denied any sick contacts, recent travel, or recent antibiotic use.  Labs => Hgb/Hct 12.8/40.2, LA 2.6 -> 1.8, BUN/Cr 23/4.76, Glu 191, COVID-19 (-). CXR => No evidence of active chest disease. CT ABD/Pel => No acute pathology. Stable cystic pancreatic tail lesion. Status post prostatectomy and neobladder. In th ED the patient received Acetaminophen 650mg PO x 1, Ondansetron 4mg IVP x 1, Piperacillin/tazobactam 3.375g IVPB x 1, Vancomycin 1g IVPB x 1, and NS x 500mL x 1. (29 Aug 2020 01:06)  Patient currently feels improved. Not dizzy. No diarrhea this AM.     PAST MEDICAL & SURGICAL HISTORY:  Moderate aortic stenosis  Chronic CHF  Type 2 diabetes mellitus  ESRD on dialysis: MWF  HTN (hypertension)  Bladder cancer  History of cholecystectomy  S/P arteriovenous (AV) fistula creation: Right  History of percutaneous angioplasty: PCI w/ KAREN RCA 12/2016, KAREN to LAD and D1 on 11/1/2018  History of exploratory laparotomy: Perforated Gastric Ulcer, Peritonitis  History of appendectomy  History of bladder cancer: s/p resection with neobladder      MEDICATIONS  (STANDING):  aspirin enteric coated 81 milliGRAM(s) Oral daily  atorvastatin 40 milliGRAM(s) Oral at bedtime  calcium acetate 2001 milliGRAM(s) Oral three times a day with meals  dextrose 5%. 1000 milliLiter(s) (50 mL/Hr) IV Continuous <Continuous>  dextrose 50% Injectable 12.5 Gram(s) IV Push once  dextrose 50% Injectable 25 Gram(s) IV Push once  dextrose 50% Injectable 25 Gram(s) IV Push once  gabapentin 300 milliGRAM(s) Oral at bedtime  heparin   Injectable 5000 Unit(s) SubCutaneous every 12 hours  insulin lispro (HumaLOG) corrective regimen sliding scale   SubCutaneous three times a day before meals  insulin lispro (HumaLOG) corrective regimen sliding scale   SubCutaneous at bedtime  insulin NPH human recombinant 10 Unit(s) SubCutaneous at bedtime  NIFEdipine XL 90 milliGRAM(s) Oral daily  vancomycin    Solution 125 milliGRAM(s) Oral every 6 hours    MEDICATIONS  (PRN):  dextrose 40% Gel 15 Gram(s) Oral once PRN Blood Glucose LESS THAN 70 milliGRAM(s)/deciliter  glucagon  Injectable 1 milliGRAM(s) IntraMuscular once PRN Glucose LESS THAN 70 milligrams/deciliter  zinc oxide 40% Ointment 1 Application(s) Topical four times a day PRN IAD      Allergies    No Known Allergies    Intolerances    lisinopril (Diarrhea)      SOCIAL HISTORY:    FAMILY HISTORY:  No pertinent family history in first degree relatives        Vital Signs Last 24 Hrs  T(C): 36.4 (29 Aug 2020 08:26), Max: 38.3 (28 Aug 2020 15:19)  T(F): 97.5 (29 Aug 2020 08:26), Max: 101 (28 Aug 2020 15:19)  HR: 69 (29 Aug 2020 08:26) (63 - 87)  BP: 145/46 (29 Aug 2020 08:26) (99/39 - 145/46)  BP(mean): 58 (28 Aug 2020 19:00) (52 - 58)  RR: 17 (29 Aug 2020 08:26) (16 - 22)  SpO2: 100% (29 Aug 2020 08:26) (92% - 100%)    PHYSICAL EXAM:    Respiratory: CTAB  Cardiovascular: S1 and S2, RRR, no M/G/R  Gastrointestinal: BS+, soft, NT/ND  Extremities: No peripheral edema    LABS:                        11.8   8.26  )-----------( 143      ( 29 Aug 2020 08:18 )             39.7     08-28    140  |  107  |  23  ----------------------------<  191<H>  4.7   |  24  |  4.73<H>    Ca    9.1      28 Aug 2020 15:40    TPro  7.2  /  Alb  3.5  /  TBili  0.7  /  DBili  x   /  AST  11<L>  /  ALT  15  /  AlkPhos  63  08-28    PT/INR - ( 28 Aug 2020 15:40 )   PT: 12.5 sec;   INR: 1.07 ratio         PTT - ( 28 Aug 2020 15:40 )  PTT:32.5 sec  LIVER FUNCTIONS - ( 28 Aug 2020 15:40 )  Alb: 3.5 g/dL / Pro: 7.2 gm/dL / ALK PHOS: 63 U/L / ALT: 15 U/L / AST: 11 U/L / GGT: x             RADIOLOGY & ADDITIONAL STUDIES:

## 2020-08-29 NOTE — H&P ADULT - NSICDXPASTMEDICALHX_GEN_ALL_CORE_FT
PAST MEDICAL HISTORY:  Bladder cancer     ESRD on dialysis MWF    HTN (hypertension) PAST MEDICAL HISTORY:  Bladder cancer     Chronic CHF     ESRD on dialysis MWF    HTN (hypertension)     Type 2 diabetes mellitus PAST MEDICAL HISTORY:  Bladder cancer     Chronic CHF     ESRD on dialysis MWF    HTN (hypertension)     Moderate aortic stenosis     Type 2 diabetes mellitus

## 2020-08-29 NOTE — PROGRESS NOTE ADULT - ASSESSMENT
89 yo m esrd on HD geovanna by wife for diarrhea  Found to have temp and low O2 sat in ED  Pt had episode of diarrhea during dialysis earlier today, but was stable and was taken home by his wife  In the car he had diarrhea and was brought to ED  See in emergency room, feels well no acute distress O2 sat 96% on 5 L O2 NC  Pt has CT abd w IV contrast, did not show any pathology  Will be dialyzed in AM  follow labs    8/29  Diarrhea  ID input noted  on abx  s/p dialysis earlier today

## 2020-08-29 NOTE — H&P ADULT - HISTORY OF PRESENT ILLNESS
Labs => Hgb/Hct 12.8/40.2, LA 2.6 -> 1.8, BUN/Cr 23/4.76, Glu 191, COVID-19 (-). CXR => No evidence of active chest disease. CT ABD/Pel => No acute pathology. Stable cystic pancreatic tail lesion. Status post prostatectomy and neobladder. In th ED the patient received Acetaminophen 650mg PO x 1, Ondansetron 4mg IVP x 1, Piperacillin/tazobactam 3.375g IVPB x 1, Vancomycin 1g IVPB x 1, and NS x 500mL x 1. 89 y/o M PMHx significant for CAD, s/p PCI w/ KAREN to proximal RCA  1/2016, and PCI w/ KAREN to LAD and D1 on 11/1/2018, ESRD on HD MWF, CHF (HFpEF), Diabetes mellitus type 2, and bladder cancer s/p resection and neobladder formation presents to  for further evaluation and management profuse watery diarrhea associated with abdominal pain, nausea, and vomiting (non-bilious/non-bloody). The patient notes associated generalized weakness/malaise with increased difficulty in ambulating despite the use of his walker. In the ED the patient was found to be febrile (Tmax 101'F). Of note the patient denied any sick contacts, recent travel, or recent antibiotic use.  Labs => Hgb/Hct 12.8/40.2, LA 2.6 -> 1.8, BUN/Cr 23/4.76, Glu 191, COVID-19 (-). CXR => No evidence of active chest disease. CT ABD/Pel => No acute pathology. Stable cystic pancreatic tail lesion. Status post prostatectomy and neobladder. In th ED the patient received Acetaminophen 650mg PO x 1, Ondansetron 4mg IVP x 1, Piperacillin/tazobactam 3.375g IVPB x 1, Vancomycin 1g IVPB x 1, and NS x 500mL x 1.

## 2020-08-29 NOTE — H&P ADULT - NSHPSOCIALHISTORY_GEN_ALL_CORE
Lives at home  No current hx of smoking or ETOH Lives at home  No current hx of smoking or ETOH  Walks with the aide of a walker

## 2020-08-29 NOTE — H&P ADULT - NSHPPHYSICALEXAM_GEN_ALL_CORE
Vital Signs Last 24 Hrs  T(C): 36.6 (28 Aug 2020 22:58), Max: 38.3 (28 Aug 2020 15:19)  T(F): 97.8 (28 Aug 2020 22:58), Max: 101 (28 Aug 2020 15:19)  HR: 70 (28 Aug 2020 22:58) (63 - 87)  BP: 118/44 (28 Aug 2020 22:58) (99/39 - 123/50)  BP(mean): 58 (28 Aug 2020 19:00) (52 - 58)  RR: 18 (28 Aug 2020 22:58) (16 - 22)  SpO2: 100% (28 Aug 2020 22:58) (92% - 100%)

## 2020-08-29 NOTE — PROGRESS NOTE ADULT - SUBJECTIVE AND OBJECTIVE BOX
Cheif complaints and Diagnosis: abdominal pain/ diahrrea    Subjective: no complaints      REVIEW OF SYSTEMS:    CONSTITUTIONAL: No weakness, fevers or chills  EYES/ENT: No visual changes;  No vertigo or throat pain   NECK: No pain or stiffness  RESPIRATORY: No cough, wheezing, hemoptysis; No shortness of breath  CARDIOVASCULAR: No chest pain or palpitations  GASTROINTESTINAL: No abdominal or epigastric pain. No nausea, vomiting, or hematemesis; No diarrhea or constipation. No melena or hematochezia.  GENITOURINARY: No dysuria, frequency or hematuria  NEUROLOGICAL: No numbness or weakness  SKIN: No itching, burning, rashes, or lesions   All other review of systems is negative unless indicated above      Vital Signs Last 24 Hrs  T(C): 36.1 (29 Aug 2020 12:33), Max: 38.3 (28 Aug 2020 15:19)  T(F): 97 (29 Aug 2020 12:33), Max: 101 (28 Aug 2020 15:19)  HR: 84 (29 Aug 2020 12:53) (63 - 87)  BP: 123/64 (29 Aug 2020 12:53) (99/39 - 145/46)  BP(mean): 58 (28 Aug 2020 19:00) (52 - 58)  RR: 16 (29 Aug 2020 12:33) (16 - 22)  SpO2: 100% (29 Aug 2020 08:26) (92% - 100%)    HEENT:   pupils equal and reactive, EOMI, no oropharyngeal lesions, erythema, exudates, oral thrush    NECK:   supple, no carotid bruits, no palpable lymph nodes, no thyromegaly    CV:  +S1, +S2, regular, no murmurs or rubs    RESP:   lungs clear to auscultation bilaterally, no wheezing, rales, rhonchi, good air entry bilaterally    BREAST:  not examined    GI:  abdomen soft, non-tender, non-distended, normal BS, no bruits, no abdominal masses, no palpable masses    RECTAL:  not examined    :  not examined    MSK:   normal muscle tone, no atrophy, no rigidity, no contractions    EXT:   no clubbing, no cyanosis, no edema, no calf pain, swelling or erythema    VASCULAR:  pulses equal and symmetric in the upper and lower extremities    NEURO:  AAOX3, no focal neurological deficits, follows all commands, able to move extremities spontaneously    SKIN:  no ulcers, lesions or rashes    MEDICATIONS  (STANDING):  aspirin enteric coated 81 milliGRAM(s) Oral daily  atorvastatin 40 milliGRAM(s) Oral at bedtime  calcium acetate 2001 milliGRAM(s) Oral three times a day with meals  dextrose 5%. 1000 milliLiter(s) (50 mL/Hr) IV Continuous <Continuous>  dextrose 50% Injectable 12.5 Gram(s) IV Push once  dextrose 50% Injectable 25 Gram(s) IV Push once  dextrose 50% Injectable 25 Gram(s) IV Push once  gabapentin 300 milliGRAM(s) Oral at bedtime  heparin   Injectable 5000 Unit(s) SubCutaneous every 12 hours  heparin   Injectable. 2000 Unit(s) Dialysis. <User Schedule>  insulin lispro (HumaLOG) corrective regimen sliding scale   SubCutaneous three times a day before meals  insulin lispro (HumaLOG) corrective regimen sliding scale   SubCutaneous at bedtime  insulin NPH human recombinant 10 Unit(s) SubCutaneous at bedtime  NIFEdipine XL 90 milliGRAM(s) Oral daily  piperacillin/tazobactam IVPB.. 3.375 Gram(s) IV Intermittent every 12 hours  vancomycin    Solution 125 milliGRAM(s) Oral every 6 hours    MEDICATIONS  (PRN):  dextrose 40% Gel 15 Gram(s) Oral once PRN Blood Glucose LESS THAN 70 milliGRAM(s)/deciliter  glucagon  Injectable 1 milliGRAM(s) IntraMuscular once PRN Glucose LESS THAN 70 milligrams/deciliter  zinc oxide 40% Ointment 1 Application(s) Topical four times a day PRN IAD      29 Aug 2020 08:18    139    |  106    |  30     ----------------------------<  91     4.5     |  25     |  6.28     Ca    8.7        29 Aug 2020 08:18  Phos  4.7       29 Aug 2020 08:18  Mg     2.2       29 Aug 2020 08:18    TPro  6.7    /  Alb  3.2    /  TBili  0.5    /  DBili  x      /  AST  7      /  ALT  13     /  AlkPhos  55     29 Aug 2020 08:18  LIVER FUNCTIONS - ( 29 Aug 2020 08:18 )  Alb: 3.2 g/dL / Pro: 6.7 gm/dL / ALK PHOS: 55 U/L / ALT: 13 U/L / AST: 7 U/L / GGT: x         PT/INR - ( 28 Aug 2020 15:40 )   PT: 12.5 sec;   INR: 1.07 ratio         PTT - ( 28 Aug 2020 15:40 )  PTT:32.5 secCBC Full  -  ( 29 Aug 2020 08:18 )  WBC Count : 8.26 K/uL  Hemoglobin : 11.8 g/dL  Hematocrit : 39.7 %  Platelet Count - Automated : 143 K/uL  Mean Cell Volume : 103.7 fl  Mean Cell Hemoglobin : 30.8 pg  Mean Cell Hemoglobin Concentration : 29.7 gm/dL  Auto Neutrophil # : 6.34 K/uL  Auto Lymphocyte # : 0.77 K/uL  Auto Monocyte # : 0.85 K/uL  Auto Eosinophil # : 0.25 K/uL  Auto Basophil # : 0.03 K/uL  Auto Neutrophil % : 76.8 %  Auto Lymphocyte % : 9.3 %  Auto Monocyte % : 10.3 %  Auto Eosinophil % : 3.0 %  Auto Basophil % : 0.4 %            PT/INR - ( 28 Aug 2020 15:40 )   PT: 12.5 sec;   INR: 1.07 ratio         PTT - ( 28 Aug 2020 15:40 )  PTT:32.5 sec        Assessment and Plan:     89 y/o M PMHx significant for CAD, s/p PCI w/ KAREN to proximal RCA  1/2016, and PCI w/ KAREN to LAD and D1 on 11/1/2018, ESRD on HD MWF, CHF (HFpEF), Diabetes mellitus type 2, and bladder cancer s/p resection and neobladder formation presents to  for further evaluation and management profuse watery diarrhea associated with abdominal pain, nausea, and vomiting (non-bilious/non-bloody). The patient notes associated generalized weakness/malaise with increased difficulty in ambulating despite the use of his walker. In the ED the patient was found to be febrile (Tmax 101'F). Of note the patient denied any sick contacts, recent travel, or recent antibiotic use.  Labs => Hgb/Hct 12.8/40.2, LA 2.6 -> 1.8, BUN/Cr 23/4.76, Glu 191, COVID-19 (-). CXR => No evidence of active chest disease. CT ABD/Pel => No acute pathology. Stable cystic pancreatic tail lesion. Status post prostatectomy and neobladder. In th ED the patient received Acetaminophen 650mg PO x 1, Ondansetron 4mg IVP x 1, Piperacillin/tazobactam 3.375g IVPB x 1, Vancomycin 1g IVPB x 1, and NS x 500mL x 1.    #Abdominal pain w/ Diarrhea  ~f/u stool studies (GI PCR + c. dif titer)  ~cont. to trend fever curve  ~f/u w/ ID in the am  ~cont. contact isolation  ~cont. Vanco PO  -start zosyn for colitis prophylaxis; stool looks infectious, green liquid    #CAD/Hypertension  ~cont. ASA 81mg po daily  ~cont. Nifedipine 90mg po daily    #ESRD  ~f/u w/ Nephrology in the am  ~cont. HD via MWF scheduling (per ED patient to have HD in the am on 8/29 as is s/p contrast load)  ~cont. Ca acetate 2001mg po qAC    #Diabetes mellitus  ~FS qAC/HS  ~cont. Basal/Bolus insulin regimen    #Hyperlipidemia  ~cont. statin therapy w/ Atorvastatin 40mg po qhs    #Vte ppx  ~IMPROVE VTE Risk Score is 2  [  ] Previous VTE                                                  3  [  ] Thrombophilia                                               2  [  ] Lower limb paralysis                                      2        (unable to hold up >15 seconds)    [  ] Current Cancer                                              2         (within 6 months)  [X] Immobilization > 24 hrs                                1  [  ] ICU/CCU stay > 24 hours                              1  [X] Age > 60                                                      1  ~cont. Heparin sq

## 2020-08-29 NOTE — PROGRESS NOTE ADULT - SUBJECTIVE AND OBJECTIVE BOX
NEPHROLOGY CONSULT  HPI:  89 yo m esrd on HD geovanna by wife for diarrhea  Found to have temp and low O2 sat in ED  Pt had episode of diarrhea during dialysis earlier today, but was stable and was taken home by his wife  In the car he had diarrhea and was brought to ED  See in emergency room, feels well no acute distress O2 sat 96% on 5 L O2 NC    8/29  s/p HD   ID and Hospitalist input toted        PAST MEDICAL & SURGICAL HISTORY:  ESRD on dialysis  HTN (hypertension)  CKD (chronic kidney disease), stage 4 (severe)  Bladder cancer  No significant past surgical history  History of exploratory laparotomy: peritonitis  History of appendectomy  AV fistula: right (never used)  History of bladder cancer: s/p resection with neobladder      FAMILY HISTORY:  No pertinent family history in first degree relatives      MEDICATIONS  (STANDING):    MEDICATIONS  (PRN):      Allergies    lisinopril (Other)    Intolerances    lisinopril (Diarrhea)      I&O's Summary        REVIEW OF SYSTEMS:    abd pain    Vital Signs Last 24 Hrs  T(C): 36.1 (29 Aug 2020 12:33), Max: 38.3 (28 Aug 2020 15:19)  T(F): 97 (29 Aug 2020 12:33), Max: 101 (28 Aug 2020 15:19)  HR: 84 (29 Aug 2020 12:53) (63 - 87)  BP: 123/64 (29 Aug 2020 12:53) (99/39 - 145/46)  BP(mean): 58 (28 Aug 2020 19:00) (52 - 58)  RR: 16 (29 Aug 2020 12:33) (16 - 22)  SpO2: 100% (29 Aug 2020 08:26) (92% - 100%)    PHYSICAL EXAM:    General:  Alert, well-developed ,No acute distress.    Neuro:  Alert and oriented to person, place, and time. Able to communicate  well. Cranial nerves 2-12 grossly intact. 5/5 strength in all  extremities bilaterally. Sensation intact in all extremities.  Appropriate affect.     HEENT:  No JVD, no masses, Eyes anicteric, No carotid bruits.No lymphadenopathy,    Cardiovascular:  Regular rate and rhythm, with normal S1 and S2. No murmurs, rubs,  or gallops. No JVD.     Lungs:  clear. no rales, no wheezing, .    Abdomen:  mild abd tenderness    Skin:  Warm, dry, well-perfused. No rashes or other lesions.     Extremities:  2+ pulses in upper and lower extremities. No lower extremity pain or  edema; legs are symmetric in appearance.    LABS:                     139    |  106    |  30     ----------------------------<  91        29 Aug 2020 08:18  4.5     |  25     |  6.28     140    |  107    |  23     ----------------------------<  191       28 Aug 2020 15:40  4.7     |  24     |  4.73     Ca    8.7        29 Aug 2020 08:18  Ca    9.1        28 Aug 2020 15:40    Phos  4.7       29 Aug 2020 08:18    Mg     2.2       29 Aug 2020 08:18                          11.8   8.26  )-----------( 143      ( 29 Aug 2020 08:18 )             39.7                         12.8   8.86  )-----------( 155      ( 28 Aug 2020 15:40 )             40.2

## 2020-08-29 NOTE — CONSULT NOTE ADULT - ASSESSMENT
87 y/o M PMHx significant for CAD, s/p PCI w/ KAREN to proximal RCA  1/2016, and PCI w/ KAREN to LAD and D1 on 11/1/2018, ESRD on HD MWF, CHF (HFpEF), Diabetes mellitus type 2, and bladder cancer s/p resection and neobladder formation presents to  for further evaluation and management profuse watery diarrhea associated with abdominal pain, nausea, and vomiting (non-bilious/non-bloody). The patient notes associated generalized weakness/malaise with increased difficulty in ambulating despite the use of his walker. In the ED the patient was found to be febrile (Tmax 101'F). Of note the patient denied any sick contacts, recent travel, or recent antibiotic use. Labs => Hgb/Hct 12.8/40.2, LA 2.6 -> 1.8, BUN/Cr 23/4.76, Glu 191, COVID-19 (-). CXR => No evidence of active chest disease. CT ABD/Pel => No acute pathology. Stable cystic pancreatic tail lesion. Status post prostatectomy and neobladder. In th ED the patient received Acetaminophen 650mg PO x 1, Ondansetron 4mg IVP x 1, Piperacillin/tazobactam 3.375g IVPB x 1, Vancomycin 1g IVPB x 1, and NS x 500mL x 1.     1. fever. abdominal pain. gastroenteritis. immuncompromised host. ESRD  - f/u stool studies, c diff/gi pcr   - imaging reviewed  - on empiric po vancomycin [id number]  - on IV zosyn [id number]  - continue with above therapy while awaiting stool studies  - monitor temps  - tolerating abx well so far; no side effects noted  - reason for abx use and side effects reviewed with patient  - supportive care  - fu cbc

## 2020-08-29 NOTE — H&P ADULT - ASSESSMENT
Labs => Hgb/Hct 12.8/40.2, LA 2.6 -> 1.8, BUN/Cr 23/4.76, Glu 191, COVID-19 (-). CXR => No evidence of active chest disease. CT ABD/Pel => No acute pathology. Stable cystic pancreatic tail lesion. Status post prostatectomy and neobladder. In th ED the patient received Acetaminophen 650mg PO x 1, Ondansetron 4mg IVP x 1, Piperacillin/tazobactam 3.375g IVPB x 1, Vancomycin 1g IVPB x 1, and NS x 500mL x 1. 89 y/o M PMHx significant for CAD, s/p PCI w/ KAREN to proximal RCA  1/2016, and PCI w/ KAREN to LAD and D1 on 11/1/2018, ESRD on HD MWF, CHF (HFpEF), Diabetes mellitus type 2, and bladder cancer s/p resection and neobladder formation presents to  for further evaluation and management profuse watery diarrhea associated with abdominal pain, nausea, and vomiting (non-bilious/non-bloody). The patient notes associated generalized weakness/malaise with increased difficulty in ambulating despite the use of his walker. In the ED the patient was found to be febrile (Tmax 101'F). Of note the patient denied any sick contacts, recent travel, or recent antibiotic use.  Labs => Hgb/Hct 12.8/40.2, LA 2.6 -> 1.8, BUN/Cr 23/4.76, Glu 191, COVID-19 (-). CXR => No evidence of active chest disease. CT ABD/Pel => No acute pathology. Stable cystic pancreatic tail lesion. Status post prostatectomy and neobladder. In th ED the patient received Acetaminophen 650mg PO x 1, Ondansetron 4mg IVP x 1, Piperacillin/tazobactam 3.375g IVPB x 1, Vancomycin 1g IVPB x 1, and NS x 500mL x 1. 89 y/o M PMHx significant for CAD, s/p PCI w/ KAREN to proximal RCA  1/2016, and PCI w/ KAREN to LAD and D1 on 11/1/2018, ESRD on HD MWF, CHF (HFpEF), Diabetes mellitus type 2, and bladder cancer s/p resection and neobladder formation presents to  for further evaluation and management profuse watery diarrhea associated with abdominal pain, nausea, and vomiting (non-bilious/non-bloody). The patient notes associated generalized weakness/malaise with increased difficulty in ambulating despite the use of his walker. In the ED the patient was found to be febrile (Tmax 101'F). Of note the patient denied any sick contacts, recent travel, or recent antibiotic use.  Labs => Hgb/Hct 12.8/40.2, LA 2.6 -> 1.8, BUN/Cr 23/4.76, Glu 191, COVID-19 (-). CXR => No evidence of active chest disease. CT ABD/Pel => No acute pathology. Stable cystic pancreatic tail lesion. Status post prostatectomy and neobladder. In th ED the patient received Acetaminophen 650mg PO x 1, Ondansetron 4mg IVP x 1, Piperacillin/tazobactam 3.375g IVPB x 1, Vancomycin 1g IVPB x 1, and NS x 500mL x 1.    #Abdominal pain w/ Diarrhea  ~admit to Medicine  ~f/u stool studies (GI PCR + c. dif titer)  ~cont. to trend fever curve  ~f/u w/ ID in the am  ~cont. contact isolation  ~cont. Vanco PO    #CAD/Hypertension  ~cont. ASA 81mg po daily  ~cont. Nifedipine 90mg po daily    #ESRD  ~f/u w/ Nephrology in the am  ~cont. HD via MWF scheduling (per ED patient to have HD in the am on 8/29 as is s/p contrast load)  ~cont. Ca acetate 2001mg po qAC    #Diabetes mellitus  ~FS qAC/HS  ~cont. Basal/Bolus insulin regimen    #Hyperlipidemia  ~cont. statin therapy w/ Atorvastatin 40mg po qhs    #Vte ppx  ~IMPROVE VTE Risk Score is 2  [  ] Previous VTE                                                  3  [  ] Thrombophilia                                               2  [  ] Lower limb paralysis                                      2        (unable to hold up >15 seconds)    [  ] Current Cancer                                              2         (within 6 months)  [X] Immobilization > 24 hrs                                1  [  ] ICU/CCU stay > 24 hours                              1  [X] Age > 60                                                      1  ~cont. Heparin sq

## 2020-08-29 NOTE — CONSULT NOTE ADULT - SUBJECTIVE AND OBJECTIVE BOX
Patient is a 88y old  Male who presents with a chief complaint of Nausea, Vomiting, and Profuse Diarrhea (29 Aug 2020 09:31)    HPI:  89 y/o M PMHx significant for CAD, s/p PCI w/ KAREN to proximal RCA  2016, and PCI w/ KAREN to LAD and D1 on 2018, ESRD on HD MWF, CHF (HFpEF), Diabetes mellitus type 2, and bladder cancer s/p resection and neobladder formation presents to  for further evaluation and management profuse watery diarrhea associated with abdominal pain, nausea, and vomiting (non-bilious/non-bloody). The patient notes associated generalized weakness/malaise with increased difficulty in ambulating despite the use of his walker. In the ED the patient was found to be febrile (Tmax 101'F). Of note the patient denied any sick contacts, recent travel, or recent antibiotic use. Labs => Hgb/Hct 12.8/40.2, LA 2.6 -> 1.8, BUN/Cr 23/4.76, Glu 191, COVID-19 (-). CXR => No evidence of active chest disease. CT ABD/Pel => No acute pathology. Stable cystic pancreatic tail lesion. Status post prostatectomy and neobladder. In th ED the patient received Acetaminophen 650mg PO x 1, Ondansetron 4mg IVP x 1, Piperacillin/tazobactam 3.375g IVPB x 1, Vancomycin 1g IVPB x 1, and NS x 500mL x 1.       PMH: as above  PSH: as above  Meds: per reconciliation sheet, noted below  MEDICATIONS  (STANDING):  aspirin enteric coated 81 milliGRAM(s) Oral daily  atorvastatin 40 milliGRAM(s) Oral at bedtime  calcium acetate 2001 milliGRAM(s) Oral three times a day with meals  dextrose 5%. 1000 milliLiter(s) (50 mL/Hr) IV Continuous <Continuous>  dextrose 50% Injectable 12.5 Gram(s) IV Push once  dextrose 50% Injectable 25 Gram(s) IV Push once  dextrose 50% Injectable 25 Gram(s) IV Push once  gabapentin 300 milliGRAM(s) Oral at bedtime  heparin   Injectable 5000 Unit(s) SubCutaneous every 12 hours  heparin   Injectable. 2000 Unit(s) Dialysis. <User Schedule>  insulin lispro (HumaLOG) corrective regimen sliding scale   SubCutaneous three times a day before meals  insulin lispro (HumaLOG) corrective regimen sliding scale   SubCutaneous at bedtime  insulin NPH human recombinant 10 Unit(s) SubCutaneous at bedtime  NIFEdipine XL 90 milliGRAM(s) Oral daily  piperacillin/tazobactam IVPB.. 3.375 Gram(s) IV Intermittent every 12 hours  vancomycin    Solution 125 milliGRAM(s) Oral every 6 hours    Allergies    No Known Allergies    Intolerances    lisinopril (Diarrhea)    Social: no smoking, no alcohol, no illegal drugs; no recent travel, no exposure to TB  FAMILY HISTORY:  No pertinent family history in first degree relatives     no history of premature cardiovascular disease in first degree relatives    ROS:no HA, no dizziness, no sore throat, no blurry vision, no CP, no palpitations, no SOB, no cough, no dysuria, no leg pain, no claudication, no rash, no joint aches, no rectal pain or bleeding, no night sweats  All other systems reviewed and are negative    Vital Signs Last 24 Hrs  T(C): 36.1 (29 Aug 2020 10:42), Max: 38.3 (28 Aug 2020 15:19)  T(F): 97 (29 Aug 2020 10:42), Max: 101 (28 Aug 2020 15:19)  HR: 75 (29 Aug 2020 12:04) (63 - 87)  BP: 117/58 (29 Aug 2020 12:04) (99/39 - 145/46)  BP(mean): 58 (28 Aug 2020 19:00) (52 - 58)  RR: 16 (29 Aug 2020 12:04) (16 - 22)  SpO2: 100% (29 Aug 2020 08:26) (92% - 100%)  Daily Height in cm: 167.64 (28 Aug 2020 14:57)    Daily Weight in k.6 (29 Aug 2020 08:19)    PE:    Constitutional: frail looking  HEENT: NC/AT, EOMI, PERRLA, conjunctivae clear; ears and nose atraumatic; pharynx benign  Neck: supple; thyroid not palpable  Back: no tenderness  Respiratory: respiratory effort normal; clear to auscultation  Cardiovascular: S1S2 regular, no murmurs  Abdomen: soft, tender, not distended, positive BS; liver and spleen WNL  Genitourinary: no suprapubic tenderness  Lymphatic: no LN palpable  Musculoskeletal: no muscle tenderness, no joint swelling or tenderness  Extremities: no pedal edema  Neurological/ Psychiatric:  moving all extremities  Skin: no rashes; no palpable lesions    Labs: all available labs reviewed                        11.8   8.26  )-----------( 143      ( 29 Aug 2020 08:18 )             39.7         139  |  106  |  30<H>  ----------------------------<  91  4.5   |  25  |  6.28<H>    Ca    8.7      29 Aug 2020 08:18  Phos  4.7       Mg     2.2         TPro  6.7  /  Alb  3.2<L>  /  TBili  0.5  /  DBili  x   /  AST  7<L>  /  ALT  13  /  AlkPhos  55       LIVER FUNCTIONS - ( 29 Aug 2020 08:18 )  Alb: 3.2 g/dL / Pro: 6.7 gm/dL / ALK PHOS: 55 U/L / ALT: 13 U/L / AST: 7 U/L / GGT: x                 Radiology: all available radiological tests reviewed  < from: CT Abdomen and Pelvis w/ IV Cont (20 @ 18:49) >  EXAM:  CT ABDOMEN AND PELVIS IC                            PROCEDURE DATE:  2020          INTERPRETATION:  CLINICAL INFORMATION: Diarrhea    COMPARISON: CT chest 2018, CT abdomen 2017    PROCEDURE:  CT of the Abdomen and Pelvis was performed with intravenous contrast.  Intravenous contrast: 90 ml Omnipaque 350. 10 ml discarded.  Oral contrast: None.  Sagittal and coronal reformats were performed.    FINDINGS:  LOWER CHEST: Aortic valvular and coronary artery calcifications. Mild dependent atelectasis at the lung bases.    LIVER: Within normal limits.  BILE DUCTS: Normal caliber.  GALLBLADDER: Cholecystectomy.  SPLEEN: Within normal limits.  PANCREAS: 1.5 cm cystic pancreatic tail lesion, unchanged in size. No new pancreatic lesion. No ductal dilatation.  ADRENALS: Within normal limits.  KIDNEYS/URETERS: Markedly atrophic kidneys. No hydronephrosis.    BLADDER: Status post neobladder creation.  REPRODUCTIVE ORGANS: Prostatectomy.    BOWEL: No bowel obstruction. Appendix not visualized. Mid small bowel anastomosis identified.  PERITONEUM: No ascites.  VESSELS: Extensive atherosclerotic arterial calcification. Normal caliber aorta  RETROPERITONEUM/LYMPH NODES: No lymphadenopathy.  ABDOMINAL WALL: Small fat-containing hernia near the gastric abdominal wall.  BONES: No lytic or blastic bony lesion.    IMPRESSION:  No acute pathology.  Stable cystic pancreatic tail lesion.  Status post prostatectomy and neobladder.      Advanced directives addressed: full resuscitation

## 2020-08-30 LAB
SARS-COV-2 IGG SERPL QL IA: NEGATIVE — SIGNIFICANT CHANGE UP
SARS-COV-2 IGM SERPL IA-ACNC: <0.1 INDEX — SIGNIFICANT CHANGE UP

## 2020-08-30 PROCEDURE — 99232 SBSQ HOSP IP/OBS MODERATE 35: CPT

## 2020-08-30 RX ORDER — LIDOCAINE AND PRILOCAINE CREAM 25; 25 MG/G; MG/G
1 CREAM TOPICAL DAILY
Refills: 0 | Status: DISCONTINUED | OUTPATIENT
Start: 2020-08-30 | End: 2020-08-31

## 2020-08-30 RX ADMIN — Medication 2001 MILLIGRAM(S): at 08:38

## 2020-08-30 RX ADMIN — ZINC OXIDE 1 APPLICATION(S): 200 OINTMENT TOPICAL at 12:45

## 2020-08-30 RX ADMIN — Medication 2001 MILLIGRAM(S): at 17:40

## 2020-08-30 RX ADMIN — Medication 2001 MILLIGRAM(S): at 12:45

## 2020-08-30 RX ADMIN — ATORVASTATIN CALCIUM 40 MILLIGRAM(S): 80 TABLET, FILM COATED ORAL at 22:02

## 2020-08-30 RX ADMIN — Medication 1: at 12:44

## 2020-08-30 RX ADMIN — PIPERACILLIN AND TAZOBACTAM 25 GRAM(S): 4; .5 INJECTION, POWDER, LYOPHILIZED, FOR SOLUTION INTRAVENOUS at 06:15

## 2020-08-30 RX ADMIN — Medication 81 MILLIGRAM(S): at 09:32

## 2020-08-30 RX ADMIN — HEPARIN SODIUM 5000 UNIT(S): 5000 INJECTION INTRAVENOUS; SUBCUTANEOUS at 09:32

## 2020-08-30 RX ADMIN — Medication 2: at 17:40

## 2020-08-30 RX ADMIN — HUMAN INSULIN 10 UNIT(S): 100 INJECTION, SUSPENSION SUBCUTANEOUS at 22:04

## 2020-08-30 RX ADMIN — HEPARIN SODIUM 5000 UNIT(S): 5000 INJECTION INTRAVENOUS; SUBCUTANEOUS at 22:02

## 2020-08-30 RX ADMIN — Medication 125 MILLIGRAM(S): at 06:14

## 2020-08-30 RX ADMIN — GABAPENTIN 300 MILLIGRAM(S): 400 CAPSULE ORAL at 22:02

## 2020-08-30 NOTE — PROGRESS NOTE ADULT - ASSESSMENT
89 yo male with multiple medical issues, admitted with diarrhea, dehydration and fever. Suspect infectious etiology. Stool tests negative and symptoms improved.    -abx per ID  -LR diet  -Dr. Kam to resume care in AM

## 2020-08-30 NOTE — PROGRESS NOTE ADULT - ASSESSMENT
87 y/o M PMHx significant for CAD, s/p PCI w/ KAREN to proximal RCA  1/2016, and PCI w/ KAREN to LAD and D1 on 11/1/2018, ESRD on HD MWF, CHF (HFpEF), Diabetes mellitus type 2, and bladder cancer s/p resection and neobladder formation presents to  for further evaluation and management profuse watery diarrhea associated with abdominal pain, nausea, and vomiting (non-bilious/non-bloody). The patient notes associated generalized weakness/malaise with increased difficulty in ambulating despite the use of his walker. In the ED the patient was found to be febrile (Tmax 101'F). Of note the patient denied any sick contacts, recent travel, or recent antibiotic use. Labs => Hgb/Hct 12.8/40.2, LA 2.6 -> 1.8, BUN/Cr 23/4.76, Glu 191, COVID-19 (-). CXR => No evidence of active chest disease. CT ABD/Pel => No acute pathology. Stable cystic pancreatic tail lesion. Status post prostatectomy and neobladder. In th ED the patient received Acetaminophen 650mg PO x 1, Ondansetron 4mg IVP x 1, Piperacillin/tazobactam 3.375g IVPB x 1, Vancomycin 1g IVPB x 1, and NS x 500mL x 1.     1. fever. abdominal pain. viral gastroenteritis. immuncompromised host. ESRD  - temps down, no further n/v/d  - blood cx no growth, c diff/gi pcr (-)   - probable viral etiology, recommend dc antibiotics/observe  - monitor temps  - supportive care  - fu cbc

## 2020-08-30 NOTE — PROGRESS NOTE ADULT - SUBJECTIVE AND OBJECTIVE BOX
GI  coverage for Dr. Kam    Patient is a 88y old  Male who presents with a chief complaint of Nausea, Vomiting, and Profuse Diarrhea (29 Aug 2020 01:06)      HPI:  feels better  diarrhea resolved  no n/v  mary kate PI    ROS: negative, all reviewed    PHYSICAL EXAM:  Vital Signs Last 24 Hrs  T(C): 36.6 (30 Aug 2020 15:30), Max: 36.8 (30 Aug 2020 08:18)  T(F): 97.8 (30 Aug 2020 15:30), Max: 98.2 (30 Aug 2020 08:18)  HR: 70 (30 Aug 2020 15:30) (65 - 70)  BP: 126/41 (30 Aug 2020 15:30) (109/42 - 149/45)  BP(mean): --  RR: 16 (30 Aug 2020 15:30) (16 - 16)  SpO2: 97% (30 Aug 2020 15:30) (97% - 98%)    elderly, frail NAD  anicteric  Respiratory: CTAB  Cardiovascular: S1 and S2, RRR, no M/G/R  Gastrointestinal: BS+, soft, NT/ND  Extremities: No peripheral edema    LABS:                                            11.8   8.26  )-----------( 143      ( 29 Aug 2020 08:18 )             39.7     stool tests negative

## 2020-08-30 NOTE — PROGRESS NOTE ADULT - SUBJECTIVE AND OBJECTIVE BOX
Cheif complaints and Diagnosis: abdominal pain/ Diahrea    Subjective: no complaints      REVIEW OF SYSTEMS:    CONSTITUTIONAL: No weakness, fevers or chills  EYES/ENT: No visual changes;  No vertigo or throat pain   NECK: No pain or stiffness  RESPIRATORY: No cough, wheezing, hemoptysis; No shortness of breath  CARDIOVASCULAR: No chest pain or palpitations  GASTROINTESTINAL: No abdominal or epigastric pain. No nausea, vomiting, or hematemesis; No diarrhea or constipation. No melena or hematochezia.  GENITOURINARY: No dysuria, frequency or hematuria  NEUROLOGICAL: No numbness or weakness  SKIN: No itching, burning, rashes, or lesions   All other review of systems is negative unless indicated above      Vital Signs Last 24 Hrs  T(C): 36.6 (30 Aug 2020 15:30), Max: 36.8 (30 Aug 2020 08:18)  T(F): 97.8 (30 Aug 2020 15:30), Max: 98.2 (30 Aug 2020 08:18)  HR: 70 (30 Aug 2020 15:30) (65 - 76)  BP: 126/41 (30 Aug 2020 15:30) (109/42 - 161/58)  BP(mean): --  RR: 16 (30 Aug 2020 15:30) (16 - 17)  SpO2: 97% (30 Aug 2020 15:30) (97% - 100%)    HEENT:   pupils equal and reactive, EOMI, no oropharyngeal lesions, erythema, exudates, oral thrush    NECK:   supple, no carotid bruits, no palpable lymph nodes, no thyromegaly    CV:  +S1, +S2, regular, no murmurs or rubs    RESP:   lungs clear to auscultation bilaterally, no wheezing, rales, rhonchi, good air entry bilaterally    BREAST:  not examined    GI:  abdomen soft, non-tender, non-distended, normal BS, no bruits, no abdominal masses, no palpable masses    RECTAL:  not examined    :  not examined    MSK:   normal muscle tone, no atrophy, no rigidity, no contractions    EXT:   no clubbing, no cyanosis, no edema, no calf pain, swelling or erythema    VASCULAR:  pulses equal and symmetric in the upper and lower extremities    NEURO:  AAOX3, no focal neurological deficits, follows all commands, able to move extremities spontaneously    SKIN:  no ulcers, lesions or rashes    MEDICATIONS  (STANDING):  aspirin enteric coated 81 milliGRAM(s) Oral daily  atorvastatin 40 milliGRAM(s) Oral at bedtime  calcium acetate 2001 milliGRAM(s) Oral three times a day with meals  dextrose 5%. 1000 milliLiter(s) (50 mL/Hr) IV Continuous <Continuous>  dextrose 50% Injectable 12.5 Gram(s) IV Push once  dextrose 50% Injectable 25 Gram(s) IV Push once  dextrose 50% Injectable 25 Gram(s) IV Push once  gabapentin 300 milliGRAM(s) Oral at bedtime  heparin   Injectable 5000 Unit(s) SubCutaneous every 12 hours  heparin   Injectable. 2000 Unit(s) Dialysis. <User Schedule>  insulin lispro (HumaLOG) corrective regimen sliding scale   SubCutaneous three times a day before meals  insulin lispro (HumaLOG) corrective regimen sliding scale   SubCutaneous at bedtime  insulin NPH human recombinant 10 Unit(s) SubCutaneous at bedtime  NIFEdipine XL 90 milliGRAM(s) Oral daily  piperacillin/tazobactam IVPB.. 3.375 Gram(s) IV Intermittent every 12 hours    MEDICATIONS  (PRN):  dextrose 40% Gel 15 Gram(s) Oral once PRN Blood Glucose LESS THAN 70 milliGRAM(s)/deciliter  glucagon  Injectable 1 milliGRAM(s) IntraMuscular once PRN Glucose LESS THAN 70 milligrams/deciliter  zinc oxide 40% Ointment 1 Application(s) Topical four times a day PRN IAD      29 Aug 2020 08:18    139    |  106    |  30     ----------------------------<  91     4.5     |  25     |  6.28     Ca    8.7        29 Aug 2020 08:18  Phos  4.7       29 Aug 2020 08:18  Mg     2.2       29 Aug 2020 08:18    TPro  6.7    /  Alb  3.2    /  TBili  0.5    /  DBili  x      /  AST  7      /  ALT  13     /  AlkPhos  55     29 Aug 2020 08:18  LIVER FUNCTIONS - ( 29 Aug 2020 08:18 )  Alb: 3.2 g/dL / Pro: 6.7 gm/dL / ALK PHOS: 55 U/L / ALT: 13 U/L / AST: 7 U/L / GGT: x         PT/INR - ( 28 Aug 2020 15:40 )   PT: 12.5 sec;   INR: 1.07 ratio         PTT - ( 28 Aug 2020 15:40 )  PTT:32.5 secCBC Full  -  ( 29 Aug 2020 08:18 )  WBC Count : 8.26 K/uL  Hemoglobin : 11.8 g/dL  Hematocrit : 39.7 %  Platelet Count - Automated : 143 K/uL  Mean Cell Volume : 103.7 fl  Mean Cell Hemoglobin : 30.8 pg  Mean Cell Hemoglobin Concentration : 29.7 gm/dL  Auto Neutrophil # : 6.34 K/uL  Auto Lymphocyte # : 0.77 K/uL  Auto Monocyte # : 0.85 K/uL  Auto Eosinophil # : 0.25 K/uL  Auto Basophil # : 0.03 K/uL  Auto Neutrophil % : 76.8 %  Auto Lymphocyte % : 9.3 %  Auto Monocyte % : 10.3 %  Auto Eosinophil % : 3.0 %  Auto Basophil % : 0.4 %            PT/INR - ( 28 Aug 2020 15:40 )   PT: 12.5 sec;   INR: 1.07 ratio         PTT - ( 28 Aug 2020 15:40 )  PTT:32.5 sec    RADIOLOGY RESULTS:            Assessment and Plan:     87 y/o M PMHx significant for CAD, s/p PCI w/ KAREN to proximal RCA  1/2016, and PCI w/ KAREN to LAD and D1 on 11/1/2018, ESRD on HD MWF, CHF (HFpEF), Diabetes mellitus type 2, and bladder cancer s/p resection and neobladder formation presents to  for further evaluation and management profuse watery diarrhea associated with abdominal pain, nausea, and vomiting (non-bilious/non-bloody). The patient notes associated generalized weakness/malaise with increased difficulty in ambulating despite the use of his walker. In the ED the patient was found to be febrile (Tmax 101'F). Of note the patient denied any sick contacts, recent travel, or recent antibiotic use.  Labs => Hgb/Hct 12.8/40.2, LA 2.6 -> 1.8, BUN/Cr 23/4.76, Glu 191, COVID-19 (-). CXR => No evidence of active chest disease. CT ABD/Pel => No acute pathology. Stable cystic pancreatic tail lesion. Status post prostatectomy and neobladder. In th ED the patient received Acetaminophen 650mg PO x 1, Ondansetron 4mg IVP x 1, Piperacillin/tazobactam 3.375g IVPB x 1, Vancomycin 1g IVPB x 1, and NS x 500mL x 1.    #Abdominal pain w/ Diarrhea  -stool studies negative  -patient diahrea now improved  -encourage oral hydration    #CAD/Hypertension  ~cont. ASA 81mg po daily  ~cont. Nifedipine 90mg po daily    #ESRD  ~f/u w/ Nephrology in the am  ~cont. HD via MWF scheduling (per ED patient to have HD in the am on 8/29 as is s/p contrast load)  ~cont. Ca acetate 2001mg po qAC    #Diabetes mellitus  ~FS qAC/HS  ~cont. Basal/Bolus insulin regimen    #Hyperlipidemia  ~cont. statin therapy w/ Atorvastatin 40mg po qhs    #Vte ppx  ~IMPROVE VTE Risk Score is 2  [  ] Previous VTE                                                  3  [  ] Thrombophilia                                               2  [  ] Lower limb paralysis                                      2        (unable to hold up >15 seconds)    [  ] Current Cancer                                              2         (within 6 months)  [X] Immobilization > 24 hrs                                1  [  ] ICU/CCU stay > 24 hours                              1  [X] Age > 60                                                      1  ~cont. Heparin sq      Dispo: anticipate [facility] do after physical therapy sees patient.

## 2020-08-30 NOTE — PROGRESS NOTE ADULT - SUBJECTIVE AND OBJECTIVE BOX
Date of service: 08-30-20 @ 12:56    pt seen and examined  feels better  no further n/v/d    ROS: no fever or chills; denies dizziness, no HA, no SOB or cough, no  constipation; no dysuria, no urinary frequency, no legs pain, no rashes    MEDICATIONS  (STANDING):  aspirin enteric coated 81 milliGRAM(s) Oral daily  atorvastatin 40 milliGRAM(s) Oral at bedtime  calcium acetate 2001 milliGRAM(s) Oral three times a day with meals  dextrose 5%. 1000 milliLiter(s) (50 mL/Hr) IV Continuous <Continuous>  dextrose 50% Injectable 12.5 Gram(s) IV Push once  dextrose 50% Injectable 25 Gram(s) IV Push once  dextrose 50% Injectable 25 Gram(s) IV Push once  gabapentin 300 milliGRAM(s) Oral at bedtime  heparin   Injectable 5000 Unit(s) SubCutaneous every 12 hours  heparin   Injectable. 2000 Unit(s) Dialysis. <User Schedule>  insulin lispro (HumaLOG) corrective regimen sliding scale   SubCutaneous three times a day before meals  insulin lispro (HumaLOG) corrective regimen sliding scale   SubCutaneous at bedtime  insulin NPH human recombinant 10 Unit(s) SubCutaneous at bedtime  NIFEdipine XL 90 milliGRAM(s) Oral daily  piperacillin/tazobactam IVPB.. 3.375 Gram(s) IV Intermittent every 12 hours      Vital Signs Last 24 Hrs  T(C): 36.8 (30 Aug 2020 08:18), Max: 36.8 (30 Aug 2020 08:18)  T(F): 98.2 (30 Aug 2020 08:18), Max: 98.2 (30 Aug 2020 08:18)  HR: 70 (30 Aug 2020 09:34) (65 - 76)  BP: 111/38 (30 Aug 2020 09:34) (109/42 - 161/58)  BP(mean): --  RR: 16 (30 Aug 2020 08:18) (16 - 17)  SpO2: 97% (30 Aug 2020 08:18) (97% - 100%)      PE:  Constitutional: frail looking  HEENT: NC/AT, EOMI, PERRLA, conjunctivae clear; ears and nose atraumatic; pharynx benign  Neck: supple; thyroid not palpable  Back: no tenderness  Respiratory: respiratory effort normal; clear to auscultation  Cardiovascular: S1S2 regular, no murmurs  Abdomen: soft, tender, not distended, positive BS; liver and spleen WNL  Genitourinary: no suprapubic tenderness  Lymphatic: no LN palpable  Musculoskeletal: no muscle tenderness, no joint swelling or tenderness  Extremities: no pedal edema  Neurological/ Psychiatric:  moving all extremities  Skin: no rashes; no palpable lesions    Labs: all available labs reviewed                        11.8   8.26  )-----------( 143      ( 29 Aug 2020 08:18 )             39.7     08-29    139  |  106  |  30<H>  ----------------------------<  91  4.5   |  25  |  6.28<H>    Ca    8.7      29 Aug 2020 08:18  Phos  4.7     08-29  Mg     2.2     08-29    TPro  6.7  /  Alb  3.2<L>  /  TBili  0.5  /  DBili  x   /  AST  7<L>  /  ALT  13  /  AlkPhos  55  08-29         GI PCR Panel, Stool (08.29.20 @ 03:30)    Culture Results:   GI PCR Results: NOT detected  *******Please Note:*******  GI panel PCR evaluates for:  Campylobacter, Plesiomonas shigelloides, Salmonella,  Vibrio, Yersinia enterocolitica, Enteroaggregative  Escherichia coli (EAEC), Enteropathogenic E.coli (EPEC),  Enterotoxigenic E. coli (ETEC) lt/st, Shiga-like  toxin-producing E. coli (STEC) stx1/stx2,  Shigella/ Enteroinvasive E. coli (EIEC), Cryptosporidium,  Cyclospora cayetanensis, Entamoeba histolytica,  Giardia lamblia, Adenovirus F 40/41, Astrovirus,  Norovirus GI/GII, Rotavirus A, Sapovirus    c diff pcr (-)     Culture - Blood (08.28.20 @ 15:40)    Specimen Source: .Blood None    Culture Results:   No growth to date.            Radiology: all available radiological tests reviewed  < from: CT Abdomen and Pelvis w/ IV Cont (08.28.20 @ 18:49) >  EXAM:  CT ABDOMEN AND PELVIS IC                            PROCEDURE DATE:  08/28/2020          INTERPRETATION:  CLINICAL INFORMATION: Diarrhea    COMPARISON: CT chest November 03, 2018, CT abdomen September 28, 2017    PROCEDURE:  CT of the Abdomen and Pelvis was performed with intravenous contrast.  Intravenous contrast: 90 ml Omnipaque 350. 10 ml discarded.  Oral contrast: None.  Sagittal and coronal reformats were performed.    FINDINGS:  LOWER CHEST: Aortic valvular and coronary artery calcifications. Mild dependent atelectasis at the lung bases.    LIVER: Within normal limits.  BILE DUCTS: Normal caliber.  GALLBLADDER: Cholecystectomy.  SPLEEN: Within normal limits.  PANCREAS: 1.5 cm cystic pancreatic tail lesion, unchanged in size. No new pancreatic lesion. No ductal dilatation.  ADRENALS: Within normal limits.  KIDNEYS/URETERS: Markedly atrophic kidneys. No hydronephrosis.    BLADDER: Status post neobladder creation.  REPRODUCTIVE ORGANS: Prostatectomy.    BOWEL: No bowel obstruction. Appendix not visualized. Mid small bowel anastomosis identified.  PERITONEUM: No ascites.  VESSELS: Extensive atherosclerotic arterial calcification. Normal caliber aorta  RETROPERITONEUM/LYMPH NODES: No lymphadenopathy.  ABDOMINAL WALL: Small fat-containing hernia near the gastric abdominal wall.  BONES: No lytic or blastic bony lesion.    IMPRESSION:  No acute pathology.  Stable cystic pancreatic tail lesion.  Status post prostatectomy and neobladder.      Advanced directives addressed: full resuscitation

## 2020-08-31 ENCOUNTER — TRANSCRIPTION ENCOUNTER (OUTPATIENT)
Age: 85
End: 2020-08-31

## 2020-08-31 VITALS
RESPIRATION RATE: 18 BRPM | HEART RATE: 83 BPM | TEMPERATURE: 98 F | SYSTOLIC BLOOD PRESSURE: 160 MMHG | DIASTOLIC BLOOD PRESSURE: 44 MMHG | OXYGEN SATURATION: 100 %

## 2020-08-31 LAB
ALBUMIN SERPL ELPH-MCNC: 3 G/DL — LOW (ref 3.3–5)
ANION GAP SERPL CALC-SCNC: 8 MMOL/L — SIGNIFICANT CHANGE UP (ref 5–17)
BUN SERPL-MCNC: 44 MG/DL — HIGH (ref 7–23)
CALCIUM SERPL-MCNC: 8.6 MG/DL — SIGNIFICANT CHANGE UP (ref 8.5–10.1)
CHLORIDE SERPL-SCNC: 111 MMOL/L — HIGH (ref 96–108)
CO2 SERPL-SCNC: 20 MMOL/L — LOW (ref 22–31)
CREAT SERPL-MCNC: 7.52 MG/DL — HIGH (ref 0.5–1.3)
GLUCOSE SERPL-MCNC: 142 MG/DL — HIGH (ref 70–99)
HCT VFR BLD CALC: 34.3 % — LOW (ref 39–50)
HGB BLD-MCNC: 11.2 G/DL — LOW (ref 13–17)
MCHC RBC-ENTMCNC: 31.9 PG — SIGNIFICANT CHANGE UP (ref 27–34)
MCHC RBC-ENTMCNC: 32.7 GM/DL — SIGNIFICANT CHANGE UP (ref 32–36)
MCV RBC AUTO: 97.7 FL — SIGNIFICANT CHANGE UP (ref 80–100)
PHOSPHATE SERPL-MCNC: 2.6 MG/DL — SIGNIFICANT CHANGE UP (ref 2.5–4.5)
PLATELET # BLD AUTO: 155 K/UL — SIGNIFICANT CHANGE UP (ref 150–400)
POTASSIUM SERPL-MCNC: 3.9 MMOL/L — SIGNIFICANT CHANGE UP (ref 3.5–5.3)
POTASSIUM SERPL-SCNC: 3.9 MMOL/L — SIGNIFICANT CHANGE UP (ref 3.5–5.3)
RBC # BLD: 3.51 M/UL — LOW (ref 4.2–5.8)
RBC # FLD: 14.5 % — SIGNIFICANT CHANGE UP (ref 10.3–14.5)
SODIUM SERPL-SCNC: 139 MMOL/L — SIGNIFICANT CHANGE UP (ref 135–145)
WBC # BLD: 5.54 K/UL — SIGNIFICANT CHANGE UP (ref 3.8–10.5)
WBC # FLD AUTO: 5.54 K/UL — SIGNIFICANT CHANGE UP (ref 3.8–10.5)

## 2020-08-31 PROCEDURE — 99239 HOSP IP/OBS DSCHRG MGMT >30: CPT

## 2020-08-31 RX ORDER — CARVEDILOL PHOSPHATE 80 MG/1
6.25 CAPSULE, EXTENDED RELEASE ORAL ONCE
Refills: 0 | Status: COMPLETED | OUTPATIENT
Start: 2020-08-31 | End: 2020-08-31

## 2020-08-31 RX ADMIN — CARVEDILOL PHOSPHATE 6.25 MILLIGRAM(S): 80 CAPSULE, EXTENDED RELEASE ORAL at 17:40

## 2020-08-31 RX ADMIN — Medication 2001 MILLIGRAM(S): at 08:23

## 2020-08-31 RX ADMIN — HEPARIN SODIUM 5000 UNIT(S): 5000 INJECTION INTRAVENOUS; SUBCUTANEOUS at 09:50

## 2020-08-31 RX ADMIN — LIDOCAINE AND PRILOCAINE CREAM 1 APPLICATION(S): 25; 25 CREAM TOPICAL at 12:08

## 2020-08-31 RX ADMIN — Medication 2: at 12:08

## 2020-08-31 RX ADMIN — Medication 81 MILLIGRAM(S): at 09:49

## 2020-08-31 RX ADMIN — Medication 2001 MILLIGRAM(S): at 17:10

## 2020-08-31 RX ADMIN — Medication 90 MILLIGRAM(S): at 09:50

## 2020-08-31 NOTE — PHYSICAL THERAPY INITIAL EVALUATION ADULT - DIAGNOSIS, PT EVAL
gastroenteritis, suspected  infectious etiology (now improved/stool studies negative) generalized weakness ,ESRD on HD M/W/F,

## 2020-08-31 NOTE — DISCHARGE NOTE NURSING/CASE MANAGEMENT/SOCIAL WORK - PATIENT PORTAL LINK FT
You can access the FollowMyHealth Patient Portal offered by [facility] by registering at the following website: [email or web address]. By joining [employer]’s FollowMyHealth portal, you will also be able to view your health information using other applications (apps) compatible with our system.

## 2020-08-31 NOTE — DISCHARGE NOTE PROVIDER - CARE PROVIDER_API CALL
.,   Outpatient Providers	PCP; Dr. Mancia    Cardiology; Dr. Nitish CARDENAS; Dr. Kam    Nephrology; Dr. Clancy  Phone: (   )    -  Fax: (   )    -  Follow Up Time:

## 2020-08-31 NOTE — DISCHARGE NOTE PROVIDER - HOSPITAL COURSE
Hospital Course:        87 y/o M PMHx significant for CAD, s/p PCI w/ KAREN to proximal RCA  1/2016, and PCI w/ KAREN to LAD and D1 on 11/1/2018, ESRD on HD MWF, CHF (HFpEF), Diabetes mellitus type 2, and bladder cancer s/p resection and neobladder formation presents to  for further evaluation and management profuse watery diarrhea associated with abdominal pain, nausea, and vomiting (non-bilious/non-bloody). The patient notes associated generalized weakness/malaise with increased difficulty in ambulating despite the use of his walker. In the ED the patient was found to be febrile (Tmax 101'F). Of note the patient denied any sick contacts, recent travel, or recent antibiotic use.    Labs => Hgb/Hct 12.8/40.2, LA 2.6 -> 1.8, BUN/Cr 23/4.76, Glu 191, COVID-19 (-). CXR => No evidence of active chest disease. CT ABD/Pel => No acute pathology. Stable cystic pancreatic tail lesion. Status post prostatectomy and neobladder. In th ED the patient received Acetaminophen 650mg PO x 1, Ondansetron 4mg IVP x 1, Piperacillin/tazobactam 3.375g IVPB x 1, Vancomycin 1g IVPB x 1, and NS x 500mL x 1.        #Abdominal pain w/ Diarrhea    -stool studies negative    -patient diahrea now improved    -encourage oral hydration Vital Signs Last 24 Hrs    T(C): 36.6 (31 Aug 2020 13:00), Max: 36.8 (30 Aug 2020 23:50)    T(F): 97.8 (31 Aug 2020 13:00), Max: 98.3 (30 Aug 2020 23:50)    HR: 67 (31 Aug 2020 13:00) (67 - 74)    BP: 132/62 (31 Aug 2020 13:00) (126/41 - 160/62)    BP(mean): --    RR: 18 (31 Aug 2020 13:00) (16 - 18)    SpO2: 98% (31 Aug 2020 08:23) (95% - 98%)        HEENT:   pupils equal and reactive, EOMI, no oropharyngeal lesions, erythema, exudates, oral thrush        NECK:   supple, no carotid bruits, no palpable lymph nodes, no thyromegaly        CV:  +S1, +S2, regular, no murmurs or rubs        RESP:   lungs clear to auscultation bilaterally, no wheezing, rales, rhonchi, good air entry bilaterally        BREAST:  not examined        GI:  abdomen soft, non-tender, non-distended, normal BS, no bruits, no abdominal masses, no palpable masses        RECTAL:  not examined        :  not examined        MSK:   normal muscle tone, no atrophy, no rigidity, no contractions        EXT:   no clubbing, no cyanosis, no edema, no calf pain, swelling or erythema        VASCULAR:  pulses equal and symmetric in the upper and lower extremities        NEURO:  AAOX3, no focal neurological deficits, follows all commands, able to move extremities spontaneously        SKIN:  no ulcers, lesions or rashes                    Hospital Course:        89 y/o M PMHx significant for CAD, s/p PCI w/ KAREN to proximal RCA  1/2016, and PCI w/ KAREN to LAD and D1 on 11/1/2018, ESRD on HD MWF, CHF (HFpEF), Diabetes mellitus type 2, and bladder cancer s/p resection and neobladder formation presents to  for further evaluation and management profuse watery diarrhea associated with abdominal pain, nausea, and vomiting (non-bilious/non-bloody). The patient notes associated generalized weakness/malaise with increased difficulty in ambulating despite the use of his walker. In the ED the patient was found to be febrile (Tmax 101'F). Of note the patient denied any sick contacts, recent travel, or recent antibiotic use.    Labs => Hgb/Hct 12.8/40.2, LA 2.6 -> 1.8, BUN/Cr 23/4.76, Glu 191, COVID-19 (-). CXR => No evidence of active chest disease. CT ABD/Pel => No acute pathology. Stable cystic pancreatic tail lesion. Status post prostatectomy and neobladder. In th ED the patient received Acetaminophen 650mg PO x 1, Ondansetron 4mg IVP x 1, Piperacillin/tazobactam 3.375g IVPB x 1, Vancomycin 1g IVPB x 1, and NS x 500mL x 1.        #Abdominal pain w/ Diarrhea    -stool studies negative    -patient diahrea now improved    -encourage oral hydration

## 2020-08-31 NOTE — DISCHARGE NOTE PROVIDER - PROVIDER TOKENS
FREE:[LAST:[.],PHONE:[(   )    -],FAX:[(   )    -],ADDRESS:[Outpatient Providers	PCP; Dr. Mancia    Cardiology; Dr. Nitish CARDENAS; Dr. Kam    Nephrology; Dr. Clancy]]

## 2020-08-31 NOTE — PHYSICAL THERAPY INITIAL EVALUATION ADULT - GENERAL OBSERVATIONS, REHAB EVAL
sitting up at EOB, posture mildly stooped ,c/o feeling chilly in room with overhead AC vent, wearing long sleeve dress shirt under gown,A& Ox4

## 2020-08-31 NOTE — DISCHARGE NOTE PROVIDER - NSDCMRMEDTOKEN_GEN_ALL_CORE_FT
aspirin 81 mg oral delayed release tablet: 1 tab(s) orally once a day  atorvastatin 40 mg oral tablet: 1 tab(s) orally once a day (at bedtime)  calcium acetate 667 mg oral tablet: 3 tab(s) orally 3 times a day (after meals)  carvedilol 6.25 mg oral tablet: 1 tab(s) orally 2 times a day  gabapentin 100 mg oral capsule: 3 cap(s) orally once a day (at bedtime)  Levemir FlexPen 100 units/mL subcutaneous solution: 20 unit(s) subcutaneous once a day  Multiple Vitamins oral tablet: 1 tab(s) orally once a day  NIFEdipine 90 mg oral tablet, extended release: 1 tab(s) orally once a day

## 2020-08-31 NOTE — DISCHARGE NOTE PROVIDER - NSDCCPCAREPLAN_GEN_ALL_CORE_FT
PRINCIPAL DISCHARGE DIAGNOSIS  Diagnosis: Diarrhea  Assessment and Plan of Treatment: continue with home oral hydration and follow up with outpatient primary care provider in one week.

## 2020-09-02 DIAGNOSIS — E86.0 DEHYDRATION: ICD-10-CM

## 2020-09-02 DIAGNOSIS — Z99.2 DEPENDENCE ON RENAL DIALYSIS: ICD-10-CM

## 2020-09-02 DIAGNOSIS — D89.9 DISORDER INVOLVING THE IMMUNE MECHANISM, UNSPECIFIED: ICD-10-CM

## 2020-09-02 DIAGNOSIS — Z85.51 PERSONAL HISTORY OF MALIGNANT NEOPLASM OF BLADDER: ICD-10-CM

## 2020-09-02 DIAGNOSIS — I50.32 CHRONIC DIASTOLIC (CONGESTIVE) HEART FAILURE: ICD-10-CM

## 2020-09-02 DIAGNOSIS — R10.9 UNSPECIFIED ABDOMINAL PAIN: ICD-10-CM

## 2020-09-02 DIAGNOSIS — N18.6 END STAGE RENAL DISEASE: ICD-10-CM

## 2020-09-02 DIAGNOSIS — I25.10 ATHEROSCLEROTIC HEART DISEASE OF NATIVE CORONARY ARTERY WITHOUT ANGINA PECTORIS: ICD-10-CM

## 2020-09-02 DIAGNOSIS — E11.22 TYPE 2 DIABETES MELLITUS WITH DIABETIC CHRONIC KIDNEY DISEASE: ICD-10-CM

## 2020-09-02 DIAGNOSIS — R19.7 DIARRHEA, UNSPECIFIED: ICD-10-CM

## 2020-09-02 DIAGNOSIS — I13.2 HYPERTENSIVE HEART AND CHRONIC KIDNEY DISEASE WITH HEART FAILURE AND WITH STAGE 5 CHRONIC KIDNEY DISEASE, OR END STAGE RENAL DISEASE: ICD-10-CM

## 2020-10-24 ENCOUNTER — EMERGENCY (EMERGENCY)
Facility: HOSPITAL | Age: 85
LOS: 0 days | Discharge: HOME | End: 2020-10-25
Attending: EMERGENCY MEDICINE
Payer: MEDICARE

## 2020-10-24 VITALS — HEIGHT: 66 IN | WEIGHT: 164.91 LBS

## 2020-10-24 DIAGNOSIS — D63.1 ANEMIA IN CHRONIC KIDNEY DISEASE: ICD-10-CM

## 2020-10-24 DIAGNOSIS — Z98.89 OTHER SPECIFIED POSTPROCEDURAL STATES: Chronic | ICD-10-CM

## 2020-10-24 DIAGNOSIS — Z95.5 PRESENCE OF CORONARY ANGIOPLASTY IMPLANT AND GRAFT: ICD-10-CM

## 2020-10-24 DIAGNOSIS — Z98.890 OTHER SPECIFIED POSTPROCEDURAL STATES: Chronic | ICD-10-CM

## 2020-10-24 DIAGNOSIS — I13.2 HYPERTENSIVE HEART AND CHRONIC KIDNEY DISEASE WITH HEART FAILURE AND WITH STAGE 5 CHRONIC KIDNEY DISEASE, OR END STAGE RENAL DISEASE: ICD-10-CM

## 2020-10-24 DIAGNOSIS — E11.22 TYPE 2 DIABETES MELLITUS WITH DIABETIC CHRONIC KIDNEY DISEASE: ICD-10-CM

## 2020-10-24 DIAGNOSIS — Z99.2 DEPENDENCE ON RENAL DIALYSIS: ICD-10-CM

## 2020-10-24 DIAGNOSIS — Z85.51 PERSONAL HISTORY OF MALIGNANT NEOPLASM OF BLADDER: ICD-10-CM

## 2020-10-24 DIAGNOSIS — I50.9 HEART FAILURE, UNSPECIFIED: ICD-10-CM

## 2020-10-24 DIAGNOSIS — N18.6 END STAGE RENAL DISEASE: ICD-10-CM

## 2020-10-24 DIAGNOSIS — Z90.49 ACQUIRED ABSENCE OF OTHER SPECIFIED PARTS OF DIGESTIVE TRACT: Chronic | ICD-10-CM

## 2020-10-24 DIAGNOSIS — Z85.51 PERSONAL HISTORY OF MALIGNANT NEOPLASM OF BLADDER: Chronic | ICD-10-CM

## 2020-10-24 DIAGNOSIS — Z79.82 LONG TERM (CURRENT) USE OF ASPIRIN: ICD-10-CM

## 2020-10-24 DIAGNOSIS — I25.10 ATHEROSCLEROTIC HEART DISEASE OF NATIVE CORONARY ARTERY WITHOUT ANGINA PECTORIS: ICD-10-CM

## 2020-10-24 DIAGNOSIS — D64.9 ANEMIA, UNSPECIFIED: ICD-10-CM

## 2020-10-24 LAB
ALBUMIN SERPL ELPH-MCNC: 3.7 G/DL — SIGNIFICANT CHANGE UP (ref 3.3–5)
ALP SERPL-CCNC: 78 U/L — SIGNIFICANT CHANGE UP (ref 40–120)
ALT FLD-CCNC: 21 U/L — SIGNIFICANT CHANGE UP (ref 12–78)
ANION GAP SERPL CALC-SCNC: 8 MMOL/L — SIGNIFICANT CHANGE UP (ref 5–17)
APTT BLD: 30.9 SEC — SIGNIFICANT CHANGE UP (ref 27.5–35.5)
AST SERPL-CCNC: 19 U/L — SIGNIFICANT CHANGE UP (ref 15–37)
BASOPHILS # BLD AUTO: 0.06 K/UL — SIGNIFICANT CHANGE UP (ref 0–0.2)
BASOPHILS NFR BLD AUTO: 0.7 % — SIGNIFICANT CHANGE UP (ref 0–2)
BILIRUB SERPL-MCNC: 0.5 MG/DL — SIGNIFICANT CHANGE UP (ref 0.2–1.2)
BUN SERPL-MCNC: 40 MG/DL — HIGH (ref 7–23)
CALCIUM SERPL-MCNC: 8.9 MG/DL — SIGNIFICANT CHANGE UP (ref 8.5–10.1)
CHLORIDE SERPL-SCNC: 107 MMOL/L — SIGNIFICANT CHANGE UP (ref 96–108)
CO2 SERPL-SCNC: 23 MMOL/L — SIGNIFICANT CHANGE UP (ref 22–31)
CREAT SERPL-MCNC: 6.43 MG/DL — HIGH (ref 0.5–1.3)
EOSINOPHIL # BLD AUTO: 0.27 K/UL — SIGNIFICANT CHANGE UP (ref 0–0.5)
EOSINOPHIL NFR BLD AUTO: 3.1 % — SIGNIFICANT CHANGE UP (ref 0–6)
GLUCOSE SERPL-MCNC: 291 MG/DL — HIGH (ref 70–99)
HCT VFR BLD CALC: 27.7 % — LOW (ref 39–50)
HGB BLD-MCNC: 8.9 G/DL — LOW (ref 13–17)
IMM GRANULOCYTES NFR BLD AUTO: 0.3 % — SIGNIFICANT CHANGE UP (ref 0–1.5)
INR BLD: 1.03 RATIO — SIGNIFICANT CHANGE UP (ref 0.88–1.16)
LYMPHOCYTES # BLD AUTO: 1.36 K/UL — SIGNIFICANT CHANGE UP (ref 1–3.3)
LYMPHOCYTES # BLD AUTO: 15.7 % — SIGNIFICANT CHANGE UP (ref 13–44)
MCHC RBC-ENTMCNC: 31.8 PG — SIGNIFICANT CHANGE UP (ref 27–34)
MCHC RBC-ENTMCNC: 32.1 GM/DL — SIGNIFICANT CHANGE UP (ref 32–36)
MCV RBC AUTO: 98.9 FL — SIGNIFICANT CHANGE UP (ref 80–100)
MONOCYTES # BLD AUTO: 0.81 K/UL — SIGNIFICANT CHANGE UP (ref 0–0.9)
MONOCYTES NFR BLD AUTO: 9.3 % — SIGNIFICANT CHANGE UP (ref 2–14)
NEUTROPHILS # BLD AUTO: 6.14 K/UL — SIGNIFICANT CHANGE UP (ref 1.8–7.4)
NEUTROPHILS NFR BLD AUTO: 70.9 % — SIGNIFICANT CHANGE UP (ref 43–77)
PLATELET # BLD AUTO: 205 K/UL — SIGNIFICANT CHANGE UP (ref 150–400)
POTASSIUM SERPL-MCNC: 4.3 MMOL/L — SIGNIFICANT CHANGE UP (ref 3.5–5.3)
POTASSIUM SERPL-SCNC: 4.3 MMOL/L — SIGNIFICANT CHANGE UP (ref 3.5–5.3)
PROT SERPL-MCNC: 7.3 GM/DL — SIGNIFICANT CHANGE UP (ref 6–8.3)
PROTHROM AB SERPL-ACNC: 12 SEC — SIGNIFICANT CHANGE UP (ref 10.6–13.6)
RBC # BLD: 2.8 M/UL — LOW (ref 4.2–5.8)
RBC # FLD: 15.8 % — HIGH (ref 10.3–14.5)
SODIUM SERPL-SCNC: 138 MMOL/L — SIGNIFICANT CHANGE UP (ref 135–145)
WBC # BLD: 8.67 K/UL — SIGNIFICANT CHANGE UP (ref 3.8–10.5)
WBC # FLD AUTO: 8.67 K/UL — SIGNIFICANT CHANGE UP (ref 3.8–10.5)

## 2020-10-24 PROCEDURE — 86900 BLOOD TYPING SEROLOGIC ABO: CPT

## 2020-10-24 PROCEDURE — 86850 RBC ANTIBODY SCREEN: CPT

## 2020-10-24 PROCEDURE — 86901 BLOOD TYPING SEROLOGIC RH(D): CPT

## 2020-10-24 PROCEDURE — 99283 EMERGENCY DEPT VISIT LOW MDM: CPT | Mod: 25

## 2020-10-24 PROCEDURE — 80053 COMPREHEN METABOLIC PANEL: CPT

## 2020-10-24 PROCEDURE — 85025 COMPLETE CBC W/AUTO DIFF WBC: CPT

## 2020-10-24 PROCEDURE — 86870 RBC ANTIBODY IDENTIFICATION: CPT

## 2020-10-24 PROCEDURE — 93010 ELECTROCARDIOGRAM REPORT: CPT

## 2020-10-24 PROCEDURE — 36415 COLL VENOUS BLD VENIPUNCTURE: CPT

## 2020-10-24 PROCEDURE — 86077 PHYS BLOOD BANK SERV XMATCH: CPT

## 2020-10-24 PROCEDURE — 93005 ELECTROCARDIOGRAM TRACING: CPT

## 2020-10-24 PROCEDURE — 85730 THROMBOPLASTIN TIME PARTIAL: CPT

## 2020-10-24 PROCEDURE — 86880 COOMBS TEST DIRECT: CPT

## 2020-10-24 PROCEDURE — 85610 PROTHROMBIN TIME: CPT

## 2020-10-24 PROCEDURE — 99284 EMERGENCY DEPT VISIT MOD MDM: CPT

## 2020-10-24 NOTE — ED PROVIDER NOTE - NSFOLLOWUPINSTRUCTIONS_ED_ALL_ED_FT
[facility] at [address] [place] [address] Road [address] [phone]               Thank you for choosing us for your health care visit with Breana Johnson MD.  We are glad to serve you and happy to provide Order:  Surgery - Internal    Roc Kendrick MD   [address] [place] [address] [address] [place] [address]   [address] [facility] [address]   Phone:  [phone]   Fax:  [phone]         Referral Orders      Normal Orders This Visit    SURGERY - INTERNAL [[id number] CUSTOM]  Order #:  [id number] Take  by mouth daily. MyChart     Sign up for [employer], your secure online medical record. [employer] will allow you to access patient instructions from your recent visit,  view other health information, and more.  To sign up or find more inf Get your heart pumping – brisk walking, biking, swimming Even 10 minute increments are effective and add up over the week   2 ½ hours per week – spread out over time Use a haley to keep you motivated   Don’t forget strength training with weights and resist Continue your regular medications as per routine.  Follow up Monday, 10/26, with your regular dialysis session.  At that time, follow up with Dr. Clancy about whether to repeat blood tests/blood count.      ED evaluation and management discussed with the patient and family (if available) in detail.  Close PMD follow up encouraged.  Strict ED return instructions discussed in detail and patient given the opportunity to ask any questions about their discharge diagnosis and instructions. Patient verbalized understanding.

## 2020-10-24 NOTE — ED PROVIDER NOTE - RESPIRATORY, MLM
Breath sounds clear and equal bilaterally. Breath sounds clear and equal bilaterally. lungs are clear, respirations normal

## 2020-10-24 NOTE — ED PROVIDER NOTE - OBJECTIVE STATEMENT
89 y/o male with PMHx of Chronic CHF, DM type 2, ESRD on HD MWF, HTN presents to ED sent in by Nephrologist Dr. Richmond for worsening anemia. States Hgb dropped from 8.0 to 7.0. States he is feeling at baseline, asymptomatic. Denies seeing any bloody stools. Denies fevers. Pt presents for repeat blood testing, last transfusion was in hospital within a year. Denies any other complaints. 89 y/o male with PMHx of Chronic CHF, DM type 2, ESRD on HD MWF, HTN, CAD with stents presents to ED sent in by Nephrologist Dr. Richmond for worsening anemia. Pt was at routine dialysis yesterday and states Hgb dropped from 8.0 to 7.0. States he is feeling at baseline, asymptomatic. Pt presents for repeat blood testing, last transfusion was in hospital within a year. Pt states he's been feeling good. Endorses good appetite. Denies chest pain, bloody/dark stools, fevers, abd pain. Denies any other complaints. Takes baby ASA daily.

## 2020-10-24 NOTE — ED PROVIDER NOTE - ENMT, MLM
Airway patent, Nasal mucosa clear. Mouth with normal mucosa. Throat has no vesicles, no oropharyngeal exudates and uvula is midline. Airway patent, Nasal mucosa clear. Mouth with normal mucosa. Throat has no vesicles, no oropharyngeal exudates and uvula is midline. oropharynx clear mucus membranes slightly dry. +denture.

## 2020-10-24 NOTE — ED PROVIDER NOTE - PROGRESS NOTE DETAILS
Dr. Del Cid:  Case d/w Dr. Baldwin, covering for Dr. Clancy, incl. labs results.  He agrees PRBC transfusion is NOT emergently indicated given current Hgb of 9 & agrees with pt D/C to continue w/ HD in 2 dd. as per routine, Dr. Clancy to follow up at that time.

## 2020-10-24 NOTE — ED PROVIDER NOTE - EYES, MLM
Clear bilaterally, pupils equal, round and reactive to light. Clear bilaterally, pupils equal, round and reactive to light. PERLIOMI. conjunctive mildly pale.

## 2020-10-24 NOTE — ED PROVIDER NOTE - PATIENT PORTAL LINK FT
You can access the FollowMyHealth Patient Portal offered by [facility] by registering at the following website: [email or web address]. By joining [employer]’s FollowMyHealth portal, you will also be able to view your health information using other applications (apps) compatible with our system.

## 2020-10-24 NOTE — ED ADULT NURSE NOTE - OBJECTIVE STATEMENT
Patient reports to ED stating that his doctor told him his "hemoglobin was low". Patient got dialysis yesterday and got blood work taken with dialysis. Denies any pain. Denies chest pain/SOB. Denies any symptoms of low hemoglobin. A+Ox4.

## 2020-10-24 NOTE — ED PROVIDER NOTE - CONSTITUTIONAL, MLM
normal... Well appearing, awake, alert, oriented to person, place, time/situation and in no apparent distress. elderly white male, awake, alert, oriented to person, place, time/situation and in no apparent distress.

## 2020-10-24 NOTE — ED PROVIDER NOTE - CARE PROVIDER_API CALL
Juan Clancy  NEPHROLOGY  [address], [address]  [place] [address]  Phone: [phone]  Fax: [phone]  Follow Up Time:

## 2020-10-24 NOTE — ED STATDOCS - PSH
History of appendectomy    History of bladder cancer  s/p resection with neobladder  History of cholecystectomy    History of exploratory laparotomy  Perforated Gastric Ulcer, Peritonitis  History of percutaneous angioplasty  PCI w/ KAREN RCA 12/2016, KAREN to LAD and D1 on 11/1/2018  S/P arteriovenous (AV) fistula creation  Right

## 2020-10-24 NOTE — ED ADULT NURSE NOTE - NSIMPLEMENTINTERV_GEN_ALL_ED
Implemented All Universal Safety Interventions:  [place] to call system. Call bell, personal items and telephone within reach. Instruct patient to call for assistance. Room bathroom lighting operational. Non-slip footwear when patient is off stretcher. Physically safe environment: no spills, clutter or unnecessary equipment. Stretcher in lowest position, wheels locked, appropriate side rails in place.

## 2020-10-24 NOTE — ED PROVIDER NOTE - CLINICAL SUMMARY MEDICAL DECISION MAKING FREE TEXT BOX
87 y/o male ESRD on Dialysis MWF came to the ed from  regarding worsening anemia. Blood work from yesterday reportedly hemoglobin 7. pt with no complaints feels well. Plan: repeat labs, discuss with .

## 2020-10-24 NOTE — ED PROVIDER NOTE - MUSCULOSKELETAL, MLM
Spine appears normal, range of motion is not limited, no muscle or joint tenderness Spine appears normal, range of motion is not limited, no muscle or joint tenderness. DONIS x 4. av fistula R anticubidal area +palpable thrill.

## 2020-10-24 NOTE — ED ADULT TRIAGE NOTE - CHIEF COMPLAINT QUOTE
presents to ed status post dialysis (mwf) patient had dialysis on friday and MD martínez reported that his HGB dropped from 8.0  to 7.0, instructed patient to go to the ED. denies abdominal pain, nausea vomiting, shortness of breath, bloody stool.

## 2020-10-24 NOTE — ED STATDOCS - PMH
Bladder cancer    Chronic CHF    ESRD on dialysis  MWF  HTN (hypertension)    Moderate aortic stenosis    Type 2 diabetes mellitus

## 2020-10-24 NOTE — ED STATDOCS - PROGRESS NOTE DETAILS
Raghu FERRARO for ED attending Dr. Lombardi: 87 y/o male with PMHx of Chronic CHF, DM type 2, ESRD on HD MWF, HTN presents to ED sent in by Dr. Richmond for anemia. States Hgb dropped from 8.0 to 7.0. Denies any other complaints. Will send pt to main ED for further evaluation. Raghu FERRARO for ED attending Dr. Lombardi: 87 y/o male with PMHx of Chronic CHF, DM type 2, ESRD on HD MWF, HTN presents to ED sent in by Nephrologist Dr. Richmond for worsening anemia. States Hgb dropped from 8.0 to 7.0. States he is feeling at baseline, asymptomatic. Denies seeing any bloody stools. Denies fevers. Pt presents for repeat blood testing, last transfusion was in hospital within a year. Denies any other complaints. Will send pt to main ED for further evaluation.

## 2020-10-25 VITALS
HEART RATE: 75 BPM | DIASTOLIC BLOOD PRESSURE: 50 MMHG | OXYGEN SATURATION: 97 % | RESPIRATION RATE: 20 BRPM | SYSTOLIC BLOOD PRESSURE: 130 MMHG | TEMPERATURE: 98 F

## 2020-10-25 PROBLEM — I35.0 NONRHEUMATIC AORTIC (VALVE) STENOSIS: Chronic | Status: ACTIVE | Noted: 2020-08-29

## 2020-10-25 PROBLEM — N18.6 END STAGE RENAL DISEASE: Chronic | Status: ACTIVE | Noted: 2017-03-26

## 2020-10-25 PROBLEM — E11.9 TYPE 2 DIABETES MELLITUS WITHOUT COMPLICATIONS: Chronic | Status: ACTIVE | Noted: 2020-08-29

## 2020-10-25 PROBLEM — I50.9 HEART FAILURE, UNSPECIFIED: Chronic | Status: ACTIVE | Noted: 2020-08-29

## 2020-11-01 ENCOUNTER — INPATIENT (INPATIENT)
Facility: HOSPITAL | Age: 85
LOS: 1 days | Discharge: HOME | DRG: 388 | End: 2020-11-03
Attending: HOSPITALIST | Admitting: INTERNAL MEDICINE
Payer: MEDICARE

## 2020-11-01 VITALS
SYSTOLIC BLOOD PRESSURE: 110 MMHG | WEIGHT: 160.94 LBS | HEIGHT: 66 IN | DIASTOLIC BLOOD PRESSURE: 39 MMHG | HEART RATE: 87 BPM | TEMPERATURE: 98 F | RESPIRATION RATE: 16 BRPM

## 2020-11-01 DIAGNOSIS — Z90.49 ACQUIRED ABSENCE OF OTHER SPECIFIED PARTS OF DIGESTIVE TRACT: Chronic | ICD-10-CM

## 2020-11-01 DIAGNOSIS — Z85.51 PERSONAL HISTORY OF MALIGNANT NEOPLASM OF BLADDER: Chronic | ICD-10-CM

## 2020-11-01 DIAGNOSIS — Z98.890 OTHER SPECIFIED POSTPROCEDURAL STATES: Chronic | ICD-10-CM

## 2020-11-01 DIAGNOSIS — R11.10 VOMITING, UNSPECIFIED: ICD-10-CM

## 2020-11-01 DIAGNOSIS — Z98.89 OTHER SPECIFIED POSTPROCEDURAL STATES: Chronic | ICD-10-CM

## 2020-11-01 LAB
ALBUMIN SERPL ELPH-MCNC: 3.7 G/DL — SIGNIFICANT CHANGE UP (ref 3.3–5)
ALP SERPL-CCNC: 75 U/L — SIGNIFICANT CHANGE UP (ref 40–120)
ALT FLD-CCNC: 21 U/L — SIGNIFICANT CHANGE UP (ref 12–78)
ANION GAP SERPL CALC-SCNC: 13 MMOL/L — SIGNIFICANT CHANGE UP (ref 5–17)
AST SERPL-CCNC: 15 U/L — SIGNIFICANT CHANGE UP (ref 15–37)
BASOPHILS # BLD AUTO: 0 K/UL — SIGNIFICANT CHANGE UP (ref 0–0.2)
BASOPHILS NFR BLD AUTO: 0 % — SIGNIFICANT CHANGE UP (ref 0–2)
BILIRUB SERPL-MCNC: 0.5 MG/DL — SIGNIFICANT CHANGE UP (ref 0.2–1.2)
BUN SERPL-MCNC: 65 MG/DL — HIGH (ref 7–23)
CALCIUM SERPL-MCNC: 9 MG/DL — SIGNIFICANT CHANGE UP (ref 8.5–10.1)
CHLORIDE SERPL-SCNC: 105 MMOL/L — SIGNIFICANT CHANGE UP (ref 96–108)
CO2 SERPL-SCNC: 16 MMOL/L — LOW (ref 22–31)
CREAT SERPL-MCNC: 8.74 MG/DL — HIGH (ref 0.5–1.3)
EOSINOPHIL # BLD AUTO: 0 K/UL — SIGNIFICANT CHANGE UP (ref 0–0.5)
EOSINOPHIL NFR BLD AUTO: 0 % — SIGNIFICANT CHANGE UP (ref 0–6)
GLUCOSE SERPL-MCNC: 236 MG/DL — HIGH (ref 70–99)
HCT VFR BLD CALC: 31.1 % — LOW (ref 39–50)
HGB BLD-MCNC: 9.9 G/DL — LOW (ref 13–17)
LACTATE SERPL-SCNC: 2.5 MMOL/L — HIGH (ref 0.7–2)
LYMPHOCYTES # BLD AUTO: 0.39 K/UL — LOW (ref 1–3.3)
LYMPHOCYTES # BLD AUTO: 4 % — LOW (ref 13–44)
MAGNESIUM SERPL-MCNC: 1.9 MG/DL — SIGNIFICANT CHANGE UP (ref 1.6–2.6)
MCHC RBC-ENTMCNC: 31.4 PG — SIGNIFICANT CHANGE UP (ref 27–34)
MCHC RBC-ENTMCNC: 31.8 GM/DL — LOW (ref 32–36)
MCV RBC AUTO: 98.7 FL — SIGNIFICANT CHANGE UP (ref 80–100)
MONOCYTES # BLD AUTO: 0.77 K/UL — SIGNIFICANT CHANGE UP (ref 0–0.9)
MONOCYTES NFR BLD AUTO: 8 % — SIGNIFICANT CHANGE UP (ref 2–14)
NEUTROPHILS # BLD AUTO: 8.1 K/UL — HIGH (ref 1.8–7.4)
NEUTROPHILS NFR BLD AUTO: 72 % — SIGNIFICANT CHANGE UP (ref 43–77)
NRBC # BLD: SIGNIFICANT CHANGE UP /100 WBCS (ref 0–0)
PLATELET # BLD AUTO: 235 K/UL — SIGNIFICANT CHANGE UP (ref 150–400)
POTASSIUM SERPL-MCNC: 4.3 MMOL/L — SIGNIFICANT CHANGE UP (ref 3.5–5.3)
POTASSIUM SERPL-SCNC: 4.3 MMOL/L — SIGNIFICANT CHANGE UP (ref 3.5–5.3)
PROT SERPL-MCNC: 7.5 GM/DL — SIGNIFICANT CHANGE UP (ref 6–8.3)
RBC # BLD: 3.15 M/UL — LOW (ref 4.2–5.8)
RBC # FLD: 16.3 % — HIGH (ref 10.3–14.5)
SODIUM SERPL-SCNC: 134 MMOL/L — LOW (ref 135–145)
TROPONIN I SERPL-MCNC: 0.03 NG/ML — SIGNIFICANT CHANGE UP (ref 0.01–0.04)
TROPONIN I SERPL-MCNC: 0.04 NG/ML — SIGNIFICANT CHANGE UP (ref 0.01–0.04)
WBC # BLD: 9.64 K/UL — SIGNIFICANT CHANGE UP (ref 3.8–10.5)
WBC # FLD AUTO: 9.64 K/UL — SIGNIFICANT CHANGE UP (ref 3.8–10.5)

## 2020-11-01 PROCEDURE — 85025 COMPLETE CBC W/AUTO DIFF WBC: CPT

## 2020-11-01 PROCEDURE — 82728 ASSAY OF FERRITIN: CPT

## 2020-11-01 PROCEDURE — 97116 GAIT TRAINING THERAPY: CPT | Mod: GP

## 2020-11-01 PROCEDURE — 74176 CT ABD & PELVIS W/O CONTRAST: CPT

## 2020-11-01 PROCEDURE — 85610 PROTHROMBIN TIME: CPT

## 2020-11-01 PROCEDURE — 80061 LIPID PANEL: CPT

## 2020-11-01 PROCEDURE — 97162 PT EVAL MOD COMPLEX 30 MIN: CPT | Mod: GP

## 2020-11-01 PROCEDURE — 93005 ELECTROCARDIOGRAM TRACING: CPT

## 2020-11-01 PROCEDURE — 87040 BLOOD CULTURE FOR BACTERIA: CPT

## 2020-11-01 PROCEDURE — 83550 IRON BINDING TEST: CPT

## 2020-11-01 PROCEDURE — 83735 ASSAY OF MAGNESIUM: CPT

## 2020-11-01 PROCEDURE — 82746 ASSAY OF FOLIC ACID SERUM: CPT

## 2020-11-01 PROCEDURE — 93010 ELECTROCARDIOGRAM REPORT: CPT

## 2020-11-01 PROCEDURE — 74019 RADEX ABDOMEN 2 VIEWS: CPT

## 2020-11-01 PROCEDURE — 83605 ASSAY OF LACTIC ACID: CPT

## 2020-11-01 PROCEDURE — 99223 1ST HOSP IP/OBS HIGH 75: CPT

## 2020-11-01 PROCEDURE — 82607 VITAMIN B-12: CPT

## 2020-11-01 PROCEDURE — 85027 COMPLETE CBC AUTOMATED: CPT

## 2020-11-01 PROCEDURE — 80048 BASIC METABOLIC PNL TOTAL CA: CPT

## 2020-11-01 PROCEDURE — 86769 SARS-COV-2 COVID-19 ANTIBODY: CPT

## 2020-11-01 PROCEDURE — 74250 X-RAY XM SM INT 1CNTRST STD: CPT

## 2020-11-01 PROCEDURE — 83036 HEMOGLOBIN GLYCOSYLATED A1C: CPT

## 2020-11-01 PROCEDURE — 74176 CT ABD & PELVIS W/O CONTRAST: CPT | Mod: 26

## 2020-11-01 PROCEDURE — 99497 ADVNCD CARE PLAN 30 MIN: CPT | Mod: 25

## 2020-11-01 PROCEDURE — 99233 SBSQ HOSP IP/OBS HIGH 50: CPT

## 2020-11-01 PROCEDURE — 83540 ASSAY OF IRON: CPT

## 2020-11-01 PROCEDURE — 90935 HEMODIALYSIS ONE EVALUATION: CPT

## 2020-11-01 PROCEDURE — 36415 COLL VENOUS BLD VENIPUNCTURE: CPT

## 2020-11-01 PROCEDURE — 80053 COMPREHEN METABOLIC PANEL: CPT

## 2020-11-01 PROCEDURE — 87507 IADNA-DNA/RNA PROBE TQ 12-25: CPT

## 2020-11-01 PROCEDURE — 71045 X-RAY EXAM CHEST 1 VIEW: CPT | Mod: 26

## 2020-11-01 PROCEDURE — 82962 GLUCOSE BLOOD TEST: CPT

## 2020-11-01 PROCEDURE — 80074 ACUTE HEPATITIS PANEL: CPT

## 2020-11-01 PROCEDURE — 84100 ASSAY OF PHOSPHORUS: CPT

## 2020-11-01 RX ORDER — SODIUM CHLORIDE 9 MG/ML
500 INJECTION INTRAMUSCULAR; INTRAVENOUS; SUBCUTANEOUS ONCE
Refills: 0 | Status: COMPLETED | OUTPATIENT
Start: 2020-11-01 | End: 2020-11-01

## 2020-11-01 RX ORDER — OXYCODONE AND ACETAMINOPHEN 5; 325 MG/1; MG/1
2 TABLET ORAL ONCE
Refills: 0 | Status: DISCONTINUED | OUTPATIENT
Start: 2020-11-01 | End: 2020-11-01

## 2020-11-01 RX ORDER — METHOCARBAMOL 500 MG/1
500 TABLET, FILM COATED ORAL ONCE
Refills: 0 | Status: DISCONTINUED | OUTPATIENT
Start: 2020-11-01 | End: 2020-11-01

## 2020-11-01 RX ORDER — ONDANSETRON 8 MG/1
4 TABLET, FILM COATED ORAL ONCE
Refills: 0 | Status: COMPLETED | OUTPATIENT
Start: 2020-11-01 | End: 2020-11-01

## 2020-11-01 RX ADMIN — SODIUM CHLORIDE 500 MILLILITER(S): 9 INJECTION INTRAMUSCULAR; INTRAVENOUS; SUBCUTANEOUS at 22:38

## 2020-11-01 RX ADMIN — SODIUM CHLORIDE 500 MILLILITER(S): 9 INJECTION INTRAMUSCULAR; INTRAVENOUS; SUBCUTANEOUS at 23:38

## 2020-11-01 RX ADMIN — ONDANSETRON 4 MILLIGRAM(S): 8 TABLET, FILM COATED ORAL at 18:00

## 2020-11-01 NOTE — ED PROVIDER NOTE - NS_ ATTENDINGSCRIBEDETAILS _ED_A_ED_FT
I, Mayito Xiao MD,  performed the initial face to face bedside interview with this patient regarding history of present illness, review of symptoms and relevant past medical, social and family history.  I completed an independent physical examination.    The history, relevant review of systems, past medical and surgical history, medical decision making, and physical examination was documented by the scribe in my presence and I attest to the accuracy of the documentation.

## 2020-11-01 NOTE — ED PROVIDER NOTE - OBJECTIVE STATEMENT
87 y/o male with a PMHx of moderate aortic stenosis, CHF, DMII, ESRD on dialysis (M/W/F) s/p right AV fistula, HTN, bladder CA s/p resection with neobladder, s/p cholecystectomy, s/p appendectomy, s/p cardiac stents, presents to the ED BIBEMS c/o generalized weakness, nausea, and vomiting x1 days. Denies chest pain, abdominal pain, SOB. Last dialysis 2 days ago. Pt was seen at [facility] 8 days ago for anemia, did not receive transfusion, and was discharged home the same day. Pt denies current nausea. No other complaints at this time. Intolerances: Lisinopril. Nephrologist Dr. Clancy

## 2020-11-01 NOTE — ED ADULT TRIAGE NOTE - CHIEF COMPLAINT QUOTE
Pt BIBA states nausea and vomiting all day, generalized weakness. Pt denies chest pain or shortness of breath,

## 2020-11-01 NOTE — ED PROVIDER NOTE - PROGRESS NOTE DETAILS
Rgahu Jones for attending Dr. Xiao: Spoke with nephrology Dr. Silva and informed him of lab values and pt's reason for visit which is nausea and vomiting. States at this point does not believe pt needs emergent dialysis, would be comfortable to discharge and keep pt dialysis tomorrow. Plan for 2nd troponin and likely discharge. Ninoska SALMERON: Spoke with patient and tried to ambulate patient who is very weak. States he does not feel like he has energy. Afebrile in ED. Pt has not vomited since being in ED after meds. Because of weakness will admit patient. Spoke with Dr. Baum hospitalist who will admit patient for vomiting/weakness elevated bands noted. Added Ct abd/pelv and lactic acid. JG:  Patient admitted to medicine for bandemia.  Lactate elevated.  Blood cultures and empiric zosyn added.  Nurse will communicate this to hospitalist. Received signout from Dr. Xiao that patient is a "Do Not Move" and that CT results are pending.  Dr. Toth notified and coming to see patient. JG:  CT showing ileus or partial SBO.  Dr. Rudolph on call for general surgery paged.  Case discussed.  Will come see patient.  Recommends NGT at this time. Dr. Toth, hospitalist aware of CT reading and is awaiting surgery recommendations before admitting to medical service.

## 2020-11-01 NOTE — ED ADULT NURSE REASSESSMENT NOTE - NS ED NURSE REASSESS COMMENT FT1
Pt's spouse notified of patient's plan to be admitted and recv dialysis tomorrow. Pt failed trial ambulation MD Ninoska notified and canceled discharge home.

## 2020-11-01 NOTE — ED ADULT NURSE REASSESSMENT NOTE - NS ED NURSE REASSESS COMMENT FT1
Assumed care of patient from LIDYA Marino.  Patient A&O resting in stretcher.  IVF infusing at this time.  Oxygen saturation 96-97% on 2L NC.  Call bell within reach, safety maintained.

## 2020-11-01 NOTE — ED PROVIDER NOTE - PATIENT PORTAL LINK FT
You can access the FollowMyHealth Patient Portal offered by [facility] by registering at the following website: [email or web address]. By joining [employer]’s FollowMyHealth portal, you will also be able to view your health information using other applications (apps) compatible with our system.

## 2020-11-01 NOTE — ED PROVIDER NOTE - CONSTITUTIONAL, MLM
normal... +Dry vomitus to clothing. Well appearing, awake, alert, oriented to person, place, time/situation and in no apparent distress.

## 2020-11-02 DIAGNOSIS — K52.9 NONINFECTIVE GASTROENTERITIS AND COLITIS, UNSPECIFIED: ICD-10-CM

## 2020-11-02 LAB
A1C WITH ESTIMATED AVERAGE GLUCOSE RESULT: 7.6 % — HIGH (ref 4–5.6)
ADD ON TEST-SPECIMEN IN LAB: SIGNIFICANT CHANGE UP
ALBUMIN SERPL ELPH-MCNC: 3.1 G/DL — LOW (ref 3.3–5)
ALP SERPL-CCNC: 61 U/L — SIGNIFICANT CHANGE UP (ref 40–120)
ALT FLD-CCNC: 14 U/L — SIGNIFICANT CHANGE UP (ref 12–78)
ANION GAP SERPL CALC-SCNC: 14 MMOL/L — SIGNIFICANT CHANGE UP (ref 5–17)
AST SERPL-CCNC: 8 U/L — LOW (ref 15–37)
BASOPHILS # BLD AUTO: 0.02 K/UL — SIGNIFICANT CHANGE UP (ref 0–0.2)
BASOPHILS NFR BLD AUTO: 0.3 % — SIGNIFICANT CHANGE UP (ref 0–2)
BILIRUB SERPL-MCNC: 0.6 MG/DL — SIGNIFICANT CHANGE UP (ref 0.2–1.2)
BUN SERPL-MCNC: 73 MG/DL — HIGH (ref 7–23)
CALCIUM SERPL-MCNC: 8.2 MG/DL — LOW (ref 8.5–10.1)
CHLORIDE SERPL-SCNC: 106 MMOL/L — SIGNIFICANT CHANGE UP (ref 96–108)
CHOLEST SERPL-MCNC: 88 MG/DL — SIGNIFICANT CHANGE UP
CO2 SERPL-SCNC: 17 MMOL/L — LOW (ref 22–31)
CREAT SERPL-MCNC: 9.16 MG/DL — HIGH (ref 0.5–1.3)
EOSINOPHIL # BLD AUTO: 0.01 K/UL — SIGNIFICANT CHANGE UP (ref 0–0.5)
EOSINOPHIL NFR BLD AUTO: 0.1 % — SIGNIFICANT CHANGE UP (ref 0–6)
ESTIMATED AVERAGE GLUCOSE: 171 MG/DL — HIGH (ref 68–114)
FERRITIN SERPL-MCNC: 812 NG/ML — HIGH (ref 30–400)
FOLATE SERPL-MCNC: 14.1 NG/ML — SIGNIFICANT CHANGE UP
GLUCOSE SERPL-MCNC: 140 MG/DL — HIGH (ref 70–99)
HAV IGM SER-ACNC: SIGNIFICANT CHANGE UP
HBV CORE IGM SER-ACNC: SIGNIFICANT CHANGE UP
HBV SURFACE AG SER-ACNC: SIGNIFICANT CHANGE UP
HCT VFR BLD CALC: 27.3 % — LOW (ref 39–50)
HCV AB S/CO SERPL IA: 0.08 S/CO — SIGNIFICANT CHANGE UP (ref 0–0.99)
HCV AB SERPL-IMP: SIGNIFICANT CHANGE UP
HDLC SERPL-MCNC: 25 MG/DL — LOW
HGB BLD-MCNC: 8.6 G/DL — LOW (ref 13–17)
IMM GRANULOCYTES NFR BLD AUTO: 0.4 % — SIGNIFICANT CHANGE UP (ref 0–1.5)
INR BLD: 1.18 RATIO — HIGH (ref 0.88–1.16)
IRON SATN MFR SERPL: 14 UG/DL — LOW (ref 45–165)
IRON SATN MFR SERPL: 9 % — LOW (ref 16–55)
LACTATE SERPL-SCNC: 2 MMOL/L — SIGNIFICANT CHANGE UP (ref 0.7–2)
LIPID PNL WITH DIRECT LDL SERPL: 22 MG/DL — SIGNIFICANT CHANGE UP
LYMPHOCYTES # BLD AUTO: 0.66 K/UL — LOW (ref 1–3.3)
LYMPHOCYTES # BLD AUTO: 8.7 % — LOW (ref 13–44)
MAGNESIUM SERPL-MCNC: 1.9 MG/DL — SIGNIFICANT CHANGE UP (ref 1.6–2.6)
MCHC RBC-ENTMCNC: 31.5 GM/DL — LOW (ref 32–36)
MCHC RBC-ENTMCNC: 32 PG — SIGNIFICANT CHANGE UP (ref 27–34)
MCV RBC AUTO: 101.5 FL — HIGH (ref 80–100)
MONOCYTES # BLD AUTO: 1.13 K/UL — HIGH (ref 0–0.9)
MONOCYTES NFR BLD AUTO: 15 % — HIGH (ref 2–14)
NEUTROPHILS # BLD AUTO: 5.7 K/UL — SIGNIFICANT CHANGE UP (ref 1.8–7.4)
NEUTROPHILS NFR BLD AUTO: 75.5 % — SIGNIFICANT CHANGE UP (ref 43–77)
NON HDL CHOLESTEROL: 63 MG/DL — SIGNIFICANT CHANGE UP
PHOSPHATE SERPL-MCNC: 6.4 MG/DL — HIGH (ref 2.5–4.5)
PLATELET # BLD AUTO: 174 K/UL — SIGNIFICANT CHANGE UP (ref 150–400)
POTASSIUM SERPL-MCNC: 4.8 MMOL/L — SIGNIFICANT CHANGE UP (ref 3.5–5.3)
POTASSIUM SERPL-SCNC: 4.8 MMOL/L — SIGNIFICANT CHANGE UP (ref 3.5–5.3)
PROT SERPL-MCNC: 6.6 GM/DL — SIGNIFICANT CHANGE UP (ref 6–8.3)
PROTHROM AB SERPL-ACNC: 13.6 SEC — SIGNIFICANT CHANGE UP (ref 10.6–13.6)
RBC # BLD: 2.69 M/UL — LOW (ref 4.2–5.8)
RBC # FLD: 16.5 % — HIGH (ref 10.3–14.5)
SARS-COV-2 RNA SPEC QL NAA+PROBE: SIGNIFICANT CHANGE UP
SODIUM SERPL-SCNC: 137 MMOL/L — SIGNIFICANT CHANGE UP (ref 135–145)
TIBC SERPL-MCNC: 152 UG/DL — LOW (ref 220–430)
TRIGL SERPL-MCNC: 206 MG/DL — HIGH
UIBC SERPL-MCNC: 138 UG/DL — SIGNIFICANT CHANGE UP (ref 110–370)
VIT B12 SERPL-MCNC: 541 PG/ML — SIGNIFICANT CHANGE UP (ref 232–1245)
WBC # BLD: 7.55 K/UL — SIGNIFICANT CHANGE UP (ref 3.8–10.5)
WBC # FLD AUTO: 7.55 K/UL — SIGNIFICANT CHANGE UP (ref 3.8–10.5)

## 2020-11-02 PROCEDURE — 99233 SBSQ HOSP IP/OBS HIGH 50: CPT

## 2020-11-02 PROCEDURE — 99232 SBSQ HOSP IP/OBS MODERATE 35: CPT

## 2020-11-02 PROCEDURE — 93010 ELECTROCARDIOGRAM REPORT: CPT

## 2020-11-02 PROCEDURE — 74250 X-RAY XM SM INT 1CNTRST STD: CPT | Mod: 26

## 2020-11-02 PROCEDURE — 74019 RADEX ABDOMEN 2 VIEWS: CPT | Mod: 26

## 2020-11-02 RX ORDER — DEXTROSE 50 % IN WATER 50 %
15 SYRINGE (ML) INTRAVENOUS ONCE
Refills: 0 | Status: DISCONTINUED | OUTPATIENT
Start: 2020-11-02 | End: 2020-11-03

## 2020-11-02 RX ORDER — INSULIN LISPRO 100/ML
VIAL (ML) SUBCUTANEOUS EVERY 6 HOURS
Refills: 0 | Status: DISCONTINUED | OUTPATIENT
Start: 2020-11-02 | End: 2020-11-03

## 2020-11-02 RX ORDER — SODIUM CHLORIDE 9 MG/ML
250 INJECTION INTRAMUSCULAR; INTRAVENOUS; SUBCUTANEOUS ONCE
Refills: 0 | Status: COMPLETED | OUTPATIENT
Start: 2020-11-02 | End: 2020-11-02

## 2020-11-02 RX ORDER — DEXTROSE 50 % IN WATER 50 %
12.5 SYRINGE (ML) INTRAVENOUS ONCE
Refills: 0 | Status: DISCONTINUED | OUTPATIENT
Start: 2020-11-02 | End: 2020-11-03

## 2020-11-02 RX ORDER — SODIUM CHLORIDE 9 MG/ML
1000 INJECTION, SOLUTION INTRAVENOUS
Refills: 0 | Status: DISCONTINUED | OUTPATIENT
Start: 2020-11-02 | End: 2020-11-03

## 2020-11-02 RX ORDER — HEPARIN SODIUM 5000 [USP'U]/ML
2000 INJECTION INTRAVENOUS; SUBCUTANEOUS
Refills: 0 | Status: DISCONTINUED | OUTPATIENT
Start: 2020-11-02 | End: 2020-11-03

## 2020-11-02 RX ORDER — LIDOCAINE AND PRILOCAINE CREAM 25; 25 MG/G; MG/G
1 CREAM TOPICAL
Refills: 0 | Status: DISCONTINUED | OUTPATIENT
Start: 2020-11-02 | End: 2020-11-03

## 2020-11-02 RX ORDER — NIFEDIPINE 30 MG
90 TABLET, EXTENDED RELEASE 24 HR ORAL DAILY
Refills: 0 | Status: DISCONTINUED | OUTPATIENT
Start: 2020-11-03 | End: 2020-11-03

## 2020-11-02 RX ORDER — SODIUM CHLORIDE 9 MG/ML
1000 INJECTION INTRAMUSCULAR; INTRAVENOUS; SUBCUTANEOUS ONCE
Refills: 0 | Status: DISCONTINUED | OUTPATIENT
Start: 2020-11-02 | End: 2020-11-02

## 2020-11-02 RX ORDER — CALCIUM ACETATE 667 MG
2001 TABLET ORAL
Refills: 0 | Status: DISCONTINUED | OUTPATIENT
Start: 2020-11-02 | End: 2020-11-03

## 2020-11-02 RX ORDER — ATORVASTATIN CALCIUM 80 MG/1
40 TABLET, FILM COATED ORAL AT BEDTIME
Refills: 0 | Status: DISCONTINUED | OUTPATIENT
Start: 2020-11-02 | End: 2020-11-03

## 2020-11-02 RX ORDER — GLUCAGON INJECTION, SOLUTION 0.5 MG/.1ML
1 INJECTION, SOLUTION SUBCUTANEOUS ONCE
Refills: 0 | Status: DISCONTINUED | OUTPATIENT
Start: 2020-11-02 | End: 2020-11-03

## 2020-11-02 RX ORDER — SODIUM CHLORIDE 9 MG/ML
1000 INJECTION INTRAMUSCULAR; INTRAVENOUS; SUBCUTANEOUS
Refills: 0 | Status: DISCONTINUED | OUTPATIENT
Start: 2020-11-02 | End: 2020-11-02

## 2020-11-02 RX ORDER — PIPERACILLIN AND TAZOBACTAM 4; .5 G/20ML; G/20ML
3.38 INJECTION, POWDER, LYOPHILIZED, FOR SOLUTION INTRAVENOUS EVERY 12 HOURS
Refills: 0 | Status: DISCONTINUED | OUTPATIENT
Start: 2020-11-02 | End: 2020-11-03

## 2020-11-02 RX ORDER — DEXTROSE 50 % IN WATER 50 %
25 SYRINGE (ML) INTRAVENOUS ONCE
Refills: 0 | Status: DISCONTINUED | OUTPATIENT
Start: 2020-11-02 | End: 2020-11-03

## 2020-11-02 RX ORDER — PIPERACILLIN AND TAZOBACTAM 4; .5 G/20ML; G/20ML
3.38 INJECTION, POWDER, LYOPHILIZED, FOR SOLUTION INTRAVENOUS ONCE
Refills: 0 | Status: COMPLETED | OUTPATIENT
Start: 2020-11-02 | End: 2020-11-02

## 2020-11-02 RX ORDER — SODIUM CHLORIDE 9 MG/ML
500 INJECTION INTRAMUSCULAR; INTRAVENOUS; SUBCUTANEOUS ONCE
Refills: 0 | Status: COMPLETED | OUTPATIENT
Start: 2020-11-02 | End: 2020-11-02

## 2020-11-02 RX ORDER — CARVEDILOL PHOSPHATE 80 MG/1
6.25 CAPSULE, EXTENDED RELEASE ORAL
Refills: 0 | Status: DISCONTINUED | OUTPATIENT
Start: 2020-11-02 | End: 2020-11-02

## 2020-11-02 RX ORDER — GABAPENTIN 400 MG/1
300 CAPSULE ORAL AT BEDTIME
Refills: 0 | Status: DISCONTINUED | OUTPATIENT
Start: 2020-11-02 | End: 2020-11-03

## 2020-11-02 RX ORDER — DOXERCALCIFEROL 2.5 UG/1
1 CAPSULE ORAL
Refills: 0 | Status: DISCONTINUED | OUTPATIENT
Start: 2020-11-02 | End: 2020-11-03

## 2020-11-02 RX ORDER — INFLUENZA VIRUS VACCINE 15; 15; 15; 15 UG/.5ML; UG/.5ML; UG/.5ML; UG/.5ML
0.5 SUSPENSION INTRAMUSCULAR ONCE
Refills: 0 | Status: DISCONTINUED | OUTPATIENT
Start: 2020-11-02 | End: 2020-11-03

## 2020-11-02 RX ORDER — ERYTHROPOIETIN 10000 [IU]/ML
10000 INJECTION, SOLUTION INTRAVENOUS; SUBCUTANEOUS
Refills: 0 | Status: DISCONTINUED | OUTPATIENT
Start: 2020-11-02 | End: 2020-11-03

## 2020-11-02 RX ADMIN — SODIUM CHLORIDE 75 MILLILITER(S): 9 INJECTION INTRAMUSCULAR; INTRAVENOUS; SUBCUTANEOUS at 03:42

## 2020-11-02 RX ADMIN — ERYTHROPOIETIN 10000 UNIT(S): 10000 INJECTION, SOLUTION INTRAVENOUS; SUBCUTANEOUS at 17:07

## 2020-11-02 RX ADMIN — PIPERACILLIN AND TAZOBACTAM 25 GRAM(S): 4; .5 INJECTION, POWDER, LYOPHILIZED, FOR SOLUTION INTRAVENOUS at 21:09

## 2020-11-02 RX ADMIN — PIPERACILLIN AND TAZOBACTAM 25 GRAM(S): 4; .5 INJECTION, POWDER, LYOPHILIZED, FOR SOLUTION INTRAVENOUS at 10:44

## 2020-11-02 RX ADMIN — SODIUM CHLORIDE 500 MILLILITER(S): 9 INJECTION INTRAMUSCULAR; INTRAVENOUS; SUBCUTANEOUS at 00:18

## 2020-11-02 RX ADMIN — HEPARIN SODIUM 2000 UNIT(S): 5000 INJECTION INTRAVENOUS; SUBCUTANEOUS at 15:17

## 2020-11-02 RX ADMIN — SODIUM CHLORIDE 166.67 MILLILITER(S): 9 INJECTION INTRAMUSCULAR; INTRAVENOUS; SUBCUTANEOUS at 06:03

## 2020-11-02 RX ADMIN — ATORVASTATIN CALCIUM 40 MILLIGRAM(S): 80 TABLET, FILM COATED ORAL at 21:09

## 2020-11-02 RX ADMIN — GABAPENTIN 300 MILLIGRAM(S): 400 CAPSULE ORAL at 21:09

## 2020-11-02 RX ADMIN — PIPERACILLIN AND TAZOBACTAM 200 GRAM(S): 4; .5 INJECTION, POWDER, LYOPHILIZED, FOR SOLUTION INTRAVENOUS at 01:29

## 2020-11-02 RX ADMIN — DOXERCALCIFEROL 1 MICROGRAM(S): 2.5 CAPSULE ORAL at 17:08

## 2020-11-02 RX ADMIN — Medication 0: at 20:02

## 2020-11-02 NOTE — PHYSICAL THERAPY INITIAL EVALUATION ADULT - PERTINENT HX OF CURRENT PROBLEM, REHAB EVAL
non-bloody vomiting x6 episodes at home ,no further vomiting in ED non-bloody vomiting x6 episodes at home ,no further vomiting in ED, NPO to allow bowel rest

## 2020-11-02 NOTE — CONSULT NOTE ADULT - SUBJECTIVE AND OBJECTIVE BOX
Patient is a 88y old  Male who presents with a chief complaint of vomiting (02 Nov 2020 14:55)      HPI:  87 y/o M w/ PMH of CAD s/p PCI, ESRD on HD, HFpEF, DM2, bladder cancer s/p resection and neobladder formation, moderate aortic stenosis, h/o perforated gastric ulcer s/p ex lap, p/w vomiting x 1 day. Patient states he vomited for half a dozen times prior to arrival, but no episodes of vomiting in ED. Vomiting was non-bloody. Had decreased appetite and generalized weakness as well. Patient denies any abdominal pain, diarrhea. Has had 2 BMs in the ED. Denies fever, chills, cough, runny nose, sore throat, CP, SOB.    On CT scan, patient found to have ileus vs partial SBO. Patient was evaluated by surgery, Dr. Rudolph, at bedside. As per Dr. rudolph, patient is clinically non-tender and not obstructed.     Patient states that he had significant nausea vomiting but denies he feels better.  Has an NG tube placed.  He feels hungry.  Feels that he is having flatus.        PSH: prostatectomy, bladder surgery, appendectomy, cholecystectomy, AV fistula     Social hx: Denies x 3      Family Hx: Denies (02 Nov 2020 02:09)      PAST MEDICAL & SURGICAL HISTORY:  Moderate aortic stenosis    Chronic CHF    Type 2 diabetes mellitus    ESRD on dialysis  MWF    HTN (hypertension)    Bladder cancer    History of cholecystectomy    S/P arteriovenous (AV) fistula creation  Right    History of percutaneous angioplasty  PCI w/ KAREN RCA 12/2016, KAREN to LAD and D1 on 11/1/2018    History of exploratory laparotomy  Perforated Gastric Ulcer, Peritonitis    History of appendectomy    History of bladder cancer  s/p resection with neobladder        MEDICATIONS  (STANDING):  atorvastatin 40 milliGRAM(s) Oral at bedtime  calcium acetate 2001 milliGRAM(s) Oral three times a day with meals  dextrose 5%. 1000 milliLiter(s) (50 mL/Hr) IV Continuous <Continuous>  dextrose 50% Injectable 12.5 Gram(s) IV Push once  dextrose 50% Injectable 25 Gram(s) IV Push once  dextrose 50% Injectable 25 Gram(s) IV Push once  doxercalciferol Injectable 1 MICROGram(s) IV Push <User Schedule>  epoetin norman-epbx (RETACRIT) Injectable 24130 Unit(s) IV Push <User Schedule>  gabapentin 300 milliGRAM(s) Oral at bedtime  heparin   Injectable. 2000 Unit(s) Dialysis. <User Schedule>  influenza   Vaccine 0.5 milliLiter(s) IntraMuscular once  insulin lispro (ADMELOG) corrective regimen sliding scale   SubCutaneous every 6 hours  lidocaine/prilocaine Cream 1 Application(s) Topical <User Schedule>  multivitamin 1 Tablet(s) Oral daily  piperacillin/tazobactam IVPB.. 3.375 Gram(s) IV Intermittent every 12 hours    MEDICATIONS  (PRN):  dextrose 40% Gel 15 Gram(s) Oral once PRN Blood Glucose LESS THAN 70 milliGRAM(s)/deciliter  glucagon  Injectable 1 milliGRAM(s) IntraMuscular once PRN Glucose LESS THAN 70 milligrams/deciliter      Allergies    No Known Allergies    Intolerances    lisinopril (Diarrhea)      SOCIAL HISTORY:neg drugs    FAMILY HISTORY:  No pertinent family history in first degree relatives        REVIEW OF SYSTEMS:    CONSTITUTIONAL: No weakness, fevers or chills  EYES/ENT: No visual changes;  No vertigo or throat pain   NECK: No pain or stiffness  RESPIRATORY: No cough, wheezing, hemoptysis; No shortness of breath  CARDIOVASCULAR: No chest pain or palpitations  GENITOURINARY: No dysuria, frequency or hematuria  NEUROLOGICAL: No numbness or weakness  SKIN: No itching, burning, rashes, or lesions   All other review of systems is negative unless indicated above.    Vital Signs Last 24 Hrs  T(C): 36.7 (02 Nov 2020 15:18), Max: 36.9 (02 Nov 2020 03:16)  T(F): 98 (02 Nov 2020 15:18), Max: 98.5 (02 Nov 2020 03:16)  HR: 91 (02 Nov 2020 18:18) (70 - 95)  BP: 126/54 (02 Nov 2020 18:18) (102/44 - 148/52)  BP(mean): 60 (02 Nov 2020 02:00) (56 - 60)  RR: 17 (02 Nov 2020 18:18) (15 - 19)  SpO2: 94% (02 Nov 2020 08:28) (94% - 100%)    PHYSICAL EXAM:    Constitutional: NAD, well-developed  HEENT: EOMI, throat clear  Neck: No LAD, supple  Respiratory: CTA and P  Cardiovascular: S1 and S2, RRR, no M  Gastrointestinal: BS+, soft, NT/ND, neg HSM,  Extremities: No peripheral edema, neg clubing, cyanosis  Vascular: 2+ peripheral pulses  Neurological: A/O x 3, no focal deficits  Psychiatric: Normal mood, normal affect  Skin: No rashes    LABS:  CBC Full  -  ( 02 Nov 2020 06:42 )  WBC Count : 7.55 K/uL  RBC Count : 2.69 M/uL  Hemoglobin : 8.6 g/dL  Hematocrit : 27.3 %  Platelet Count - Automated : 174 K/uL  Mean Cell Volume : 101.5 fl  Mean Cell Hemoglobin : 32.0 pg  Mean Cell Hemoglobin Concentration : 31.5 gm/dL  Auto Neutrophil # : 5.70 K/uL  Auto Lymphocyte # : 0.66 K/uL  Auto Monocyte # : 1.13 K/uL  Auto Eosinophil # : 0.01 K/uL  Auto Basophil # : 0.02 K/uL  Auto Neutrophil % : 75.5 %  Auto Lymphocyte % : 8.7 %  Auto Monocyte % : 15.0 %  Auto Eosinophil % : 0.1 %  Auto Basophil % : 0.3 %    11-02    137  |  106  |  73<H>  ----------------------------<  140<H>  4.8   |  17<L>  |  9.16<H>    Ca    8.2<L>      02 Nov 2020 06:42  Phos  6.4     11-02  Mg     1.9     11-02    TPro  6.6  /  Alb  3.1<L>  /  TBili  0.6  /  DBili  x   /  AST  8<L>  /  ALT  14  /  AlkPhos  61  11-02    PT/INR - ( 02 Nov 2020 06:42 )   PT: 13.6 sec;   INR: 1.18 ratio             11-02 @ 06:42  Hep A Igm Nonreact  Hep A total ab, IgA and M --  Hep B core Ab total --  Hep B core IgM Nonreact  Hep B DNA PCR --  Hep BSAg --  Hep BSAb --  Hep BSAb --  HCV Ab --  HCV RNA Log --  HCV RNA interp --                RADIOLOGY & ADDITIONAL STUDIES:  < from: CT Abdomen and Pelvis No Cont (11.01.20 @ 23:14) >    EXAM:  CT ABDOMEN AND PELVIS                            PROCEDURE DATE:  11/01/2020          INTERPRETATION:  CLINICAL INFORMATION:  vomiting      TECHNIQUE: Multiple contiguous axial CT images of the abdomen and pelvis obtained without intervenouscontrast. Coronal and sagittal reformatted images provided.    COMPARISON: CT abdomen and pelvis 8/28/2020.    FINDINGS: Aortic valvular and coronary artery calcifications. The visualized lung bases demonstrate bibasilar atelectasis. Limited, unenhanced images of the liver, spleen, adrenal glands are grossly unremarkable. Fatty infiltration of the pancreas is evident. Stable 1.4 cm cystic lesion of the pancreatic tail. Very subtle fatty stranding within the central abdominal mesentery near the pancreas again seen with adjacent multiple small lymph nodes, grossly stable, likely an infectious/inflammatory in etiology likely mild panniculitis... The stomach is underdistended for adequate evaluation. Status post cholecystectomy.    Limited evaluation of the abdominal aorta without intravenous contrast demonstrates calcific atherosclerosis without aneurysmal dilatation.    The kidneys are atrophic without hydronephrosis. Probable bilateral renal artery calcification seen. The ureters are not dilated.    The patient is status post neobladder creation. Patient is status post prostatectomy.    Evaluation of bowel is limited without distention with oral contrast. There is colonic diverticulosis without definite acute diverticulitis. Patient isstatus post small bowel anastomosis within the anterior abdomen. There are several prominently distended/mildly dilated loops of small bowel seen measuring up to 3.1 cm. Question ileus and/or partial small bowel obstruction. No free air or significant ascites. Right femoral fixation hardware again demonstrated. Degenerative changes of the spine seen. Tiny right paracentral supraumbilical fat-containing ventral hernia.    IMPRESSION:  1. Several prominently distended/mildly dilated loops of small bowel seen. Findings may represent ileus or partial small bowel obstruction.  2. Colonic diverticulosis without acute diverticulitis.  3. Stable cystic pancreatic tail lesion.                    GUADALUPE KEYES MD; Attending Radiologist  This document has been electronically signed. Nov 2 2020 12:16AM    < end of copied text >

## 2020-11-02 NOTE — PHYSICAL THERAPY INITIAL EVALUATION ADULT - CRITERIA FOR SKILLED THERAPEUTIC INTERVENTIONS
risk reduction/prevention/rehab potential/predicted duration of therapy intervention/anticipated equipment needs at discharge/therapy frequency/impairments found/functional limitations in following categories

## 2020-11-02 NOTE — CONSULT NOTE ADULT - ASSESSMENT
Nausea vomiting/gastroenteritis appears to be improving.  DC NG tube and advance diet.    History of anemia status post endoscopy and colonoscopy.  Multifactorial anemia complicated by patient's end-stage renal disease.  Outpatient follow-up

## 2020-11-02 NOTE — CONSULT NOTE ADULT - ASSESSMENT
87 yo man with ESRD and previous hx of Exp lap due to perforated gastric ulcer admitted with vomiting.  CT revealing ileus vs SBO.  Post NGT drainage, clinically improved.  --ESRD : Continue TIW HD per home unit Rx.  --GI : trial of NGT clamping and monitor GI function.  --Fluid /electrolytes stable.

## 2020-11-02 NOTE — PHYSICAL THERAPY INITIAL EVALUATION ADULT - ADDITIONAL COMMENTS
last episode of care pt was capable of transfers OOB/ambulation long distance using RW >240ft with supervision/standby guard only; he has a stair chair inside home to accesss 2nd floor ; attends HD 3x/week last episode of care pt was capable of transfers OOB/ambulation long distance using RW >240ft with supervision/standby guard only; he has a stair chair inside home to accesss 2nd floor ; attends HD 3x/week @ [facility] and his 85 yr old wife provides transportation to/from HD

## 2020-11-02 NOTE — H&P ADULT - NEUROLOGICAL DETAILS
alert and oriented x 3/sensation intact/cranial nerves intact/responds to verbal commands/responds to pain/normal strength

## 2020-11-02 NOTE — H&P ADULT - ASSESSMENT
89 y/o M w/ PMH of CAD s/p PCI, ESRD on HD, HFpEF, DM2, bladder cancer s/p resection and neobladder formation, moderate aortic stenosis, h/o perforated gastric ulcer s/p ex lap, p/w vomiting x 1 day      *Vomiting w/ bandemia of 12    -On CT scan, patient found to have ileus vs partial SBO. Patient was evaluated by surgery, Dr. Rudolph, at bedside. As per Dr. rudolph, patient is clinically non-tender and not obstructed  -Patient is s/p NGT in ED   -NPO   -IVF  -Surgery consult recommends:   -Pain control   -Bandemia of 12  -Lactic acidosis -> repeat lactate s/p IVF     *Anemia  -Hb trended down from 11.2 (8/31/20) to 9.9 today  -Iron panel / ferritin / B12 / Folate  -Trend H/H   -GI consult  -PPI IV  -Will hold ASA temporarily until further evaluation by GI     *Cystic pancreatic lesion  -F/u GI recommendations     *DM2  -Humalog ISS Q6H while NPO  -Will hold basal insulin while NPO    *ERSD on HD  -Nephro consult    *H/o CAD / HFpEF / bladder cancer / moderate aortic stenosis  -C/w home meds and f/u outpatient for further management     *Medication reconcilliation   -Patient doesn't know meds he is taking. Reconcilled based on Trini and previous admission, will need to confirm in AM     *Advance Directives  -Patient states HCP is his wife Samra. Patient wishes to have cardiac resuscitation and intubation / mechanical ventilation if necessary, and is Full Code. Advance care planning time <30 minutes     *DVT ppx  -Will hold off on chemical ppx until GI bleed is ruled out   89 y/o M w/ PMH of CAD s/p PCI, ESRD on HD, HFpEF, DM2, bladder cancer s/p resection and neobladder formation, moderate aortic stenosis, h/o perforated gastric ulcer s/p ex lap, p/w vomiting x 1 day      *Vomiting w/ bandemia of 12    -On CT scan, patient found to have ileus vs partial SBO. Patient was evaluated by surgery, Dr. Rudolph, at bedside. As per Dr. rudolph, patient is clinically non-tender and not obstructed  -Surgery consult recommends: IV abx, f/u cultures, ID consult, IV hydration, NPO, NGT if vomiting   -Patient is s/p NGT in ED   -NPO   -IVF  -Bandemia of 12  -Lactic acidosis -> repeat lactate s/p IVF   -PT consult for generalized weakness / deconditioning     *Anemia  -Hb trended down from 11.2 (8/31/20) to 9.9 today  -Iron panel / ferritin / B12 / Folate  -Trend H/H   -GI consult  -Will hold ASA temporarily until further evaluation by GI     *Cystic pancreatic lesion  -F/u GI recommendations     *DM2  -Humalog ISS Q6H while NPO  -Will hold basal insulin while NPO    *ERSD on HD  -Nephro consult    *H/o CAD / HFpEF / bladder cancer / moderate aortic stenosis  -C/w home meds and f/u outpatient for further management     *Medication reconciliation   -Patient doesn't know meds he is taking. Reconciled based on Trini and previous admission, will need to confirm in AM     *Advance Directives  -Patient states HCP is his wife Samra. Patient wishes to have cardiac resuscitation and intubation / mechanical ventilation if necessary, and is Full Code. Advance care planning time <30 minutes     *DVT ppx  -SCDs. Will hold off on chemical ppx until GI bleed is ruled out 89 y/o M w/ PMH of CAD s/p PCI, ESRD on HD, HFpEF, DM2, bladder cancer s/p resection and neobladder formation, moderate aortic stenosis, h/o perforated gastric ulcer s/p ex lap, p/w vomiting x 1 day      *Vomiting w/ bandemia of 12    -On CT scan, patient found to have ileus vs partial SBO. Patient was evaluated by surgery, Dr. Rudolph, at bedside. As per Dr. rudolph, patient is clinically non-tender and not obstructed  -Surgery consult recommends: IV abx, f/u cultures, ID consult, IV hydration, NPO, NGT if vomiting, SBS   -Patient is s/p NGT in ED   -NPO   -IVF  -Bandemia of 12  -Lactic acidosis -> repeat lactate s/p IVF   -PT consult for generalized weakness / deconditioning     *Anemia  -Hb trended down from 11.2 (8/31/20) to 9.9 today  -Iron panel / ferritin / B12 / Folate  -Trend H/H   -GI consult  -Will hold ASA temporarily until further evaluation by GI     *Cystic pancreatic lesion  -F/u GI recommendations     *DM2  -Humalog ISS Q6H while NPO  -Will hold basal insulin while NPO    *ERSD on HD  -Nephro consult    *H/o CAD / HFpEF / bladder cancer / moderate aortic stenosis  -C/w home meds and f/u outpatient for further management     *Medication reconciliation   -Patient doesn't know meds he is taking. Reconciled based on Trini and previous admission, will need to confirm in AM     *Advance Directives  -Patient states HCP is his wife Samra. Patient wishes to have cardiac resuscitation and intubation / mechanical ventilation if necessary, and is Full Code. Advance care planning time <30 minutes     *DVT ppx  -SCDs. Will hold off on chemical ppx until GI bleed is ruled out

## 2020-11-02 NOTE — ED ADULT NURSE REASSESSMENT NOTE - NS ED NURSE REASSESS COMMENT FT1
Patient had x2 BM.  100mL of light green bowel contents in suction container.  Patients diaper changed and perineal care provided to patient.  Patient awaiting bed on inpatient unit at this time.  Call bell within reach, safety maintained.

## 2020-11-02 NOTE — PHYSICAL THERAPY INITIAL EVALUATION ADULT - GENERAL OBSERVATIONS, REHAB EVAL
rec'd sitting up in bedside chair eating lunch meal,feels better since admission, wants to drink coffee,soup,very pleasant/receptive ,A&Ox3

## 2020-11-02 NOTE — H&P ADULT - HISTORY OF PRESENT ILLNESS
89 y/o M w/ PMH of CAD s/p PCI, ESRD on HD, HFpEF, DM2, bladder cancer s/p resection and neobladder formation, moderate aortic stenosis, h/o perforated gastric ulcer s/p ex lap, p/w vomiting x 1 day. Patient states he vomited for half a dozen times prior to arrival, but no episodes of vomiting in ED. Vomiting was non-bloody. Had decreased appetite and generalized weakness as well. Patient denies any abdominal pain, diarrhea. Has had 2 BMs in the ED. Denies fever, chills, cough, runny nose, sore throat, CP, SOB.    On CT scan, patient found to have ileus vs partial SBO. Patient was evaluated by surgery, Dr. Rudolph, at bedside. As per Dr. rudolph, patient is clinically non-tender and not obstructed.       PSH: prostatectomy, bladder surgery, appendectomy, cholecystectomy, AV fistula     Social hx: Denies x 3      Family Hx: Denies

## 2020-11-02 NOTE — CONSULT NOTE ADULT - SUBJECTIVE AND OBJECTIVE BOX
Patient is a 88y old  Male who presents with a chief complaint of vomiting     HPI:  89 y/o Male with h/o CAD s/p PCI, ESRD on HD, HFpEF, DM2, bladder cancer s/p resection and neobladder formation, moderate aortic stenosis, h/o perforated gastric ulcer s/p ex lap was admitted on  for vomiting x 1 day. Patient states he vomited for half a dozen times prior to arrival, but no episodes of vomiting in ED. Vomiting was non-bloody. Had decreased appetite and generalized weakness as well. Patient denies abdominal pain, diarrhea. Has had 2 BMs in the ED. Denies fever, chills reported. In ER he received zosyn.     PMH: as above  PSH: prostatectomy, bladder surgery, appendectomy, cholecystectomy, AV fistula  Meds: per reconciliation sheet, noted below  MEDICATIONS  (STANDING):  atorvastatin 40 milliGRAM(s) Oral at bedtime  calcium acetate 2001 milliGRAM(s) Oral three times a day with meals  carvedilol Oral Tab/Cap - Peds 6.25 milliGRAM(s) Oral two times a day  dextrose 5%. 1000 milliLiter(s) (50 mL/Hr) IV Continuous <Continuous>  dextrose 50% Injectable 12.5 Gram(s) IV Push once  dextrose 50% Injectable 25 Gram(s) IV Push once  dextrose 50% Injectable 25 Gram(s) IV Push once  gabapentin 300 milliGRAM(s) Oral at bedtime  heparin   Injectable. 2000 Unit(s) Dialysis. <User Schedule>  influenza   Vaccine 0.5 milliLiter(s) IntraMuscular once  insulin lispro (ADMELOG) corrective regimen sliding scale   SubCutaneous every 6 hours  multivitamin 1 Tablet(s) Oral daily  piperacillin/tazobactam IVPB.. 3.375 Gram(s) IV Intermittent every 12 hours    MEDICATIONS  (PRN):  dextrose 40% Gel 15 Gram(s) Oral once PRN Blood Glucose LESS THAN 70 milliGRAM(s)/deciliter  glucagon  Injectable 1 milliGRAM(s) IntraMuscular once PRN Glucose LESS THAN 70 milligrams/deciliter    Allergies    No Known Allergies    Intolerances    lisinopril (Diarrhea)    Social: no smoking, no alcohol, no illegal drugs; no recent travel, no exposure to TB  FAMILY HISTORY:  No pertinent family history in first degree relatives      no history of premature cardiovascular disease in first degree relatives    ROS: the patient denies fever, no chills, no HA, no seizures, no dizziness, no sore throat, no nasal congestion, no blurry vision, no CP, no palpitations, no SOB, no cough, no abdominal pain, no diarrhea, had N/V, no dysuria, no leg pain, no claudication, no rash, no joint aches, no rectal pain or bleeding, no night sweats  All other systems reviewed and are negative    Vital Signs Last 24 Hrs  T(C): 36.9 (2020 08:28), Max: 36.9 (2020 17:37)  T(F): 98.4 (2020 08:28), Max: 98.5 (2020 17:37)  HR: 71 (2020 08:) (70 - 89)  BP: 107/38 (2020 08:28) (102/44 - 123/46)  BP(mean): 60 (2020 02:00) (56 - 60)  RR: 18 (2020 08:28) (15 - 19)  SpO2: 94% (2020 08:28) (94% - 100%)  Daily Height in cm: 167.64 (2020 17:37)    Daily Weight in k.8 (2020 03:42)    PE:    Constitutional:  No acute distress  HEENT: NC/AT, EOMI, PERRLA, conjunctivae clear; ears and nose atraumatic; pharynx benign  Neck: supple; thyroid not palpable  Back: no tenderness  Respiratory: respiratory effort normal; clear to auscultation  Cardiovascular: S1S2 regular, no murmurs  Abdomen: soft, not tender, not distended, positive BS; no liver or spleen organomegaly  Genitourinary: no suprapubic tenderness  Lymphatic: no LN palpable  Musculoskeletal: no muscle tenderness, no joint swelling or tenderness  Extremities: no pedal edema  Neurological/ Psychiatric: AxOx3, judgement and insight normal; moving all extremities  Skin: no rashes; no palpable lesions    Labs: all available labs reviewed                        8.6    7.55  )-----------( 174      ( 2020 06:42 )             27.3     11-02    137  |  106  |  73<H>  ----------------------------<  140<H>  4.8   |  17<L>  |  9.16<H>    Ca    8.2<L>      2020 06:42  Phos  6.4       Mg     1.9         TPro  6.6  /  Alb  3.1<L>  /  TBili  0.6  /  DBili  x   /  AST  8<L>  /  ALT  14  /  AlkPhos  61       LIVER FUNCTIONS - ( 2020 06:42 )  Alb: 3.1 g/dL / Pro: 6.6 gm/dL / ALK PHOS: 61 U/L / ALT: 14 U/L / AST: 8 U/L / GGT: x             COVID-19 PCR: NotDetec (20 @ 22:21)    Radiology: all available radiological tests reviewed    < from: CT Abdomen and Pelvis No Cont (20 @ 23:14) >  1. Several prominently distended/mildly dilated loops of small bowel seen. Findings may represent ileus or partial small bowel obstruction.  2. Colonic diverticulosis without acute diverticulitis.  3. Stable cystic pancreatic tail lesion.    < end of copied text >  < from: Xray Chest 1 View-PORTABLE IMMEDIATE (20 @ 18:59) >  Negative for active pulmonary disease.    < end of copied text >      Advanced directives addressed: full resuscitation Patient is a 88y old  Male who presents with a chief complaint of vomiting     HPI:  89 y/o Male with h/o CAD s/p PCI, ESRD on HD, HFpEF, DM2, bladder cancer s/p resection and neobladder formation, moderate aortic stenosis, h/o perforated gastric ulcer s/p ex lap was admitted on  for vomiting x 1 day. Patient states he vomited for half a dozen times prior to arrival, but no episodes of vomiting in ED. Vomiting was non-bloody. Had decreased appetite and generalized weakness as well. Patient denies abdominal pain, diarrhea. Has had 2 BMs in the ED. Denies fever, chills reported. In ER he received zosyn.     PMH: as above  PSH: prostatectomy, bladder surgery, appendectomy, cholecystectomy, AV fistula  Meds: per reconciliation sheet, noted below  MEDICATIONS  (STANDING):  atorvastatin 40 milliGRAM(s) Oral at bedtime  calcium acetate 2001 milliGRAM(s) Oral three times a day with meals  carvedilol Oral Tab/Cap - Peds 6.25 milliGRAM(s) Oral two times a day  dextrose 5%. 1000 milliLiter(s) (50 mL/Hr) IV Continuous <Continuous>  dextrose 50% Injectable 12.5 Gram(s) IV Push once  dextrose 50% Injectable 25 Gram(s) IV Push once  dextrose 50% Injectable 25 Gram(s) IV Push once  gabapentin 300 milliGRAM(s) Oral at bedtime  heparin   Injectable. 2000 Unit(s) Dialysis. <User Schedule>  influenza   Vaccine 0.5 milliLiter(s) IntraMuscular once  insulin lispro (ADMELOG) corrective regimen sliding scale   SubCutaneous every 6 hours  multivitamin 1 Tablet(s) Oral daily  piperacillin/tazobactam IVPB.. 3.375 Gram(s) IV Intermittent every 12 hours    MEDICATIONS  (PRN):  dextrose 40% Gel 15 Gram(s) Oral once PRN Blood Glucose LESS THAN 70 milliGRAM(s)/deciliter  glucagon  Injectable 1 milliGRAM(s) IntraMuscular once PRN Glucose LESS THAN 70 milligrams/deciliter    Allergies    No Known Allergies    Intolerances    lisinopril (Diarrhea)    Social: no smoking, no alcohol, no illegal drugs; no recent travel, no exposure to TB  FAMILY HISTORY:  No pertinent family history in first degree relatives      no history of premature cardiovascular disease in first degree relatives    ROS: the patient denies fever, no chills, no HA, no seizures, no dizziness, no sore throat, no nasal congestion, no blurry vision, no CP, no palpitations, no SOB, no cough, no abdominal pain, had 2 loose stools, had N/V, no dysuria, no leg pain, no claudication, no rash, no joint aches, no rectal pain or bleeding, no night sweats  All other systems reviewed and are negative    Vital Signs Last 24 Hrs  T(C): 36.9 (2020 08:28), Max: 36.9 (2020 17:37)  T(F): 98.4 (2020 08:28), Max: 98.5 (2020 17:37)  HR: 71 (2020 08:) (70 - 89)  BP: 107/38 (2020 08:28) (102/44 - 123/46)  BP(mean): 60 (2020 02:00) (56 - 60)  RR: 18 (2020 08:) (15 - 19)  SpO2: 94% (2020 08:28) (94% - 100%)  Daily Height in cm: 167.64 (2020 17:37)    Daily Weight in k.8 (2020 03:42)    PE:    Constitutional:  No acute distress  HEENT: NC/AT, EOMI, PERRLA, conjunctivae clear; ears and nose atraumatic; NGT in place  Neck: supple; thyroid not palpable  Back: no tenderness  Respiratory: respiratory effort normal; clear to auscultation  Cardiovascular: S1S2 regular, no murmurs  Abdomen: soft, not tender, not distended, positive BS; no liver or spleen organomegaly  Genitourinary: no suprapubic tenderness  Lymphatic: no LN palpable  Musculoskeletal: no muscle tenderness, no joint swelling or tenderness  Extremities: no pedal edema  Neurological/ Psychiatric: AxOx3, judgement and insight normal; moving all extremities  Skin: no rashes; no palpable lesions    Labs: all available labs reviewed                        8.6    7.55  )-----------( 174      ( 2020 06:42 )             27.3     11-02    137  |  106  |  73<H>  ----------------------------<  140<H>  4.8   |  17<L>  |  9.16<H>    Ca    8.2<L>      2020 06:42  Phos  6.4       Mg     1.9         TPro  6.6  /  Alb  3.1<L>  /  TBili  0.6  /  DBili  x   /  AST  8<L>  /  ALT  14  /  AlkPhos  61       LIVER FUNCTIONS - ( 2020 06:42 )  Alb: 3.1 g/dL / Pro: 6.6 gm/dL / ALK PHOS: 61 U/L / ALT: 14 U/L / AST: 8 U/L / GGT: x             COVID-19 PCR: NotDetec (20 @ 22:21)    Radiology: all available radiological tests reviewed    < from: CT Abdomen and Pelvis No Cont (20 @ 23:14) >  1. Several prominently distended/mildly dilated loops of small bowel seen. Findings may represent ileus or partial small bowel obstruction.  2. Colonic diverticulosis without acute diverticulitis.  3. Stable cystic pancreatic tail lesion.    < end of copied text >  < from: Xray Chest 1 View-PORTABLE IMMEDIATE (20 @ 18:59) >  Negative for active pulmonary disease.    < end of copied text >      Advanced directives addressed: full resuscitation

## 2020-11-02 NOTE — CONSULT NOTE ADULT - ASSESSMENT
89 y/o Male with h/o CAD s/p PCI, ESRD on HD, HFpEF, DM2, bladder cancer s/p resection and neobladder formation, moderate aortic stenosis, h/o perforated gastric ulcer s/p ex lap was admitted on 11/1 for vomiting x 1 day. Patient states he vomited for half a dozen times prior to arrival, but no episodes of vomiting in ED. Vomiting was non-bloody. Had decreased appetite and generalized weakness as well. Patient denies abdominal pain, diarrhea. Has had 2 BMs in the ED. Denies fever, chills reported. In ER he received zosyn.     1. Acute gastroenteritis. ESRD on HD.     89 y/o Male with h/o CAD s/p PCI, ESRD on HD, HFpEF, DM2, bladder cancer s/p resection and neobladder formation, moderate aortic stenosis, h/o perforated gastric ulcer s/p ex lap was admitted on 11/1 for vomiting x 1 day. Patient states he vomited for half a dozen times prior to arrival, but no episodes of vomiting in ED. Vomiting was non-bloody. Had decreased appetite and generalized weakness as well. Patient denies abdominal pain, diarrhea. Has had 2 BMs in the ED. Denies fever, chills reported. In ER he received zosyn.     1. Vomiting. Acute gastroenteritis. ESRD on HD.  -abdomen is soft and nondistended; doubt SBO  -noted with increased bands ?cause ?infection  -f/u cultures  -if loose stools wound send stool for GI PCR and CDT  -on zosyn 3.375 gm IV q8h  -reason for abx use and side effects reviewed with patient; monitor BMP  -monitor abdomen  -old chart reviewed to assess prior cultures  -monitor temps  -f/u CBC  -supportive care  2. Other issues:   -care per medicine

## 2020-11-02 NOTE — CONSULT NOTE ADULT - ASSESSMENT
88 Y old male with multiple medical problems, on HD, missed last few dialysis with nausea, vomiting no abdominal pain. Pt also has bandemia. Pt found to have ileus VS PSBO on CT  NPO  IV hydration   IV antibiotics f/U cultures, ID consult  HD per nephrology   medical optimization primary team   NGT if vomiting  SBS in am  No acute surgical intervention, will follow 88 Y old male with multiple medical problems, on HD, missed last few dialysis with nausea, vomiting no abdominal pain. Pt also has bandemia. Pt found to have ileus VS PSBO on CT, had a BM in ER, clinically non tender not obstructed, NV could be from Uremia  NPO  IV hydration   IV antibiotics f/U cultures, ID consult  HD per nephrology   medical optimization primary team   NGT if vomiting  SBS in am  No acute surgical intervention, will follow

## 2020-11-02 NOTE — CONSULT NOTE ADULT - SUBJECTIVE AND OBJECTIVE BOX
Patient is a 88y old  Male who presents with a chief complaint of   HPI:  89 y/o male with a PMHx of moderate aortic stenosis, CHF, DMII, ESRD on dialysis (M/W/F) s/p right AV fistula, HTN, bladder CA s/p resection with neobladder, s/p cholecystectomy, s/p appendectomy, s/p cardiac stents, presents to the ED BIBEMS c/o generalized weakness, nausea, and vomiting x1 days. Denies chest pain, abdominal pain, SOB. Last dialysis 2 days ago. Pt was seen at [facility] 8 days ago for anemia, did not receive transfusion, and was discharged home the same day. Found to have ileus VS partial SBO on CT, multiple abdominal surgeries No other complaints at this time.  ROS:.  [] A ten-point review of systems was otherwise negative except as noted.  Systemic:	[ ] Fever	[ ] Chills	[ ] Night sweats    [ ] Fatigue	[ ] Other  [] Cardiovascular:  [] Pulmonary:  [] Renal/Urologic:  [] Gastrointestinal: abdominal pain, vomiting  [] Metabolic:  [] Neurologic:  [] Hematologic:  [] ENT:  [] Ophthalmologic:  [] Musculoskeletal:    [ ] Due to altered mental status/intubation, subjective information were not able to be obtained from the patient. History was obtained, to the extent possible, from review of the chart and collateral sources of information.    PAST MEDICAL & SURGICAL HISTORY:  Moderate aortic stenosis    Chronic CHF    Type 2 diabetes mellitus    ESRD on dialysis  MWF    HTN (hypertension)    Bladder cancer    History of cholecystectomy    S/P arteriovenous (AV) fistula creation  Right    History of percutaneous angioplasty  PCI w/ KAREN RCA 12/2016, KAREN to LAD and D1 on 11/1/2018    History of exploratory laparotomy  Perforated Gastric Ulcer, Peritonitis    History of appendectomy    History of bladder cancer  s/p resection with neobladder      FAMILY HISTORY:  No pertinent family history in first degree relatives      Social History:    Alcohol: Denied  Smoking: Denied  Drug Use: Denied        Allergies    No Known Allergies    Intolerances    lisinopril (Diarrhea)    MEDICATIONS  (STANDING):  piperacillin/tazobactam IVPB. 3.375 Gram(s) IV Intermittent once    Vital Signs Last 24 Hrs  T(C): 36.9 (01 Nov 2020 17:37), Max: 36.9 (01 Nov 2020 17:37)  T(F): 98.5 (01 Nov 2020 17:37), Max: 98.5 (01 Nov 2020 17:37)  HR: 75 (01 Nov 2020 22:45) (75 - 89)  BP: 103/42 (01 Nov 2020 22:45) (103/42 - 123/46)  BP(mean): 58 (01 Nov 2020 22:45) (58 - 58)  RR: 16 (01 Nov 2020 22:45) (16 - 19)  SpO2: 97% (01 Nov 2020 22:45) (94% - 98%)  PHYSICAL EXAM:  Constitutional: NAD, GCS: 15/15  AOX3  Eyes:  WNL  ENMT:  WNL  Neck:  WNL, non tender  Back: Non tender  Respiratory: CTABL  Cardiovascular:  S1+S2+0  Gastrointestinal: Soft,  Genitourinary:  WNL  Extremities: NV intact  Vascular:  Intact  Neurological: No focal neurological deficit,  CN, motor and sensory system grossly intact.  Skin: WNL  Musculoskeletal: WNL  Psychiatric: Grossly WNL      Labs:                          9.9    9.64  )-----------( 235      ( 01 Nov 2020 18:00 )             31.1       11-01    134<L>  |  105  |  65<H>  ----------------------------<  236<H>  4.3   |  16<L>  |  8.74<H>    Ca    9.0      01 Nov 2020 18:00  Mg     1.9     11-01    TPro  7.5  /  Alb  3.7  /  TBili  0.5  /  DBili  x   /  AST  15  /  ALT  21  /  AlkPhos  75  11-01    Lactate, Blood: 2.5: Elevated lactate. Consider ordering follow-up lactate to trend. mmol/L (11.01.20 @ 23:35)          Radiology Results:    < from: CT Abdomen and Pelvis No Cont (11.01.20 @ 23:14) >  The kidneys are atrophic without hydronephrosis. Probable bilateral renal artery calcification seen. The ureters are not dilated.    The patient is status post neobladder creation. Patient is status post prostatectomy.    Evaluation of bowel is limited without distention with oral contrast. There is colonic diverticulosis without definite acute diverticulitis. Patient isstatus post small bowel anastomosis within the anterior abdomen. There are several prominently distended/mildly dilated loops of small bowel seen measuring up to 3.1 cm. Question ileus and/or partial small bowel obstruction. No free air or significant ascites. Right femoral fixation hardware again demonstrated. Degenerative changes of the spine seen. Tiny right paracentral supraumbilical fat-containing ventral hernia.    IMPRESSION:  1. Several prominently distended/mildly dilated loops of small bowel seen. Findings may represent ileus or partial small bowel obstruction.  2. Colonic diverticulosis without acute diverticulitis.  3. Stable cystic pancreatic tail lesion.      < end of copied text >         Patient is a 88y old  Male who presents with a chief complaint of   HPI:  87 y/o male with a PMHx of moderate aortic stenosis, CHF, DMII, ESRD on dialysis (M/W/F) s/p right AV fistula, HTN, bladder CA s/p resection with neobladder, s/p cholecystectomy, s/p appendectomy, s/p cardiac stents, presents to the ED BIBEMS c/o generalized weakness, nausea, and vomiting x1 days. Denies chest pain, abdominal pain, SOB. Last dialysis 2 days ago. Pt was seen at [facility] 8 days ago for anemia, did not receive transfusion, and was discharged home the same day. Found to have ileus VS partial SBO on CT, had a BM in ER. multiple abdominal surgeries No other complaints at this time.  ROS:.  [X] A ten-point review of systems was otherwise negative except as noted.  Systemic:	[ ] Fever	[ ] Chills	[ ] Night sweats    [ ] Fatigue	[ ] Other  [] Cardiovascular:  [] Pulmonary:  [] Renal/Urologic:  [] Gastrointestinal: abdominal pain, vomiting  [] Metabolic:  [] Neurologic:  [] Hematologic:  [] ENT:  [] Ophthalmologic:  [] Musculoskeletal:    [ ] Due to altered mental status/intubation, subjective information were not able to be obtained from the patient. History was obtained, to the extent possible, from review of the chart and collateral sources of information.    PAST MEDICAL & SURGICAL HISTORY:  Moderate aortic stenosis    Chronic CHF    Type 2 diabetes mellitus    ESRD on dialysis  MWF    HTN (hypertension)    Bladder cancer    History of cholecystectomy    S/P arteriovenous (AV) fistula creation  Right    History of percutaneous angioplasty  PCI w/ KAREN RCA 12/2016, KAREN to LAD and D1 on 11/1/2018    History of exploratory laparotomy  Perforated Gastric Ulcer, Peritonitis    History of appendectomy    History of bladder cancer  s/p resection with neobladder      FAMILY HISTORY:  No pertinent family history in first degree relatives      Social History:    Alcohol: Denied  Smoking: Denied  Drug Use: Denied        Allergies    No Known Allergies    Intolerances    lisinopril (Diarrhea)    MEDICATIONS  (STANDING):  piperacillin/tazobactam IVPB. 3.375 Gram(s) IV Intermittent once    Vital Signs Last 24 Hrs  T(C): 36.9 (01 Nov 2020 17:37), Max: 36.9 (01 Nov 2020 17:37)  T(F): 98.5 (01 Nov 2020 17:37), Max: 98.5 (01 Nov 2020 17:37)  HR: 75 (01 Nov 2020 22:45) (75 - 89)  BP: 103/42 (01 Nov 2020 22:45) (103/42 - 123/46)  BP(mean): 58 (01 Nov 2020 22:45) (58 - 58)  RR: 16 (01 Nov 2020 22:45) (16 - 19)  SpO2: 97% (01 Nov 2020 22:45) (94% - 98%)  PHYSICAL EXAM:  Constitutional: NAD, GCS: 15/15  AOX3  Eyes:  WNL  ENMT:  WNL  Neck:  WNL, non tender  Back: Non tender  Respiratory: CTABL  Cardiovascular:  S1+S2+0  Gastrointestinal: Soft, ND, non tender, well healed scars.   Genitourinary:  WNL  Extremities: NV intact  Vascular:  Intact  Neurological: No focal neurological deficit,  CN, motor and sensory system grossly intact.  Skin: WNL  Musculoskeletal: WNL  Psychiatric: Grossly WNL      Labs:                          9.9    9.64  )-----------( 235      ( 01 Nov 2020 18:00 )             31.1       11-01    134<L>  |  105  |  65<H>  ----------------------------<  236<H>  4.3   |  16<L>  |  8.74<H>    Ca    9.0      01 Nov 2020 18:00  Mg     1.9     11-01    TPro  7.5  /  Alb  3.7  /  TBili  0.5  /  DBili  x   /  AST  15  /  ALT  21  /  AlkPhos  75  11-01    Lactate, Blood: 2.5: Elevated lactate. Consider ordering follow-up lactate to trend. mmol/L (11.01.20 @ 23:35)          Radiology Results:    < from: CT Abdomen and Pelvis No Cont (11.01.20 @ 23:14) >  The kidneys are atrophic without hydronephrosis. Probable bilateral renal artery calcification seen. The ureters are not dilated.    The patient is status post neobladder creation. Patient is status post prostatectomy.    Evaluation of bowel is limited without distention with oral contrast. There is colonic diverticulosis without definite acute diverticulitis. Patient isstatus post small bowel anastomosis within the anterior abdomen. There are several prominently distended/mildly dilated loops of small bowel seen measuring up to 3.1 cm. Question ileus and/or partial small bowel obstruction. No free air or significant ascites. Right femoral fixation hardware again demonstrated. Degenerative changes of the spine seen. Tiny right paracentral supraumbilical fat-containing ventral hernia.    IMPRESSION:  1. Several prominently distended/mildly dilated loops of small bowel seen. Findings may represent ileus or partial small bowel obstruction.  2. Colonic diverticulosis without acute diverticulitis.  3. Stable cystic pancreatic tail lesion.      < end of copied text >

## 2020-11-02 NOTE — PHYSICAL THERAPY INITIAL EVALUATION ADULT - PLANNED THERAPY INTERVENTIONS, PT EVAL
gait training/ROM/home safety/fall prevention education/balance training/bed mobility training/strengthening/transfer training

## 2020-11-02 NOTE — PHYSICAL THERAPY INITIAL EVALUATION ADULT - DIAGNOSIS, PT EVAL
+ileus vs partial SBO,s/p NGT decompression in ED ,h/o bladder CA s/p resection and creation of neobladder; ESRD on HD ,generalized weakness/deconditioning

## 2020-11-02 NOTE — CONSULT NOTE ADULT - SUBJECTIVE AND OBJECTIVE BOX
Chief complaints.  Presented with nausea and vomiting at home.    HPI:  87 yo man with ESRD on MWF HD schedule/at [facility] HD,  presented with recurrent vomiting at home.  Found to have Ileus vs SBO on CT of abdomen.    NGT placed to drainage.  Pt reports resolution of nausea /vomiting since admission.  Feeling improved.  Denies SOB.      PMHX and PSHX.  1.HTN  2.Hx of Bladder CA   3.Hx of Cystectomy and Neobladder.  4.DM  5.CAD ( post PCI)  6.CHF (HFpEF)  7. Moderate Aortic Stenosis  8.Hx of perforated gastric ulcer post exp lap.    FAMILY HISTORY:  No pertinent family history in first degree relatives    SOCIAL HISTORY : No current hx of smoking or ETOH.    Allergies :  No Known Allergies    Intolerances:  lisinopril (Diarrhea)    REVIEW OF SYSTEMS :  Denies further vomiting  Denies abdominal pain  Denies SOB  Denies chest discomfort      MEDICATIONS  (STANDING):  atorvastatin 40 milliGRAM(s) Oral at bedtime  calcium acetate 2001 milliGRAM(s) Oral three times a day with meals  dextrose 5%. 1000 milliLiter(s) (50 mL/Hr) IV Continuous <Continuous>  dextrose 50% Injectable 12.5 Gram(s) IV Push once  dextrose 50% Injectable 25 Gram(s) IV Push once  dextrose 50% Injectable 25 Gram(s) IV Push once  gabapentin 300 milliGRAM(s) Oral at bedtime  heparin   Injectable. 2000 Unit(s) Dialysis. <User Schedule>  influenza   Vaccine 0.5 milliLiter(s) IntraMuscular once  insulin lispro (ADMELOG) corrective regimen sliding scale   SubCutaneous every 6 hours  multivitamin 1 Tablet(s) Oral daily  piperacillin/tazobactam IVPB.. 3.375 Gram(s) IV Intermittent every 12 hours    MEDICATIONS  (PRN):  dextrose 40% Gel 15 Gram(s) Oral once PRN Blood Glucose LESS THAN 70 milliGRAM(s)/deciliter  glucagon  Injectable 1 milliGRAM(s) IntraMuscular once PRN Glucose LESS THAN 70 milligrams/deciliter      Vital Signs Last 24 Hrs  T(C): 36.9 (2020 08:28), Max: 36.9 (2020 17:37)  T(F): 98.4 (2020 08:28), Max: 98.5 (2020 17:37)  HR: 71 (2020 08:28) (70 - 89)  BP: 107/38 (2020 08:28) (102/44 - 123/46)  BP(mean): 60 (2020 02:00) (56 - 60)  RR: 18 (2020 08:28) (15 - 19)  SpO2: 94% (2020 08:28) (94% - 100%)  Daily Height in cm: 167.64 (2020 17:37)    Daily Weight in k.8 (2020 03:42)  I&O's Summary    2020 07:01  -  2020 07:00  --------------------------------------------------------  IN: 225 mL / OUT: 0 mL / NET: 225 mL    PHYSICAL EXAM:  GEN: Comfortable.  HEENT: WNL  NECK : supple  CV: S1S2 RRR  LUNGS: Clear to aus  ABD: distended, soft  EXT: no edema    LABS:                        8.6    7.55  )-----------( 174      ( 2020 06:42 )             27.3     11    137  |  106  |  73<H>  ----------------------------<  140<H>  4.8   |  17<L>  |  9.16<H>    Ca    8.2<L>      2020 06:42  Phos  6.4       Mg     1.9         TPro  6.6  /  Alb  3.1<L>  /  TBili  0.6  /  DBili  x   /  AST  8<L>  /  ALT  14  /  AlkPhos  61      PT/INR - ( 2020 06:42 )   PT: 13.6 sec;   INR: 1.18 ratio             Magnesium, Serum: 1.9 mg/dL ( @ 06:42)  Phosphorus Level, Serum: 6.4 mg/dL ( @ 06:42)  Magnesium, Serum: 1.9 mg/dL ( @ 18:00)

## 2020-11-02 NOTE — PROGRESS NOTE ADULT - SUBJECTIVE AND OBJECTIVE BOX
Flat plate reveals contrast to the colon.  Official read pending.    Vital Signs Last 24 Hrs  T(C): 36.9 (02 Nov 2020 08:28), Max: 36.9 (01 Nov 2020 17:37)  T(F): 98.4 (02 Nov 2020 08:28), Max: 98.5 (01 Nov 2020 17:37)  HR: 71 (02 Nov 2020 08:28) (70 - 89)  BP: 107/38 (02 Nov 2020 08:28) (102/44 - 123/46)  BP(mean): 60 (02 Nov 2020 02:00) (56 - 60)  RR: 18 (02 Nov 2020 08:28) (15 - 19)  SpO2: 94% (02 Nov 2020 08:28) (94% - 100%)    The patient is clinically improved without complaints.  +BM, -N/V

## 2020-11-02 NOTE — PHYSICAL THERAPY INITIAL EVALUATION ADULT - PATIENT PROFILE REVIEW, REHAB EVAL
yes yes/pt familiar to me from recent hospitalization 8/28-8/31 with profuse watery diarrhea/abdominal cramping felt to represent acute gastroenteritis of infectious etiology

## 2020-11-02 NOTE — PROGRESS NOTE ADULT - SUBJECTIVE AND OBJECTIVE BOX
pt seen and examined. states he is hungry    Vital Signs Last 24 Hrs  T(C): 36.9 (02 Nov 2020 08:28), Max: 36.9 (01 Nov 2020 17:37)  T(F): 98.4 (02 Nov 2020 08:28), Max: 98.5 (01 Nov 2020 17:37)  HR: 71 (02 Nov 2020 08:28) (70 - 89)  BP: 107/38 (02 Nov 2020 08:28) (102/44 - 123/46)  BP(mean): 60 (02 Nov 2020 02:00) (56 - 60)  RR: 18 (02 Nov 2020 08:28) (15 - 19)  SpO2: 94% (02 Nov 2020 08:28) (94% - 100%)    hEENT - NGT in palce  CV - +s1 s2 RRR  abd - soft + BS, NT ND                              8.6    7.55  )-----------( 174      ( 02 Nov 2020 06:42 )             27.3     11-02    137  |  106  |  73<H>  ----------------------------<  140<H>  4.8   |  17<L>  |  9.16<H>    Ca    8.2<L>      02 Nov 2020 06:42  Phos  6.4     11-02  Mg     1.9     11-02    TPro  6.6  /  Alb  3.1<L>  /  TBili  0.6  /  DBili  x   /  AST  8<L>  /  ALT  14  /  AlkPhos  61  11-02    CARDIAC MARKERS ( 01 Nov 2020 20:40 )  0.036 ng/mL / x     / x     / x     / x      CARDIAC MARKERS ( 01 Nov 2020 18:00 )  0.026 ng/mL / x     / x     / x     / x          LIVER FUNCTIONS - ( 02 Nov 2020 06:42 )  Alb: 3.1 g/dL / Pro: 6.6 gm/dL / ALK PHOS: 61 U/L / ALT: 14 U/L / AST: 8 U/L / GGT: x           PT/INR - ( 02 Nov 2020 06:42 )   PT: 13.6 sec;   INR: 1.18 ratio               Lactate, Blood: 2.0 mmol/L (11-02 @ 06:42)  Lactate, Blood: 2.7 mmol/L (11-02 @ 02:53)  Lactate, Blood: 2.5 mmol/L (11-01 @ 23:35)        < from: Xray Abdomen 2 Views (11.02.20 @ 09:17) >    IMPRESSION:  minimally dilated small bowel loops in the central abdomen. Nasogastric tube tip in stomach. Surgical clips in pelvis..        < end of copied text >    - surgery input ntoed  - await SB series before resuming feeds and removing NGT  - await GI eval

## 2020-11-02 NOTE — PHYSICAL THERAPY INITIAL EVALUATION ADULT - DISCHARGE DISPOSITION, PT EVAL
home w/ assist/Note: pt declined home care services after last hospitalization ,feels he manages well enough at home with assist from wife /dtr PRN/home w/ home PT

## 2020-11-03 ENCOUNTER — TRANSCRIPTION ENCOUNTER (OUTPATIENT)
Age: 85
End: 2020-11-03

## 2020-11-03 VITALS
DIASTOLIC BLOOD PRESSURE: 43 MMHG | TEMPERATURE: 98 F | RESPIRATION RATE: 16 BRPM | HEART RATE: 87 BPM | SYSTOLIC BLOOD PRESSURE: 149 MMHG | OXYGEN SATURATION: 100 %

## 2020-11-03 LAB
ANION GAP SERPL CALC-SCNC: 9 MMOL/L — SIGNIFICANT CHANGE UP (ref 5–17)
BUN SERPL-MCNC: 36 MG/DL — HIGH (ref 7–23)
CALCIUM SERPL-MCNC: 8.6 MG/DL — SIGNIFICANT CHANGE UP (ref 8.5–10.1)
CHLORIDE SERPL-SCNC: 108 MMOL/L — SIGNIFICANT CHANGE UP (ref 96–108)
CO2 SERPL-SCNC: 26 MMOL/L — SIGNIFICANT CHANGE UP (ref 22–31)
CREAT SERPL-MCNC: 5.93 MG/DL — HIGH (ref 0.5–1.3)
CULTURE RESULTS: SIGNIFICANT CHANGE UP
GLUCOSE SERPL-MCNC: 140 MG/DL — HIGH (ref 70–99)
HCT VFR BLD CALC: 27.8 % — LOW (ref 39–50)
HGB BLD-MCNC: 8.8 G/DL — LOW (ref 13–17)
MCHC RBC-ENTMCNC: 31.7 GM/DL — LOW (ref 32–36)
MCHC RBC-ENTMCNC: 31.7 PG — SIGNIFICANT CHANGE UP (ref 27–34)
MCV RBC AUTO: 100 FL — SIGNIFICANT CHANGE UP (ref 80–100)
PLATELET # BLD AUTO: 176 K/UL — SIGNIFICANT CHANGE UP (ref 150–400)
POTASSIUM SERPL-MCNC: 3.4 MMOL/L — LOW (ref 3.5–5.3)
POTASSIUM SERPL-SCNC: 3.4 MMOL/L — LOW (ref 3.5–5.3)
RBC # BLD: 2.78 M/UL — LOW (ref 4.2–5.8)
RBC # FLD: 16.8 % — HIGH (ref 10.3–14.5)
SARS-COV-2 IGG SERPL QL IA: NEGATIVE — SIGNIFICANT CHANGE UP
SARS-COV-2 IGM SERPL IA-ACNC: <0.1 INDEX — SIGNIFICANT CHANGE UP
SODIUM SERPL-SCNC: 143 MMOL/L — SIGNIFICANT CHANGE UP (ref 135–145)
SPECIMEN SOURCE: SIGNIFICANT CHANGE UP
WBC # BLD: 7.22 K/UL — SIGNIFICANT CHANGE UP (ref 3.8–10.5)
WBC # FLD AUTO: 7.22 K/UL — SIGNIFICANT CHANGE UP (ref 3.8–10.5)

## 2020-11-03 PROCEDURE — 99239 HOSP IP/OBS DSCHRG MGMT >30: CPT

## 2020-11-03 PROCEDURE — 93010 ELECTROCARDIOGRAM REPORT: CPT

## 2020-11-03 RX ORDER — POTASSIUM CHLORIDE 20 MEQ
40 PACKET (EA) ORAL ONCE
Refills: 0 | Status: DISCONTINUED | OUTPATIENT
Start: 2020-11-03 | End: 2020-11-03

## 2020-11-03 RX ORDER — LOPERAMIDE HCL 2 MG
2 TABLET ORAL ONCE
Refills: 0 | Status: DISCONTINUED | OUTPATIENT
Start: 2020-11-03 | End: 2020-11-03

## 2020-11-03 RX ADMIN — Medication 2001 MILLIGRAM(S): at 11:50

## 2020-11-03 RX ADMIN — Medication 2: at 11:49

## 2020-11-03 RX ADMIN — PIPERACILLIN AND TAZOBACTAM 25 GRAM(S): 4; .5 INJECTION, POWDER, LYOPHILIZED, FOR SOLUTION INTRAVENOUS at 09:13

## 2020-11-03 RX ADMIN — Medication 2001 MILLIGRAM(S): at 09:13

## 2020-11-03 RX ADMIN — Medication 90 MILLIGRAM(S): at 09:13

## 2020-11-03 RX ADMIN — Medication 1 TABLET(S): at 09:13

## 2020-11-03 NOTE — PHARMACOTHERAPY INTERVENTION NOTE - COMMENTS
Medication reconciliation was done after checking with Dr. Palm and speaking to the patient.  He gets his medication from [facility]

## 2020-11-03 NOTE — PROGRESS NOTE ADULT - ASSESSMENT
89 yo man with ESRD and previous hx of Exp lap due to perforated gastric ulcer admitted with vomiting.  CT revealing ileus vs SBO.  Post NGT drainage, clinically improved.  --ESRD : Continue TIW HD per home unit Rx.  --GI : trial of NGT clamping and monitor GI function.  --Fluid /electrolytes stable.      11/3  ESRD s/p HD yesterday  GI PCR negative  once C Diff neg, plan is to DC home  d/w Dr Alvarenga  if dc d may go to HD at [facility] in am second shift

## 2020-11-03 NOTE — PROGRESS NOTE ADULT - ASSESSMENT
Nausea vomiting/gastroenteritis appears to be improving.   advance diet.    Likely gastroenteritis, rule out C. difficile    History of anemia status post endoscopy and colonoscopy.  Multifactorial anemia complicated by patient's end-stage renal disease.  Outpatient follow-up

## 2020-11-03 NOTE — DISCHARGE NOTE PROVIDER - CARE PROVIDER_API CALL
Sunny Negrete  [facility]  [address] [place] [address]  Phone: [phone]  Fax: [phone]  Follow Up Time:     Bc Kam  GASTROENTEROLOGY  [address] [place] [address]  Phone: [phone]  Fax: [phone]  Follow Up Time:

## 2020-11-03 NOTE — PROGRESS NOTE ADULT - SUBJECTIVE AND OBJECTIVE BOX
Date of service: 11-03-20 @ 10:39    Had loose stools overnight  Stool specimen collected for testing  No abdominal pain    ROS: no fever or chills; denies dizziness, no HA, no SOB or cough, no dysuria, no legs pain, no rashes    MEDICATIONS  (STANDING):  atorvastatin 40 milliGRAM(s) Oral at bedtime  calcium acetate 2001 milliGRAM(s) Oral three times a day with meals  dextrose 5%. 1000 milliLiter(s) (50 mL/Hr) IV Continuous <Continuous>  dextrose 50% Injectable 12.5 Gram(s) IV Push once  dextrose 50% Injectable 25 Gram(s) IV Push once  dextrose 50% Injectable 25 Gram(s) IV Push once  doxercalciferol Injectable 1 MICROGram(s) IV Push <User Schedule>  epoetin norman-epbx (RETACRIT) Injectable 13857 Unit(s) IV Push <User Schedule>  gabapentin 300 milliGRAM(s) Oral at bedtime  heparin   Injectable. 2000 Unit(s) Dialysis. <User Schedule>  influenza   Vaccine 0.5 milliLiter(s) IntraMuscular once  insulin lispro (ADMELOG) corrective regimen sliding scale   SubCutaneous every 6 hours  lidocaine/prilocaine Cream 1 Application(s) Topical <User Schedule>  multivitamin 1 Tablet(s) Oral daily  NIFEdipine XL 90 milliGRAM(s) Oral daily  piperacillin/tazobactam IVPB.. 3.375 Gram(s) IV Intermittent every 12 hours    Vital Signs Last 24 Hrs  T(C): 36.6 (03 Nov 2020 08:08), Max: 36.7 (02 Nov 2020 15:18)  T(F): 97.8 (03 Nov 2020 08:08), Max: 98 (02 Nov 2020 15:18)  HR: 87 (03 Nov 2020 08:08) (87 - 97)  BP: 149/43 (03 Nov 2020 08:08) (116/50 - 149/43)  BP(mean): --  RR: 16 (03 Nov 2020 08:08) (16 - 18)  SpO2: 100% (03 Nov 2020 08:08) (100% - 100%)     Physical exam:    Constitutional:  No acute distress  HEENT: NC/AT, EOMI, PERRLA, conjunctivae clear; ears and nose atraumatic; NGT removed  Neck: supple; thyroid not palpable  Back: no tenderness  Respiratory: respiratory effort normal; clear to auscultation  Cardiovascular: S1S2 regular, no murmurs  Abdomen: soft, not tender, not distended, positive BS  Genitourinary: no suprapubic tenderness  Lymphatic: no LN palpable  Musculoskeletal: no muscle tenderness, no joint swelling or tenderness  Extremities: no pedal edema  Neurological/ Psychiatric: AxOx3, moving all extremities  Skin: no rashes; no palpable lesions    Labs: all available labs reviewed                        8.6    7.55  )-----------( 174      ( 02 Nov 2020 06:42 )             27.3     11-02    137  |  106  |  73<H>  ----------------------------<  140<H>  4.8   |  17<L>  |  9.16<H>    Ca    8.2<L>      02 Nov 2020 06:42  Phos  6.4     11-02  Mg     1.9     11-02    TPro  6.6  /  Alb  3.1<L>  /  TBili  0.6  /  DBili  x   /  AST  8<L>  /  ALT  14  /  AlkPhos  61  11-02     LIVER FUNCTIONS - ( 02 Nov 2020 06:42 )  Alb: 3.1 g/dL / Pro: 6.6 gm/dL / ALK PHOS: 61 U/L / ALT: 14 U/L / AST: 8 U/L / GGT: x             Culture - Blood (collected 02 Nov 2020 00:50)  Source: .Blood None  Preliminary Report (03 Nov 2020 07:01):    No growth to date.    Culture - Blood (collected 02 Nov 2020 00:42)  Source: .Blood None  Preliminary Report (03 Nov 2020 07:01):    No growth to date.    COVID-19 PCR: NotDetec (11-01-20 @ 22:21)    Radiology: all available radiological tests reviewed    < from: CT Abdomen and Pelvis No Cont (11.01.20 @ 23:14) >  1. Several prominently distended/mildly dilated loops of small bowel seen. Findings may represent ileus or partial small bowel obstruction.  2. Colonic diverticulosis without acute diverticulitis.  3. Stable cystic pancreatic tail lesion.    < end of copied text >  < from: Xray Chest 1 View-PORTABLE IMMEDIATE (11.01.20 @ 18:59) >  Negative for active pulmonary disease.    < end of copied text >      Advanced directives addressed: full resuscitation

## 2020-11-03 NOTE — DISCHARGE NOTE PROVIDER - NSDCCPCAREPLAN_GEN_ALL_CORE_FT
PRINCIPAL DISCHARGE DIAGNOSIS  Diagnosis: Gastroenteritis  Assessment and Plan of Treatment: Patient on Zosyn antibiotic treatment and will continue treatment after discharge. If fever developed or symptoms worsen return to the ED immediatly. Can continue with home medications      SECONDARY DISCHARGE DIAGNOSES  Diagnosis: ESRD (end stage renal disease)  Assessment and Plan of Treatment: continue with Hemodialysis treatment

## 2020-11-03 NOTE — PROGRESS NOTE ADULT - SUBJECTIVE AND OBJECTIVE BOX
Chief complaints.  Presented with nausea and vomiting at home.    HPI:  87 yo man with ESRD on MWF HD schedule/at [facility] HD,  presented with recurrent vomiting at home.  Found to have Ileus vs SBO on CT of abdomen.    NGT placed to drainage.  Pt reports resolution of nausea /vomiting since admission.  Feeling improved.  Denies SOB.    11/3  feels well  no new events  GI PCR neg  C Diff pending  feels well to be dc d      PMHX and PSHX.  1.HTN  2.Hx of Bladder CA   3.Hx of Cystectomy and Neobladder.  4.DM  5.CAD ( post PCI)  6.CHF (HFpEF)  7. Moderate Aortic Stenosis  8.Hx of perforated gastric ulcer post exp lap.    FAMILY HISTORY:  No pertinent family history in first degree relatives    SOCIAL HISTORY : No current hx of smoking or ETOH.    Allergies :  No Known Allergies    Intolerances:  lisinopril (Diarrhea)    REVIEW OF SYSTEMS :  Denies further vomiting  Denies abdominal pain  Denies SOB  Denies chest discomfort      I&O's Detail    03 Nov 2020 07:01  -  03 Nov 2020 13:08  --------------------------------------------------------  IN:    IV PiggyBack: 100 mL    Oral Fluid: 360 mL  Total IN: 460 mL    OUT:  Total OUT: 0 mL    Total NET: 460 mL      MEDICATIONS  (STANDING):  atorvastatin 40 milliGRAM(s) Oral at bedtime  calcium acetate 2001 milliGRAM(s) Oral three times a day with meals  dextrose 5%. 1000 milliLiter(s) (50 mL/Hr) IV Continuous <Continuous>  dextrose 50% Injectable 12.5 Gram(s) IV Push once  dextrose 50% Injectable 25 Gram(s) IV Push once  dextrose 50% Injectable 25 Gram(s) IV Push once  doxercalciferol Injectable 1 MICROGram(s) IV Push <User Schedule>  epoetin norman-epbx (RETACRIT) Injectable 57124 Unit(s) IV Push <User Schedule>  gabapentin 300 milliGRAM(s) Oral at bedtime  heparin   Injectable. 2000 Unit(s) Dialysis. <User Schedule>  influenza   Vaccine 0.5 milliLiter(s) IntraMuscular once  insulin lispro (ADMELOG) corrective regimen sliding scale   SubCutaneous every 6 hours  lidocaine/prilocaine Cream 1 Application(s) Topical <User Schedule>  multivitamin 1 Tablet(s) Oral daily  NIFEdipine XL 90 milliGRAM(s) Oral daily  piperacillin/tazobactam IVPB.. 3.375 Gram(s) IV Intermittent every 12 hours    MEDICATIONS  (PRN):  dextrose 40% Gel 15 Gram(s) Oral once PRN Blood Glucose LESS THAN 70 milliGRAM(s)/deciliter  glucagon  Injectable 1 milliGRAM(s) IntraMuscular once PRN Glucose LESS THAN 70 milligrams/deciliter    Vital Signs Last 24 Hrs  T(C): 36.6 (03 Nov 2020 08:08), Max: 36.7 (02 Nov 2020 15:18)  T(F): 97.8 (03 Nov 2020 08:08), Max: 98 (02 Nov 2020 15:18)  HR: 87 (03 Nov 2020 08:08) (87 - 97)  BP: 149/43 (03 Nov 2020 08:08) (116/50 - 149/43)  BP(mean): --  RR: 16 (03 Nov 2020 08:08) (16 - 18)  SpO2: 100% (03 Nov 2020 08:08) (100% - 100%)        PHYSICAL EXAM:  GEN: Comfortable.  HEENT: WNL  NECK : supple  CV: S1S2 RRR  LUNGS: Clear to aus  ABD: distended, soft  EXT: no edema    LABS:                          8.8    7.22  )-----------( 176      ( 03 Nov 2020 06:57 )             27.8                           8.6    7.55  )-----------( 174      ( 02 Nov 2020 06:42 )             27.3     11-03    143  |  108  |  36<H>  ----------------------------<  140<H>  3.4<L>   |  26  |  5.93<H>    Ca    8.6      03 Nov 2020 06:57  Phos  6.4     11-02  Mg     1.9     11-02    TPro  6.6  /  Alb  3.1<L>  /  TBili  0.6  /  DBili  x   /  AST  8<L>  /  ALT  14  /  AlkPhos  61  11-02 11-02    137  |  106  |  73<H>  ----------------------------<  140<H>  4.8   |  17<L>  |  9.16<H>    Ca    8.2<L>      02 Nov 2020 06:42  Phos  6.4     11-02  Mg     1.9     11-02    TPro  6.6  /  Alb  3.1<L>  /  TBili  0.6  /  DBili  x   /  AST  8<L>  /  ALT  14  /  AlkPhos  61  11-02    PT/INR - ( 02 Nov 2020 06:42 )   PT: 13.6 sec;   INR: 1.18 ratio             Magnesium, Serum: 1.9 mg/dL (11-02 @ 06:42)  Phosphorus Level, Serum: 6.4 mg/dL (11-02 @ 06:42)  Magnesium, Serum: 1.9 mg/dL (11-01 @ 18:00)

## 2020-11-03 NOTE — PROGRESS NOTE ADULT - ASSESSMENT
89 y/o Male with h/o CAD s/p PCI, ESRD on HD, HFpEF, DM2, bladder cancer s/p resection and neobladder formation, moderate aortic stenosis, h/o perforated gastric ulcer s/p ex lap was admitted on 11/1 for vomiting x 1 day. Patient states he vomited for half a dozen times prior to arrival, but no episodes of vomiting in ED. Vomiting was non-bloody. Had decreased appetite and generalized weakness as well. Patient denies abdominal pain, diarrhea. Has had 2 BMs in the ED. Denies fever, chills reported. In ER he received zosyn.     1. Vomiting recolved. Acute gastroenteritis. ESRD on HD.  -abdomen is soft and nondistended; doubt SBO  -noted with increased bands ?cause ?infection  -f/u cultures  -stool for GI PCR and CDT collcected  -on zosyn 3.375 gm IV q8h # 2  -tolerating abx well so far; no side effects noted  -monitor abdomen  -cultures are negative to date; will review stool testing  -continue abx coverage for now  -monitor temps  -f/u CBC  -supportive care  2. Other issues:   -care per medicine

## 2020-11-03 NOTE — PROGRESS NOTE ADULT - SUBJECTIVE AND OBJECTIVE BOX
Patient is a 88y old  Male who presents with a chief complaint of vomiting (03 Nov 2020 10:39)      HPI:  87 y/o M w/ PMH of CAD s/p PCI, ESRD on HD, HFpEF, DM2, bladder cancer s/p resection and neobladder formation, moderate aortic stenosis, h/o perforated gastric ulcer s/p ex lap, p/w vomiting x 1 day. Patient states he vomited for half a dozen times prior to arrival, but no episodes of vomiting in ED. Vomiting was non-bloody. Had decreased appetite and generalized weakness as well. Patient denies any abdominal pain, diarrhea. Has had 2 BMs in the ED. Denies fever, chills, cough, runny nose, sore throat, CP, SOB.    On CT scan, patient found to have ileus vs partial SBO. Patient was evaluated by surgery, Dr. Rudolph, at bedside. As per Dr. rudolph, patient is clinically non-tender and not obstructed.       Patient pleasant and a poor historian.  Positive loose stools.  Negative abdominal pain.  Tolerated liquids.  X-ray noted.  Infectious disease note appreciated.        PSH: prostatectomy, bladder surgery, appendectomy, cholecystectomy, AV fistula     Social hx: Denies x 3      Family Hx: Denies (02 Nov 2020 02:09)      PAST MEDICAL & SURGICAL HISTORY:  Moderate aortic stenosis    Chronic CHF    Type 2 diabetes mellitus    ESRD on dialysis  MWF    HTN (hypertension)    Bladder cancer    History of cholecystectomy    S/P arteriovenous (AV) fistula creation  Right    History of percutaneous angioplasty  PCI w/ KAREN RCA 12/2016, KAREN to LAD and D1 on 11/1/2018    History of exploratory laparotomy  Perforated Gastric Ulcer, Peritonitis    History of appendectomy    History of bladder cancer  s/p resection with neobladder        MEDICATIONS  (STANDING):  atorvastatin 40 milliGRAM(s) Oral at bedtime  calcium acetate 2001 milliGRAM(s) Oral three times a day with meals  dextrose 5%. 1000 milliLiter(s) (50 mL/Hr) IV Continuous <Continuous>  dextrose 50% Injectable 12.5 Gram(s) IV Push once  dextrose 50% Injectable 25 Gram(s) IV Push once  dextrose 50% Injectable 25 Gram(s) IV Push once  doxercalciferol Injectable 1 MICROGram(s) IV Push <User Schedule>  epoetin norman-epbx (RETACRIT) Injectable 20988 Unit(s) IV Push <User Schedule>  gabapentin 300 milliGRAM(s) Oral at bedtime  heparin   Injectable. 2000 Unit(s) Dialysis. <User Schedule>  influenza   Vaccine 0.5 milliLiter(s) IntraMuscular once  insulin lispro (ADMELOG) corrective regimen sliding scale   SubCutaneous every 6 hours  lidocaine/prilocaine Cream 1 Application(s) Topical <User Schedule>  multivitamin 1 Tablet(s) Oral daily  NIFEdipine XL 90 milliGRAM(s) Oral daily  piperacillin/tazobactam IVPB.. 3.375 Gram(s) IV Intermittent every 12 hours    MEDICATIONS  (PRN):  dextrose 40% Gel 15 Gram(s) Oral once PRN Blood Glucose LESS THAN 70 milliGRAM(s)/deciliter  glucagon  Injectable 1 milliGRAM(s) IntraMuscular once PRN Glucose LESS THAN 70 milligrams/deciliter      Allergies    No Known Allergies    Intolerances    lisinopril (Diarrhea)      SOCIAL HISTORY:    FAMILY HISTORY:  No pertinent family history in first degree relatives        REVIEW OF SYSTEMS:    CONSTITUTIONAL: No weakness, fevers or chills  EYES/ENT: No visual changes;  No vertigo or throat pain   NECK: No pain or stiffness  RESPIRATORY: No cough, wheezing, hemoptysis; No shortness of breath  CARDIOVASCULAR: No chest pain or palpitations  GENITOURINARY: No dysuria, frequency or hematuria  NEUROLOGICAL: No numbness or weakness  SKIN: No itching, burning, rashes, or lesions   All other review of systems is negative unless indicated above.    Vital Signs Last 24 Hrs  T(C): 36.6 (03 Nov 2020 08:08), Max: 36.7 (02 Nov 2020 15:18)  T(F): 97.8 (03 Nov 2020 08:08), Max: 98 (02 Nov 2020 15:18)  HR: 87 (03 Nov 2020 08:08) (87 - 97)  BP: 149/43 (03 Nov 2020 08:08) (116/50 - 149/43)  BP(mean): --  RR: 16 (03 Nov 2020 08:08) (16 - 18)  SpO2: 100% (03 Nov 2020 08:08) (100% - 100%)    PHYSICAL EXAM:    Constitutional: NAD, well-developed  HEENT: EOMI, throat clear  Neck: No LAD, supple  Respiratory: CTA and P  Cardiovascular: S1 and S2, RRR, no M  Gastrointestinal: BS+, soft, NT/ND, neg HSM,  Extremities: No peripheral edema, neg clubing, cyanosis  Vascular: 2+ peripheral pulses  Neurological: A/O x 3, no focal deficits  Psychiatric: Normal mood, normal affect  Skin: No rashes    LABS:  CBC Full  -  ( 03 Nov 2020 06:57 )  WBC Count : 7.22 K/uL  RBC Count : 2.78 M/uL  Hemoglobin : 8.8 g/dL  Hematocrit : 27.8 %  Platelet Count - Automated : 176 K/uL  Mean Cell Volume : 100.0 fl  Mean Cell Hemoglobin : 31.7 pg  Mean Cell Hemoglobin Concentration : 31.7 gm/dL  Auto Neutrophil # : x  Auto Lymphocyte # : x  Auto Monocyte # : x  Auto Eosinophil # : x  Auto Basophil # : x  Auto Neutrophil % : x  Auto Lymphocyte % : x  Auto Monocyte % : x  Auto Eosinophil % : x  Auto Basophil % : x    11-03    143  |  108  |  36<H>  ----------------------------<  140<H>  3.4<L>   |  26  |  5.93<H>    Ca    8.6      03 Nov 2020 06:57  Phos  6.4     11-02  Mg     1.9     11-02    TPro  6.6  /  Alb  3.1<L>  /  TBili  0.6  /  DBili  x   /  AST  8<L>  /  ALT  14  /  AlkPhos  61  11-02    PT/INR - ( 02 Nov 2020 06:42 )   PT: 13.6 sec;   INR: 1.18 ratio             11-02 @ 06:42  Hep A Igm Nonreact  Hep A total ab, IgA and M --  Hep B core Ab total --  Hep B core IgM Nonreact  Hep B DNA PCR --  Hep BSAg --  Hep BSAb --  Hep BSAb --  HCV Ab --  HCV RNA Log --  HCV RNA interp --                RADIOLOGY & ADDITIONAL STUDIES:  < from: Xray Small Bowel Series (11.02.20 @ 13:08) >    EXAM:  XR SM INTEST SNGL CON STDY                            PROCEDURE DATE:  11/02/2020          INTERPRETATION:  Small bowel follow through    CLINICAL INFORMATION:  Small bowel obstruction on CT scan dated 11/01/2020    TECHNIQUE:  Dilute Gastrografin was given by mouth.  Evaluation was performed with serial radiographs.  FINDINGS:   CT dated 11/01/2020 available for review.    Small bowel follow through was completed with Gastrografin reaching the cecum in approximately 1 hour and 20 minutes.  The small bowel fold pattern was intact. Mild small bowel dilatation was found predominantly in the LEFT hemiabdomen.  No abnormal displacement of loops was appreciated.  The terminal ileum demonstrated intact mucosa.    IMPRESSION: No evidence ofsmall bowel obstruction. Mild small bowel dilatation predominantly in the LEFT hemiabdomen. Free passage of Gastrografin into the colon.            MIRA KEYES MD; Attending Radiologist  This document has been electronically signed. Nov 2 2020  3:44PM    < end of copied text >

## 2020-11-03 NOTE — DISCHARGE NOTE NURSING/CASE MANAGEMENT/SOCIAL WORK - PATIENT PORTAL LINK FT
You can access the FollowMyHealth Patient Portal offered by [facility] by registering at the following website: [email or web address]. By joining [employer]’s FollowMyHealth portal, you will also be able to view your health information using other applications (apps) compatible with our system.

## 2020-11-03 NOTE — DISCHARGE NOTE PROVIDER - HOSPITAL COURSE
89 y/o M w/ PMH of CAD s/p PCI, ESRD on HD, HFpEF, DM2, bladder cancer s/p resection and neobladder formation, moderate aortic stenosis, h/o perforated gastric ulcer s/p ex lap, p/w vomiting x 1 day. On CT scan, patient found to have ileus vs partial SBO, patient was placed on NG Tube. With signs of improvement in n/v/ gastroenteritis sxs NG tube was dc on 11/3 and diet was advanced, patient has been able to tolerate diet. Patient has been started on IV zosyn 3.375gm q8h for acute gastroenteritis. Blood cultures came back negative, stool cultures came back negative, C.diff PCR came back negative. Patient can resume home medications after discharge.     #Vomiting. Acute gastroenteritis  -stool cultures-negative  -blood cultures - negative  -C.diff PCR- Negative  -on zosyn 3.375 gm IV q8h  -supportive care    #Anemia  -Hb trended down from 11.2 (8/31/20) to 8.8 today  -Will hold ASA temporarily until further evaluation by GI    #ESRD  -on HD    #DM2-continue home meds 87 y/o M w/ PMH of CAD s/p PCI, ESRD on HD, HFpEF, DM2, bladder cancer s/p resection and neobladder formation, moderate aortic stenosis, h/o perforated gastric ulcer s/p ex lap, p/w vomiting x 1 day. On CT scan, patient found to have ileus vs partial SBO, patient was placed on NG Tube. With signs of improvement in n/v/ gastroenteritis sxs NG tube was dc on 11/3 and diet was advanced, patient has been able to tolerate diet. Patient has been started on IV zosyn 3.375gm q8h for acute gastroenteritis. Blood cultures came back negative, stool cultures came back negative, GI PCR came back negative. no need for cdiff as per dr linares as diarrhea resolving with no leukocytosis.  Patient can resume home medications after discharge.     #Vomiting. Acute gastroenteritis  -stool cultures-negative  -blood cultures - negative  -GI PCR- Negative  - no further abx needed   -supportive care    #Anemia  -Hb trended down from 11.2 (8/31/20) to 8.8 today  -Will hold ASA temporarily until further evaluation by GI    #ESRD  -on HD    #DM2-continue home meds

## 2020-11-03 NOTE — DISCHARGE NOTE PROVIDER - NSDCPNSUBOBJ_GEN_ALL_CORE
Vital Signs Last 24 Hrs  T(C): 36.6 (03 Nov 2020 08:08), Max: 36.7 (02 Nov 2020 15:18)  T(F): 97.8 (03 Nov 2020 08:08), Max: 98 (02 Nov 2020 15:18)  HR: 87 (03 Nov 2020 08:08) (87 - 97)  BP: 149/43 (03 Nov 2020 08:08) (116/50 - 149/43)  BP(mean): --  RR: 16 (03 Nov 2020 08:08) (16 - 18)  SpO2: 100% (03 Nov 2020 08:08) (100% - 100%)    GEN: lying in bed, NAD  HEENT:   NC/AT, pupils equal and reactive, EOMI  CV:  +S1, +S2, RRR  RESP:   lungs clear to auscultation bilaterally, no wheeze, rales, rhonchi   BREAST:  not examined  GI:  abdomen soft, non-tender, non-distended, normoactive BS  RECTAL:  not examined  :  not examined  MSK:   normal muscle tone  EXT:  no edema  NEURO:  AAOX3, no focal neurological deficits  SKIN:  no rashes                              8.8    7.22  )-----------( 176      ( 03 Nov 2020 06:57 )             27.8     11-03    143  |  108  |  36<H>  ----------------------------<  140<H>  3.4<L>   |  26  |  5.93<H>    Ca    8.6      03 Nov 2020 06:57  Phos  6.4     11-02  Mg     1.9     11-02    TPro  6.6  /  Alb  3.1<L>  /  TBili  0.6  /  DBili  x   /  AST  8<L>  /  ALT  14  /  AlkPhos  61  11-02    CARDIAC MARKERS ( 01 Nov 2020 20:40 )  0.036 ng/mL / x     / x     / x     / x      CARDIAC MARKERS ( 01 Nov 2020 18:00 )  0.026 ng/mL / x     / x     / x     / x          LIVER FUNCTIONS - ( 02 Nov 2020 06:42 )  Alb: 3.1 g/dL / Pro: 6.6 gm/dL / ALK PHOS: 61 U/L / ALT: 14 U/L / AST: 8 U/L / GGT: x           PT/INR - ( 02 Nov 2020 06:42 )   PT: 13.6 sec;   INR: 1.18 ratio

## 2020-11-06 DIAGNOSIS — D72.825 BANDEMIA: ICD-10-CM

## 2020-11-06 DIAGNOSIS — I25.10 ATHEROSCLEROTIC HEART DISEASE OF NATIVE CORONARY ARTERY WITHOUT ANGINA PECTORIS: ICD-10-CM

## 2020-11-06 DIAGNOSIS — Z90.6 ACQUIRED ABSENCE OF OTHER PARTS OF URINARY TRACT: ICD-10-CM

## 2020-11-06 DIAGNOSIS — Z99.2 DEPENDENCE ON RENAL DIALYSIS: ICD-10-CM

## 2020-11-06 DIAGNOSIS — I50.32 CHRONIC DIASTOLIC (CONGESTIVE) HEART FAILURE: ICD-10-CM

## 2020-11-06 DIAGNOSIS — K86.2 CYST OF PANCREAS: ICD-10-CM

## 2020-11-06 DIAGNOSIS — K52.9 NONINFECTIVE GASTROENTERITIS AND COLITIS, UNSPECIFIED: ICD-10-CM

## 2020-11-06 DIAGNOSIS — E87.2 ACIDOSIS: ICD-10-CM

## 2020-11-06 DIAGNOSIS — K56.7 ILEUS, UNSPECIFIED: ICD-10-CM

## 2020-11-06 DIAGNOSIS — Z79.4 LONG TERM (CURRENT) USE OF INSULIN: ICD-10-CM

## 2020-11-06 DIAGNOSIS — N18.6 END STAGE RENAL DISEASE: ICD-10-CM

## 2020-11-06 DIAGNOSIS — Z85.51 PERSONAL HISTORY OF MALIGNANT NEOPLASM OF BLADDER: ICD-10-CM

## 2020-11-06 DIAGNOSIS — I35.0 NONRHEUMATIC AORTIC (VALVE) STENOSIS: ICD-10-CM

## 2020-11-06 DIAGNOSIS — Z79.82 LONG TERM (CURRENT) USE OF ASPIRIN: ICD-10-CM

## 2020-11-06 DIAGNOSIS — Z98.61 CORONARY ANGIOPLASTY STATUS: ICD-10-CM

## 2020-11-06 DIAGNOSIS — Z87.11 PERSONAL HISTORY OF PEPTIC ULCER DISEASE: ICD-10-CM

## 2020-11-06 DIAGNOSIS — E11.22 TYPE 2 DIABETES MELLITUS WITH DIABETIC CHRONIC KIDNEY DISEASE: ICD-10-CM

## 2020-11-06 DIAGNOSIS — D63.1 ANEMIA IN CHRONIC KIDNEY DISEASE: ICD-10-CM

## 2021-01-01 ENCOUNTER — EMERGENCY (EMERGENCY)
Facility: HOSPITAL | Age: 86
LOS: 0 days | Discharge: HOME | End: 2021-09-13
Attending: EMERGENCY MEDICINE
Payer: MEDICARE

## 2021-01-01 VITALS
DIASTOLIC BLOOD PRESSURE: 61 MMHG | SYSTOLIC BLOOD PRESSURE: 141 MMHG | RESPIRATION RATE: 18 BRPM | TEMPERATURE: 98 F | OXYGEN SATURATION: 95 % | HEART RATE: 73 BPM

## 2021-01-01 VITALS
OXYGEN SATURATION: 96 % | RESPIRATION RATE: 17 BRPM | HEIGHT: 72 IN | WEIGHT: 179.9 LBS | SYSTOLIC BLOOD PRESSURE: 119 MMHG | DIASTOLIC BLOOD PRESSURE: 106 MMHG | TEMPERATURE: 98 F

## 2021-01-01 DIAGNOSIS — Z98.890 OTHER SPECIFIED POSTPROCEDURAL STATES: Chronic | ICD-10-CM

## 2021-01-01 DIAGNOSIS — Z90.6 ACQUIRED ABSENCE OF OTHER PARTS OF URINARY TRACT: ICD-10-CM

## 2021-01-01 DIAGNOSIS — Z98.89 OTHER SPECIFIED POSTPROCEDURAL STATES: Chronic | ICD-10-CM

## 2021-01-01 DIAGNOSIS — Z86.79 PERSONAL HISTORY OF OTHER DISEASES OF THE CIRCULATORY SYSTEM: ICD-10-CM

## 2021-01-01 DIAGNOSIS — E11.22 TYPE 2 DIABETES MELLITUS WITH DIABETIC CHRONIC KIDNEY DISEASE: ICD-10-CM

## 2021-01-01 DIAGNOSIS — I13.2 HYPERTENSIVE HEART AND CHRONIC KIDNEY DISEASE WITH HEART FAILURE AND WITH STAGE 5 CHRONIC KIDNEY DISEASE, OR END STAGE RENAL DISEASE: ICD-10-CM

## 2021-01-01 DIAGNOSIS — Z85.51 PERSONAL HISTORY OF MALIGNANT NEOPLASM OF BLADDER: ICD-10-CM

## 2021-01-01 DIAGNOSIS — Z90.49 ACQUIRED ABSENCE OF OTHER SPECIFIED PARTS OF DIGESTIVE TRACT: ICD-10-CM

## 2021-01-01 DIAGNOSIS — N18.6 END STAGE RENAL DISEASE: ICD-10-CM

## 2021-01-01 DIAGNOSIS — Z99.2 DEPENDENCE ON RENAL DIALYSIS: ICD-10-CM

## 2021-01-01 DIAGNOSIS — M25.551 PAIN IN RIGHT HIP: ICD-10-CM

## 2021-01-01 DIAGNOSIS — Z95.1 PRESENCE OF AORTOCORONARY BYPASS GRAFT: ICD-10-CM

## 2021-01-01 DIAGNOSIS — Z79.4 LONG TERM (CURRENT) USE OF INSULIN: ICD-10-CM

## 2021-01-01 DIAGNOSIS — Z79.82 LONG TERM (CURRENT) USE OF ASPIRIN: ICD-10-CM

## 2021-01-01 DIAGNOSIS — Z79.899 OTHER LONG TERM (CURRENT) DRUG THERAPY: ICD-10-CM

## 2021-01-01 DIAGNOSIS — I50.9 HEART FAILURE, UNSPECIFIED: ICD-10-CM

## 2021-01-01 DIAGNOSIS — Z90.49 ACQUIRED ABSENCE OF OTHER SPECIFIED PARTS OF DIGESTIVE TRACT: Chronic | ICD-10-CM

## 2021-01-01 DIAGNOSIS — Z85.51 PERSONAL HISTORY OF MALIGNANT NEOPLASM OF BLADDER: Chronic | ICD-10-CM

## 2021-01-01 DIAGNOSIS — Z88.8 ALLERGY STATUS TO OTHER DRUGS, MEDICAMENTS AND BIOLOGICAL SUBSTANCES: ICD-10-CM

## 2021-01-01 LAB
ALBUMIN SERPL ELPH-MCNC: 3.3 G/DL — SIGNIFICANT CHANGE UP (ref 3.3–5)
ALP SERPL-CCNC: 83 U/L — SIGNIFICANT CHANGE UP (ref 40–120)
ALT FLD-CCNC: 14 U/L — SIGNIFICANT CHANGE UP (ref 12–78)
ANION GAP SERPL CALC-SCNC: 12 MMOL/L — SIGNIFICANT CHANGE UP (ref 5–17)
AST SERPL-CCNC: 9 U/L — LOW (ref 15–37)
BASOPHILS # BLD AUTO: 0.07 K/UL — SIGNIFICANT CHANGE UP (ref 0–0.2)
BASOPHILS NFR BLD AUTO: 0.8 % — SIGNIFICANT CHANGE UP (ref 0–2)
BILIRUB SERPL-MCNC: 0.7 MG/DL — SIGNIFICANT CHANGE UP (ref 0.2–1.2)
BUN SERPL-MCNC: 68 MG/DL — HIGH (ref 7–23)
CALCIUM SERPL-MCNC: 8.4 MG/DL — LOW (ref 8.5–10.1)
CHLORIDE SERPL-SCNC: 101 MMOL/L — SIGNIFICANT CHANGE UP (ref 96–108)
CO2 SERPL-SCNC: 22 MMOL/L — SIGNIFICANT CHANGE UP (ref 22–31)
CREAT SERPL-MCNC: 10.7 MG/DL — HIGH (ref 0.5–1.3)
EOSINOPHIL # BLD AUTO: 0.18 K/UL — SIGNIFICANT CHANGE UP (ref 0–0.5)
EOSINOPHIL NFR BLD AUTO: 2.1 % — SIGNIFICANT CHANGE UP (ref 0–6)
GLUCOSE SERPL-MCNC: 165 MG/DL — HIGH (ref 70–99)
HAV IGM SER-ACNC: SIGNIFICANT CHANGE UP
HBV CORE IGM SER-ACNC: SIGNIFICANT CHANGE UP
HBV SURFACE AG SER-ACNC: SIGNIFICANT CHANGE UP
HCT VFR BLD CALC: 31.9 % — LOW (ref 39–50)
HCV AB S/CO SERPL IA: 0.13 S/CO — SIGNIFICANT CHANGE UP (ref 0–0.99)
HCV AB SERPL-IMP: SIGNIFICANT CHANGE UP
HGB BLD-MCNC: 10.5 G/DL — LOW (ref 13–17)
IMM GRANULOCYTES NFR BLD AUTO: 0.4 % — SIGNIFICANT CHANGE UP (ref 0–1.5)
LYMPHOCYTES # BLD AUTO: 0.99 K/UL — LOW (ref 1–3.3)
LYMPHOCYTES # BLD AUTO: 11.8 % — LOW (ref 13–44)
MCHC RBC-ENTMCNC: 31.9 PG — SIGNIFICANT CHANGE UP (ref 27–34)
MCHC RBC-ENTMCNC: 32.9 GM/DL — SIGNIFICANT CHANGE UP (ref 32–36)
MCV RBC AUTO: 97 FL — SIGNIFICANT CHANGE UP (ref 80–100)
MONOCYTES # BLD AUTO: 1.13 K/UL — HIGH (ref 0–0.9)
MONOCYTES NFR BLD AUTO: 13.4 % — SIGNIFICANT CHANGE UP (ref 2–14)
NEUTROPHILS # BLD AUTO: 6.02 K/UL — SIGNIFICANT CHANGE UP (ref 1.8–7.4)
NEUTROPHILS NFR BLD AUTO: 71.5 % — SIGNIFICANT CHANGE UP (ref 43–77)
PHOSPHATE SERPL-MCNC: 6.2 MG/DL — HIGH (ref 2.5–4.5)
PLATELET # BLD AUTO: 183 K/UL — SIGNIFICANT CHANGE UP (ref 150–400)
POTASSIUM SERPL-MCNC: 4.1 MMOL/L — SIGNIFICANT CHANGE UP (ref 3.5–5.3)
POTASSIUM SERPL-SCNC: 4.1 MMOL/L — SIGNIFICANT CHANGE UP (ref 3.5–5.3)
PROT SERPL-MCNC: 6.6 GM/DL — SIGNIFICANT CHANGE UP (ref 6–8.3)
RBC # BLD: 3.29 M/UL — LOW (ref 4.2–5.8)
RBC # FLD: 14.3 % — SIGNIFICANT CHANGE UP (ref 10.3–14.5)
SODIUM SERPL-SCNC: 135 MMOL/L — SIGNIFICANT CHANGE UP (ref 135–145)
WBC # BLD: 8.42 K/UL — SIGNIFICANT CHANGE UP (ref 3.8–10.5)
WBC # FLD AUTO: 8.42 K/UL — SIGNIFICANT CHANGE UP (ref 3.8–10.5)

## 2021-01-01 PROCEDURE — 36415 COLL VENOUS BLD VENIPUNCTURE: CPT

## 2021-01-01 PROCEDURE — 99284 EMERGENCY DEPT VISIT MOD MDM: CPT | Mod: 25

## 2021-01-01 PROCEDURE — 73502 X-RAY EXAM HIP UNI 2-3 VIEWS: CPT | Mod: RT

## 2021-01-01 PROCEDURE — 80074 ACUTE HEPATITIS PANEL: CPT

## 2021-01-01 PROCEDURE — 73502 X-RAY EXAM HIP UNI 2-3 VIEWS: CPT | Mod: 26,RT

## 2021-01-01 PROCEDURE — 99284 EMERGENCY DEPT VISIT MOD MDM: CPT

## 2021-01-01 PROCEDURE — 85025 COMPLETE CBC W/AUTO DIFF WBC: CPT

## 2021-01-01 PROCEDURE — 96374 THER/PROPH/DIAG INJ IV PUSH: CPT

## 2021-01-01 PROCEDURE — 90999 UNLISTED DIALYSIS PROCEDURE: CPT

## 2021-01-01 PROCEDURE — 84100 ASSAY OF PHOSPHORUS: CPT

## 2021-01-01 PROCEDURE — 80053 COMPREHEN METABOLIC PANEL: CPT

## 2021-01-01 PROCEDURE — 73552 X-RAY EXAM OF FEMUR 2/>: CPT | Mod: 26,RT

## 2021-01-01 PROCEDURE — 73552 X-RAY EXAM OF FEMUR 2/>: CPT | Mod: RT

## 2021-01-01 RX ORDER — CALCIUM ACETATE 667 MG
3 TABLET ORAL
Qty: 0 | Refills: 0 | DISCHARGE

## 2021-01-01 RX ORDER — ERYTHROPOIETIN 10000 [IU]/ML
5000 INJECTION, SOLUTION INTRAVENOUS; SUBCUTANEOUS
Refills: 0 | Status: DISCONTINUED | OUTPATIENT
Start: 2021-01-01 | End: 2021-01-01

## 2021-01-01 RX ADMIN — ERYTHROPOIETIN 5000 UNIT(S): 10000 INJECTION, SOLUTION INTRAVENOUS; SUBCUTANEOUS at 18:58

## 2021-07-05 ENCOUNTER — EMERGENCY (EMERGENCY)
Facility: HOSPITAL | Age: 86
LOS: 0 days | Discharge: HOME | End: 2021-07-05
Attending: EMERGENCY MEDICINE
Payer: MEDICARE

## 2021-07-05 VITALS
DIASTOLIC BLOOD PRESSURE: 63 MMHG | OXYGEN SATURATION: 92 % | TEMPERATURE: 98 F | RESPIRATION RATE: 17 BRPM | SYSTOLIC BLOOD PRESSURE: 160 MMHG | HEIGHT: 72 IN | WEIGHT: 162.92 LBS | HEART RATE: 78 BPM

## 2021-07-05 VITALS
RESPIRATION RATE: 16 BRPM | DIASTOLIC BLOOD PRESSURE: 70 MMHG | OXYGEN SATURATION: 99 % | SYSTOLIC BLOOD PRESSURE: 167 MMHG | HEART RATE: 78 BPM

## 2021-07-05 DIAGNOSIS — Z98.89 OTHER SPECIFIED POSTPROCEDURAL STATES: Chronic | ICD-10-CM

## 2021-07-05 DIAGNOSIS — Z99.2 DEPENDENCE ON RENAL DIALYSIS: ICD-10-CM

## 2021-07-05 DIAGNOSIS — I50.9 HEART FAILURE, UNSPECIFIED: ICD-10-CM

## 2021-07-05 DIAGNOSIS — Z98.890 OTHER SPECIFIED POSTPROCEDURAL STATES: Chronic | ICD-10-CM

## 2021-07-05 DIAGNOSIS — Z85.51 PERSONAL HISTORY OF MALIGNANT NEOPLASM OF BLADDER: Chronic | ICD-10-CM

## 2021-07-05 DIAGNOSIS — Z79.82 LONG TERM (CURRENT) USE OF ASPIRIN: ICD-10-CM

## 2021-07-05 DIAGNOSIS — N18.6 END STAGE RENAL DISEASE: ICD-10-CM

## 2021-07-05 DIAGNOSIS — Z90.49 ACQUIRED ABSENCE OF OTHER SPECIFIED PARTS OF DIGESTIVE TRACT: ICD-10-CM

## 2021-07-05 DIAGNOSIS — Z20.822 CONTACT WITH AND (SUSPECTED) EXPOSURE TO COVID-19: ICD-10-CM

## 2021-07-05 DIAGNOSIS — Z98.890 OTHER SPECIFIED POSTPROCEDURAL STATES: ICD-10-CM

## 2021-07-05 DIAGNOSIS — E11.9 TYPE 2 DIABETES MELLITUS WITHOUT COMPLICATIONS: ICD-10-CM

## 2021-07-05 DIAGNOSIS — R53.1 WEAKNESS: ICD-10-CM

## 2021-07-05 DIAGNOSIS — Z90.49 ACQUIRED ABSENCE OF OTHER SPECIFIED PARTS OF DIGESTIVE TRACT: Chronic | ICD-10-CM

## 2021-07-05 DIAGNOSIS — C67.9 MALIGNANT NEOPLASM OF BLADDER, UNSPECIFIED: ICD-10-CM

## 2021-07-05 DIAGNOSIS — I13.2 HYPERTENSIVE HEART AND CHRONIC KIDNEY DISEASE WITH HEART FAILURE AND WITH STAGE 5 CHRONIC KIDNEY DISEASE, OR END STAGE RENAL DISEASE: ICD-10-CM

## 2021-07-05 DIAGNOSIS — I35.0 NONRHEUMATIC AORTIC (VALVE) STENOSIS: ICD-10-CM

## 2021-07-05 DIAGNOSIS — Z88.8 ALLERGY STATUS TO OTHER DRUGS, MEDICAMENTS AND BIOLOGICAL SUBSTANCES: ICD-10-CM

## 2021-07-05 DIAGNOSIS — Z85.51 PERSONAL HISTORY OF MALIGNANT NEOPLASM OF BLADDER: ICD-10-CM

## 2021-07-05 LAB
ADD ON TEST-SPECIMEN IN LAB: SIGNIFICANT CHANGE UP
ALBUMIN SERPL ELPH-MCNC: 3.2 G/DL — LOW (ref 3.3–5)
ALP SERPL-CCNC: 68 U/L — SIGNIFICANT CHANGE UP (ref 40–120)
ALT FLD-CCNC: 9 U/L — LOW (ref 12–78)
ANION GAP SERPL CALC-SCNC: 14 MMOL/L — SIGNIFICANT CHANGE UP (ref 5–17)
APTT BLD: 33.7 SEC — SIGNIFICANT CHANGE UP (ref 27.5–35.5)
AST SERPL-CCNC: 4 U/L — LOW (ref 15–37)
BASOPHILS # BLD AUTO: 0.04 K/UL — SIGNIFICANT CHANGE UP (ref 0–0.2)
BASOPHILS NFR BLD AUTO: 0.5 % — SIGNIFICANT CHANGE UP (ref 0–2)
BILIRUB SERPL-MCNC: 0.7 MG/DL — SIGNIFICANT CHANGE UP (ref 0.2–1.2)
BUN SERPL-MCNC: 84 MG/DL — HIGH (ref 7–23)
CALCIUM SERPL-MCNC: 8.7 MG/DL — SIGNIFICANT CHANGE UP (ref 8.5–10.1)
CHLORIDE SERPL-SCNC: 100 MMOL/L — SIGNIFICANT CHANGE UP (ref 96–108)
CO2 SERPL-SCNC: 19 MMOL/L — LOW (ref 22–31)
CREAT SERPL-MCNC: 10.8 MG/DL — HIGH (ref 0.5–1.3)
EOSINOPHIL # BLD AUTO: 0.1 K/UL — SIGNIFICANT CHANGE UP (ref 0–0.5)
EOSINOPHIL NFR BLD AUTO: 1.1 % — SIGNIFICANT CHANGE UP (ref 0–6)
GLUCOSE SERPL-MCNC: 328 MG/DL — HIGH (ref 70–99)
HAV IGM SER-ACNC: SIGNIFICANT CHANGE UP
HBV CORE IGM SER-ACNC: SIGNIFICANT CHANGE UP
HBV SURFACE AG SER-ACNC: SIGNIFICANT CHANGE UP
HCT VFR BLD CALC: 33.2 % — LOW (ref 39–50)
HCV AB S/CO SERPL IA: 0.11 S/CO — SIGNIFICANT CHANGE UP (ref 0–0.99)
HCV AB SERPL-IMP: SIGNIFICANT CHANGE UP
HGB BLD-MCNC: 10.6 G/DL — LOW (ref 13–17)
IMM GRANULOCYTES NFR BLD AUTO: 0.3 % — SIGNIFICANT CHANGE UP (ref 0–1.5)
INR BLD: 1.12 RATIO — SIGNIFICANT CHANGE UP (ref 0.88–1.16)
LACTATE SERPL-SCNC: 1.1 MMOL/L — SIGNIFICANT CHANGE UP (ref 0.7–2)
LYMPHOCYTES # BLD AUTO: 0.8 K/UL — LOW (ref 1–3.3)
LYMPHOCYTES # BLD AUTO: 9.2 % — LOW (ref 13–44)
MCHC RBC-ENTMCNC: 31.6 PG — SIGNIFICANT CHANGE UP (ref 27–34)
MCHC RBC-ENTMCNC: 31.9 GM/DL — LOW (ref 32–36)
MCV RBC AUTO: 99.1 FL — SIGNIFICANT CHANGE UP (ref 80–100)
MONOCYTES # BLD AUTO: 0.94 K/UL — HIGH (ref 0–0.9)
MONOCYTES NFR BLD AUTO: 10.8 % — SIGNIFICANT CHANGE UP (ref 2–14)
NEUTROPHILS # BLD AUTO: 6.79 K/UL — SIGNIFICANT CHANGE UP (ref 1.8–7.4)
NEUTROPHILS NFR BLD AUTO: 78.1 % — HIGH (ref 43–77)
NT-PROBNP SERPL-SCNC: 7961 PG/ML — HIGH (ref 0–450)
PLATELET # BLD AUTO: 161 K/UL — SIGNIFICANT CHANGE UP (ref 150–400)
POTASSIUM SERPL-MCNC: 4.9 MMOL/L — SIGNIFICANT CHANGE UP (ref 3.5–5.3)
POTASSIUM SERPL-SCNC: 4.9 MMOL/L — SIGNIFICANT CHANGE UP (ref 3.5–5.3)
PROT SERPL-MCNC: 7 GM/DL — SIGNIFICANT CHANGE UP (ref 6–8.3)
PROTHROM AB SERPL-ACNC: 13 SEC — SIGNIFICANT CHANGE UP (ref 10.6–13.6)
RBC # BLD: 3.35 M/UL — LOW (ref 4.2–5.8)
RBC # FLD: 14.6 % — HIGH (ref 10.3–14.5)
SARS-COV-2 RNA SPEC QL NAA+PROBE: SIGNIFICANT CHANGE UP
SODIUM SERPL-SCNC: 133 MMOL/L — LOW (ref 135–145)
TROPONIN I SERPL-MCNC: <0.015 NG/ML — SIGNIFICANT CHANGE UP (ref 0.01–0.04)
WBC # BLD: 8.7 K/UL — SIGNIFICANT CHANGE UP (ref 3.8–10.5)
WBC # FLD AUTO: 8.7 K/UL — SIGNIFICANT CHANGE UP (ref 3.8–10.5)

## 2021-07-05 PROCEDURE — 84484 ASSAY OF TROPONIN QUANT: CPT

## 2021-07-05 PROCEDURE — 85610 PROTHROMBIN TIME: CPT

## 2021-07-05 PROCEDURE — 99284 EMERGENCY DEPT VISIT MOD MDM: CPT | Mod: 25

## 2021-07-05 PROCEDURE — 71045 X-RAY EXAM CHEST 1 VIEW: CPT | Mod: 26

## 2021-07-05 PROCEDURE — 80074 ACUTE HEPATITIS PANEL: CPT

## 2021-07-05 PROCEDURE — 83605 ASSAY OF LACTIC ACID: CPT

## 2021-07-05 PROCEDURE — 82962 GLUCOSE BLOOD TEST: CPT

## 2021-07-05 PROCEDURE — 71045 X-RAY EXAM CHEST 1 VIEW: CPT

## 2021-07-05 PROCEDURE — 36415 COLL VENOUS BLD VENIPUNCTURE: CPT

## 2021-07-05 PROCEDURE — 85730 THROMBOPLASTIN TIME PARTIAL: CPT

## 2021-07-05 PROCEDURE — 99285 EMERGENCY DEPT VISIT HI MDM: CPT

## 2021-07-05 PROCEDURE — 93010 ELECTROCARDIOGRAM REPORT: CPT

## 2021-07-05 PROCEDURE — 90999 UNLISTED DIALYSIS PROCEDURE: CPT

## 2021-07-05 PROCEDURE — 87040 BLOOD CULTURE FOR BACTERIA: CPT

## 2021-07-05 PROCEDURE — U0005: CPT

## 2021-07-05 PROCEDURE — 93005 ELECTROCARDIOGRAM TRACING: CPT

## 2021-07-05 PROCEDURE — 85025 COMPLETE CBC W/AUTO DIFF WBC: CPT

## 2021-07-05 PROCEDURE — 80053 COMPREHEN METABOLIC PANEL: CPT

## 2021-07-05 PROCEDURE — U0003: CPT

## 2021-07-05 PROCEDURE — 83880 ASSAY OF NATRIURETIC PEPTIDE: CPT

## 2021-07-05 PROCEDURE — 84100 ASSAY OF PHOSPHORUS: CPT

## 2021-07-05 RX ORDER — HEPARIN SODIUM 5000 [USP'U]/ML
2000 INJECTION INTRAVENOUS; SUBCUTANEOUS
Refills: 0 | Status: DISCONTINUED | OUTPATIENT
Start: 2021-07-05 | End: 2021-07-05

## 2021-07-05 RX ADMIN — HEPARIN SODIUM 2000 UNIT(S): 5000 INJECTION INTRAVENOUS; SUBCUTANEOUS at 13:07

## 2021-07-05 NOTE — CONSULT NOTE ADULT - ASSESSMENT
89 yo male h/o esrd on HD , DB, s/p bladder Ca, has cesar bladder CHF Aortic stenosis, seen by podiatrist on Monday with cellulitis R foot.  Pt did not know he had cellulitis was placed on Augmentin 500 mg po  Today felt weak could not come to dialysis and was brought to the ED BGM was greater than 400, rest of labs pending  As per the pt the foot is better , was checked by the wife at home for bandage change.  Will dialyze in the ED, pending covid swab  Overall feels well  CBC noted   BMP pending 89 yo male h/o esrd on HD , DB, s/p bladder Ca, has cesar bladder CHF Aortic stenosis, seen by podiatrist on Monday with cellulitis R foot.  Pt did not know he had cellulitis was placed on Augmentin 500 mg po  Today felt weak could not come to dialysis and was brought to the ED BGM was greater than 400  Pt apparently did not check his BGMs and cover the glucose w insulin  As per the pt the foot is better , was checked by the wife at home for bandage change.  Will dialyze in the ED, pending covid swab  Overall feels well  CBC noted   BMP reviewed

## 2021-07-05 NOTE — ED PROVIDER NOTE - MUSCULOSKELETAL, MLM
Spine appears normal, range of motion is not limited, no muscle or joint tenderness. Right 1st toe wrapped.

## 2021-07-05 NOTE — ED ADULT TRIAGE NOTE - CHIEF COMPLAINT QUOTE
Patient comes in with progressive weakness x4 days. Patient was scheduled to go to dialysis today but wife couldn't get patient in car d/t weakness. Patient takes insulin daily but has been non compliant recently. Patient also has wound to right toe that he is on amoxicillin for. Denies any other symptoms.

## 2021-07-05 NOTE — ED ADULT NURSE NOTE - OBJECTIVE STATEMENT
pt arrives to ED complaining of weakness. pt states weakness began about 5 days ago. pt was attempting to get into car to go to hemodialysis this morning when he was unable to secondary to weakness. history of DM, HTN. pt hyperglycemic upon arrival to ED. alert and oriented x 4.

## 2021-07-05 NOTE — ED PROVIDER NOTE - OBJECTIVE STATEMENT
89 y/o male with a PMHx of bladder cancer, chronic CHF, DM2, ESRD on dialysis (M/W/F), HTN, moderate aortic stenosis presents to the ED c/o generalized weakness since yesterday. Pt was scheduled for dialysis today but wife could not get pt out of the car. Pt is on insulin daily but has been non compliant recently. Pt currently on Amoxicillin for wound to right toe. Denies CP, SOB. No other complaints at this time. Nephrologist: Dr. Clancy. 88 y/o male with a PMHx of bladder cancer, chronic CHF, DM2, ESRD on dialysis (M/W/F), HTN, moderate aortic stenosis presents to the ED c/o generalized weakness since yesterday. Pt was scheduled for dialysis today but wife could not get pt out of the car. Pt is on insulin daily but has been non compliant recently. Pt currently on abx for right toe wound. Denies CP, SOB. No other complaints at this time. Nephrologist: Dr. Clancy.

## 2021-07-05 NOTE — ED PROVIDER NOTE - NSFOLLOWUPINSTRUCTIONS_ED_ALL_ED_FT
FOLLOW UP WITH PMD  & NEPHROLOGIST WITHIN 1-2DAYS, CALL TO MAKE APPOINTMENT  COME BACK TO ED IF YOUR CONDITION WORSENS OR IF YOU DEVELOP FEVER GREATER THAN 100.4F, CHEST PAIN,  SHORTNESS OF BREATH OR ANY OTHER SYMPTOMS CONCERNING TO YOU  TAKE TYLENOL (ACETAMINOPHEN) 650 MG EVERY 6 HOURS AS NEEDED FOR PAIN    IF YOU WERE PRESRCIBED ANY MEDICATIONS FROM TODAY'S VISIT, TAKE THEM AS DIRECTED

## 2021-07-05 NOTE — ED ADULT NURSE NOTE - NSIMPLEMENTINTERV_GEN_ALL_ED
Implemented All Fall with Harm Risk Interventions:  [place] to call system. Call bell, personal items and telephone within reach. Instruct patient to call for assistance. Room bathroom lighting operational. Non-slip footwear when patient is off stretcher. Physically safe environment: no spills, clutter or unnecessary equipment. Stretcher in lowest position, wheels locked, appropriate side rails in place. Provide visual cue, wrist band, yellow gown, etc. Monitor gait and stability. Monitor for mental status changes and reorient to person, place, and time. Review medications for side effects contributing to fall risk. Reinforce activity limits and safety measures with patient and family. Provide visual clues: red socks.

## 2021-07-05 NOTE — CONSULT NOTE ADULT - SUBJECTIVE AND OBJECTIVE BOX
NEPHROLOGY CONSULT  HPI:  88 yo male h/o esrd on HD , DB, s/p bladder Ca, has cesar bladder CHF Aortic stenosis, seen by podiatrist on Monday with cellulitis R foot.  Pt did not know he had cellulitis was placed on Augmentin 500 mg po  Today felt weak could not come to dialysis and was brought to the ED BGM was greater than 400, rest of labs pending  As per the pt the foot is better , was checked by the wife at home for bandage change.  Will dialyze in the ED, pending covid swab  Overall feels well      PAST MEDICAL & SURGICAL HISTORY:  Bladder cancer    HTN (hypertension)    ESRD on dialysis  MWF    Type 2 diabetes mellitus    Chronic CHF    Moderate aortic stenosis    History of bladder cancer  s/p resection with neobladder    History of appendectomy    History of exploratory laparotomy  Perforated Gastric Ulcer, Peritonitis    History of percutaneous angioplasty  PCI w/ KAREN RCA 12/2016, KAREN to LAD and D1 on 11/1/2018    S/P arteriovenous (AV) fistula creation  Right    History of cholecystectomy        FAMILY HISTORY:  No pertinent family history in first degree relatives        MEDICATIONS  (STANDING):    MEDICATIONS  (PRN):      Allergies    No Known Allergies    Intolerances    lisinopril (Diarrhea)      I&O's Summary        REVIEW OF SYSTEMS:    CONSTITUTIONAL:  As per HPI.  CONSTITUTIONAL: No weakness, fevers or chills  EYES/ENT: No visual changes;  No vertigo or throat pain   NECK: No pain or stiffness  CARDIOVASCULAR: No chest pain or palpitations  GASTROINTESTINAL: No abdominal or epigastric pain. No nausea, vomiting, or hematemesis; No diarrhea or constipation. No melena or hematochezia.  GENITOURINARY: No dysuria, frequency or hematuria  NEUROLOGICAL: No numbness or weakness  SKIN: No itching, burning, rashes, or lesions   All other review of systems is negative unless indicated above      Vital Signs Last 24 Hrs  T(C): 36.7 (05 Jul 2021 09:46), Max: 36.7 (05 Jul 2021 09:46)  T(F): 98 (05 Jul 2021 09:46), Max: 98 (05 Jul 2021 09:46)  HR: 78 (05 Jul 2021 09:46) (78 - 78)  BP: 160/63 (05 Jul 2021 09:46) (160/63 - 160/63)  BP(mean): --  RR: 17 (05 Jul 2021 09:46) (17 - 17)  SpO2: 92% (05 Jul 2021 09:46) (92% - 92%)  Daily Height in cm: 182.88 (05 Jul 2021 09:46)    Daily   I&O's Summary      PHYSICAL EXAM:    General:  Alert, well-developed ,No acute distress.    Neuro:  Alert and oriented to person, place, and time. Able to communicate  well.     HEENT:  No JVD, no masses, Eyes anicteric, No carotid bruits.No lymphadenopathy,    Cardiovascular:  Regular rate and rhythm, with normal S1 and S2. No murmurs, rubs,  or gallops. No JVD.     Lungs:  clear. no rales, no wheezing, .    Abdomen:  Normoactive bowel sounds. Soft, flat, non-tender, and non-distended.  No hepatosplenomegaly, positive bowel sounds    Skin:  Warm, dry, well-perfused. No rashes or other lesions.     Extremities:  R foot wrapped , no erythema    LABS:                          10.6   8.70  )-----------( 161      ( 05 Jul 2021 10:54 )             33.2                  NEPHROLOGY CONSULT  HPI:  89 yo male h/o esrd on HD , DB, s/p bladder Ca, has cesar bladder CHF Aortic stenosis, seen by podiatrist on Monday with cellulitis R foot.  Pt did not know he had cellulitis was placed on Augmentin 500 mg po, after pus was aspirated from the R big toe  Was feeling weak and tired even while at podiatrist office, and for this weekend forgot to check his  BGMs and administer extra insulin as needed, became weaker over the weekend  Today  could not come to dialysis and was brought to the ED BGM was greater than 400, rest of labs pending  As per the pt the foot is better , was checked by the wife at home for bandage change.  Will dialyze in the ED, pending covid swab  Overall feels well      PAST MEDICAL & SURGICAL HISTORY:  Bladder cancer    HTN (hypertension)    ESRD on dialysis  MWF    Type 2 diabetes mellitus    Chronic CHF    Moderate aortic stenosis    History of bladder cancer  s/p resection with neobladder    History of appendectomy    History of exploratory laparotomy  Perforated Gastric Ulcer, Peritonitis    History of percutaneous angioplasty  PCI w/ KAREN RCA 12/2016, KAREN to LAD and D1 on 11/1/2018    S/P arteriovenous (AV) fistula creation  Right    History of cholecystectomy        FAMILY HISTORY:  No pertinent family history in first degree relatives        MEDICATIONS  (STANDING):    MEDICATIONS  (PRN):      Allergies    No Known Allergies    Intolerances    lisinopril (Diarrhea)      I&O's Summary        REVIEW OF SYSTEMS:    CONSTITUTIONAL:  As per HPI.  CONSTITUTIONAL: No weakness, fevers or chills  EYES/ENT: No visual changes;  No vertigo or throat pain   NECK: No pain or stiffness  CARDIOVASCULAR: No chest pain or palpitations  GASTROINTESTINAL: No abdominal or epigastric pain. No nausea, vomiting, or hematemesis; No diarrhea or constipation. No melena or hematochezia.  GENITOURINARY: No dysuria, frequency or hematuria  NEUROLOGICAL: No numbness or weakness  SKIN: No itching, burning, rashes, or lesions   All other review of systems is negative unless indicated above      Vital Signs Last 24 Hrs  T(C): 36.7 (05 Jul 2021 09:46), Max: 36.7 (05 Jul 2021 09:46)  T(F): 98 (05 Jul 2021 09:46), Max: 98 (05 Jul 2021 09:46)  HR: 78 (05 Jul 2021 09:46) (78 - 78)  BP: 160/63 (05 Jul 2021 09:46) (160/63 - 160/63)  BP(mean): --  RR: 17 (05 Jul 2021 09:46) (17 - 17)  SpO2: 92% (05 Jul 2021 09:46) (92% - 92%)  Daily Height in cm: 182.88 (05 Jul 2021 09:46)    Daily   I&O's Summary      PHYSICAL EXAM:    General:  Alert, well-developed ,No acute distress.    Neuro:  Alert and oriented to person, place, and time. Able to communicate  well.     HEENT:  No JVD, no masses, Eyes anicteric, No carotid bruits.No lymphadenopathy,    Cardiovascular:  Regular rate and rhythm, with normal S1 and S2. No murmurs, rubs,  or gallops. No JVD.     Lungs:  clear. no rales, no wheezing, .    Abdomen:  Normoactive bowel sounds. Soft, flat, non-tender, and non-distended.  No hepatosplenomegaly, positive bowel sounds    Skin:  Warm, dry, well-perfused. No rashes or other lesions.     Extremities:  R toe wrapped , no erythema  There is a .5 cm wound outer part of R 1st toe no dc no erythema  Area on dorsum of the foot marked by the podiatrist, most likely   outlining the area of erythema, w/o any swelling or erythema    LABS:                          10.6   8.70  )-----------( 161      ( 05 Jul 2021 10:54 )             33.2         07-05    133<L>  |  100  |  84<H>  ----------------------------<  328<H>  4.9   |  19<L>  |  10.80<H>    Ca    8.7      05 Jul 2021 10:54  Phos  5.1     07-05    TPro  7.0  /  Alb  3.2<L>  /  TBili  0.7  /  DBili  x   /  AST  4<L>  /  ALT  9<L>  /  AlkPhos  68  07-05

## 2021-07-05 NOTE — ED PROVIDER NOTE - PROGRESS NOTE DETAILS
Patient reassessed once dialysis done may be d/c as he feels well. Keanu Botello: pt endorsed to me from prior physician PENDING: HD. LIKELY DISPOSITION: dc home margaret: hd finished ,stabel vital, wife will take home

## 2021-07-05 NOTE — ED PROVIDER NOTE - PATIENT PORTAL LINK FT
You can access the FollowMyHealth Patient Portal offered by [facility] by registering at the following website: [email or web address]. By joining [employer] [facility]’s FollowMyHealth portal, you will also be able to view your health information using other applications (apps) compatible with our system.

## 2021-07-05 NOTE — ED PROVIDER NOTE - SKIN, MLM
Skin warm, dry and intact. No evidence of rash. Marker used for cellulitis to dorsum of foot. Erythema spreading minimally past marker. Skin warm, dry and intact. No evidence of rash. Marker used for cellulitis to dorsum of foot. Erythema not spreading past marker.

## 2021-09-13 NOTE — ED PROVIDER NOTE - CARE PLAN
1 Principal Discharge DX:	Hip pain, right   Principal Discharge DX:	Hip pain, right  Secondary Diagnosis:	ESRD on hemodialysis

## 2021-09-13 NOTE — ED ADULT NURSE NOTE - NSICDXPASTMEDICALHX_GEN_ALL_CORE_FT
PAST MEDICAL HISTORY:  Bladder cancer     Chronic CHF     ESRD on dialysis MWF    HTN (hypertension)     Moderate aortic stenosis     Type 2 diabetes mellitus

## 2021-09-13 NOTE — ED PROVIDER NOTE - PATIENT PORTAL LINK FT
You can access the FollowMyHealth Patient Portal offered by [facility] by registering at the following website: [email or web address]. By joining [employer]’s FollowMyHealth portal, you will also be able to view your health information using other applications (apps) compatible with our system.

## 2021-09-13 NOTE — ED ADULT NURSE NOTE - OBJECTIVE STATEMENT
pt presents to ED c/o right hip and groin pain. pt report 9/10 pain. pt denies recent trauma/falls. pt reports ESRD pt reports dialysis MWF, missing dialysis today.

## 2021-09-13 NOTE — CONSULT NOTE ADULT - SUBJECTIVE AND OBJECTIVE BOX
Chief complaints.  Presented with acute Right Hip pain    HPI:  88 yo man with ESRD on MWF HD schedule/at [facility] HD,  presented with acute onset right sided groin/hip pain.  Noted acute severe pain when trying to get out of bed and was unable to ambulate.  Denies SOB.  Denies abdominal pain.  Right Hip pain much improved in ED.  Xray revealing-- Fracture of one of the anchoring screws of the femoral prosthesis in the upper shaft of the femur. Age uncertain      PMHX and PSHX.  1.HTN  2.Hx of Bladder CA   3.Hx of Cystectomy and Neobladder.  4.DM  5.CAD ( post PCI)  6.CHF (HFpEF)  7. Moderate Aortic Stenosis  8.Hx of perforated gastric ulcer post exp lap.    FAMILY HISTORY:  No pertinent family history in first degree relatives    SOCIAL HISTORY :  No current hx of smoking or ETOH.  Allergies    No Known Allergies    Intolerances    lisinopril (Diarrhea)      MEDICATIONS  (STANDING):    MEDICATIONS  (PRN):         Vital Signs Last 24 Hrs  T(C): 36.1 (13 Sep 2021 16:00), Max: 36.7 (13 Sep 2021 10:12)  T(F): 97 (13 Sep 2021 16:00), Max: 98 (13 Sep 2021 10:12)  HR: 62 (13 Sep 2021 16:45) (62 - 66)  BP: 129/55 (13 Sep 2021 16:45) (112/56 - 129/55)  BP(mean): --  RR: 17 (13 Sep 2021 16:45) (16 - 17)  SpO2: 95% (13 Sep 2021 16:00) (95% - 96%)  Daily Height in cm: 182.88 (13 Sep 2021 10:12)    Daily   I&O's Summary      PHYSICAL EXAM:  GEN: comfortable.  no distress  HEENT: WNL  NECK : supple  CV: S1S2 RRR  LUNGS: Clear to aus  ABD: soft  EXT: no edema    LABS:                        10.5   8.42  )-----------( 183      ( 13 Sep 2021 16:00 )             31.9

## 2021-09-13 NOTE — ED PROVIDER NOTE - MUSCULOSKELETAL, MLM
Spine appears normal. Right hip with mild anterior tenderness. Right SLR 30 degrees, good motor. Left SLR 45 degrees, good motor. Spine appears normal. Right hip with mild anterior tenderness, no associated focal swelling/discoloration. Right SLR 30 degrees, good motor. Left SLR 45 degrees, good motor.  B/L LEs distal motor/sensory intact, DP pulses adequate.

## 2021-09-13 NOTE — ED PROVIDER NOTE - CONSTITUTIONAL, MLM
normal... Elderly white male, awake, alert, oriented to person, place, time/situation and in no apparent distress.

## 2021-09-13 NOTE — ED PROVIDER NOTE - CLINICAL SUMMARY MEDICAL DECISION MAKING FREE TEXT BOX
R hip/groin pain this AM, much improved after ED arrival, no recent fall/injury.  No abd tenderness/swelling noted.  Plan: XRs, ambulation trial if negative. R hip/groin pain this AM, much improved after ED arrival, no recent fall/injury.  No abd tenderness/swelling noted.  Plan: XRs, ambulation trial if negative.  Pt missed dialysis today, dialyzed in the ER

## 2021-09-13 NOTE — ED ADULT TRIAGE NOTE - CHIEF COMPLAINT QUOTE
Pt BIBA EMS states woke up this am and had pain in his left groin and other joints. Pt states increased difficulty ambulating due to pain, denes fall, denies trauma. supposed to go to dialysis this morning (MWF). No obvious bleeding, swelling or deformity. Desines chest pain, SOB,

## 2021-09-13 NOTE — ED PROVIDER NOTE - GASTROINTESTINAL, MLM
Abdomen soft, non-tender, no guarding. Bowel sounds hypoactive, normal pitch. No obvious inguinal hernia noted

## 2021-09-13 NOTE — ED PROVIDER NOTE - SKIN, MLM
Skin normal color for race, warm, dry and intact. No evidence of rash. Skin normal color for race, warm, dry and intact. No evidence of rash.  No tactile warmth, diaphoresis.

## 2021-09-13 NOTE — ED PROVIDER NOTE - ENMT, MLM
Oropharynx clear. Airway patent, Nasal mucosa clear. Mouth with mildly dry mucosa. Throat has no vesicles, no oropharyngeal exudates and uvula is midline.

## 2021-09-13 NOTE — ED PROVIDER NOTE - OBJECTIVE STATEMENT
88 y/o male with a PMHx of bladder cancer, chronic CHF, DM2, ESRD on dialysis M/W/F, HTN, moderate aortic stenosis presents to the ED c/o right hip/groin pain. Pt reports he woke up with right hip/groin pain. Pain initially 9/10, now resolved. No recent trauma or falls. Pt missed dialysis today. Denies SOB. NKDA. No other complaints at this time. Nephrologist: Dr. Clancy.

## 2021-09-13 NOTE — ED PROVIDER NOTE - PROGRESS NOTE DETAILS
LUANNE Del Cid MD:  Pt receiving HD currently.  XRs no acute fx, + fx of 1 scree within pt's hardware, age-indeterminate, likely old.  Case signed out to Dr. Jaime to f/u after returns from HD, RN to perform ambulation trial. took signout from Dr Del Cid pending HD and ambulation trial. pt feeling well, ambulating without difficulty or sig pain.  ok for dc home and ortho followup.  MD Tamia LUANNE Del Cid MD:  Pt receiving HD currently.  XRs no acute fx, + fx of 1 screw within pt's hardware, age-indeterminate, likely old.  Case signed out to Dr. Jaime to f/u after returns from HD, RN to perform ambulation trial.

## 2021-09-13 NOTE — ED PROVIDER NOTE - TOBACCO USE
ESTABLISHED PATIENT PRE-VISIT PLANNING     Note: Patient will not be contacted if there is no indication to call.     1.  Reviewed notes from the last few office visits within the medical group: Yes    2.  If any orders were placed at last visit or intended to be done for this visit (i.e. 6 mos follow-up), do we have Results/Consult Notes?        •  Labs - Labs were not ordered at last office visit.       •  Imaging - Imaging was not ordered at last office visit.       •  Referrals - No referrals were ordered at last office visit.    3. Is this appointment scheduled as a Hospital Follow-Up? No    4.  Immunizations were updated in [employer] using WebIZ?: Yes       •  Web Iz Recommendations: MMR  and VARICELLA (Chicken Pox)     5.  Patient is due for the following Health Maintenance Topics:   There are no preventive care reminders to display for this patient.    - Patient has completed FLU and TDAP Immunization(s) per WebIZ. Chart has been updated.    6.  MDX printed for Provider? NO    7.  Patient was NOT informed to arrive 15 min prior to their scheduled appointment and bring in their medication bottles.   Unknown if ever smoked

## 2021-09-13 NOTE — ED ADULT NURSE NOTE - NSICDXPASTSURGICALHX_GEN_ALL_CORE_FT
PAST SURGICAL HISTORY:  History of appendectomy     History of bladder cancer s/p resection with neobladder    History of cholecystectomy     History of exploratory laparotomy Perforated Gastric Ulcer, Peritonitis    History of percutaneous angioplasty PCI w/ KAREN RCA 12/2016, KAREN to LAD and D1 on 11/1/2018    S/P arteriovenous (AV) fistula creation Right

## 2021-09-13 NOTE — CONSULT NOTE ADULT - ASSESSMENT
88 yo man with ESRD presents with acute Right Hip pain.   Xray revealing fracture of one of anchoring screws of prosthesis.    --ESRD : HD today.  orders written and reviewed  --Right Hip pain : Ortho evaluation.  --Fluid status stable.  --Anemia : continue SISSY with HD.

## 2021-09-13 NOTE — ED ADULT NURSE NOTE - NSFALLRSKASSESASSIST_ED_ALL_ED
no Is This A New Presentation, Or A Follow-Up?: Skin Lesions How Severe Is Your Skin Lesion?: mild Have Your Skin Lesions Been Treated?: not been treated

## 2022-01-01 ENCOUNTER — INPATIENT (INPATIENT)
Facility: HOSPITAL | Age: 87
LOS: 3 days | DRG: 388 | End: 2022-07-16
Attending: GENERAL PRACTICE | Admitting: GENERAL PRACTICE
Payer: MEDICARE

## 2022-01-01 VITALS
DIASTOLIC BLOOD PRESSURE: 79 MMHG | HEIGHT: 72 IN | TEMPERATURE: 99 F | OXYGEN SATURATION: 99 % | HEART RATE: 115 BPM | SYSTOLIC BLOOD PRESSURE: 179 MMHG | RESPIRATION RATE: 18 BRPM | WEIGHT: 139.99 LBS

## 2022-01-01 VITALS — RESPIRATION RATE: 20 BRPM | OXYGEN SATURATION: 99 %

## 2022-01-01 DIAGNOSIS — N18.6 END STAGE RENAL DISEASE: ICD-10-CM

## 2022-01-01 DIAGNOSIS — Z98.890 OTHER SPECIFIED POSTPROCEDURAL STATES: Chronic | ICD-10-CM

## 2022-01-01 DIAGNOSIS — I35.0 NONRHEUMATIC AORTIC (VALVE) STENOSIS: ICD-10-CM

## 2022-01-01 DIAGNOSIS — I25.10 ATHEROSCLEROTIC HEART DISEASE OF NATIVE CORONARY ARTERY WITHOUT ANGINA PECTORIS: ICD-10-CM

## 2022-01-01 DIAGNOSIS — E11.22 TYPE 2 DIABETES MELLITUS WITH DIABETIC CHRONIC KIDNEY DISEASE: ICD-10-CM

## 2022-01-01 DIAGNOSIS — Z66 DO NOT RESUSCITATE: ICD-10-CM

## 2022-01-01 DIAGNOSIS — K56.609 UNSPECIFIED INTESTINAL OBSTRUCTION, UNSPECIFIED AS TO PARTIAL VERSUS COMPLETE OBSTRUCTION: ICD-10-CM

## 2022-01-01 DIAGNOSIS — E11.9 TYPE 2 DIABETES MELLITUS WITHOUT COMPLICATIONS: ICD-10-CM

## 2022-01-01 DIAGNOSIS — I10 ESSENTIAL (PRIMARY) HYPERTENSION: ICD-10-CM

## 2022-01-01 DIAGNOSIS — Z85.51 PERSONAL HISTORY OF MALIGNANT NEOPLASM OF BLADDER: ICD-10-CM

## 2022-01-01 DIAGNOSIS — Z95.5 PRESENCE OF CORONARY ANGIOPLASTY IMPLANT AND GRAFT: ICD-10-CM

## 2022-01-01 DIAGNOSIS — Z79.82 LONG TERM (CURRENT) USE OF ASPIRIN: ICD-10-CM

## 2022-01-01 DIAGNOSIS — Z51.5 ENCOUNTER FOR PALLIATIVE CARE: ICD-10-CM

## 2022-01-01 DIAGNOSIS — J96.01 ACUTE RESPIRATORY FAILURE WITH HYPOXIA: ICD-10-CM

## 2022-01-01 DIAGNOSIS — J96.02 ACUTE RESPIRATORY FAILURE WITH HYPERCAPNIA: ICD-10-CM

## 2022-01-01 DIAGNOSIS — Z98.89 OTHER SPECIFIED POSTPROCEDURAL STATES: Chronic | ICD-10-CM

## 2022-01-01 DIAGNOSIS — J69.0 PNEUMONITIS DUE TO INHALATION OF FOOD AND VOMIT: ICD-10-CM

## 2022-01-01 DIAGNOSIS — R65.21 SEVERE SEPSIS WITH SEPTIC SHOCK: ICD-10-CM

## 2022-01-01 DIAGNOSIS — I50.32 CHRONIC DIASTOLIC (CONGESTIVE) HEART FAILURE: ICD-10-CM

## 2022-01-01 DIAGNOSIS — I13.0 HYPERTENSIVE HEART AND CHRONIC KIDNEY DISEASE WITH HEART FAILURE AND STAGE 1 THROUGH STAGE 4 CHRONIC KIDNEY DISEASE, OR UNSPECIFIED CHRONIC KIDNEY DISEASE: ICD-10-CM

## 2022-01-01 DIAGNOSIS — G92.8 OTHER TOXIC ENCEPHALOPATHY: ICD-10-CM

## 2022-01-01 DIAGNOSIS — E11.40 TYPE 2 DIABETES MELLITUS WITH DIABETIC NEUROPATHY, UNSPECIFIED: ICD-10-CM

## 2022-01-01 DIAGNOSIS — E11.42 TYPE 2 DIABETES MELLITUS WITH DIABETIC POLYNEUROPATHY: ICD-10-CM

## 2022-01-01 DIAGNOSIS — Z85.51 PERSONAL HISTORY OF MALIGNANT NEOPLASM OF BLADDER: Chronic | ICD-10-CM

## 2022-01-01 DIAGNOSIS — Z90.49 ACQUIRED ABSENCE OF OTHER SPECIFIED PARTS OF DIGESTIVE TRACT: Chronic | ICD-10-CM

## 2022-01-01 DIAGNOSIS — E87.2 ACIDOSIS: ICD-10-CM

## 2022-01-01 DIAGNOSIS — A41.9 SEPSIS, UNSPECIFIED ORGANISM: ICD-10-CM

## 2022-01-01 DIAGNOSIS — I24.8 OTHER FORMS OF ACUTE ISCHEMIC HEART DISEASE: ICD-10-CM

## 2022-01-01 DIAGNOSIS — Z99.2 DEPENDENCE ON RENAL DIALYSIS: ICD-10-CM

## 2022-01-01 LAB
A1C WITH ESTIMATED AVERAGE GLUCOSE RESULT: 6.4 % — HIGH (ref 4–5.6)
ALBUMIN SERPL ELPH-MCNC: 2.1 G/DL — LOW (ref 3.3–5)
ALBUMIN SERPL ELPH-MCNC: 3 G/DL — LOW (ref 3.3–5)
ALBUMIN SERPL ELPH-MCNC: 4 G/DL — SIGNIFICANT CHANGE UP (ref 3.3–5)
ALP SERPL-CCNC: 42 U/L — SIGNIFICANT CHANGE UP (ref 40–120)
ALP SERPL-CCNC: 71 U/L — SIGNIFICANT CHANGE UP (ref 40–120)
ALP SERPL-CCNC: 72 U/L — SIGNIFICANT CHANGE UP (ref 40–120)
ALT FLD-CCNC: 12 U/L — SIGNIFICANT CHANGE UP (ref 12–78)
ALT FLD-CCNC: 17 U/L — SIGNIFICANT CHANGE UP (ref 12–78)
ALT FLD-CCNC: 21 U/L — SIGNIFICANT CHANGE UP (ref 12–78)
ANION GAP SERPL CALC-SCNC: 10 MMOL/L — SIGNIFICANT CHANGE UP (ref 5–17)
ANION GAP SERPL CALC-SCNC: 11 MMOL/L — SIGNIFICANT CHANGE UP (ref 5–17)
ANION GAP SERPL CALC-SCNC: 12 MMOL/L — SIGNIFICANT CHANGE UP (ref 5–17)
ANION GAP SERPL CALC-SCNC: 13 MMOL/L — SIGNIFICANT CHANGE UP (ref 5–17)
ANION GAP SERPL CALC-SCNC: 7 MMOL/L — SIGNIFICANT CHANGE UP (ref 5–17)
ANION GAP SERPL CALC-SCNC: 9 MMOL/L — SIGNIFICANT CHANGE UP (ref 5–17)
APTT BLD: 33.4 SEC — SIGNIFICANT CHANGE UP (ref 27.5–35.5)
AST SERPL-CCNC: 18 U/L — SIGNIFICANT CHANGE UP (ref 15–37)
AST SERPL-CCNC: 3 U/L — LOW (ref 15–37)
AST SERPL-CCNC: 44 U/L — HIGH (ref 15–37)
BASE EXCESS BLDA CALC-SCNC: -2 MMOL/L — SIGNIFICANT CHANGE UP (ref -2–3)
BASE EXCESS BLDA CALC-SCNC: -6.9 MMOL/L — LOW (ref -2–3)
BASE EXCESS BLDA CALC-SCNC: -7.5 MMOL/L — LOW (ref -2–3)
BASOPHILS # BLD AUTO: 0 K/UL — SIGNIFICANT CHANGE UP (ref 0–0.2)
BASOPHILS # BLD AUTO: 0.05 K/UL — SIGNIFICANT CHANGE UP (ref 0–0.2)
BASOPHILS # BLD AUTO: 0.21 K/UL — HIGH (ref 0–0.2)
BASOPHILS NFR BLD AUTO: 0 % — SIGNIFICANT CHANGE UP (ref 0–2)
BASOPHILS NFR BLD AUTO: 0.8 % — SIGNIFICANT CHANGE UP (ref 0–2)
BASOPHILS NFR BLD AUTO: 5 % — HIGH (ref 0–2)
BILIRUB SERPL-MCNC: 0.6 MG/DL — SIGNIFICANT CHANGE UP (ref 0.2–1.2)
BILIRUB SERPL-MCNC: 0.7 MG/DL — SIGNIFICANT CHANGE UP (ref 0.2–1.2)
BILIRUB SERPL-MCNC: 0.7 MG/DL — SIGNIFICANT CHANGE UP (ref 0.2–1.2)
BLOOD GAS COMMENTS ARTERIAL: SIGNIFICANT CHANGE UP
BUN SERPL-MCNC: 38 MG/DL — HIGH (ref 7–23)
BUN SERPL-MCNC: 49 MG/DL — HIGH (ref 7–23)
BUN SERPL-MCNC: 52 MG/DL — HIGH (ref 7–23)
BUN SERPL-MCNC: 52 MG/DL — HIGH (ref 7–23)
BUN SERPL-MCNC: 58 MG/DL — HIGH (ref 7–23)
BUN SERPL-MCNC: 68 MG/DL — HIGH (ref 7–23)
CALCIUM SERPL-MCNC: 10.2 MG/DL — HIGH (ref 8.5–10.1)
CALCIUM SERPL-MCNC: 8.3 MG/DL — LOW (ref 8.5–10.1)
CALCIUM SERPL-MCNC: 8.6 MG/DL — SIGNIFICANT CHANGE UP (ref 8.5–10.1)
CALCIUM SERPL-MCNC: 8.7 MG/DL — SIGNIFICANT CHANGE UP (ref 8.5–10.1)
CALCIUM SERPL-MCNC: 8.9 MG/DL — SIGNIFICANT CHANGE UP (ref 8.5–10.1)
CALCIUM SERPL-MCNC: 9.3 MG/DL — SIGNIFICANT CHANGE UP (ref 8.5–10.1)
CHLORIDE SERPL-SCNC: 101 MMOL/L — SIGNIFICANT CHANGE UP (ref 96–108)
CHLORIDE SERPL-SCNC: 102 MMOL/L — SIGNIFICANT CHANGE UP (ref 96–108)
CHLORIDE SERPL-SCNC: 106 MMOL/L — SIGNIFICANT CHANGE UP (ref 96–108)
CHLORIDE SERPL-SCNC: 89 MMOL/L — LOW (ref 96–108)
CHLORIDE SERPL-SCNC: 95 MMOL/L — LOW (ref 96–108)
CHLORIDE SERPL-SCNC: 98 MMOL/L — SIGNIFICANT CHANGE UP (ref 96–108)
CK SERPL-CCNC: 135 U/L — SIGNIFICANT CHANGE UP (ref 26–308)
CO2 BLDA-SCNC: 20 MMOL/L — SIGNIFICANT CHANGE UP (ref 19–24)
CO2 BLDA-SCNC: 24 MMOL/L — SIGNIFICANT CHANGE UP (ref 19–24)
CO2 BLDA-SCNC: 25 MMOL/L — HIGH (ref 19–24)
CO2 SERPL-SCNC: 22 MMOL/L — SIGNIFICANT CHANGE UP (ref 22–31)
CO2 SERPL-SCNC: 25 MMOL/L — SIGNIFICANT CHANGE UP (ref 22–31)
CO2 SERPL-SCNC: 26 MMOL/L — SIGNIFICANT CHANGE UP (ref 22–31)
CO2 SERPL-SCNC: 29 MMOL/L — SIGNIFICANT CHANGE UP (ref 22–31)
CO2 SERPL-SCNC: 29 MMOL/L — SIGNIFICANT CHANGE UP (ref 22–31)
CO2 SERPL-SCNC: 31 MMOL/L — SIGNIFICANT CHANGE UP (ref 22–31)
CREAT SERPL-MCNC: 5.45 MG/DL — HIGH (ref 0.5–1.3)
CREAT SERPL-MCNC: 7.51 MG/DL — HIGH (ref 0.5–1.3)
CREAT SERPL-MCNC: 7.52 MG/DL — HIGH (ref 0.5–1.3)
CREAT SERPL-MCNC: 7.62 MG/DL — HIGH (ref 0.5–1.3)
CREAT SERPL-MCNC: 7.69 MG/DL — HIGH (ref 0.5–1.3)
CREAT SERPL-MCNC: 7.96 MG/DL — HIGH (ref 0.5–1.3)
EGFR: 6 ML/MIN/1.73M2 — LOW
EGFR: 9 ML/MIN/1.73M2 — LOW
EOSINOPHIL # BLD AUTO: 0 K/UL — SIGNIFICANT CHANGE UP (ref 0–0.5)
EOSINOPHIL # BLD AUTO: 0.03 K/UL — SIGNIFICANT CHANGE UP (ref 0–0.5)
EOSINOPHIL # BLD AUTO: 0.04 K/UL — SIGNIFICANT CHANGE UP (ref 0–0.5)
EOSINOPHIL NFR BLD AUTO: 0 % — SIGNIFICANT CHANGE UP (ref 0–6)
EOSINOPHIL NFR BLD AUTO: 0.5 % — SIGNIFICANT CHANGE UP (ref 0–6)
EOSINOPHIL NFR BLD AUTO: 1 % — SIGNIFICANT CHANGE UP (ref 0–6)
ESTIMATED AVERAGE GLUCOSE: 137 MG/DL — HIGH (ref 68–114)
GAS PNL BLDA: SIGNIFICANT CHANGE UP
GLUCOSE SERPL-MCNC: 120 MG/DL — HIGH (ref 70–99)
GLUCOSE SERPL-MCNC: 155 MG/DL — HIGH (ref 70–99)
GLUCOSE SERPL-MCNC: 176 MG/DL — HIGH (ref 70–99)
GLUCOSE SERPL-MCNC: 180 MG/DL — HIGH (ref 70–99)
GLUCOSE SERPL-MCNC: 204 MG/DL — HIGH (ref 70–99)
GLUCOSE SERPL-MCNC: 252 MG/DL — HIGH (ref 70–99)
HAV IGM SER-ACNC: SIGNIFICANT CHANGE UP
HBV CORE IGM SER-ACNC: SIGNIFICANT CHANGE UP
HBV SURFACE AG SER-ACNC: SIGNIFICANT CHANGE UP
HCO3 BLDA-SCNC: 19 MMOL/L — LOW (ref 21–28)
HCO3 BLDA-SCNC: 23 MMOL/L — SIGNIFICANT CHANGE UP (ref 21–28)
HCO3 BLDA-SCNC: 23 MMOL/L — SIGNIFICANT CHANGE UP (ref 21–28)
HCT VFR BLD CALC: 34.4 % — LOW (ref 39–50)
HCT VFR BLD CALC: 35.4 % — LOW (ref 39–50)
HCT VFR BLD CALC: 38.5 % — LOW (ref 39–50)
HCT VFR BLD CALC: 39.2 % — SIGNIFICANT CHANGE UP (ref 39–50)
HCV AB S/CO SERPL IA: 0.09 S/CO — SIGNIFICANT CHANGE UP (ref 0–0.99)
HCV AB SERPL-IMP: SIGNIFICANT CHANGE UP
HGB BLD-MCNC: 10.5 G/DL — LOW (ref 13–17)
HGB BLD-MCNC: 11.4 G/DL — LOW (ref 13–17)
HGB BLD-MCNC: 12.2 G/DL — LOW (ref 13–17)
HGB BLD-MCNC: 12.9 G/DL — LOW (ref 13–17)
HOROWITZ INDEX BLDA+IHG-RTO: 1 — SIGNIFICANT CHANGE UP
HOROWITZ INDEX BLDA+IHG-RTO: SIGNIFICANT CHANGE UP
IMM GRANULOCYTES NFR BLD AUTO: 0.2 % — SIGNIFICANT CHANGE UP (ref 0–1.5)
INR BLD: 1.1 RATIO — SIGNIFICANT CHANGE UP (ref 0.88–1.16)
LACTATE SERPL-SCNC: 1.4 MMOL/L — SIGNIFICANT CHANGE UP (ref 0.7–2)
LACTATE SERPL-SCNC: 2.9 MMOL/L — HIGH (ref 0.7–2)
LACTATE SERPL-SCNC: 3.8 MMOL/L — HIGH (ref 0.7–2)
LACTATE SERPL-SCNC: 3.8 MMOL/L — HIGH (ref 0.7–2)
LACTATE SERPL-SCNC: 3.9 MMOL/L — HIGH (ref 0.7–2)
LACTATE SERPL-SCNC: 4.2 MMOL/L — CRITICAL HIGH (ref 0.7–2)
LACTATE SERPL-SCNC: 4.6 MMOL/L — CRITICAL HIGH (ref 0.7–2)
LYMPHOCYTES # BLD AUTO: 0.2 K/UL — LOW (ref 1–3.3)
LYMPHOCYTES # BLD AUTO: 0.62 K/UL — LOW (ref 1–3.3)
LYMPHOCYTES # BLD AUTO: 1.28 K/UL — SIGNIFICANT CHANGE UP (ref 1–3.3)
LYMPHOCYTES # BLD AUTO: 16 % — SIGNIFICANT CHANGE UP (ref 13–44)
LYMPHOCYTES # BLD AUTO: 31 % — SIGNIFICANT CHANGE UP (ref 13–44)
LYMPHOCYTES # BLD AUTO: 9.6 % — LOW (ref 13–44)
MAGNESIUM SERPL-MCNC: 1.9 MG/DL — SIGNIFICANT CHANGE UP (ref 1.6–2.6)
MAGNESIUM SERPL-MCNC: 2.2 MG/DL — SIGNIFICANT CHANGE UP (ref 1.6–2.6)
MCHC RBC-ENTMCNC: 30.5 GM/DL — LOW (ref 32–36)
MCHC RBC-ENTMCNC: 31.1 GM/DL — LOW (ref 32–36)
MCHC RBC-ENTMCNC: 31.6 PG — SIGNIFICANT CHANGE UP (ref 27–34)
MCHC RBC-ENTMCNC: 31.9 PG — SIGNIFICANT CHANGE UP (ref 27–34)
MCHC RBC-ENTMCNC: 32.2 GM/DL — SIGNIFICANT CHANGE UP (ref 32–36)
MCHC RBC-ENTMCNC: 32.3 PG — SIGNIFICANT CHANGE UP (ref 27–34)
MCHC RBC-ENTMCNC: 32.4 PG — SIGNIFICANT CHANGE UP (ref 27–34)
MCHC RBC-ENTMCNC: 33.5 GM/DL — SIGNIFICANT CHANGE UP (ref 32–36)
MCV RBC AUTO: 100.3 FL — HIGH (ref 80–100)
MCV RBC AUTO: 102.6 FL — HIGH (ref 80–100)
MCV RBC AUTO: 103.6 FL — HIGH (ref 80–100)
MCV RBC AUTO: 96.7 FL — SIGNIFICANT CHANGE UP (ref 80–100)
MONOCYTES # BLD AUTO: 0.08 K/UL — SIGNIFICANT CHANGE UP (ref 0–0.9)
MONOCYTES # BLD AUTO: 0.54 K/UL — SIGNIFICANT CHANGE UP (ref 0–0.9)
MONOCYTES # BLD AUTO: 0.86 K/UL — SIGNIFICANT CHANGE UP (ref 0–0.9)
MONOCYTES NFR BLD AUTO: 13 % — SIGNIFICANT CHANGE UP (ref 2–14)
MONOCYTES NFR BLD AUTO: 13.4 % — SIGNIFICANT CHANGE UP (ref 2–14)
MONOCYTES NFR BLD AUTO: 6 % — SIGNIFICANT CHANGE UP (ref 2–14)
NEUTROPHILS # BLD AUTO: 0.91 K/UL — LOW (ref 1.8–7.4)
NEUTROPHILS # BLD AUTO: 2.06 K/UL — SIGNIFICANT CHANGE UP (ref 1.8–7.4)
NEUTROPHILS # BLD AUTO: 4.86 K/UL — SIGNIFICANT CHANGE UP (ref 1.8–7.4)
NEUTROPHILS NFR BLD AUTO: 46 % — SIGNIFICANT CHANGE UP (ref 43–77)
NEUTROPHILS NFR BLD AUTO: 66 % — SIGNIFICANT CHANGE UP (ref 43–77)
NEUTROPHILS NFR BLD AUTO: 75.5 % — SIGNIFICANT CHANGE UP (ref 43–77)
NRBC # BLD: SIGNIFICANT CHANGE UP /100 WBCS (ref 0–0)
NRBC # BLD: SIGNIFICANT CHANGE UP /100 WBCS (ref 0–0)
PCO2 BLDA: 40 MMHG — SIGNIFICANT CHANGE UP (ref 35–48)
PCO2 BLDA: 40 MMHG — SIGNIFICANT CHANGE UP (ref 35–48)
PCO2 BLDA: 64 MMHG — HIGH (ref 35–48)
PH BLDA: 7.16 — CRITICAL LOW (ref 7.35–7.45)
PH BLDA: 7.28 — LOW (ref 7.35–7.45)
PH BLDA: 7.37 — SIGNIFICANT CHANGE UP (ref 7.35–7.45)
PHOSPHATE SERPL-MCNC: 4.8 MG/DL — HIGH (ref 2.5–4.5)
PHOSPHATE SERPL-MCNC: 6.9 MG/DL — HIGH (ref 2.5–4.5)
PLATELET # BLD AUTO: 176 K/UL — SIGNIFICANT CHANGE UP (ref 150–400)
PLATELET # BLD AUTO: 194 K/UL — SIGNIFICANT CHANGE UP (ref 150–400)
PLATELET # BLD AUTO: 252 K/UL — SIGNIFICANT CHANGE UP (ref 150–400)
PLATELET # BLD AUTO: 261 K/UL — SIGNIFICANT CHANGE UP (ref 150–400)
PO2 BLDA: 110 MMHG — HIGH (ref 83–108)
PO2 BLDA: 220 MMHG — HIGH (ref 83–108)
PO2 BLDA: 70 MMHG — LOW (ref 83–108)
POTASSIUM SERPL-MCNC: 4.2 MMOL/L — SIGNIFICANT CHANGE UP (ref 3.5–5.3)
POTASSIUM SERPL-MCNC: 4.5 MMOL/L — SIGNIFICANT CHANGE UP (ref 3.5–5.3)
POTASSIUM SERPL-MCNC: 5.1 MMOL/L — SIGNIFICANT CHANGE UP (ref 3.5–5.3)
POTASSIUM SERPL-MCNC: 5.5 MMOL/L — HIGH (ref 3.5–5.3)
POTASSIUM SERPL-MCNC: 5.6 MMOL/L — HIGH (ref 3.5–5.3)
POTASSIUM SERPL-MCNC: 5.8 MMOL/L — HIGH (ref 3.5–5.3)
POTASSIUM SERPL-SCNC: 4.2 MMOL/L — SIGNIFICANT CHANGE UP (ref 3.5–5.3)
POTASSIUM SERPL-SCNC: 4.5 MMOL/L — SIGNIFICANT CHANGE UP (ref 3.5–5.3)
POTASSIUM SERPL-SCNC: 5.1 MMOL/L — SIGNIFICANT CHANGE UP (ref 3.5–5.3)
POTASSIUM SERPL-SCNC: 5.5 MMOL/L — HIGH (ref 3.5–5.3)
POTASSIUM SERPL-SCNC: 5.6 MMOL/L — HIGH (ref 3.5–5.3)
POTASSIUM SERPL-SCNC: 5.8 MMOL/L — HIGH (ref 3.5–5.3)
PROT SERPL-MCNC: 5.9 GM/DL — LOW (ref 6–8.3)
PROT SERPL-MCNC: 7.4 GM/DL — SIGNIFICANT CHANGE UP (ref 6–8.3)
PROT SERPL-MCNC: 8.3 GM/DL — SIGNIFICANT CHANGE UP (ref 6–8.3)
PROTHROM AB SERPL-ACNC: 12.8 SEC — SIGNIFICANT CHANGE UP (ref 10.5–13.4)
RBC # BLD: 3.32 M/UL — LOW (ref 4.2–5.8)
RBC # BLD: 3.53 M/UL — LOW (ref 4.2–5.8)
RBC # BLD: 3.82 M/UL — LOW (ref 4.2–5.8)
RBC # BLD: 3.98 M/UL — LOW (ref 4.2–5.8)
RBC # FLD: 13.8 % — SIGNIFICANT CHANGE UP (ref 10.3–14.5)
RBC # FLD: 13.9 % — SIGNIFICANT CHANGE UP (ref 10.3–14.5)
RBC # FLD: 14.1 % — SIGNIFICANT CHANGE UP (ref 10.3–14.5)
RBC # FLD: 14.2 % — SIGNIFICANT CHANGE UP (ref 10.3–14.5)
SAO2 % BLDA: 100 % — SIGNIFICANT CHANGE UP
SAO2 % BLDA: 93 % — SIGNIFICANT CHANGE UP
SAO2 % BLDA: 99 % — SIGNIFICANT CHANGE UP
SODIUM SERPL-SCNC: 133 MMOL/L — LOW (ref 135–145)
SODIUM SERPL-SCNC: 134 MMOL/L — LOW (ref 135–145)
SODIUM SERPL-SCNC: 135 MMOL/L — SIGNIFICANT CHANGE UP (ref 135–145)
SODIUM SERPL-SCNC: 136 MMOL/L — SIGNIFICANT CHANGE UP (ref 135–145)
SODIUM SERPL-SCNC: 137 MMOL/L — SIGNIFICANT CHANGE UP (ref 135–145)
SODIUM SERPL-SCNC: 140 MMOL/L — SIGNIFICANT CHANGE UP (ref 135–145)
TROPONIN I, HIGH SENSITIVITY RESULT: 183.64 NG/L — HIGH
TROPONIN I, HIGH SENSITIVITY RESULT: 195.92 NG/L — HIGH
VANCOMYCIN FLD-MCNC: 11.6 UG/ML — SIGNIFICANT CHANGE UP (ref 10–20)
WBC # BLD: 1.26 K/UL — LOW (ref 3.8–10.5)
WBC # BLD: 3.27 K/UL — LOW (ref 3.8–10.5)
WBC # BLD: 4.12 K/UL — SIGNIFICANT CHANGE UP (ref 3.8–10.5)
WBC # BLD: 6.43 K/UL — SIGNIFICANT CHANGE UP (ref 3.8–10.5)
WBC # FLD AUTO: 1.26 K/UL — LOW (ref 3.8–10.5)
WBC # FLD AUTO: 3.27 K/UL — LOW (ref 3.8–10.5)
WBC # FLD AUTO: 4.12 K/UL — SIGNIFICANT CHANGE UP (ref 3.8–10.5)
WBC # FLD AUTO: 6.43 K/UL — SIGNIFICANT CHANGE UP (ref 3.8–10.5)

## 2022-01-01 PROCEDURE — 71275 CT ANGIOGRAPHY CHEST: CPT

## 2022-01-01 PROCEDURE — 99233 SBSQ HOSP IP/OBS HIGH 50: CPT | Mod: GC

## 2022-01-01 PROCEDURE — 93005 ELECTROCARDIOGRAM TRACING: CPT

## 2022-01-01 PROCEDURE — 82550 ASSAY OF CK (CPK): CPT

## 2022-01-01 PROCEDURE — 71045 X-RAY EXAM CHEST 1 VIEW: CPT | Mod: 26

## 2022-01-01 PROCEDURE — 99231 SBSQ HOSP IP/OBS SF/LOW 25: CPT

## 2022-01-01 PROCEDURE — 83735 ASSAY OF MAGNESIUM: CPT

## 2022-01-01 PROCEDURE — 74019 RADEX ABDOMEN 2 VIEWS: CPT | Mod: 26

## 2022-01-01 PROCEDURE — 74177 CT ABD & PELVIS W/CONTRAST: CPT | Mod: 26,MA

## 2022-01-01 PROCEDURE — 84484 ASSAY OF TROPONIN QUANT: CPT

## 2022-01-01 PROCEDURE — 80202 ASSAY OF VANCOMYCIN: CPT

## 2022-01-01 PROCEDURE — 36415 COLL VENOUS BLD VENIPUNCTURE: CPT

## 2022-01-01 PROCEDURE — 94002 VENT MGMT INPAT INIT DAY: CPT

## 2022-01-01 PROCEDURE — 93306 TTE W/DOPPLER COMPLETE: CPT | Mod: 26

## 2022-01-01 PROCEDURE — 80053 COMPREHEN METABOLIC PANEL: CPT

## 2022-01-01 PROCEDURE — 97163 PT EVAL HIGH COMPLEX 45 MIN: CPT | Mod: GP

## 2022-01-01 PROCEDURE — 99291 CRITICAL CARE FIRST HOUR: CPT

## 2022-01-01 PROCEDURE — 93306 TTE W/DOPPLER COMPLETE: CPT

## 2022-01-01 PROCEDURE — P9047: CPT

## 2022-01-01 PROCEDURE — 87040 BLOOD CULTURE FOR BACTERIA: CPT

## 2022-01-01 PROCEDURE — 90935 HEMODIALYSIS ONE EVALUATION: CPT

## 2022-01-01 PROCEDURE — 80048 BASIC METABOLIC PNL TOTAL CA: CPT

## 2022-01-01 PROCEDURE — 82803 BLOOD GASES ANY COMBINATION: CPT

## 2022-01-01 PROCEDURE — 99285 EMERGENCY DEPT VISIT HI MDM: CPT

## 2022-01-01 PROCEDURE — 71045 X-RAY EXAM CHEST 1 VIEW: CPT

## 2022-01-01 PROCEDURE — 93010 ELECTROCARDIOGRAM REPORT: CPT

## 2022-01-01 PROCEDURE — 74019 RADEX ABDOMEN 2 VIEWS: CPT

## 2022-01-01 PROCEDURE — 85025 COMPLETE CBC W/AUTO DIFF WBC: CPT

## 2022-01-01 PROCEDURE — 85027 COMPLETE CBC AUTOMATED: CPT

## 2022-01-01 PROCEDURE — 71275 CT ANGIOGRAPHY CHEST: CPT | Mod: 26

## 2022-01-01 PROCEDURE — 36600 WITHDRAWAL OF ARTERIAL BLOOD: CPT

## 2022-01-01 PROCEDURE — 74018 RADEX ABDOMEN 1 VIEW: CPT

## 2022-01-01 PROCEDURE — 83036 HEMOGLOBIN GLYCOSYLATED A1C: CPT

## 2022-01-01 PROCEDURE — 74250 X-RAY XM SM INT 1CNTRST STD: CPT

## 2022-01-01 PROCEDURE — 83605 ASSAY OF LACTIC ACID: CPT

## 2022-01-01 PROCEDURE — 74018 RADEX ABDOMEN 1 VIEW: CPT | Mod: 26

## 2022-01-01 PROCEDURE — 99221 1ST HOSP IP/OBS SF/LOW 40: CPT

## 2022-01-01 PROCEDURE — 82962 GLUCOSE BLOOD TEST: CPT

## 2022-01-01 PROCEDURE — 84100 ASSAY OF PHOSPHORUS: CPT

## 2022-01-01 PROCEDURE — C9113: CPT

## 2022-01-01 PROCEDURE — 74250 X-RAY XM SM INT 1CNTRST STD: CPT | Mod: 26

## 2022-01-01 RX ORDER — ACETAMINOPHEN 500 MG
1000 TABLET ORAL ONCE
Refills: 0 | Status: COMPLETED | OUTPATIENT
Start: 2022-01-01 | End: 2022-01-01

## 2022-01-01 RX ORDER — SODIUM CHLORIDE 9 MG/ML
1000 INJECTION INTRAMUSCULAR; INTRAVENOUS; SUBCUTANEOUS
Refills: 0 | Status: DISCONTINUED | OUTPATIENT
Start: 2022-01-01 | End: 2022-01-01

## 2022-01-01 RX ORDER — HEPARIN SODIUM 5000 [USP'U]/ML
5000 INJECTION INTRAVENOUS; SUBCUTANEOUS EVERY 8 HOURS
Refills: 0 | Status: DISCONTINUED | OUTPATIENT
Start: 2022-01-01 | End: 2022-01-01

## 2022-01-01 RX ORDER — METOPROLOL TARTRATE 50 MG
5 TABLET ORAL EVERY 6 HOURS
Refills: 0 | Status: DISCONTINUED | OUTPATIENT
Start: 2022-01-01 | End: 2022-01-01

## 2022-01-01 RX ORDER — MORPHINE SULFATE 50 MG/1
4 CAPSULE, EXTENDED RELEASE ORAL ONCE
Refills: 0 | Status: DISCONTINUED | OUTPATIENT
Start: 2022-01-01 | End: 2022-01-01

## 2022-01-01 RX ORDER — HYDROMORPHONE HYDROCHLORIDE 2 MG/ML
2 INJECTION INTRAMUSCULAR; INTRAVENOUS; SUBCUTANEOUS ONCE
Refills: 0 | Status: DISCONTINUED | OUTPATIENT
Start: 2022-01-01 | End: 2022-01-01

## 2022-01-01 RX ORDER — CARVEDILOL PHOSPHATE 80 MG/1
1 CAPSULE, EXTENDED RELEASE ORAL
Qty: 0 | Refills: 0 | DISCHARGE

## 2022-01-01 RX ORDER — PIPERACILLIN AND TAZOBACTAM 4; .5 G/20ML; G/20ML
3.38 INJECTION, POWDER, LYOPHILIZED, FOR SOLUTION INTRAVENOUS EVERY 8 HOURS
Refills: 0 | Status: DISCONTINUED | OUTPATIENT
Start: 2022-01-01 | End: 2022-01-01

## 2022-01-01 RX ORDER — FAMOTIDINE 10 MG/ML
20 INJECTION INTRAVENOUS DAILY
Refills: 0 | Status: DISCONTINUED | OUTPATIENT
Start: 2022-01-01 | End: 2022-01-01

## 2022-01-01 RX ORDER — NIFEDIPINE 30 MG
1 TABLET, EXTENDED RELEASE 24 HR ORAL
Qty: 0 | Refills: 0 | DISCHARGE

## 2022-01-01 RX ORDER — CARVEDILOL PHOSPHATE 80 MG/1
6.25 CAPSULE, EXTENDED RELEASE ORAL EVERY 12 HOURS
Refills: 0 | Status: DISCONTINUED | OUTPATIENT
Start: 2022-01-01 | End: 2022-01-01

## 2022-01-01 RX ORDER — PIPERACILLIN AND TAZOBACTAM 4; .5 G/20ML; G/20ML
3.38 INJECTION, POWDER, LYOPHILIZED, FOR SOLUTION INTRAVENOUS EVERY 12 HOURS
Refills: 0 | Status: DISCONTINUED | OUTPATIENT
Start: 2022-01-01 | End: 2022-01-01

## 2022-01-01 RX ORDER — GABAPENTIN 400 MG/1
1 CAPSULE ORAL
Qty: 0 | Refills: 0 | DISCHARGE

## 2022-01-01 RX ORDER — SODIUM CHLORIDE 9 MG/ML
1000 INJECTION, SOLUTION INTRAVENOUS ONCE
Refills: 0 | Status: COMPLETED | OUTPATIENT
Start: 2022-01-01 | End: 2022-01-01

## 2022-01-01 RX ORDER — HYDROCORTISONE 20 MG
50 TABLET ORAL EVERY 6 HOURS
Refills: 0 | Status: DISCONTINUED | OUTPATIENT
Start: 2022-01-01 | End: 2022-01-01

## 2022-01-01 RX ORDER — NOREPINEPHRINE BITARTRATE/D5W 8 MG/250ML
0.05 PLASTIC BAG, INJECTION (ML) INTRAVENOUS
Qty: 8 | Refills: 0 | Status: DISCONTINUED | OUTPATIENT
Start: 2022-01-01 | End: 2022-01-01

## 2022-01-01 RX ORDER — PANTOPRAZOLE SODIUM 20 MG/1
40 TABLET, DELAYED RELEASE ORAL DAILY
Refills: 0 | Status: DISCONTINUED | OUTPATIENT
Start: 2022-01-01 | End: 2022-01-01

## 2022-01-01 RX ORDER — FAMOTIDINE 10 MG/ML
20 INJECTION INTRAVENOUS
Refills: 0 | Status: DISCONTINUED | OUTPATIENT
Start: 2022-01-01 | End: 2022-01-01

## 2022-01-01 RX ORDER — ACETAMINOPHEN 500 MG
650 TABLET ORAL EVERY 6 HOURS
Refills: 0 | Status: DISCONTINUED | OUTPATIENT
Start: 2022-01-01 | End: 2022-01-01

## 2022-01-01 RX ORDER — DIATRIZOATE MEGLUMINE 180 MG/ML
100 INJECTION, SOLUTION INTRAVESICAL ONCE
Refills: 0 | Status: COMPLETED | OUTPATIENT
Start: 2022-01-01 | End: 2022-01-01

## 2022-01-01 RX ORDER — LIDOCAINE AND PRILOCAINE CREAM 25; 25 MG/G; MG/G
1 CREAM TOPICAL
Refills: 0 | Status: DISCONTINUED | OUTPATIENT
Start: 2022-01-01 | End: 2022-01-01

## 2022-01-01 RX ORDER — INSULIN LISPRO 100/ML
VIAL (ML) SUBCUTANEOUS
Refills: 0 | Status: DISCONTINUED | OUTPATIENT
Start: 2022-01-01 | End: 2022-01-01

## 2022-01-01 RX ORDER — INSULIN DETEMIR 100/ML (3)
20 INSULIN PEN (ML) SUBCUTANEOUS
Qty: 0 | Refills: 0 | DISCHARGE

## 2022-01-01 RX ORDER — MORPHINE SULFATE 50 MG/1
2 CAPSULE, EXTENDED RELEASE ORAL
Refills: 0 | Status: DISCONTINUED | OUTPATIENT
Start: 2022-01-01 | End: 2022-01-01

## 2022-01-01 RX ORDER — ONDANSETRON 8 MG/1
4 TABLET, FILM COATED ORAL EVERY 6 HOURS
Refills: 0 | Status: DISCONTINUED | OUTPATIENT
Start: 2022-01-01 | End: 2022-01-01

## 2022-01-01 RX ORDER — DEXTROSE 50 % IN WATER 50 %
15 SYRINGE (ML) INTRAVENOUS ONCE
Refills: 0 | Status: DISCONTINUED | OUTPATIENT
Start: 2022-01-01 | End: 2022-01-01

## 2022-01-01 RX ORDER — NOREPINEPHRINE BITARTRATE/D5W 8 MG/250ML
0.45 PLASTIC BAG, INJECTION (ML) INTRAVENOUS
Qty: 16 | Refills: 0 | Status: DISCONTINUED | OUTPATIENT
Start: 2022-01-01 | End: 2022-01-01

## 2022-01-01 RX ORDER — SODIUM CHLORIDE 9 MG/ML
1000 INJECTION, SOLUTION INTRAVENOUS
Refills: 0 | Status: DISCONTINUED | OUTPATIENT
Start: 2022-01-01 | End: 2022-01-01

## 2022-01-01 RX ORDER — ALBUMIN HUMAN 25 %
100 VIAL (ML) INTRAVENOUS ONCE
Refills: 0 | Status: COMPLETED | OUTPATIENT
Start: 2022-01-01 | End: 2022-01-01

## 2022-01-01 RX ORDER — GABAPENTIN 400 MG/1
3 CAPSULE ORAL
Qty: 0 | Refills: 0 | DISCHARGE

## 2022-01-01 RX ORDER — PHENYLEPHRINE HYDROCHLORIDE 10 MG/ML
0.5 INJECTION INTRAVENOUS
Qty: 40 | Refills: 0 | Status: DISCONTINUED | OUTPATIENT
Start: 2022-01-01 | End: 2022-01-01

## 2022-01-01 RX ORDER — SODIUM CHLORIDE 9 MG/ML
500 INJECTION INTRAMUSCULAR; INTRAVENOUS; SUBCUTANEOUS ONCE
Refills: 0 | Status: COMPLETED | OUTPATIENT
Start: 2022-01-01 | End: 2022-01-01

## 2022-01-01 RX ORDER — INSULIN LISPRO 100/ML
VIAL (ML) SUBCUTANEOUS EVERY 6 HOURS
Refills: 0 | Status: DISCONTINUED | OUTPATIENT
Start: 2022-01-01 | End: 2022-01-01

## 2022-01-01 RX ORDER — DEXTROSE 50 % IN WATER 50 %
12.5 SYRINGE (ML) INTRAVENOUS ONCE
Refills: 0 | Status: DISCONTINUED | OUTPATIENT
Start: 2022-01-01 | End: 2022-01-01

## 2022-01-01 RX ORDER — SODIUM CHLORIDE 9 MG/ML
1000 INJECTION INTRAMUSCULAR; INTRAVENOUS; SUBCUTANEOUS ONCE
Refills: 0 | Status: COMPLETED | OUTPATIENT
Start: 2022-01-01 | End: 2022-01-01

## 2022-01-01 RX ORDER — VASOPRESSIN 20 [USP'U]/ML
0.04 INJECTION INTRAVENOUS
Qty: 50 | Refills: 0 | Status: DISCONTINUED | OUTPATIENT
Start: 2022-01-01 | End: 2022-01-01

## 2022-01-01 RX ORDER — VANCOMYCIN HCL 1 G
1250 VIAL (EA) INTRAVENOUS ONCE
Refills: 0 | Status: COMPLETED | OUTPATIENT
Start: 2022-01-01 | End: 2022-01-01

## 2022-01-01 RX ORDER — DEXTROSE 50 % IN WATER 50 %
25 SYRINGE (ML) INTRAVENOUS ONCE
Refills: 0 | Status: DISCONTINUED | OUTPATIENT
Start: 2022-01-01 | End: 2022-01-01

## 2022-01-01 RX ORDER — GLUCAGON INJECTION, SOLUTION 0.5 MG/.1ML
1 INJECTION, SOLUTION SUBCUTANEOUS ONCE
Refills: 0 | Status: DISCONTINUED | OUTPATIENT
Start: 2022-01-01 | End: 2022-01-01

## 2022-01-01 RX ORDER — CHLORHEXIDINE GLUCONATE 213 G/1000ML
1 SOLUTION TOPICAL
Refills: 0 | Status: DISCONTINUED | OUTPATIENT
Start: 2022-01-01 | End: 2022-01-01

## 2022-01-01 RX ORDER — PIPERACILLIN AND TAZOBACTAM 4; .5 G/20ML; G/20ML
3.38 INJECTION, POWDER, LYOPHILIZED, FOR SOLUTION INTRAVENOUS ONCE
Refills: 0 | Status: COMPLETED | OUTPATIENT
Start: 2022-01-01 | End: 2022-01-01

## 2022-01-01 RX ORDER — ATORVASTATIN CALCIUM 80 MG/1
1 TABLET, FILM COATED ORAL
Qty: 30 | Refills: 0

## 2022-01-01 RX ORDER — HYDROMORPHONE HYDROCHLORIDE 2 MG/ML
2 INJECTION INTRAMUSCULAR; INTRAVENOUS; SUBCUTANEOUS
Qty: 50 | Refills: 0 | Status: DISCONTINUED | OUTPATIENT
Start: 2022-01-01 | End: 2022-01-01

## 2022-01-01 RX ORDER — ATORVASTATIN CALCIUM 80 MG/1
1 TABLET, FILM COATED ORAL
Qty: 0 | Refills: 0 | DISCHARGE

## 2022-01-01 RX ORDER — ONDANSETRON 8 MG/1
4 TABLET, FILM COATED ORAL ONCE
Refills: 0 | Status: COMPLETED | OUTPATIENT
Start: 2022-01-01 | End: 2022-01-01

## 2022-01-01 RX ORDER — CHLORHEXIDINE GLUCONATE 213 G/1000ML
15 SOLUTION TOPICAL EVERY 12 HOURS
Refills: 0 | Status: DISCONTINUED | OUTPATIENT
Start: 2022-01-01 | End: 2022-01-01

## 2022-01-01 RX ADMIN — Medication 166.67 MILLIGRAM(S): at 04:49

## 2022-01-01 RX ADMIN — Medication 110 MILLIGRAM(S): at 00:50

## 2022-01-01 RX ADMIN — PIPERACILLIN AND TAZOBACTAM 25 GRAM(S): 4; .5 INJECTION, POWDER, LYOPHILIZED, FOR SOLUTION INTRAVENOUS at 05:36

## 2022-01-01 RX ADMIN — HEPARIN SODIUM 5000 UNIT(S): 5000 INJECTION INTRAVENOUS; SUBCUTANEOUS at 13:44

## 2022-01-01 RX ADMIN — SODIUM CHLORIDE 100 MILLILITER(S): 9 INJECTION INTRAMUSCULAR; INTRAVENOUS; SUBCUTANEOUS at 11:20

## 2022-01-01 RX ADMIN — Medication 5 MILLIGRAM(S): at 06:07

## 2022-01-01 RX ADMIN — Medication 50 MILLIGRAM(S): at 10:37

## 2022-01-01 RX ADMIN — Medication 50 MILLIGRAM(S): at 05:35

## 2022-01-01 RX ADMIN — SODIUM CHLORIDE 1000 MILLILITER(S): 9 INJECTION, SOLUTION INTRAVENOUS at 10:17

## 2022-01-01 RX ADMIN — MORPHINE SULFATE 2 MILLIGRAM(S): 50 CAPSULE, EXTENDED RELEASE ORAL at 23:00

## 2022-01-01 RX ADMIN — Medication 2: at 18:07

## 2022-01-01 RX ADMIN — FAMOTIDINE 20 MILLIGRAM(S): 10 INJECTION INTRAVENOUS at 03:43

## 2022-01-01 RX ADMIN — MORPHINE SULFATE 2 MILLIGRAM(S): 50 CAPSULE, EXTENDED RELEASE ORAL at 17:18

## 2022-01-01 RX ADMIN — HEPARIN SODIUM 5000 UNIT(S): 5000 INJECTION INTRAVENOUS; SUBCUTANEOUS at 13:37

## 2022-01-01 RX ADMIN — VASOPRESSIN 2.4 UNIT(S)/MIN: 20 INJECTION INTRAVENOUS at 00:55

## 2022-01-01 RX ADMIN — PIPERACILLIN AND TAZOBACTAM 200 GRAM(S): 4; .5 INJECTION, POWDER, LYOPHILIZED, FOR SOLUTION INTRAVENOUS at 04:50

## 2022-01-01 RX ADMIN — HEPARIN SODIUM 5000 UNIT(S): 5000 INJECTION INTRAVENOUS; SUBCUTANEOUS at 06:05

## 2022-01-01 RX ADMIN — ONDANSETRON 4 MILLIGRAM(S): 8 TABLET, FILM COATED ORAL at 01:44

## 2022-01-01 RX ADMIN — MORPHINE SULFATE 2 MILLIGRAM(S): 50 CAPSULE, EXTENDED RELEASE ORAL at 21:48

## 2022-01-01 RX ADMIN — Medication 50 MILLIGRAM(S): at 13:44

## 2022-01-01 RX ADMIN — PIPERACILLIN AND TAZOBACTAM 25 GRAM(S): 4; .5 INJECTION, POWDER, LYOPHILIZED, FOR SOLUTION INTRAVENOUS at 13:43

## 2022-01-01 RX ADMIN — HYDROMORPHONE HYDROCHLORIDE 2 MILLIGRAM(S): 2 INJECTION INTRAMUSCULAR; INTRAVENOUS; SUBCUTANEOUS at 19:35

## 2022-01-01 RX ADMIN — Medication 50 MILLIGRAM(S): at 18:06

## 2022-01-01 RX ADMIN — Medication 650 MILLIGRAM(S): at 11:20

## 2022-01-01 RX ADMIN — Medication 2: at 11:20

## 2022-01-01 RX ADMIN — PHENYLEPHRINE HYDROCHLORIDE 11.9 MICROGRAM(S)/KG/MIN: 10 INJECTION INTRAVENOUS at 07:51

## 2022-01-01 RX ADMIN — CHLORHEXIDINE GLUCONATE 1 APPLICATION(S): 213 SOLUTION TOPICAL at 06:11

## 2022-01-01 RX ADMIN — Medication 5.95 MICROGRAM(S)/KG/MIN: at 10:17

## 2022-01-01 RX ADMIN — PANTOPRAZOLE SODIUM 40 MILLIGRAM(S): 20 TABLET, DELAYED RELEASE ORAL at 11:09

## 2022-01-01 RX ADMIN — SODIUM CHLORIDE 100 MILLILITER(S): 9 INJECTION INTRAMUSCULAR; INTRAVENOUS; SUBCUTANEOUS at 11:54

## 2022-01-01 RX ADMIN — Medication 26.8 MICROGRAM(S)/KG/MIN: at 11:21

## 2022-01-01 RX ADMIN — Medication 110 MILLIGRAM(S): at 18:57

## 2022-01-01 RX ADMIN — HEPARIN SODIUM 5000 UNIT(S): 5000 INJECTION INTRAVENOUS; SUBCUTANEOUS at 21:41

## 2022-01-01 RX ADMIN — Medication 650 MILLIGRAM(S): at 13:00

## 2022-01-01 RX ADMIN — Medication 26.8 MICROGRAM(S)/KG/MIN: at 00:52

## 2022-01-01 RX ADMIN — Medication 100 MILLILITER(S): at 07:22

## 2022-01-01 RX ADMIN — HEPARIN SODIUM 5000 UNIT(S): 5000 INJECTION INTRAVENOUS; SUBCUTANEOUS at 03:42

## 2022-01-01 RX ADMIN — LIDOCAINE AND PRILOCAINE CREAM 1 APPLICATION(S): 25; 25 CREAM TOPICAL at 11:42

## 2022-01-01 RX ADMIN — SODIUM CHLORIDE 75 MILLILITER(S): 9 INJECTION INTRAMUSCULAR; INTRAVENOUS; SUBCUTANEOUS at 06:07

## 2022-01-01 RX ADMIN — VASOPRESSIN 2.4 UNIT(S)/MIN: 20 INJECTION INTRAVENOUS at 10:37

## 2022-01-01 RX ADMIN — SODIUM CHLORIDE 75 MILLILITER(S): 9 INJECTION INTRAMUSCULAR; INTRAVENOUS; SUBCUTANEOUS at 05:36

## 2022-01-01 RX ADMIN — HEPARIN SODIUM 5000 UNIT(S): 5000 INJECTION INTRAVENOUS; SUBCUTANEOUS at 16:13

## 2022-01-01 RX ADMIN — SODIUM CHLORIDE 40 MILLILITER(S): 9 INJECTION INTRAMUSCULAR; INTRAVENOUS; SUBCUTANEOUS at 11:12

## 2022-01-01 RX ADMIN — SODIUM CHLORIDE 75 MILLILITER(S): 9 INJECTION INTRAMUSCULAR; INTRAVENOUS; SUBCUTANEOUS at 16:18

## 2022-01-01 RX ADMIN — HEPARIN SODIUM 5000 UNIT(S): 5000 INJECTION INTRAVENOUS; SUBCUTANEOUS at 06:11

## 2022-01-01 RX ADMIN — HEPARIN SODIUM 5000 UNIT(S): 5000 INJECTION INTRAVENOUS; SUBCUTANEOUS at 14:27

## 2022-01-01 RX ADMIN — HEPARIN SODIUM 5000 UNIT(S): 5000 INJECTION INTRAVENOUS; SUBCUTANEOUS at 22:25

## 2022-01-01 RX ADMIN — Medication 110 MILLIGRAM(S): at 14:24

## 2022-01-01 RX ADMIN — Medication 400 MILLIGRAM(S): at 05:11

## 2022-01-01 RX ADMIN — Medication 50 MILLIGRAM(S): at 23:29

## 2022-01-01 RX ADMIN — SODIUM CHLORIDE 500 MILLILITER(S): 9 INJECTION INTRAMUSCULAR; INTRAVENOUS; SUBCUTANEOUS at 03:46

## 2022-01-01 RX ADMIN — HYDROMORPHONE HYDROCHLORIDE 2 MG/HR: 2 INJECTION INTRAMUSCULAR; INTRAVENOUS; SUBCUTANEOUS at 19:37

## 2022-01-01 RX ADMIN — Medication 1000 MILLIGRAM(S): at 19:10

## 2022-01-01 RX ADMIN — PIPERACILLIN AND TAZOBACTAM 25 GRAM(S): 4; .5 INJECTION, POWDER, LYOPHILIZED, FOR SOLUTION INTRAVENOUS at 22:25

## 2022-01-01 RX ADMIN — Medication 26.8 MICROGRAM(S)/KG/MIN: at 06:13

## 2022-01-01 RX ADMIN — Medication 400 MILLIGRAM(S): at 18:06

## 2022-01-01 RX ADMIN — Medication 50 MILLIGRAM(S): at 11:19

## 2022-01-01 RX ADMIN — DIATRIZOATE MEGLUMINE 100 MILLILITER(S): 180 INJECTION, SOLUTION INTRAVESICAL at 10:56

## 2022-01-01 RX ADMIN — MORPHINE SULFATE 4 MILLIGRAM(S): 50 CAPSULE, EXTENDED RELEASE ORAL at 22:49

## 2022-01-01 RX ADMIN — CARVEDILOL PHOSPHATE 6.25 MILLIGRAM(S): 80 CAPSULE, EXTENDED RELEASE ORAL at 21:41

## 2022-01-01 RX ADMIN — HEPARIN SODIUM 5000 UNIT(S): 5000 INJECTION INTRAVENOUS; SUBCUTANEOUS at 21:48

## 2022-01-01 RX ADMIN — ONDANSETRON 4 MILLIGRAM(S): 8 TABLET, FILM COATED ORAL at 22:49

## 2022-01-01 RX ADMIN — SODIUM CHLORIDE 500 MILLILITER(S): 9 INJECTION INTRAMUSCULAR; INTRAVENOUS; SUBCUTANEOUS at 22:49

## 2022-01-01 RX ADMIN — Medication 26.8 MICROGRAM(S)/KG/MIN: at 13:42

## 2022-01-01 RX ADMIN — PANTOPRAZOLE SODIUM 40 MILLIGRAM(S): 20 TABLET, DELAYED RELEASE ORAL at 11:19

## 2022-01-01 RX ADMIN — HEPARIN SODIUM 5000 UNIT(S): 5000 INJECTION INTRAVENOUS; SUBCUTANEOUS at 06:03

## 2022-01-01 RX ADMIN — SODIUM CHLORIDE 100 MILLILITER(S): 9 INJECTION INTRAMUSCULAR; INTRAVENOUS; SUBCUTANEOUS at 22:26

## 2022-01-01 RX ADMIN — Medication 1000 MILLIGRAM(S): at 05:27

## 2022-01-01 RX ADMIN — MORPHINE SULFATE 2 MILLIGRAM(S): 50 CAPSULE, EXTENDED RELEASE ORAL at 18:00

## 2022-01-01 RX ADMIN — CHLORHEXIDINE GLUCONATE 1 APPLICATION(S): 213 SOLUTION TOPICAL at 11:10

## 2022-01-01 RX ADMIN — SODIUM CHLORIDE 500 MILLILITER(S): 9 INJECTION, SOLUTION INTRAVENOUS at 12:00

## 2022-02-22 NOTE — ED ADULT TRIAGE NOTE - CCCP TRG CHIEF CMPLNT
Problem List Items Addressed This Visit        Cardiac and Vasculature    Benign essential hypertension - Primary     2/22/2022  Blood pressure on lisinopril 10 mg =110/60  Continue the present meds            Infectious Diseases    Need for pneumococcal vaccination    Relevant Orders    PNEUMOCOCCAL POLYSACCHARIDE ADULT VACC, IM/SQ (PNEUMOVAX 23)    Need for shingles vaccine     The SHINGRIX shingles vaccine is a dead vaccine and is a series of 2 shots  by 2-6 months.  Check with your insurance company or pharmacy  to see if  the vaccine is covered partially or totally  Check with other pharmacies to see what the cost is for you to get the shingles shots on medicare               multiple medical complaints

## 2022-07-12 NOTE — ED PROVIDER NOTE - GASTROINTESTINAL, MLM
abd extended diffusely tender but worse on the left side abd extended diffusely tender but worse on the left side with rebound and guarding

## 2022-07-12 NOTE — ED PROVIDER NOTE - CLINICAL SUMMARY MEDICAL DECISION MAKING FREE TEXT BOX
88 y/o male with a PMHx of bladder cancer, chronic CHF, DM2, ESRD on dialysis (M/W/F), HTN, moderate aortic stenosis presents to the ED c/o abd pain after eating. Pt experienced vomiting during the exam. Plan: EKG, CVP, CT IV contrast, chest x-ray, might not be able to PO intake, morphine, concern for SBO.

## 2022-07-13 NOTE — ED ADULT NURSE REASSESSMENT NOTE - NS ED NURSE REASSESS COMMENT FT1
received report from Ying klein. spoke with pt's daughter betito with pt's permission. Daughter updated on plan of care and agreeable. pt in no acute distress at this time. tolerating NGT, connected to suction. pending room placement at this time. will ctm

## 2022-07-13 NOTE — PROVIDER CONTACT NOTE (OTHER) - SITUATION
paged @[phone] Dr. Marshall spoke to Dr. Madrigal (covering Dr. Clancy)  paged @[phone], returned page @[phone]

## 2022-07-13 NOTE — CONSULT NOTE ADULT - ASSESSMENT
89 yo with ESRD on MWF HD admitted with ileus vs SBO.  Abdominal pain improved wit NG drainage.  --ESRD : Continue TIW HD.  No UF due to significant GI fluid loss.   Continue maintenance IVF at 1L/day due to ongoing GI loss, until po intake can be started.  --HTN : hold all meds.  --GI : monitor with NG drainage.

## 2022-07-13 NOTE — PATIENT PROFILE ADULT - FUNCTIONAL ASSESSMENT - DAILY ACTIVITY 4.
S/P bunionectomy  right 2012  Status post surgery  endometriosis surgery: 10/2014, 05/2015, 10/2015   appendectomy during the first surgery 10/2014 4 = No assist / stand by assistance

## 2022-07-13 NOTE — H&P ADULT - NSHPPHYSICALEXAM_GEN_ALL_CORE
ABdomen: + distension, + reproducible pain, numerous previous incisional scars, hyporesonant  -guarding/rebound

## 2022-07-13 NOTE — PROVIDER CONTACT NOTE (OTHER) - ACTION/TREATMENT ORDERED:
Awaiting further blood work and CTA
Vel Hunter will come and see patient.
repeat lactate ordered for 1200. do not increase fluids given cardiac history, monitor for s/s of fluid overload and sepsis

## 2022-07-13 NOTE — PATIENT PROFILE ADULT - FALL HARM RISK - HARM RISK INTERVENTIONS

## 2022-07-13 NOTE — H&P ADULT - ASSESSMENT
89 yr/old male presents with acute abdominal distension, with assocaited nausea/vomitting secondary to small bowel obstruction  - NGT to lcws  -NS a 75cc per hr  -pain management prn  - Hospitalist/cardiology evaluation appreciated  -esrd will require dialysis  - PT may require surgical intervention if sbo worsens  -D/w Dr Daniels

## 2022-07-13 NOTE — PROGRESS NOTE ADULT - SUBJECTIVE AND OBJECTIVE BOX
89 yo male ESRD on HD, cystectomy/ileal conduit/ SBO, no abd pain.  ml last night, 20 ml last 7 hrs.    Abd soft, NT  NGT in place              Vital Signs Last 24 Hrs  T(C): 37.2 (13 Jul 2022 10:00), Max: 37.4 (13 Jul 2022 05:45)  T(F): 98.9 (13 Jul 2022 10:00), Max: 99.3 (13 Jul 2022 05:45)  HR: 87 (13 Jul 2022 10:00) (84 - 115)  BP: 105/45 (13 Jul 2022 10:00) (105/45 - 179/79)  BP(mean): --  RR: 18 (13 Jul 2022 10:00) (16 - 18)  SpO2: 95% (13 Jul 2022 10:00) (94% - 99%)    Parameters below as of 13 Jul 2022 10:00  Patient On (Oxygen Delivery Method): nasal cannula  O2 Flow (L/min): 4                            11.4   4.12  )-----------( 194      ( 13 Jul 2022 07:11 )             35.4       07-13    134<L>  |  95<L>  |  52<H>  ----------------------------<  180<H>  5.8<H>   |  29  |  7.62<H>    Ca    8.7      13 Jul 2022 07:11    TPro  8.3  /  Alb  4.0  /  TBili  0.7  /  DBili  x   /  AST  18  /  ALT  17  /  AlkPhos  72  07-12      LIVER FUNCTIONS - ( 12 Jul 2022 22:12 )  Alb: 4.0 g/dL / Pro: 8.3 gm/dL / ALK PHOS: 72 U/L / ALT: 17 U/L / AST: 18 U/L / GGT: x             MEDICATIONS  (STANDING):  dextrose 5%. 1000 milliLiter(s) (50 mL/Hr) IV Continuous <Continuous>  dextrose 5%. 1000 milliLiter(s) (100 mL/Hr) IV Continuous <Continuous>  dextrose 50% Injectable 25 Gram(s) IV Push once  dextrose 50% Injectable 12.5 Gram(s) IV Push once  dextrose 50% Injectable 25 Gram(s) IV Push once  famotidine Injectable 20 milliGRAM(s) IV Push every 48 hours  glucagon  Injectable 1 milliGRAM(s) IntraMuscular once  heparin   Injectable 5000 Unit(s) SubCutaneous every 8 hours  insulin lispro (ADMELOG) corrective regimen sliding scale   SubCutaneous three times a day before meals  metoprolol tartrate IVPB 5 milliGRAM(s) IV Intermittent every 6 hours  sodium chloride 0.9%. 1000 milliLiter(s) (75 mL/Hr) IV Continuous <Continuous>    MEDICATIONS  (PRN):  dextrose Oral Gel 15 Gram(s) Oral once PRN Blood Glucose LESS THAN 70 milliGRAM(s)/deciliter  ondansetron Injectable 4 milliGRAM(s) IV Push every 6 hours PRN Nausea      MEDICATIONS  (STANDING):  dextrose 5%. 1000 milliLiter(s) (50 mL/Hr) IV Continuous <Continuous>  dextrose 5%. 1000 milliLiter(s) (100 mL/Hr) IV Continuous <Continuous>  dextrose 50% Injectable 25 Gram(s) IV Push once  dextrose 50% Injectable 12.5 Gram(s) IV Push once  dextrose 50% Injectable 25 Gram(s) IV Push once  famotidine Injectable 20 milliGRAM(s) IV Push every 48 hours  glucagon  Injectable 1 milliGRAM(s) IntraMuscular once  heparin   Injectable 5000 Unit(s) SubCutaneous every 8 hours  insulin lispro (ADMELOG) corrective regimen sliding scale   SubCutaneous three times a day before meals  metoprolol tartrate IVPB 5 milliGRAM(s) IV Intermittent every 6 hours  sodium chloride 0.9%. 1000 milliLiter(s) (75 mL/Hr) IV Continuous <Continuous>

## 2022-07-13 NOTE — ED ADULT NURSE NOTE - OBJECTIVE STATEMENT
89 year old male found laying on stretcher complaining of abdominal pain that started last night and has increasely gotten worse.  pt is also vomiting.  pt denies SI/HI/depression

## 2022-07-13 NOTE — H&P ADULT - NSHPLABSRESULTS_GEN_ALL_CORE
12.9   6.43  )-----------( 252      ( 12 Jul 2022 22:12 )             38.5     PT/INR - ( 12 Jul 2022 22:12 )   PT: 12.8 sec;   INR: 1.10 ratio         PTT - ( 12 Jul 2022 22:12 )  PTT:33.4 sec  07-12    133<L>  |  89<L>  |  49<H>  ----------------------------<  252<H>  5.5<H>   |  31  |  7.69<H>    Ca    10.2<H>      12 Jul 2022 22:12    TPro  8.3  /  Alb  4.0  /  TBili  0.7  /  DBili  x   /  AST  18  /  ALT  17  /  AlkPhos  72  07-12    Type and Screen Antibody Screen: POS (07-13 @ 00:19)

## 2022-07-13 NOTE — CHART NOTE - NSCHARTNOTEFT_GEN_A_CORE
Pt SpO2 95% at 4L NC. Pt was not hypoxic on arrival to ED, no O2 use at home. On BB which can mask HR response to hypoxia.     PHYSICAL EXAM:    CHEST/LUNG: Decreased BS at bases    PLan:  -CTA with PE protocol ordered, discussed with Dr Hagan who will evaluate pt tomorrow and will determine need for extra HD session  -will monitor for signs of aspiration pna or pneumonitis (although less likely as pt has been NPO with NGT)   -CXR looks congested as compared to previous, will follow final read   -hold IVF for now   -Hx of CHF with mrEF, questioning fluid overload as pt s/p HD today  -cardiac wkup ordered as per sx with troponin at 196, pt is ESRD which will have baseline troponinemia, no CP, doubt ACS, will trend troponin later tonight and in AM   -plan discussed with primary team and attending, will sign out to night team and continue to monitor Pt SpO2 95% at 4L NC. Pt was not hypoxic on arrival to ED, no O2 use at home. On BB which can mask HR response to hypoxia.     PHYSICAL EXAM:    CHEST/LUNG: Decreased BS at bases    PLan:  -CTA with PE protocol ordered, discussed with Dr Hagan who will evaluate pt tomorrow and will determine need for extra HD session  -will monitor for signs of aspiration pna or pneumonitis (although less likely as pt has been NPO with NGT)   -CXR with no significant change as compared to previous   -hold IVF for now   -Hx of CHF with mrEF, questioning fluid overload as pt s/p HD today  -TTE ordered earlier today, will FU   -cardiac wkup ordered as per sx with troponin at 196, pt is ESRD which will have baseline troponinemia, also CAD with stents. No CP, doubt ACS, more likely demand ischemia.  Will trend troponin later tonight and in AM   -plan discussed with primary team and attending, will sign out to night team and continue to monitor

## 2022-07-13 NOTE — PROGRESS NOTE ADULT - ASSESSMENT
89 yo M with h/o CHF, AS, ESRD on dialysis, admitted with SBO currently being treated conservatively with NGT.  Case d/w Dr. Daniels, who is recommending pt be upgraded to SD for closer monitoring   Stat CXR   Case d/w medical team, asked to reassess pt  Unable to perform CTA 2/2 ESRD       will f/u.

## 2022-07-13 NOTE — CONSULT NOTE ADULT - ATTENDING COMMENTS
89 yo M with PMHx of ESRD on HD, HTN, bladder Ca, DM2, CAD s/p PCI, CHF, moderate aortic stenosis, perforated gastric ulcer presented with cc of abd pain, nausea, and vomiting that started 2 days ago. Pt reports has a regular diet. Abd CT showed high-grade SBO and was admitted by surgery for further management.  Endorses abd pain, denies sob or cp.

## 2022-07-13 NOTE — CONSULT NOTE ADULT - PROBLEM SELECTOR RECOMMENDATION 9
-type B in the setting of renal failure   -doubt infectious process (SIRS 0/4) -management per Sx  -NG tube in place with bilious output   -might need surgical intervention  -RCRI: 5 (15% 3 day risk of death, MI, or cardiac arrest)  -cardiology to evaluate pt

## 2022-07-13 NOTE — CHART NOTE - NSCHARTNOTEFT_GEN_A_CORE
I was consulted on this patient but he sees Dr. Maria and well known to the [facility] Cardiology team. I made their team aware of this consultation and they will see when they are able.     If it turns into an Urgent consult please call their service. Thank you.

## 2022-07-13 NOTE — H&P ADULT - HISTORY OF PRESENT ILLNESS
88 y/o male with a PMHx of bladder cancer, chronic CHF, DM2, ESRD on dialysis (M/W/F), HTN, moderate aortic stenosis presents to the   ED c/o abd pain after eating. + vomtting, NGT in place approx 1 liter of output noted bilious in nature, denies previous episodes of similiar symptoms

## 2022-07-13 NOTE — PROGRESS NOTE ADULT - ASSESSMENT
89 yo male ESRD on HD, cystectomy/ileal conduit/SBO  -Will increase IVF 75 ml/hr x 8 hrs then drop it to 40ml/hr since lactic acid still elevated and BP on low side  -Awaiting medicine/cardiology consult  -AXR in am  -SCDS  -PT/OOB

## 2022-07-13 NOTE — PHARMACOTHERAPY INTERVENTION NOTE - COMMENTS
Medication history complete, patient is unable to provide medication information, contacted patient's spouse [phone] no answer. I use [facility] to complete med rec.

## 2022-07-13 NOTE — PROVIDER CONTACT NOTE (OTHER) - SITUATION
patient admitted to 2S from ER. lactate 3.8 at 7am. SPo2 85% on room air, placed on 4 L nasal cannula.

## 2022-07-13 NOTE — CHART NOTE - NSCHARTNOTEFT_GEN_A_CORE
< from: CT Angio Chest PE Protocol w/ IV Cont (07.13.22 @ 19:42) >      ACC: [id number] EXAM:  CT ANGIO CHEST PULBANG GAITAN                          PROCEDURE DATE:  07/13/2022        CT Angio chest results:    INTERPRETATION:  Clinical information: Acute respiratory failure.   Evaluate for pulmonary embolus. Exam is compared to previous study of   11/3/2018.    CT angiogram of the chest was obtained following administration of   intravenous contrast. Approximately 90 cc of Omnipaque 350 was   administered and 10 cc was discarded. Coronal, sagittal and MIP images   were submitted for review.    No hilar and or mediastinal adenopathy is noted.    Heart is enlarged in size. Calcification of the aortic valve and the   coronary arteries is noted. No pericardial effusion is noted. Pulmonary   arteries are normal in caliber. No filling defects are noted. Right   subclavian vein stent is noted.    No endobronchial lesions are noted. Centrilobular emphysema is noted   bilaterally. 0.2 cm nodule is noted in the left upper lobe. Minimal   atelectasis is noted involving portions of the lingulaand both lower   lobes. No pleural effusions are noted.    Below the diaphragm, visualized portions of the abdomen demonstrate   nasogastric tube in place. Left kidney is small in size.    Degenerative changes of the spine are noted.    IMPRESSION: No pulmonary embolus is noted.    --- End of Report ---      DERRICK PRADHAN MD; Attending Radiologist  This document has been electronically signed. Jul 13 2022  8:06PM    < end of copied text >      A/P:  CTA neg for PE  Above results and Troponin 195 reviewed with Dr Daniels  Will transfer to CICU and conchita Troponin as discussed   Hospitalist following, Cardiology C/S pending < from: CT Angio Chest PE Protocol w/ IV Cont (07.13.22 @ 19:42) >      ACC: [id number] EXAM:  CT ANGIO CHEST PULBANG GAITAN                          PROCEDURE DATE:  07/13/2022        CT Angio chest results:    INTERPRETATION:  Clinical information: Acute respiratory failure.   Evaluate for pulmonary embolus. Exam is compared to previous study of   11/3/2018.    CT angiogram of the chest was obtained following administration of   intravenous contrast. Approximately 90 cc of Omnipaque 350 was   administered and 10 cc was discarded. Coronal, sagittal and MIP images   were submitted for review.    No hilar and or mediastinal adenopathy is noted.    Heart is enlarged in size. Calcification of the aortic valve and the   coronary arteries is noted. No pericardial effusion is noted. Pulmonary   arteries are normal in caliber. No filling defects are noted. Right   subclavian vein stent is noted.    No endobronchial lesions are noted. Centrilobular emphysema is noted   bilaterally. 0.2 cm nodule is noted in the left upper lobe. Minimal   atelectasis is noted involving portions of the lingulaand both lower   lobes. No pleural effusions are noted.    Below the diaphragm, visualized portions of the abdomen demonstrate   nasogastric tube in place. Left kidney is small in size.    Degenerative changes of the spine are noted.    IMPRESSION: No pulmonary embolus is noted.    --- End of Report ---      DERRICK PRADHAN MD; Attending Radiologist  This document has been electronically signed. Jul 13 2022  8:06PM    < end of copied text >      A/P:  CTA neg for PE  Above results and Troponin 195 reviewed with Dr Daniels at 2015  Will transfer to CICU and conchita Troponin as discussed   Hospitalist following, Cardiology C/S pending

## 2022-07-13 NOTE — PROVIDER CONTACT NOTE (OTHER) - ASSESSMENT
last lactate 3.8 at 7am (trending down). SPO2 85% on room air, placed on 4L nasal cannula, SPO2 95% breathing comfortably, denies shortness of breath. /42. fluids ordered NS at 40 cc/hr
/32 HR 78 O2 98% 4L
/43, O2 4L NC 94%

## 2022-07-13 NOTE — CONSULT NOTE ADULT - RESPIRATORY
normal/clear to auscultation bilaterally/no wheezes/no rales/no rhonchi/no respiratory distress/no use of accessory muscles/good air movement

## 2022-07-13 NOTE — PROGRESS NOTE ADULT - SUBJECTIVE AND OBJECTIVE BOX
Called by Rn to assess pt after developing acute onset of hypoxia. Pt went after O2 requirements went from sat'ing in mid 90's on RA to requiring 4L through the NC to maintain sat's in the mid 90's. His oxygen needs have not gone down since recieivng dialysis.  Upon arrival to the bedside, pt denies any SOB, CP, no increase in his abd pain. he is resting comfortably in bed with the NGT in place, actively suctioning bilious drainage, approx 100cc since this am. Pt does report a 20 year smoking history but hasn't smoked in 35 years and normally does not require supplemental O2 at home    ICU Vital Signs Last 24 Hrs  T(C): 37.1 (13 Jul 2022 15:57), Max: 37.4 (13 Jul 2022 05:45)  T(F): 98.7 (13 Jul 2022 15:57), Max: 99.3 (13 Jul 2022 05:45)  HR: 88 (13 Jul 2022 15:57) (76 - 115)  BP: 114/43 (13 Jul 2022 15:57) (105/45 - 179/79)  BP(mean): --  ABP: --  ABP(mean): --  RR: 18 (13 Jul 2022 15:57) (16 - 18)  SpO2: 94% (13 Jul 2022 15:57) (94% - 99%)    O2 Parameters below as of 13 Jul 2022 15:57  Patient On (Oxygen Delivery Method): nasal cannula  O2 Flow (L/min): 4    General: INAD A&Ox3  Lungs: CTA b/l  Abd: soft, tenderness LLQ. Mild distension. NGT actively suctioning gastric contents  Ext: mild 1+pitting edema.

## 2022-07-14 NOTE — PHYSICAL THERAPY INITIAL EVALUATION ADULT - CRITERIA FOR SKILLED THERAPEUTIC INTERVENTIONS
will attempt completion of Eval @ later date when appropriate; pt having continuous BM's; currently on bedpan; further course of PT intervention pending

## 2022-07-14 NOTE — PROGRESS NOTE ADULT - SUBJECTIVE AND OBJECTIVE BOX
89 yo male with ESRD on HD, CHF, CAD, DM, SBO, h/o cystectomy/ileal conduit admitted yesterday. NGT output 250ml 24 hrs, no flatus, normal lactic acid, dialysis yest. Chest CT negat for PE. Normal vital signs    Abd soft, mildly tender lower abdomen, midline scar, upper 4cm ventral hernia  NGT              Vital Signs Last 24 Hrs  T(C): 37.2 (14 Jul 2022 08:40), Max: 37.9 (14 Jul 2022 00:31)  T(F): 98.9 (14 Jul 2022 08:40), Max: 100.2 (14 Jul 2022 00:31)  HR: 81 (14 Jul 2022 08:00) (76 - 92)  BP: 124/42 (14 Jul 2022 08:00) (105/45 - 158/52)  BP(mean): 64 (14 Jul 2022 08:00) (58 - 78)  RR: 16 (14 Jul 2022 08:00) (15 - 24)  SpO2: 100% (14 Jul 2022 06:00) (92% - 100%)    Parameters below as of 14 Jul 2022 08:00  Patient On (Oxygen Delivery Method): nasal cannula  O2 Flow (L/min): 4                            10.5   3.27  )-----------( 176      ( 14 Jul 2022 05:49 )             34.4       07-14    137  |  101  |  38<H>  ----------------------------<  155<H>  5.1   |  29  |  5.45<H>    Ca    8.9      14 Jul 2022 05:49    TPro  8.3  /  Alb  4.0  /  TBili  0.7  /  DBili  x   /  AST  18  /  ALT  17  /  AlkPhos  72  07-12      LIVER FUNCTIONS - ( 12 Jul 2022 22:12 )  Alb: 4.0 g/dL / Pro: 8.3 gm/dL / ALK PHOS: 72 U/L / ALT: 17 U/L / AST: 18 U/L / GGT: x             MEDICATIONS  (STANDING):  dextrose 5%. 1000 milliLiter(s) (100 mL/Hr) IV Continuous <Continuous>  dextrose 5%. 1000 milliLiter(s) (50 mL/Hr) IV Continuous <Continuous>  dextrose 50% Injectable 25 Gram(s) IV Push once  dextrose 50% Injectable 12.5 Gram(s) IV Push once  dextrose 50% Injectable 25 Gram(s) IV Push once  diatrizoate meglumine/diatrizoate sodium. 100 milliLiter(s) Oral once  famotidine Injectable 20 milliGRAM(s) IV Push every 48 hours  glucagon  Injectable 1 milliGRAM(s) IntraMuscular once  heparin   Injectable 5000 Unit(s) SubCutaneous every 8 hours  insulin lispro (ADMELOG) corrective regimen sliding scale   SubCutaneous three times a day before meals  lidocaine/prilocaine Cream 1 Application(s) Topical <User Schedule>  metoprolol tartrate IVPB 5 milliGRAM(s) IV Intermittent every 6 hours  sodium chloride 0.9%. 1000 milliLiter(s) (40 mL/Hr) IV Continuous <Continuous>    MEDICATIONS  (PRN):  dextrose Oral Gel 15 Gram(s) Oral once PRN Blood Glucose LESS THAN 70 milliGRAM(s)/deciliter  morphine  - Injectable 2 milliGRAM(s) IV Push every 3 hours PRN Severe Pain (7 - 10)  ondansetron Injectable 4 milliGRAM(s) IV Push every 6 hours PRN Nausea      MEDICATIONS  (STANDING):  dextrose 5%. 1000 milliLiter(s) (100 mL/Hr) IV Continuous <Continuous>  dextrose 5%. 1000 milliLiter(s) (50 mL/Hr) IV Continuous <Continuous>  dextrose 50% Injectable 25 Gram(s) IV Push once  dextrose 50% Injectable 12.5 Gram(s) IV Push once  dextrose 50% Injectable 25 Gram(s) IV Push once  diatrizoate meglumine/diatrizoate sodium. 100 milliLiter(s) Oral once  famotidine Injectable 20 milliGRAM(s) IV Push every 48 hours  glucagon  Injectable 1 milliGRAM(s) IntraMuscular once  heparin   Injectable 5000 Unit(s) SubCutaneous every 8 hours  insulin lispro (ADMELOG) corrective regimen sliding scale   SubCutaneous three times a day before meals  lidocaine/prilocaine Cream 1 Application(s) Topical <User Schedule>  metoprolol tartrate IVPB 5 milliGRAM(s) IV Intermittent every 6 hours  sodium chloride 0.9%. 1000 milliLiter(s) (40 mL/Hr) IV Continuous <Continuous>       89 yo male with ESRD on HD, CHF,  moderate aortic stenosis, CAD, DM, SBO, h/o cystectomy/ileal conduit, perf duodenal ulcer? admitted yesterday. NGT output 250ml 24 hrs, no flatus, normal lactic acid, dialysis yest. Chest CT negat for PE. Normal vital signs    Abd soft, mildly tender lower abdomen, midline scar, upper 4cm ventral hernia  NGT              Vital Signs Last 24 Hrs  T(C): 37.2 (14 Jul 2022 08:40), Max: 37.9 (14 Jul 2022 00:31)  T(F): 98.9 (14 Jul 2022 08:40), Max: 100.2 (14 Jul 2022 00:31)  HR: 81 (14 Jul 2022 08:00) (76 - 92)  BP: 124/42 (14 Jul 2022 08:00) (105/45 - 158/52)  BP(mean): 64 (14 Jul 2022 08:00) (58 - 78)  RR: 16 (14 Jul 2022 08:00) (15 - 24)  SpO2: 100% (14 Jul 2022 06:00) (92% - 100%)    Parameters below as of 14 Jul 2022 08:00  Patient On (Oxygen Delivery Method): nasal cannula  O2 Flow (L/min): 4                            10.5   3.27  )-----------( 176      ( 14 Jul 2022 05:49 )             34.4       07-14    137  |  101  |  38<H>  ----------------------------<  155<H>  5.1   |  29  |  5.45<H>    Ca    8.9      14 Jul 2022 05:49    TPro  8.3  /  Alb  4.0  /  TBili  0.7  /  DBili  x   /  AST  18  /  ALT  17  /  AlkPhos  72  07-12      LIVER FUNCTIONS - ( 12 Jul 2022 22:12 )  Alb: 4.0 g/dL / Pro: 8.3 gm/dL / ALK PHOS: 72 U/L / ALT: 17 U/L / AST: 18 U/L / GGT: x             MEDICATIONS  (STANDING):  dextrose 5%. 1000 milliLiter(s) (100 mL/Hr) IV Continuous <Continuous>  dextrose 5%. 1000 milliLiter(s) (50 mL/Hr) IV Continuous <Continuous>  dextrose 50% Injectable 25 Gram(s) IV Push once  dextrose 50% Injectable 12.5 Gram(s) IV Push once  dextrose 50% Injectable 25 Gram(s) IV Push once  diatrizoate meglumine/diatrizoate sodium. 100 milliLiter(s) Oral once  famotidine Injectable 20 milliGRAM(s) IV Push every 48 hours  glucagon  Injectable 1 milliGRAM(s) IntraMuscular once  heparin   Injectable 5000 Unit(s) SubCutaneous every 8 hours  insulin lispro (ADMELOG) corrective regimen sliding scale   SubCutaneous three times a day before meals  lidocaine/prilocaine Cream 1 Application(s) Topical <User Schedule>  metoprolol tartrate IVPB 5 milliGRAM(s) IV Intermittent every 6 hours  sodium chloride 0.9%. 1000 milliLiter(s) (40 mL/Hr) IV Continuous <Continuous>    MEDICATIONS  (PRN):  dextrose Oral Gel 15 Gram(s) Oral once PRN Blood Glucose LESS THAN 70 milliGRAM(s)/deciliter  morphine  - Injectable 2 milliGRAM(s) IV Push every 3 hours PRN Severe Pain (7 - 10)  ondansetron Injectable 4 milliGRAM(s) IV Push every 6 hours PRN Nausea      MEDICATIONS  (STANDING):  dextrose 5%. 1000 milliLiter(s) (100 mL/Hr) IV Continuous <Continuous>  dextrose 5%. 1000 milliLiter(s) (50 mL/Hr) IV Continuous <Continuous>  dextrose 50% Injectable 25 Gram(s) IV Push once  dextrose 50% Injectable 12.5 Gram(s) IV Push once  dextrose 50% Injectable 25 Gram(s) IV Push once  diatrizoate meglumine/diatrizoate sodium. 100 milliLiter(s) Oral once  famotidine Injectable 20 milliGRAM(s) IV Push every 48 hours  glucagon  Injectable 1 milliGRAM(s) IntraMuscular once  heparin   Injectable 5000 Unit(s) SubCutaneous every 8 hours  insulin lispro (ADMELOG) corrective regimen sliding scale   SubCutaneous three times a day before meals  lidocaine/prilocaine Cream 1 Application(s) Topical <User Schedule>  metoprolol tartrate IVPB 5 milliGRAM(s) IV Intermittent every 6 hours  sodium chloride 0.9%. 1000 milliLiter(s) (40 mL/Hr) IV Continuous <Continuous>

## 2022-07-14 NOTE — PROGRESS NOTE ADULT - ASSESSMENT
ASSESSMENT  89yo male with PMHx of bladder cancer s/p resection with neobladder, hx appendectomy,  chronic CHF, DM2, ESRD on dialysis (M/W/F), HTN, moderate aortic stenosis presents to the ED c/o abd pain and vomiting. Admitted for SBO    1. Abdominal pain 2/2 high grade SBO  2. Acute hypoxic resp failure  3. ESRD on HD (MWF)  4. HF  5. hx bladder ca s/p resection and neobladder  6. mod AS, CAD s/p PCI 2016, DM2    PLAN  Neuro: Pt agitated. pain control prn.  CV: BP stable, oral meds held. cont IV lopressor 5mg q6hr. TTE LVEF 60-65% (EF prev 45% 2018), mod AS, mild MS. cardio consulted  Pulm: on 4L NC, titrate to maintain O2> 94%. CTA chest neg for PE, no infiltrates seen. Possible aspiration pneumonitis vs atelectasis from abd distension.   GI: NPO with NG tube in place. gastrografin study today pending read. may have to go to OR if SBO does not resolve with conservative mgmt.   Nephro: ESRD on HD. had HD yesterday. fluid restriction 1L/day. monitor electrolytes.  Endo: cont ISS. FS goal <180. check a1c.   Heme: DVT ppx hep sq    Will discuss with Dr. Rhoades (intensivist)    *THIS NOTE IS FOR ACADEMIC PURPOSES ONLY*        ASSESSMENT  89yo male with PMHx of bladder cancer s/p resection with neobladder, hx appendectomy,  chronic CHF, DM2, ESRD on dialysis (M/W/F), HTN, moderate aortic stenosis presents to the ED c/o abd pain and vomiting. Admitted for SBO    1. Abdominal pain 2/2 high grade SBO  2. Acute hypoxic resp failure  3. ESRD on HD (MWF)  4. HFpEF  5. hx bladder ca s/p resection and neobladder  6. mod AS, CAD s/p PCI 2016, DM2    PLAN  Neuro: Pt agitated. pain control prn.  CV: BP stable, oral meds held. cont IV lopressor 5mg q6hr. TTE LVEF 60-65% (EF prev 45% 2018), mod AS, mild MS. cardio consulted  Pulm: on 4L NC, titrate to maintain O2> 94%. CTA chest neg for PE, no infiltrates seen. Possible aspiration pneumonitis but less likely with NGT  GI: NPO with NG tube in place. gastrografin study today pending read. may have to go to OR if SBO does not resolve with conservative mgmt.   Nephro: ESRD on HD. had HD yesterday. fluid restriction 1L/day. monitor electrolytes.  Endo: cont ISS. FS goal <180. check a1c.   Heme: DVT ppx hep sq    Will discuss with Dr. Rhoades    *THIS NOTE IS FOR ACADEMIC PURPOSES ONLY*

## 2022-07-14 NOTE — PHYSICAL THERAPY INITIAL EVALUATION ADULT - MODALITIES TREATMENT COMMENTS
pt left in bed supine post Eval; bed alarm on; all above lines/monitors in place; RN Shannon present; callbell in reach; mary kate well; denied pain

## 2022-07-14 NOTE — PHYSICAL THERAPY INITIAL EVALUATION ADULT - GENERAL OBSERVATIONS, REHAB EVAL
NGT; IV; HM; BP cuff; pulse oxym; pt rec'd in bed supine; HR 95; /48; O2 Sat 96%; pt denied pain; daughter present

## 2022-07-14 NOTE — PROGRESS NOTE ADULT - ASSESSMENT
89 yo male with ESRD on HD, CHF, CAD, DM, SBO, h/o cystectomy/ileal conduit admitted yesterday. NGT output 250ml 24 hrs, no flatus, normal lactic acid, dialysis yest. Chest CT negat for PE. Normal vital signs  -Will order gastrografin small bowel series x ray  -Awaiting for Dr Maria (cardiology) consult. Last Echo ef 40-45%  -Will consult Urologist since he has an ileal conduit  -He might need ex lap if no improvement is seen in SBO  -IVF 40ml/hr  -HSQ/SCds

## 2022-07-14 NOTE — CONSULT NOTE ADULT - PROBLEM SELECTOR RECOMMENDATION 9
-management per Sx  -NG tube in place with bilious output   -might need surgical intervention  -RCRI: 5 (15% 3 day risk of death, MI, or cardiac arrest)  -cardiology to evaluate pt -type B in the setting of renal failure   -doubt infectious process (SIRS 0/4) -management per Sx  -NG tube in place with bilious output   -might need surgical intervention.  (patient refusing surgery if needed)  -RCRI: 5 (15% 3 day risk of death, MI, or cardiac arrest)  -cardiology to evaluate pt

## 2022-07-14 NOTE — PROGRESS NOTE ADULT - SUBJECTIVE AND OBJECTIVE BOX
NEPHROLOGY INTERVAL HPI/OVERNIGHT EVENTS:    Date of Service: 22 @ 10:33    --No acute distress.   Tolerated dialysis ok.  C/o thirst.  NG on drainage again.  CTA done --negative for PE or effusion.    HPI:  89 yo with ESRD on MWF/[facility] dialysis unit admitted with abdominal pain and vomiting.  PMHX significant for hx fo bladder ca--cystectomy and neobladder,  perf gastric ulcer with exp lap, DM, HTN and CAD.  CT ileus vs SBO.  Feeling improved post NG drainage of ~ 1 Liter bilious fluid.  Had similar episode in 2020--ilus vs SBO --improved with NG drainage without surgical intervetnion.    PMHX and PSHX.  --HTN  --Hx of Bladder CA   --Hx of Cystectomy and Neobladder.  --DM  --CAD ( post PCI)--[place] to RCA 2016, [place] to LAD and D1 in 2018  --CHF (HFpEF)  --Moderate Aortic Stenosis  --Hx of perforated gastric ulcer post exp lap.  --Ileus vs SBO in 2020.    MEDICATIONS  (STANDING):  dextrose 5%. 1000 milliLiter(s) (100 mL/Hr) IV Continuous <Continuous>  dextrose 5%. 1000 milliLiter(s) (50 mL/Hr) IV Continuous <Continuous>  dextrose 50% Injectable 25 Gram(s) IV Push once  dextrose 50% Injectable 12.5 Gram(s) IV Push once  dextrose 50% Injectable 25 Gram(s) IV Push once  diatrizoate meglumine/diatrizoate sodium. 100 milliLiter(s) Oral once  famotidine Injectable 20 milliGRAM(s) IV Push every 48 hours  glucagon  Injectable 1 milliGRAM(s) IntraMuscular once  heparin   Injectable 5000 Unit(s) SubCutaneous every 8 hours  insulin lispro (ADMELOG) corrective regimen sliding scale   SubCutaneous three times a day before meals  lidocaine/prilocaine Cream 1 Application(s) Topical <User Schedule>  metoprolol tartrate IVPB 5 milliGRAM(s) IV Intermittent every 6 hours  sodium chloride 0.9%. 1000 milliLiter(s) (40 mL/Hr) IV Continuous <Continuous>    MEDICATIONS  (PRN):  dextrose Oral Gel 15 Gram(s) Oral once PRN Blood Glucose LESS THAN 70 milliGRAM(s)/deciliter  morphine  - Injectable 2 milliGRAM(s) IV Push every 3 hours PRN Severe Pain (7 - 10)  ondansetron Injectable 4 milliGRAM(s) IV Push every 6 hours PRN Nausea    Vital Signs Last 24 Hrs  T(C): 37.2 (2022 08:40), Max: 37.9 (2022 00:31)  T(F): 98.9 (2022 08:40), Max: 100.2 (2022 00:31)  HR: 81 (2022 08:00) (76 - 92)  BP: 124/42 (2022 08:00) (107/63 - 158/52)  BP(mean): 64 (2022 08:00) (58 - 78)  RR: 16 (2022 08:00) (15 - 24)  SpO2: 100% (2022 06:00) (92% - 100%)    Parameters below as of 2022 08:00  Patient On (Oxygen Delivery Method): nasal cannula  O2 Flow (L/min): 4    Daily     Daily Weight in k (2022 06:09)    07-13 @ 07:  -  14 @ 07:00  --------------------------------------------------------  IN: 0 mL / OUT: 600 mL / NET: -600 mL     @ 07:  -   @ 10:33  --------------------------------------------------------  IN: 0 mL / OUT: 250 mL / NET: -250 mL    PHYSICAL EXAM:  GENERAL: Comfortable.  CHEST/LUNG: r base rhonchi  HEART: s1S2 RRR  ABDOMEN: decreased distension  EXTREMITIES: no edema  SKIN:     LABS:                        10.5   3.27  )-----------( 176      ( 2022 05:49 )             34.4         137  |  101  |  38<H>  ----------------------------<  155<H>  5.1   |  29  |  5.45<H>    Ca    8.9      2022 05:49    TPro  8.3  /  Alb  4.0  /  TBili  0.7  /  DBili  x   /  AST  18  /  ALT  17  /  AlkPhos  72  07-12    PT/INR - ( 2022 22:12 )   PT: 12.8 sec;   INR: 1.10 ratio         PTT - ( 2022 22:12 )  PTT:33.4 sec            RADIOLOGY & ADDITIONAL TESTS:

## 2022-07-14 NOTE — PROGRESS NOTE ADULT - SUBJECTIVE AND OBJECTIVE BOX
Patient is a 89y old  Male who presents with a chief complaint of SMall bowel obstruction (14 Jul 2022 10:33)    BRIEF HOSPITAL COURSE: 89yo male with  PMHx of bladder cancer s/p resection with neobladder, hx appendectomy,  chronic CHF, DM2, ESRD on dialysis (M/W/F), HTN, moderate aortic stenosis presents to the ED c/o abd pain after eating. + vomting, NGT in place approx 1 liter of output noted bilious in nature. Admitted for SBO. 7/13 had SOB and hypoxia. CTA chest neg for PE, placed on 4L NC. moved to CICU. Trop likely elevated in setting of ESRD.     Events last 24 hours: NGT output 250 in 24hr, no bowel sounds abd still appears distended. plan for gastrografin study today. pt non compliant with taking hx. yelling, threatening to leave. States he feels fine and wants to go home.    PAST MEDICAL & SURGICAL HISTORY:  Bladder cancer  HTN (hypertension)  ESRD on dialysis MWF  Type 2 diabetes mellitus  Chronic CHF  Moderate aortic stenosis  History of bladder cancer s/p resection with neobladder  History of appendectomy  History of exploratory laparotomy  Perforated Gastric Ulcer, Peritonitis  History of percutaneous angioplasty  PCI w/ KAREN RCA 12/2016, KAREN to LAD and D1 on 11/1/2018  S/P arteriovenous (AV) fistula creation Right  History of cholecystectomy    Medications:    metoprolol tartrate IVPB 5 milliGRAM(s) IV Intermittent every 6 hours  morphine  - Injectable 2 milliGRAM(s) IV Push every 3 hours PRN  ondansetron Injectable 4 milliGRAM(s) IV Push every 6 hours PRN  heparin   Injectable 5000 Unit(s) SubCutaneous every 8 hours  famotidine Injectable 20 milliGRAM(s) IV Push every 48 hours  dextrose 50% Injectable 25 Gram(s) IV Push once  dextrose 50% Injectable 12.5 Gram(s) IV Push once  dextrose 50% Injectable 25 Gram(s) IV Push once  dextrose Oral Gel 15 Gram(s) Oral once PRN  glucagon  Injectable 1 milliGRAM(s) IntraMuscular once  insulin lispro (ADMELOG) corrective regimen sliding scale   SubCutaneous three times a day before meals  dextrose 5%. 1000 milliLiter(s) IV Continuous <Continuous>  dextrose 5%. 1000 milliLiter(s) IV Continuous <Continuous>  sodium chloride 0.9%. 1000 milliLiter(s) IV Continuous <Continuous>  lidocaine/prilocaine Cream 1 Application(s) Topical <User Schedule>    ICU Vital Signs Last 24 Hrs  T(C): 37.2 (14 Jul 2022 08:40), Max: 37.9 (14 Jul 2022 00:31)  T(F): 98.9 (14 Jul 2022 08:40), Max: 100.2 (14 Jul 2022 00:31)  HR: 91 (14 Jul 2022 12:00) (78 - 92)  BP: 144/51 (14 Jul 2022 12:00) (107/63 - 158/52)  BP(mean): 75 (14 Jul 2022 12:00) (58 - 78)  ABP: --  ABP(mean): --  RR: 23 (14 Jul 2022 12:00) (14 - 25)  SpO2: 99% (14 Jul 2022 12:00) (92% - 100%)    O2 Parameters below as of 14 Jul 2022 08:00  Patient On (Oxygen Delivery Method): nasal cannula  O2 Flow (L/min): 4    I&O's Detail    13 Jul 2022 07:01  -  14 Jul 2022 07:00  --------------------------------------------------------  IN:  Total IN: 0 mL    OUT:    Nasogastric/Oral tube (mL): 100 mL    Other (mL): 500 mL  Total OUT: 600 mL    Total NET: -600 mL      14 Jul 2022 07:01  -  14 Jul 2022 14:07  --------------------------------------------------------  IN:  Total IN: 0 mL    OUT:    Nasogastric/Oral tube (mL): 250 mL  Total OUT: 250 mL    Total NET: -250 mL    LABS:                        10.5   3.27  )-----------( 176      ( 14 Jul 2022 05:49 )             34.4     07-14    137  |  101  |  38<H>  ----------------------------<  155<H>  5.1   |  29  |  5.45<H>    Ca    8.9      14 Jul 2022 05:49    TPro  8.3  /  Alb  4.0  /  TBili  0.7  /  DBili  x   /  AST  18  /  ALT  17  /  AlkPhos  72  07-12    CARDIAC MARKERS ( 13 Jul 2022 18:00 )  x     / x     / 135 U/L / x     / x        CAPILLARY BLOOD GLUCOSE  POCT Blood Glucose.: 153 mg/dL (14 Jul 2022 11:03)    PT/INR - ( 12 Jul 2022 22:12 )   PT: 12.8 sec;   INR: 1.10 ratio    PTT - ( 12 Jul 2022 22:12 )  PTT:33.4 sec    CULTURES:    Physical Examination:  General: No acute distress.  agitated  HEENT: Pupil equal, reactive to light.  Symmetric.  PULM: Clear to auscultation bilaterally, no wheezing  NECK: Supple, no lymphadenopathy, trachea midline  CVS: Regular rate and rhythm, no murmurs  ABD: Soft, nondistended, nontender, no bowel sounds  EXT: No edema, nontender  SKIN: Warm and well perfused, no rashes noted.  NEURO: Alert, oriented, interactive,    DEVICES:   +NG tube clamped    RADIOLOGY:   TTE 7/13   Moderate mitral annular calcification is present.   The mitral valve leaflets appear mildly calcified.   Mean transmitral gradient is 4 mmHg; this finding is consistent with mild   mitral stenosis.   Trace mitral regurgitation is present.   The aortic valve appears moderately calcified. Valve opening seems to be   restricted.   Peak and mean transaortic gradients are 51 and 25 mmHg respectively; this   finding is consistent with moderate aortic stenosis.   The tricuspid valve leaflets are thin and pliable; valve motion is   normal.   Trace tricuspid valve regurgitation is present.   The left ventricle is normal in size, wall thickness, wall motion and   contractility.   Estimated left ventricular ejection fraction is 60-65 %.   Normal appearing right ventricle structure and function.    < from: CT Angio Chest PE Protocol w/ IV Cont (07.13.22 @ 19:42) >  IMPRESSION: No pulmonary embolus is noted.    < end of copied text >    < from: CT Abdomen and Pelvis w/ Oral Cont and w/ IV Cont (07.13.22 @ 00:46) >  IMPRESSION:    High-grade small bowel obstruction with transition point in the lower   abdomen, likely related to matted adhesions. Mild mesenteric edema and   interloop fluid. Nasogastric tube placement may partially relate symptoms.    < end of copied text >  < from: CT Abdomen and Pelvis w/ Oral Cont and w/ IV Cont (07.13.22 @ 00:46) >  IMPRESSION:    High-grade small bowel obstruction with transition point in the lower   abdomen, likely related to matted adhesions. Mild mesenteric edema and   interloop fluid. Nasogastric tube placement may partially relate symptoms.    < end of copied text >         Patient is a 89y old  Male who presents with a chief complaint of SMall bowel obstruction (14 Jul 2022 10:33)    BRIEF HOSPITAL COURSE: 89yo male with  PMHx of bladder cancer s/p resection with neobladder, hx appendectomy,  chronic CHF, DM2, ESRD on dialysis (M/W/F), HTN, moderate aortic stenosis presents to the ED c/o abd pain after eating. + vomting, NGT in place approx 1 liter of output noted bilious in nature. Admitted for SBO. 7/13 had SOB and hypoxia. CTA chest neg for PE, placed on 4L NC. Trop likely elevated in setting of ESRD. denies chest pain.    Events last 24 hours: NGT output 250 in 24hr, no bowel sounds abd still appears distended. plan for gastrografin study today. pt non compliant with taking hx. yelling, threatening to leave. States he feels fine and wants to go home.    PAST MEDICAL & SURGICAL HISTORY:  Bladder cancer  HTN (hypertension)  ESRD on dialysis MWF  Type 2 diabetes mellitus  Chronic CHF  Moderate aortic stenosis  History of bladder cancer s/p resection with neobladder  History of appendectomy  History of exploratory laparotomy  Perforated Gastric Ulcer, Peritonitis  History of percutaneous angioplasty  PCI w/ KAREN RCA 12/2016, KAREN to LAD and D1 on 11/1/2018  S/P arteriovenous (AV) fistula creation Right  History of cholecystectomy    Medications:    metoprolol tartrate IVPB 5 milliGRAM(s) IV Intermittent every 6 hours  morphine  - Injectable 2 milliGRAM(s) IV Push every 3 hours PRN  ondansetron Injectable 4 milliGRAM(s) IV Push every 6 hours PRN  heparin   Injectable 5000 Unit(s) SubCutaneous every 8 hours  famotidine Injectable 20 milliGRAM(s) IV Push every 48 hours  dextrose 50% Injectable 25 Gram(s) IV Push once  dextrose 50% Injectable 12.5 Gram(s) IV Push once  dextrose 50% Injectable 25 Gram(s) IV Push once  dextrose Oral Gel 15 Gram(s) Oral once PRN  glucagon  Injectable 1 milliGRAM(s) IntraMuscular once  insulin lispro (ADMELOG) corrective regimen sliding scale   SubCutaneous three times a day before meals  dextrose 5%. 1000 milliLiter(s) IV Continuous <Continuous>  dextrose 5%. 1000 milliLiter(s) IV Continuous <Continuous>  sodium chloride 0.9%. 1000 milliLiter(s) IV Continuous <Continuous>  lidocaine/prilocaine Cream 1 Application(s) Topical <User Schedule>    ICU Vital Signs Last 24 Hrs  T(C): 37.2 (14 Jul 2022 08:40), Max: 37.9 (14 Jul 2022 00:31)  T(F): 98.9 (14 Jul 2022 08:40), Max: 100.2 (14 Jul 2022 00:31)  HR: 91 (14 Jul 2022 12:00) (78 - 92)  BP: 144/51 (14 Jul 2022 12:00) (107/63 - 158/52)  BP(mean): 75 (14 Jul 2022 12:00) (58 - 78)  ABP: --  ABP(mean): --  RR: 23 (14 Jul 2022 12:00) (14 - 25)  SpO2: 99% (14 Jul 2022 12:00) (92% - 100%)    O2 Parameters below as of 14 Jul 2022 08:00  Patient On (Oxygen Delivery Method): nasal cannula  O2 Flow (L/min): 4    I&O's Detail    13 Jul 2022 07:01  -  14 Jul 2022 07:00  --------------------------------------------------------  IN:  Total IN: 0 mL    OUT:    Nasogastric/Oral tube (mL): 100 mL    Other (mL): 500 mL  Total OUT: 600 mL    Total NET: -600 mL      14 Jul 2022 07:01  -  14 Jul 2022 14:07  --------------------------------------------------------  IN:  Total IN: 0 mL    OUT:    Nasogastric/Oral tube (mL): 250 mL  Total OUT: 250 mL    Total NET: -250 mL    LABS:                        10.5   3.27  )-----------( 176      ( 14 Jul 2022 05:49 )             34.4     07-14    137  |  101  |  38<H>  ----------------------------<  155<H>  5.1   |  29  |  5.45<H>    Ca    8.9      14 Jul 2022 05:49    TPro  8.3  /  Alb  4.0  /  TBili  0.7  /  DBili  x   /  AST  18  /  ALT  17  /  AlkPhos  72  07-12    CARDIAC MARKERS ( 13 Jul 2022 18:00 )  x     / x     / 135 U/L / x     / x        CAPILLARY BLOOD GLUCOSE  POCT Blood Glucose.: 153 mg/dL (14 Jul 2022 11:03)    PT/INR - ( 12 Jul 2022 22:12 )   PT: 12.8 sec;   INR: 1.10 ratio    PTT - ( 12 Jul 2022 22:12 )  PTT:33.4 sec    CULTURES:    Physical Examination:  General: No acute distress.  agitated  HEENT: Pupil equal, reactive to light.  Symmetric.  PULM: Clear to auscultation bilaterally, no wheezing  NECK: Supple, no lymphadenopathy, trachea midline  CVS: Regular rate and rhythm, no murmurs  ABD: Soft, nondistended, nontender, no bowel sounds  EXT: No edema, nontender  SKIN: Warm and well perfused, no rashes noted.  NEURO: Alert, oriented, interactive,    DEVICES:   +NG tube clamped    RADIOLOGY:   TTE 7/13   Moderate mitral annular calcification is present.   The mitral valve leaflets appear mildly calcified.   Mean transmitral gradient is 4 mmHg; this finding is consistent with mild   mitral stenosis.   Trace mitral regurgitation is present.   The aortic valve appears moderately calcified. Valve opening seems to be   restricted.   Peak and mean transaortic gradients are 51 and 25 mmHg respectively; this   finding is consistent with moderate aortic stenosis.   The tricuspid valve leaflets are thin and pliable; valve motion is   normal.   Trace tricuspid valve regurgitation is present.   The left ventricle is normal in size, wall thickness, wall motion and   contractility.   Estimated left ventricular ejection fraction is 60-65 %.   Normal appearing right ventricle structure and function.    < from: CT Angio Chest PE Protocol w/ IV Cont (07.13.22 @ 19:42) >  IMPRESSION: No pulmonary embolus is noted.    < end of copied text >    < from: CT Abdomen and Pelvis w/ Oral Cont and w/ IV Cont (07.13.22 @ 00:46) >  IMPRESSION:    High-grade small bowel obstruction with transition point in the lower   abdomen, likely related to matted adhesions. Mild mesenteric edema and   interloop fluid. Nasogastric tube placement may partially relate symptoms.    < end of copied text >  < from: CT Abdomen and Pelvis w/ Oral Cont and w/ IV Cont (07.13.22 @ 00:46) >  IMPRESSION:    High-grade small bowel obstruction with transition point in the lower   abdomen, likely related to matted adhesions. Mild mesenteric edema and   interloop fluid. Nasogastric tube placement may partially relate symptoms.    < end of copied text >

## 2022-07-14 NOTE — PROGRESS NOTE ADULT - ASSESSMENT
89 yo with ESRD on MWF HD admitted with ileus vs SBO.  Abdominal pain improved wit NG drainage.  --ESRD : Continue TIW HD.  No UF due to significant GI fluid loss.   Continue maintenance IVF at 1L/day due to ongoing GI loss, until po intake can be started.  --HTN : hold all meds.  --GI : monitor with NG drainage.    7/14 SY  --ESRD : continue MWF schedule.  --HTN : BP stable off all meds.  --GI : back on NG drainage.  Surgery follow up appreciated.   For small bowel gastrografin study.  --Fluid/electrolytes stable.

## 2022-07-14 NOTE — CONSULT NOTE ADULT - PROBLEM SELECTOR RECOMMENDATION 2
-KAREN to RCA 12/2016, KAREN to LAD and D1 in 11/2018  -on ASA 81 and Lipitor 40, currently NPO  -cardio eval
-KAREN to RCA 12/2016, KAREN to LAD and D1 in 11/2018  -on ASA 81 and Lipitor 40, currently NPO  -cardio eval

## 2022-07-14 NOTE — CONSULT NOTE ADULT - PROBLEM SELECTOR RECOMMENDATION 7
-last echo on 2018 with mildly reduced EF (40-45%)  -Coreg on hold as NPO  -will order TTE  -cardio eval
-last echo on 2018 with mildly reduced EF (40-45%)  -Coreg on hold as NPO  -will order TTE  -cardio eval

## 2022-07-14 NOTE — CONSULT NOTE ADULT - ASSESSMENT
SBO  Mildly elevated CE, No chest pain.  Probably sec to demand ischemia  CAD, s/p PCI of LAD and D1 in the past.   Moderate AS  HTN  ESRD on HD  DM      Suggest:    Conservative cardiac care.   Beta blockers. Advance dose if BP and HR permit  Resume aspirin when able to tolerate PO  Avoid large volume shifts. Pt with AS and diastolic CHF.   HD as scheduled.   D/W daughter at bedside.

## 2022-07-14 NOTE — CONSULT NOTE ADULT - PROBLEM SELECTOR PROBLEM 7
Chronic heart failure with preserved ejection fraction
Chronic heart failure with preserved ejection fraction

## 2022-07-14 NOTE — CONSULT NOTE ADULT - PROBLEM SELECTOR RECOMMENDATION 4
-On HD MWF  -Nephrology cpnsult appreciated-cont TIW HD  -cont maintenance fluids until PO intake can be restarted (1L/day as per nephro recs)
-On HD MWF  -Nephrology cpnsult appreciated-cont TIW HD  -cont maintenance fluids until PO intake can be restarted (1L/day as per nephro recs)

## 2022-07-14 NOTE — CONSULT NOTE ADULT - PROBLEM SELECTOR RECOMMENDATION 3
-On Levemir Pen qHS at home   -A1C 6.4 however falsely decreased in the setting of ESRD  -ISS for now   -Accu checks q6 while NPO
-On Levemir Pen qHS at home   -A1C 6.4 however falsely decreased in the setting of ESRD  -ISS for now   -Accu checks q6 while NPO

## 2022-07-14 NOTE — PROGRESS NOTE ADULT - SUBJECTIVE AND OBJECTIVE BOX
Pt has been seen and examined with FP resident, resident supervised agree with a/p       Patient is a 89y old  Male who presents with a chief complaint of SMall bowel obstruction (14 Jul 2022 14:07)          PHYSICAL EXAM:  Vital Signs Last 24 Hrs  T(C): 37.2 (14 Jul 2022 08:40), Max: 37.9 (14 Jul 2022 00:31)  T(F): 98.9 (14 Jul 2022 08:40), Max: 100.2 (14 Jul 2022 00:31)  HR: 84 (14 Jul 2022 14:00) (78 - 91)  BP: 154/56 (14 Jul 2022 14:00) (107/63 - 158/52)  BP(mean): 79 (14 Jul 2022 14:00) (58 - 79)  RR: 13 (14 Jul 2022 14:00) (13 - 25)  SpO2: 99% (14 Jul 2022 14:00) (92% - 100%)    -rs-aeeb, cta  -cvs-s1s2 normal   -p/a-soft,bs+      A/P    #ct supportive care     #dvt pr

## 2022-07-14 NOTE — CONSULT NOTE ADULT - PROBLEM SELECTOR RECOMMENDATION 10
-hold BP meds as per nephrology   -BP stable, will monitor
-hold BP meds as per nephrology   -BP stable, will monitor

## 2022-07-15 NOTE — PROGRESS NOTE ADULT - ASSESSMENT
89 yo male ESRD, on HD, moderate aortic stenosis, CAD, coronary angioplasties, DM, bladder cancer , cystectomy, ileal conduit, Partial SBO, had over 3 lts of BM overnight, likely aspiration after vomiting. Patient on 2 vasopressors, minimally responsive, had another large liquid BM now. Remains on BIPAP  _I think patient should be on comfort care/palliative care consult. I don't think patient is going to do well if he aspirated, having fever now, on 2 vasopressors. If patient was not that sick he might need an ex lap with lysis of adhesions if he has a partial SBO causing vomiting. I'm afraid patient is too debilitated to undergo ex lap., likely adhesions from cystectomy/ileal conduit.   -Awaiting for family member to arrive and discuss case

## 2022-07-15 NOTE — CONSULT NOTE ADULT - ASSESSMENT
Assessment:  89yo male with  PMHx of bladder cancer s/p resection with neobladder, hx appendectomy,  chronic CHF, DM2, ESRD on dialysis (M/W/F), HTN, moderate aortic stenosis presents to the ED c/o abd pain after eating. + vomting, NGT in place approx 1 liter of output noted bilious in nature. Admitted for SBO. 7/13 had SOB and hypoxia. CTA chest neg for PE, placed on 4L NC. Trop likely elevated in setting of ESRD    Problem List:  1. Acute hypoxic respiratory failure 2/2  2. Aspiration pneumonitis   3. SBO   4, HFpEF  5. ESRD on HD MWF  6. Demand ischemia     Plan:  Neuro: Monitor mental status, avoid neurosuppressant meds   CV: Sinus tachycardia 2/2   Pulm:  Renal:  GI:  ID:  Heme:  Endo:    CRITICAL CARE TIME SPENT: 32 minutes assessing presenting problems of acute illness, which pose high probability of life threatening deterioration or end organ damage/dysfunction, as well as medical decision making including initiating plan of care, reviewing data, reviewing radiologic exams, discussing with multidisciplinary team,  discussing goals of care with patient/family, and writing this note.  Non-inclusive of procedures performed   Assessment:  89yo male with  PMHx of bladder cancer s/p resection with neobladder, hx appendectomy,  chronic CHF, DM2, ESRD on dialysis (M/W/F), HTN, moderate aortic stenosis presents to the ED c/o abd pain after eating. + vomting, NGT in place approx 1 liter of output noted bilious in nature. Admitted for SBO. 7/13 had SOB and hypoxia. CTA chest neg for PE, placed on 4L NC. Trop likely elevated in setting of ESRD    Problem List:  1. Acute hypoxic respiratory failure 2/2  2. Aspiration pneumonitis   3. SBO   4, HFpEF  5. ESRD on HD MWF  6. Demand ischemia     Plan:  Neuro: Monitor mental status, avoid neurosuppressant meds   CV: Sinus tachycardia 2/2 NGT insertion/WOB. Patient w/hx of CAD w/[place]AR[place] to RCA, LAD, D1. Aspirin and Lipitor. HFpEF w/AS. C/w Coreg    Pulm: Acute hypoxic respiratory failure 2/2 aspiration. HFNC STAT. on 100%NRB. Patient tachypneic. High risk for intubation and mechanical ventilation   Renal: ESRD MWF. Strict I/Os, goal UOP >0.5cc/kg/hr. Trend renal function and electrolytes, replete as needed. Avoid nephrotoxic agents  GI: NPO. NGT LWIS. Surgery notified and evaluated patient, appreciate reccs. PPI  ID: Cover for aspiration pna   Heme:  Endo:    CRITICAL CARE TIME SPENT: 32 minutes assessing presenting problems of acute illness, which pose high probability of life threatening deterioration or end organ damage/dysfunction, as well as medical decision making including initiating plan of care, reviewing data, reviewing radiologic exams, discussing with multidisciplinary team,  discussing goals of care with patient/family, and writing this note.  Non-inclusive of procedures performed   Assessment:  89yo male with  PMHx of bladder cancer s/p resection with neobladder, hx appendectomy,  chronic CHF, DM2, ESRD on dialysis (M/W/F), HTN, moderate aortic stenosis presents to the ED c/o abd pain after eating. + vomting, NGT in place approx 1 liter of output noted bilious in nature. Admitted for SBO. 7/13 had SOB and hypoxia. CTA chest neg for PE, placed on 4L NC. Trop likely elevated in setting of ESRD    Problem List:  1. Acute hypoxic respiratory failure 2/2  2. Aspiration pneumonitis   3. SBO   4, HFpEF  5. ESRD on HD MWF  6. Demand ischemia     Plan:  Neuro: Monitor mental status, avoid neurosuppressant meds   CV: Sinus tachycardia 2/2 NGT insertion/WOB. Patient w/hx of CAD w/KAREN to RCA, LAD, D1. Aspirin and Lipitor. HFpEF w/AS. C/w Coreg    Pulm: Acute hypoxic respiratory failure 2/2 aspiration. HFNC STAT. on 100%NRB. Patient tachypneic. High risk for intubation and mechanical ventilation   Renal: ESRD MWF. Strict I/Os, goal UOP >0.5cc/kg/hr. Trend renal function and electrolytes, replete as needed. Avoid nephrotoxic agents  GI: NPO. NGT LWIS. Surgery notified and evaluated patient, appreciate reccs. PPI  ID: Cover for aspiration pna w/Vanc/zosyn. Send for blood, urine, and sputum cultures. Trend WBC/fevers/procalcitonin   Heme: DVT prophylaxis w/HSQ  Endo: Goal blood glucose <180. Cw ISS         CRITICAL CARE TIME SPENT: 45 minutes assessing presenting problems of acute illness, which pose high probability of life threatening deterioration or end organ damage/dysfunction, as well as medical decision making including initiating plan of care, reviewing data, reviewing radiologic exams, discussing with multidisciplinary team,  discussing goals of care with patient/family, and writing this note.  Non-inclusive of procedures performed   Assessment:  89yo male with  PMHx of bladder cancer s/p resection with neobladder, hx appendectomy,  chronic CHF, DM2, ESRD on dialysis (M/W/F), HTN, moderate aortic stenosis presents to the ED c/o abd pain after eating. + vomting, NGT in place approx 1 liter of output noted bilious in nature. Admitted for SBO. 7/13 had SOB and hypoxia. CTA chest neg for PE, placed on 4L NC. Trop likely elevated in setting of ESRD    Problem List:  1. Acute hypoxic respiratory failure 2/2  2. Aspiration pneumonitis   3. SBO   4, HFpEF  5. ESRD on HD MWF  6. Demand ischemia     Plan:  Neuro: Monitor mental status, avoid neurosuppressant meds   CV: Sinus tachycardia 2/2 NGT insertion/WOB. Patient w/hx of CAD w/KAREN to RCA, LAD, D1. Aspirin and Lipitor. HFpEF w/AS. C/w Coreg    Pulm: Acute hypoxic respiratory failure 2/2 aspiration. HFNC STAT. on 100%NRB. Patient tachypneic. High risk for intubation and mechanical ventilation   Renal: ESRD MWF. Strict I/Os, goal UOP >0.5cc/kg/hr. Trend renal function and electrolytes, replete as needed. Avoid nephrotoxic agents  GI: NPO. NGT LWIS. Surgery notified and evaluated patient, appreciate reccs. PPI  ID: Cover for aspiration pna w/Vanc/zosyn. Send for blood, urine, and sputum cultures. Trend WBC/fevers/procalcitonin   Heme: DVT prophylaxis w/HSQ  Endo: Goal blood glucose <180. Cw ISS     Discussed case w/eICU attending       CRITICAL CARE TIME SPENT: 45 minutes assessing presenting problems of acute illness, which pose high probability of life threatening deterioration or end organ damage/dysfunction, as well as medical decision making including initiating plan of care, reviewing data, reviewing radiologic exams, discussing with multidisciplinary team,  discussing goals of care with patient/family, and writing this note.  Non-inclusive of procedures performed

## 2022-07-15 NOTE — PROGRESS NOTE ADULT - SUBJECTIVE AND OBJECTIVE BOX
NEPHROLOGY INTERVAL HPI/OVERNIGHT EVENTS:  07-15-22 @ 10:57    7/15 now on pressors in ccu with inc lethargy, sugical discussion with family noted.  GOC discussion to occur when family arrives this am   had significant BM outpt ( 3L recorded in surgical note  --No acute distress.   Tolerated dialysis ok.  C/o thirst.  NG on drainage again.  CTA done --negative for PE or effusion.    HPI:  89 yo with ESRD on MWF/[facility] dialysis unit admitted with abdominal pain and vomiting.  PMHX significant for hx fo bladder ca--cystectomy and neobladder,  perf gastric ulcer with exp lap, DM, HTN and CAD.  CT ileus vs SBO.  Feeling improved post NG drainage of ~ 1 Liter bilious fluid.  Had similar episode in 2020--ilus vs SBO --improved with NG drainage without surgical intervetnion.    PMHX and PSHX.  --HTN  --Hx of Bladder CA   --Hx of Cystectomy and Neobladder.  --DM  --CAD ( post PCI)--[place] to RCA 2016, [place] to LAD and D1 in 2018  --CHF (HFpEF)  --Moderate Aortic Stenosis  --Hx of perforated gastric ulcer post exp lap.  --Ileus vs SBO in 2020.      MEDICATIONS  (STANDING):  chlorhexidine 4% Liquid 1 Application(s) Topical <User Schedule>  dextrose 5%. 1000 milliLiter(s) (100 mL/Hr) IV Continuous <Continuous>  dextrose 5%. 1000 milliLiter(s) (50 mL/Hr) IV Continuous <Continuous>  dextrose 50% Injectable 25 Gram(s) IV Push once  dextrose 50% Injectable 12.5 Gram(s) IV Push once  dextrose 50% Injectable 25 Gram(s) IV Push once  glucagon  Injectable 1 milliGRAM(s) IntraMuscular once  heparin   Injectable 5000 Unit(s) SubCutaneous every 8 hours  hydrocortisone sodium succinate Injectable 50 milliGRAM(s) IV Push every 6 hours  insulin lispro (ADMELOG) corrective regimen sliding scale   SubCutaneous every 6 hours  lidocaine/prilocaine Cream 1 Application(s) Topical <User Schedule>  norepinephrine Infusion 0.05 MICROgram(s)/kG/Min (5.95 mL/Hr) IV Continuous <Continuous>  pantoprazole  Injectable 40 milliGRAM(s) IV Push daily  phenylephrine    Infusion 0.5 MICROgram(s)/kG/Min (11.9 mL/Hr) IV Continuous <Continuous>  piperacillin/tazobactam IVPB.. 3.375 Gram(s) IV Intermittent every 8 hours  sodium chloride 0.9%. 1000 milliLiter(s) (100 mL/Hr) IV Continuous <Continuous>  vasopressin Infusion 0.04 Unit(s)/Min (2.4 mL/Hr) IV Continuous <Continuous>    MEDICATIONS  (PRN):  dextrose Oral Gel 15 Gram(s) Oral once PRN Blood Glucose LESS THAN 70 milliGRAM(s)/deciliter  ondansetron Injectable 4 milliGRAM(s) IV Push every 6 hours PRN Nausea      Allergies    No Known Allergies    Intolerances    lisinopril (Diarrhea)      I&O's Detail    2022 07:01  -  15 Jul 2022 07:00  --------------------------------------------------------  IN:  Total IN: 0 mL    OUT:    Nasogastric/Oral tube (mL): 325 mL    Stool (mL): 3250 mL  Total OUT: 3575 mL    Total NET: -3575 mL            Vital Signs Last 24 Hrs  T(C): 37.9 (15 Jul 2022 08:00), Max: 39 (15 Jul 2022 04:55)  T(F): 100.2 (15 Jul 2022 08:00), Max: 102.2 (15 Jul 2022 04:55)  HR: 88 (15 Jul 2022 10:50) (74 - 123)  BP: 94/45 (15 Jul 2022 10:15) (61/33 - 165/50)  BP(mean): 55 (15 Jul 2022 10:15) (40 - 87)  RR: 19 (15 Jul 2022 10:50) (13 - 37)  SpO2: 100% (15 Jul 2022 10:50) (84% - 100%)    Parameters below as of 15 Jul 2022 10:50    O2 Flow (L/min): 40  O2 Concentration (%): 60  Daily     Daily Weight in k.2 (15 Jul 2022 06:17)    PHYSICAL EXAM:  General: lethargic   HEENT: OG tube, HFNC   CV: s1s2 rrr  LUNGS: B/L CTA  EXT: no edema    LABS:                        12.2   1.26  )-----------( 261      ( 15 Jul 2022 04:27 )             39.2     07-15    135  |  98  |  52<H>  ----------------------------<  204<H>  4.5   |  26  |  7.51<H>    Ca    9.3      15 Jul 2022 04:27  Phos  4.8     07-15  Mg     2.2     07-15    TPro  7.4  /  Alb  3.0<L>  /  TBili  0.7  /  DBili  x   /  AST  3<L>  /  ALT  12  /  AlkPhos  71  07-15        Magnesium, Serum: 2.2 mg/dL (07-15 @ 04:27)  Phosphorus Level, Serum: 4.8 mg/dL (07-15 @ 04:27)    ABG - ( 15 Jul 2022 07:54 )  pH, Arterial: 7.37  pH, Blood: x     /  pCO2: 40    /  pO2: 110   / HCO3: 23    / Base Excess: -2.0  /  SaO2: 99

## 2022-07-15 NOTE — PROGRESS NOTE ADULT - ASSESSMENT
89 yo male ESRD on HD, CHF, moderate aortic stenosis, DM, CAD, h/o bladder resection ileal conduit, SBO. Patient had several BM (>3000ml) overnight, Patient threw up earlier this morning, taken to the CCU and placed on BIPAP.  Patient is minimally responsive, hypotensive (70/30) O2 sat 100%  _Patient might need intubation.   -Stat ABG  -Jessica  -Probably aspiration pneumomia  -I talked to his daughter (betito [phone]) , she said they never talked about his wishes about intubation, however, she thinks we should intubate him if needed. I told her I'm worried he might not do well if he aspirated, and unlikely he will be able to be weaned off vent quickly. He has a lot of comorbidities. She said patient's son is driving from [place].  -IV ABX  -NGT  -AXR  -IVF  -Case d/w ICU PA 89 yo male ESRD on HD, CHF, moderate aortic stenosis, DM, CAD, h/o bladder resection ileal conduit, SBO. Patient had several BM (>3000ml) overnight, Patient requiring more oxygen this am, taken to the CCU and placed on BIPAP.  Patient is minimally responsive, hypotensive (70/30) O2 sat 100%  _Patient might need intubation.   -Stat ABG  -Jessica  -Aspiration?  -I talked to his daughter (betito [phone]) , she said they never talked about his wishes about intubation, however, she thinks we should intubate him if needed. I told her I'm worried he might not do well if he aspirated, and unlikely he will be able to be weaned off vent quickly. He has a lot of comorbidities. She said patient's son is driving from [place].  -IV ABX  -NGT  -AXR  -IVF  -Case d/w ICU PA  -Talked to his son who would like patient intubated

## 2022-07-15 NOTE — CHART NOTE - NSCHARTNOTEFT_GEN_A_CORE
Critical Care Goals of Care    I had a detailed discussion with the patient's son Grupo Cutler, who is a Psychiatrist.   I explained the nature of the patients deterioration overnight, as being most likely attributed to aspiration resulting in his hypoxic respiratory failure and septic shock.  I further explained that there is overall concern for his prognosis, given his advanced age and the weakened and deconditioned state which he is in, now requiring 2 vasopressors to support his BP.     Grupo spoke about his father having completed a Living Will several years ago however he never signed it and has not really communicated his own wishes regarding Advanced Directives.  In speaking with Grupo regarding the severity of his Dad's condition, he seems to understand, that he may not improve and indeed not survive. At this poit he remains hopeful and optimistic and would like to continue with the current aggressive treatment, including a trial period of intubation. However he understands that if things do continue to worsen, if his pressor requirement does not improve that the family would not want to prolong his life artificially and they would embrace comfort measures.     He does agree at this point that should his heart stop under these circumstances, that CPR should not be performed and that he should be allowed to pass away peacefully and naturally. I have completed  a MOLST form to reflect the DNR status.       I spent a total of 45 mins of time discussing this critical  patient's condition and care, including completing the Goals of Care Conversation.

## 2022-07-15 NOTE — PROGRESS NOTE ADULT - ASSESSMENT
Hypotensive shock on pressors  SBO  Mildly elevated CE, No chest pain.  Likely sec to demand ischemia  CAD, s/p PCI of LAD and D1 in the past.   Moderate AS  HTN  ESRD on HD  DM      Suggest:  Continue pressor support.  Renal evaluation noted.  No plan for dialysis today.  Given comorbidities and age, guarded prognosis.  Patient DNR.  Antibiotics per critical care team.  Addendum: Goals of care discussion noted.

## 2022-07-15 NOTE — PROGRESS NOTE ADULT - SUBJECTIVE AND OBJECTIVE BOX
Pt Vomited bilious material, likely aspiration. Denies Abd pain , CP, + C/O mild SOB. NGT reinserted by [facility] PA with + bilious output    Gen: Dyspneic    Vital Signs Last 24 Hrs  T(C): 36.1 (14 Jul 2022 21:11), Max: 37.2 (14 Jul 2022 08:40)  T(F): 96.9 (14 Jul 2022 21:11), Max: 98.9 (14 Jul 2022 08:40)  HR: 116 (15 Jul 2022 03:00) (74 - 116)  BP: 141/80 (15 Jul 2022 03:00) (124/42 - 165/50)  BP(mean): 87 (15 Jul 2022 03:00) (64 - 87)  RR: 32 (15 Jul 2022 03:00) (13 - 37)  SpO2: 100% (15 Jul 2022 03:00) (84% - 100%)    Parameters below as of 15 Jul 2022 03:00  Patient On (Oxygen Delivery Method): mask, nonrebreather          Heart: S1S2 ate tachy 122 , reg    Lungs: + BS with Scattered rhonchi B/L    Abdomen: +, soft , distended , soft, NT to palpation     Exrem: NT, no swelling    A/P:  Pt Admitted with SBO  was resolving , improving clinically  NGT out, clears stated yesterday  Episode of bilious vomiting, aspirated  NGT reinserted by CC PA  on 100% NRB, transfer to CCU  for hi flow O2  CXR, AXR in AM  Will discuss above with Dr Daniels

## 2022-07-15 NOTE — CONSULT NOTE ADULT - SUBJECTIVE AND OBJECTIVE BOX
Patient is a 89y old  Male who presents with a chief complaint of SBO (15 Jul 2022 03:30)    BRIEF HOSPITAL COURSE:   89yo male with  PMHx of bladder cancer s/p resection with neobladder, hx appendectomy,  chronic CHF, DM2, ESRD on dialysis (M/W/F), HTN, moderate aortic stenosis presents to the ED c/o abd pain after eating. + vomting, NGT in place approx 1 liter of output noted bilious in nature. Admitted for SBO. 7/13 had SOB and hypoxia. CTA chest neg for PE, placed on 4L NC. Trop likely elevated in setting of ESRD     Events last 24 hours:   - Patient had BM today, SBO thought to be resolved, patient had NGT removed and diet advanced to liquid   - Called by RN overnight for hypoxia and increased WOB. Nurse endorses patient has been vomiting as well   - NGT reinserted   - Chest xray concerning for aspiration pneumonia  - RR 30s, O2 saturations 90s on 100% NRB  - Needs HFNC, unable to do NIPPV given distention   - Cultures and broad spectrum abx     Review of Systems:  + abdominal pain, SOB    PAST MEDICAL & SURGICAL HISTORY:  Bladder cancer      HTN (hypertension)      ESRD on dialysis  MWF      Type 2 diabetes mellitus      Chronic CHF      Moderate aortic stenosis      History of bladder cancer  s/p resection with neobladder      History of appendectomy      History of exploratory laparotomy  Perforated Gastric Ulcer, Peritonitis      History of percutaneous angioplasty  PCI w/ KAREN RCA 12/2016, KAREN to LAD and D1 on 11/1/2018      S/P arteriovenous (AV) fistula creation  Right      History of cholecystectomy          Medications:  piperacillin/tazobactam IVPB. 3.375 Gram(s) IV Intermittent once  piperacillin/tazobactam IVPB.. 3.375 Gram(s) IV Intermittent every 8 hours  vancomycin  IVPB 1250 milliGRAM(s) IV Intermittent once    carvedilol 6.25 milliGRAM(s) Oral every 12 hours      morphine  - Injectable 2 milliGRAM(s) IV Push every 3 hours PRN  ondansetron Injectable 4 milliGRAM(s) IV Push every 6 hours PRN      heparin   Injectable 5000 Unit(s) SubCutaneous every 8 hours    famotidine    Tablet 20 milliGRAM(s) Oral daily      dextrose 50% Injectable 25 Gram(s) IV Push once  dextrose 50% Injectable 12.5 Gram(s) IV Push once  dextrose 50% Injectable 25 Gram(s) IV Push once  dextrose Oral Gel 15 Gram(s) Oral once PRN  glucagon  Injectable 1 milliGRAM(s) IntraMuscular once  insulin lispro (ADMELOG) corrective regimen sliding scale   SubCutaneous three times a day before meals    dextrose 5%. 1000 milliLiter(s) IV Continuous <Continuous>  dextrose 5%. 1000 milliLiter(s) IV Continuous <Continuous>      chlorhexidine 4% Liquid 1 Application(s) Topical <User Schedule>  lidocaine/prilocaine Cream 1 Application(s) Topical <User Schedule>            ICU Vital Signs Last 24 Hrs  T(C): 36.1 (14 Jul 2022 21:11), Max: 37.2 (14 Jul 2022 08:40)  T(F): 96.9 (14 Jul 2022 21:11), Max: 98.9 (14 Jul 2022 08:40)  HR: 116 (15 Jul 2022 03:00) (74 - 116)  BP: 141/80 (15 Jul 2022 03:00) (124/42 - 165/50)  BP(mean): 87 (15 Jul 2022 03:00) (64 - 87)  ABP: --  ABP(mean): --  RR: 32 (15 Jul 2022 03:00) (13 - 37)  SpO2: 100% (15 Jul 2022 03:00) (84% - 100%)    O2 Parameters below as of 15 Jul 2022 03:00  Patient On (Oxygen Delivery Method): mask, nonrebreather                I&O's Detail    13 Jul 2022 07:01  -  14 Jul 2022 07:00  --------------------------------------------------------  IN:  Total IN: 0 mL    OUT:    Nasogastric/Oral tube (mL): 100 mL    Other (mL): 500 mL  Total OUT: 600 mL    Total NET: -600 mL      14 Jul 2022 07:01  -  15 Jul 2022 04:03  --------------------------------------------------------  IN:  Total IN: 0 mL    OUT:    Nasogastric/Oral tube (mL): 250 mL    Stool (mL): 3250 mL  Total OUT: 3500 mL    Total NET: -3500 mL          LABS:                        10.5   3.27  )-----------( 176      ( 14 Jul 2022 05:49 )             34.4     07-14    137  |  101  |  38<H>  ----------------------------<  155<H>  5.1   |  29  |  5.45<H>    Ca    8.9      14 Jul 2022 05:49        CARDIAC MARKERS ( 13 Jul 2022 18:00 )  x     / x     / 135 U/L / x     / x          CAPILLARY BLOOD GLUCOSE      POCT Blood Glucose.: 145 mg/dL (14 Jul 2022 15:51)        CULTURES:      Physical Examination:  General: Respiratory distress   HEENT: Pupils equal, reactive to light.  Symmetric.  PULM: Diminished breathe sounds b/l  NECK: Supple, no lymphadenopathy, trachea midline  CVS: Regular rate and rhythm, no murmurs, rubs, or gallops  ABD: Soft, distended, nontender  EXT: No edema, nontender  SKIN: Warm and well perfused  NEURO: Alert, oriented, interactive, nonfocal  RADIOLOGY:   < from: Xray Abdomen 2 Views (07.14.22 @ 08:10) >    ACC: [id number] EXAM:  XR ABDOMEN 2V                          PROCEDURE DATE:  07/14/2022      INTERPRETATION:  Clinical information: Small bowel obstruction    Portable supine and upright abdominal radiographs    COMPARISON: July 13, 2022    FINDINGS: Enteric tube with tip in the proximal duodenum. Multiple mildly   dilated small bowel loops, not significantly changed. No free   intraperitoneal air. Surgical clips along both pelvic sidewalls   compatible with prior lymphadenectomy. Right hipopen reduction internal   fixation.    IMPRESSION:    Unchanged small bowel obstruction    --- End of Report ---    JAMIA MEADOWS JR, MD; Attending Radiologist  This document has been electronically signed. Jul 14 2022 10:11AM    < end of copied text >  
Chief complaints.  Presented with acute onset abdominal pain and vomiting post dinner.    HPI:  89 yo with ESRD on [facility] dialysis unit admitted with abdominal pain and vomiting.  PMHX significant for hx fo bladder ca--cystectomy and neobladder,  perf gastric ulcer with exp lap, DM, HTN and CAD.  CT ileus vs SBO.  Feeling improved post NG drainage of ~ 1 Liter bilious fluid.  Had similar episode in 11/2020--ilus vs SBO --improved with NG drainage without surgical intervetnion.    PMHX and PSHX.  --HTN  --Hx of Bladder CA   --Hx of Cystectomy and Neobladder.  --DM  --CAD ( post PCI)--[place] to RCA 12/2016, [place] to LAD and D1 in 11/2018  --CHF (HFpEF)  --Moderate Aortic Stenosis  --Hx of perforated gastric ulcer post exp lap.  --Ileus vs SBO in 11/2020.    FAMILY HISTORY:  No pertinent family history in first degree relatives    SOCIAL HISTORY :  Allergies    No Known Allergies    Intolerances    lisinopril (Diarrhea)  Home Medications:   * Incomplete Medication History as of 13-Sep-2021 12:04 documented in Structured Notes  · 	atorvastatin 40 mg oral tablet: 1 tab(s) orally once a day (at bedtime)  · 	aspirin 81 mg oral delayed release tablet: 1 tab(s) orally once a day  · 	gabapentin 100 mg oral capsule: 3 cap(s) orally once a day (at bedtime), As Needed  · 	Levemir FlexPen 100 units/mL subcutaneous solution: 20 unit(s) subcutaneous once a day  · 	Multiple Vitamins oral tablet: 1 tab(s) orally once a day  · 	NIFEdipine 90 mg oral tablet, extended release: 1 tab(s) orally once a day  · 	carvedilol 6.25 mg oral tablet: 1 tab(s) orally 2 times a day    MEDICATIONS  (STANDING):  dextrose 5%. 1000 milliLiter(s) (50 mL/Hr) IV Continuous <Continuous>  dextrose 5%. 1000 milliLiter(s) (100 mL/Hr) IV Continuous <Continuous>  dextrose 50% Injectable 25 Gram(s) IV Push once  dextrose 50% Injectable 12.5 Gram(s) IV Push once  dextrose 50% Injectable 25 Gram(s) IV Push once  famotidine Injectable 20 milliGRAM(s) IV Push every 48 hours  glucagon  Injectable 1 milliGRAM(s) IntraMuscular once  heparin   Injectable 5000 Unit(s) SubCutaneous every 8 hours  insulin lispro (ADMELOG) corrective regimen sliding scale   SubCutaneous three times a day before meals  metoprolol tartrate Injectable 5 milliGRAM(s) IV Push every 6 hours  sodium chloride 0.9%. 1000 milliLiter(s) (75 mL/Hr) IV Continuous <Continuous>    MEDICATIONS  (PRN):  dextrose Oral Gel 15 Gram(s) Oral once PRN Blood Glucose LESS THAN 70 milliGRAM(s)/deciliter  ondansetron Injectable 4 milliGRAM(s) IV Push every 6 hours PRN Nausea    Vital Signs Last 24 Hrs  T(C): 37.4 (13 Jul 2022 05:45), Max: 37.4 (13 Jul 2022 05:45)  T(F): 99.3 (13 Jul 2022 05:45), Max: 99.3 (13 Jul 2022 05:45)  HR: 91 (13 Jul 2022 05:45) (91 - 115)  BP: 141/92 (13 Jul 2022 05:45) (141/92 - 179/79)  BP(mean): --  RR: 17 (13 Jul 2022 05:45) (17 - 18)  SpO2: 94% (13 Jul 2022 05:45) (94% - 99%)    Parameters below as of 13 Jul 2022 05:45  Patient On (Oxygen Delivery Method): room air      Daily Height in cm: 182.88 (12 Jul 2022 21:56)    Daily   I&O's Summary    12 Jul 2022 07:01  -  13 Jul 2022 07:00  --------------------------------------------------------  IN: 0 mL / OUT: 800 mL / NET: -800 mL    PHYSICAL EXAM:  GEN: comfortable.  HEENT: WNL  NECK : supple  CV: S1S2 RRR  LUNGS: Clear to aus  ABD: distended, mildly tender  EXT: no edema    LABS:                        11.4   4.12  )-----------( 194      ( 13 Jul 2022 07:11 )             35.4     07-13    134<L>  |  95<L>  |  52<H>  ----------------------------<  180<H>  5.8<H>   |  29  |  7.62<H>    Ca    8.7      13 Jul 2022 07:11    TPro  8.3  /  Alb  4.0  /  TBili  0.7  /  DBili  x   /  AST  18  /  ALT  17  /  AlkPhos  72  07-12    PT/INR - ( 12 Jul 2022 22:12 )   PT: 12.8 sec;   INR: 1.10 ratio         PTT - ( 12 Jul 2022 22:12 )  PTT:33.4 sec        
89-year-old male admitted with complaints of abdominal pain, nausea, vomiting and diagnosed with small bowel obstruction.  Noted to have abnormal cardiac enzymes.  No complaints of chest pain or shortness of breath.  No palpitations.  Previous history of moderate aortic stenosis.      PAST MEDICAL & SURGICAL HISTORY:  Bladder cancer  HTN (hypertension)  ESRD on dialysis MWF  Type 2 diabetes mellitus  Chronic CHF  Moderate aortic stenosis  History of bladder cancer s/p resection with neobladder  History of appendectomy  History of exploratory laparotomy  Perforated Gastric Ulcer, Peritonitis  History of percutaneous angioplasty  PCI w/ KAREN RCA 12/2016, KAREN to LAD and D1 on 11/1/2018  S/P arteriovenous (AV) fistula creation Right  History of cholecystectomy      CARDIAC WORKUP:      < from: TTE Echo Complete w/o Contrast w/ Doppler (07.14.22 @ 09:42) >   Moderate mitral annular calcification is present.   The mitral valve leaflets appear mildly calcified.   Mean transmitral gradient is 4 mmHg; this finding is consistent with mild mitral stenosis.   Trace mitral regurgitation is present.   The aortic valve appears moderately calcified. Valve opening seems to be restricted.   Peak and mean transaortic gradients are 51 and 25 mmHg respectively; this finding is consistent with moderate aortic stenosis.   The tricuspid valve leaflets are thin and pliable; valve motion is normal.   Trace tricuspid valve regurgitation is present.   The left ventricle is normal in size, wall thickness, wall motion and contractility.   Estimated left ventricular ejection fraction is 60-65 %.   Normal appearing right ventricle structure and function.      < from: Cardiac Cath Lab - Adult (11.01.18 @ 12:03) >   DiagnosticConclusions   One Vessel coronary artery disease (LAD) .   Mildly reduced left ventricular systolic function with segmental wall motion abnormalities. Estimated LV ejection fraction is 45 %.   Mild aortic valve stenosis.   Moderate pulmonary artery hypertension.   Elevated pulmonary capillary wedge pressure.     Interventional Conclusions     Successful Coronary Intervention KAREN of proximal LAD.   Successful Coronary Intervention KAREN of D 1 .      ALLERGIES:    No Known Allergies       MEDICATIONS  (STANDING):  dextrose 5%. 1000 milliLiter(s) (50 mL/Hr) IV Continuous <Continuous>  dextrose 5%. 1000 milliLiter(s) (100 mL/Hr) IV Continuous <Continuous>  dextrose 50% Injectable 25 Gram(s) IV Push once  dextrose 50% Injectable 12.5 Gram(s) IV Push once  dextrose 50% Injectable 25 Gram(s) IV Push once  famotidine Injectable 20 milliGRAM(s) IV Push every 48 hours  glucagon  Injectable 1 milliGRAM(s) IntraMuscular once  heparin   Injectable 5000 Unit(s) SubCutaneous every 8 hours  insulin lispro (ADMELOG) corrective regimen sliding scale   SubCutaneous three times a day before meals  lidocaine/prilocaine Cream 1 Application(s) Topical <User Schedule>  metoprolol tartrate IVPB 5 milliGRAM(s) IV Intermittent every 6 hours    MEDICATIONS  (PRN):  dextrose Oral Gel 15 Gram(s) Oral once PRN Blood Glucose LESS THAN 70 milliGRAM(s)/deciliter  morphine  - Injectable 2 milliGRAM(s) IV Push every 3 hours PRN Severe Pain (7 - 10)  ondansetron Injectable 4 milliGRAM(s) IV Push every 6 hours PRN Nausea      FAMILY HISTORY:  No pertinent family history in first degree relatives      SOCIAL HISTORY:  Nonsmoker. No ETOH abuse. No illicit drugs.     ROS: not obtainable.       Vital Signs Last 24 Hrs  T(C): 37.2 (14 Jul 2022 08:40), Max: 37.9 (14 Jul 2022 00:31)  T(F): 98.9 (14 Jul 2022 08:40), Max: 100.2 (14 Jul 2022 00:31)  HR: 99 (14 Jul 2022 19:00) (74 - 99)  BP: 138/51 (14 Jul 2022 19:00) (124/42 - 158/52)  BP(mean): 73 (14 Jul 2022 19:00) (58 - 79)  RR: 21 (14 Jul 2022 19:00) (13 - 25)  SpO2: 96% (14 Jul 2022 19:00) (92% - 100%) : nasal cannula O2 Flow (L/min): 4      I&O's Summary    13 Jul 2022 07:01  -  14 Jul 2022 07:00  --------------------------------------------------------  IN: 0 mL / OUT: 600 mL / NET: -600 mL    14 Jul 2022 07:01  -  14 Jul 2022 20:36  --------------------------------------------------------  IN: 0 mL / OUT: 3100 mL / NET: -3100 mL        PHYSICAL EXAM:    General:                Comfortable, AAO X 1, in no distress. Daughter at bedside  HEENT:                  Atraumatic, PERRLA, EOMI, conjunctiva clear. NGT in  Neck:                     Supple, no JVD.  Chest:                   B/L symmetric air entry, no tachypnea  Heart:                     S1, S2 normal, + ESM.  Abdomen:              Distended.  Extremities:           no cyanosis, no edema. Peripheral pulses normal.  Neurologic:            Grossly nonfocal.       TELEMETRY:  Normal sinus rhythm with no tachy or vikki events     ECG:  NSR, PACs First deg AV block Nonspecific STT changes    LABS:                        10.5   3.27  )-----------( 176      ( 14 Jul 2022 05:49 )             34.4     07-14    137  |  101  |  38<H>  ----------------------------<  155<H>  5.1   |  29  |  5.45<H>    Ca    8.9      14 Jul 2022 05:49    TPro  8.3  /  Alb  4.0  /  TBili  0.7  /  DBili  x   /  AST  18  /  ALT  17  /  AlkPhos  72  07-12      07-14 @ 05:49  Trop-I  170.99    07-14 @ 00:50  Trop-I  183.64    07-13 @ 18:00  Trop-I  195.92  CK      135      PT/INR - ( 12 Jul 2022 22:12 )   PT: 12.8 sec;   INR: 1.10 ratio    PTT - ( 12 Jul 2022 22:12 )  PTT:33.4 sec      RADIOLOGY & ADDITIONAL STUDIES:    < from: CT Angio Chest PE Protocol w/ IV Cont (07.13.22 @ 19:42) >  No hilar and or mediastinal adenopathy is noted.    Heart is enlarged in size. Calcification of the aortic valve and the coronary arteries is noted. No pericardial effusion is noted. Pulmonary arteries are normal in caliber. No filling defects are noted. Right subclavian vein stent is noted.    No endobronchial lesions are noted. Centrilobular emphysema is noted bilaterally. 0.2 cm nodule is noted in the left upper lobe. Minimal atelectasis is noted involving portions of the lingula and both lower lobes. No pleural effusions are noted.    Below the diaphragm, visualized portions of the abdomen demonstrate nasogastric tube in place. Left kidney is small in size.    Degenerative changes of the spine are noted.    IMPRESSION: No pulmonary embolus is noted.      < from: CT Abdomen and Pelvis w/ Oral Cont and w/ IV Cont (07.13.22 @ 00:46) >  FINDINGS:  LOWER CHEST: Aortic valvular and coronary artery calcifications.   Dependent atelectatic changes at the lung bases.    LIVER: Within normal limits.  BILE DUCTS: Normal caliber.  GALLBLADDER: Cholecystectomy.  SPLEEN: Within normal limits.  PANCREAS: Fatty atrophy. Stable 1.4 x 1.9 cm cystic lesion in the pancreatic tail. ADRENALS: Within normal limits.  KIDNEYS/URETERS: Atrophic.    BLADDER: Neobladder.  REPRODUCTIVE ORGANS: Prostatectomy.    BOWEL: The stomach is moderately distended with fluid. There are dilated fluid-filled and fecalized loops of small bowel with a transition point in the mid lower abdomen, likely related to matted adhesions underlying the laparotomy incisional scar.. Small bowel anastomosis is decompressed.   There is mild mesenteric edema and interloop fluid. Distal small bowel is collapsed. Appendix is not clearly identified. The colon is collapsed.   Diverticulosis.  PERITONEUM: Trace amount of right subhepatic free fluid.  VESSELS: Aorta is not dilated. Moderate atherosclerotic vascular calcification.  RETROPERITONEUM/LYMPH NODES: No lymphadenopathy.  ABDOMINAL WALL: Within normal limits.  BONES: Vacuum gas phenomenon between L4/L5. Status post ORIF right proximal femur.    IMPRESSION:    High-grade small bowel obstruction with transition point in the lower abdomen, likely related to matted adhesions. Mild mesenteric edema and interloop fluid. Nasogastric tube placement may partially relate symptoms.    
89 yo M with PMHx of ESRD on HD, HTN, bladder Ca, DM2, CAD s/p PCI, CHF, moderate aortic stenosis, perforated gastric ulcer presented with cc of abd pain, nausea, and vomiting that started 2 days ago. Pt reports has a regular diet. Abd CT showed high-grade SBO and was admitted by surgery for further management.  Endorses abd pain, denies sob or cp. 
89 yo M with PMHx of ESRD on HD, HTN, bladder Ca, DM2, CAD s/p PCI, CHF, moderate aortic stenosis, perforated gastric ulcer presented with cc of abd pain, nausea, and vomiting that started 2 days ago. Pt reports has a regular diet. Abd CT showed high-grade SBO and was admitted by surgery for further management.  Endorses abd pain, denies sob or cp.

## 2022-07-15 NOTE — PROGRESS NOTE ADULT - SUBJECTIVE AND OBJECTIVE BOX
Patient' son (Grupo ) HCP at bedside. He would like patient be DNR; however he would like to have patient intubated if he decompensates. I told him I'm very concerned about how critically ills he's and how his outcome is probably poor regardless of intubation. He would like to see his dad more awake, which it might not happen based on his condition  I still believe comfort care is probably the best option for patient based on his critical condition.

## 2022-07-15 NOTE — CONSULT NOTE ADULT - REASON FOR ADMISSION
SMall bowel obstruction

## 2022-07-15 NOTE — PROGRESS NOTE ADULT - SUBJECTIVE AND OBJECTIVE BOX
89 yo male ESRD, on HD, moderate aortic stenosis, CAD, coronary angioplasties, DM, bladder cancer , cystectomy, ileal conduit, Partial SBO, had over 3 lts of BM overnight, likely aspiration after vomiting. Patient on 2 vasopressors, minimally responsive, had another large liquid BM now. Remains on BIPAP    NGT in place 300ml  Abd softly distended, midline scar  minimally responsive  RUE AVF              Vital Signs Last 24 Hrs  T(C): 38.1 (15 Jul 2022 12:50), Max: 39 (15 Jul 2022 04:55)  T(F): 100.6 (15 Jul 2022 12:50), Max: 102.2 (15 Jul 2022 04:55)  HR: 93 (15 Jul 2022 12:50) (74 - 123)  BP: 94/45 (15 Jul 2022 10:15) (61/33 - 165/50)  BP(mean): 55 (15 Jul 2022 10:15) (40 - 87)  RR: 22 (15 Jul 2022 12:50) (13 - 37)  SpO2: 97% (15 Jul 2022 12:50) (84% - 100%)    Parameters below as of 15 Jul 2022 10:50    O2 Flow (L/min): 40  O2 Concentration (%): 60                          12.2   1.26  )-----------( 261      ( 15 Jul 2022 04:27 )             39.2       07-15    135  |  98  |  52<H>  ----------------------------<  204<H>  4.5   |  26  |  7.51<H>    Ca    9.3      15 Jul 2022 04:27  Phos  4.8     07-15  Mg     2.2     07-15    TPro  7.4  /  Alb  3.0<L>  /  TBili  0.7  /  DBili  x   /  AST  3<L>  /  ALT  12  /  AlkPhos  71  07-15      LIVER FUNCTIONS - ( 15 Jul 2022 04:27 )  Alb: 3.0 g/dL / Pro: 7.4 gm/dL / ALK PHOS: 71 U/L / ALT: 12 U/L / AST: 3 U/L / GGT: x             MEDICATIONS  (STANDING):  chlorhexidine 4% Liquid 1 Application(s) Topical <User Schedule>  dextrose 5%. 1000 milliLiter(s) (100 mL/Hr) IV Continuous <Continuous>  dextrose 5%. 1000 milliLiter(s) (50 mL/Hr) IV Continuous <Continuous>  dextrose 50% Injectable 25 Gram(s) IV Push once  dextrose 50% Injectable 12.5 Gram(s) IV Push once  dextrose 50% Injectable 25 Gram(s) IV Push once  glucagon  Injectable 1 milliGRAM(s) IntraMuscular once  heparin   Injectable 5000 Unit(s) SubCutaneous every 8 hours  hydrocortisone sodium succinate Injectable 50 milliGRAM(s) IV Push every 6 hours  insulin lispro (ADMELOG) corrective regimen sliding scale   SubCutaneous every 6 hours  lidocaine/prilocaine Cream 1 Application(s) Topical <User Schedule>  norepinephrine Infusion 0.05 MICROgram(s)/kG/Min (5.95 mL/Hr) IV Continuous <Continuous>  pantoprazole  Injectable 40 milliGRAM(s) IV Push daily  phenylephrine    Infusion 0.5 MICROgram(s)/kG/Min (11.9 mL/Hr) IV Continuous <Continuous>  piperacillin/tazobactam IVPB.. 3.375 Gram(s) IV Intermittent every 8 hours  sodium chloride 0.9%. 1000 milliLiter(s) (100 mL/Hr) IV Continuous <Continuous>  vasopressin Infusion 0.04 Unit(s)/Min (2.4 mL/Hr) IV Continuous <Continuous>    MEDICATIONS  (PRN):  dextrose Oral Gel 15 Gram(s) Oral once PRN Blood Glucose LESS THAN 70 milliGRAM(s)/deciliter  ondansetron Injectable 4 milliGRAM(s) IV Push every 6 hours PRN Nausea      MEDICATIONS  (STANDING):  chlorhexidine 4% Liquid 1 Application(s) Topical <User Schedule>  dextrose 5%. 1000 milliLiter(s) (100 mL/Hr) IV Continuous <Continuous>  dextrose 5%. 1000 milliLiter(s) (50 mL/Hr) IV Continuous <Continuous>  dextrose 50% Injectable 25 Gram(s) IV Push once  dextrose 50% Injectable 12.5 Gram(s) IV Push once  dextrose 50% Injectable 25 Gram(s) IV Push once  glucagon  Injectable 1 milliGRAM(s) IntraMuscular once  heparin   Injectable 5000 Unit(s) SubCutaneous every 8 hours  hydrocortisone sodium succinate Injectable 50 milliGRAM(s) IV Push every 6 hours  insulin lispro (ADMELOG) corrective regimen sliding scale   SubCutaneous every 6 hours  lidocaine/prilocaine Cream 1 Application(s) Topical <User Schedule>  norepinephrine Infusion 0.05 MICROgram(s)/kG/Min (5.95 mL/Hr) IV Continuous <Continuous>  pantoprazole  Injectable 40 milliGRAM(s) IV Push daily  phenylephrine    Infusion 0.5 MICROgram(s)/kG/Min (11.9 mL/Hr) IV Continuous <Continuous>  piperacillin/tazobactam IVPB.. 3.375 Gram(s) IV Intermittent every 8 hours  sodium chloride 0.9%. 1000 milliLiter(s) (100 mL/Hr) IV Continuous <Continuous>  vasopressin Infusion 0.04 Unit(s)/Min (2.4 mL/Hr) IV Continuous <Continuous>

## 2022-07-15 NOTE — CHART NOTE - NSCHARTNOTEFT_GEN_A_CORE
Called by rn    Patient hypoxic, requiring 100%NRB  Patient vomiting, bilious content   NGT reinserted. LWIS  Aspiration pneumonia highly likely now   Zosyn   Cultures    Titrate O2 down as possible   Patient full code, no NIPPV given SBO   Surgery PA updated

## 2022-07-15 NOTE — PROGRESS NOTE ADULT - SUBJECTIVE AND OBJECTIVE BOX
89 yo male ESRD on HD, CHF, moderate aortic stenosis, DM, CAD, h/o bladder resection ileal conduit, SBO. Patient had several BM (>3000ml) overnight, Patient threw up earlier this morning, taken to the CCU and placed on BIPAP.  Patient is minimally responsive, hypotensive (70/30) O2 sat 100%    Minimally responsive  NGT in place (50ml)  Abd softly distended  LUE AVF              Vital Signs Last 24 Hrs  T(C): 39 (15 Jul 2022 04:55), Max: 39 (15 Jul 2022 04:55)  T(F): 102.2 (15 Jul 2022 04:55), Max: 102.2 (15 Jul 2022 04:55)  HR: 104 (15 Jul 2022 07:50) (74 - 123)  BP: 72/38 (15 Jul 2022 07:50) (61/33 - 165/50)  BP(mean): 46 (15 Jul 2022 07:50) (40 - 87)  RR: 33 (15 Jul 2022 07:50) (13 - 37)  SpO2: 98% (15 Jul 2022 07:50) (84% - 100%)    Parameters below as of 15 Jul 2022 07:00  Patient On (Oxygen Delivery Method): nasal cannula, high flow  O2 Flow (L/min): 50  O2 Concentration (%): 100                          12.2   1.26  )-----------( 261      ( 15 Jul 2022 04:27 )             39.2       07-15    135  |  98  |  52<H>  ----------------------------<  204<H>  4.5   |  26  |  7.51<H>    Ca    9.3      15 Jul 2022 04:27  Phos  4.8     07-15  Mg     2.2     07-15    TPro  7.4  /  Alb  3.0<L>  /  TBili  0.7  /  DBili  x   /  AST  3<L>  /  ALT  12  /  AlkPhos  71  07-15      LIVER FUNCTIONS - ( 15 Jul 2022 04:27 )  Alb: 3.0 g/dL / Pro: 7.4 gm/dL / ALK PHOS: 71 U/L / ALT: 12 U/L / AST: 3 U/L / GGT: x             MEDICATIONS  (STANDING):  chlorhexidine 4% Liquid 1 Application(s) Topical <User Schedule>  dextrose 5%. 1000 milliLiter(s) (100 mL/Hr) IV Continuous <Continuous>  dextrose 5%. 1000 milliLiter(s) (50 mL/Hr) IV Continuous <Continuous>  dextrose 50% Injectable 25 Gram(s) IV Push once  dextrose 50% Injectable 12.5 Gram(s) IV Push once  dextrose 50% Injectable 25 Gram(s) IV Push once  glucagon  Injectable 1 milliGRAM(s) IntraMuscular once  heparin   Injectable 5000 Unit(s) SubCutaneous every 8 hours  insulin lispro (ADMELOG) corrective regimen sliding scale   SubCutaneous three times a day before meals  lidocaine/prilocaine Cream 1 Application(s) Topical <User Schedule>  pantoprazole  Injectable 40 milliGRAM(s) IV Push daily  phenylephrine    Infusion 0.5 MICROgram(s)/kG/Min (11.9 mL/Hr) IV Continuous <Continuous>  piperacillin/tazobactam IVPB.. 3.375 Gram(s) IV Intermittent every 8 hours    MEDICATIONS  (PRN):  dextrose Oral Gel 15 Gram(s) Oral once PRN Blood Glucose LESS THAN 70 milliGRAM(s)/deciliter  ondansetron Injectable 4 milliGRAM(s) IV Push every 6 hours PRN Nausea      MEDICATIONS  (STANDING):  chlorhexidine 4% Liquid 1 Application(s) Topical <User Schedule>  dextrose 5%. 1000 milliLiter(s) (100 mL/Hr) IV Continuous <Continuous>  dextrose 5%. 1000 milliLiter(s) (50 mL/Hr) IV Continuous <Continuous>  dextrose 50% Injectable 25 Gram(s) IV Push once  dextrose 50% Injectable 12.5 Gram(s) IV Push once  dextrose 50% Injectable 25 Gram(s) IV Push once  glucagon  Injectable 1 milliGRAM(s) IntraMuscular once  heparin   Injectable 5000 Unit(s) SubCutaneous every 8 hours  insulin lispro (ADMELOG) corrective regimen sliding scale   SubCutaneous three times a day before meals  lidocaine/prilocaine Cream 1 Application(s) Topical <User Schedule>  pantoprazole  Injectable 40 milliGRAM(s) IV Push daily  phenylephrine    Infusion 0.5 MICROgram(s)/kG/Min (11.9 mL/Hr) IV Continuous <Continuous>  piperacillin/tazobactam IVPB.. 3.375 Gram(s) IV Intermittent every 8 hours       89 yo male ESRD on HD, CHF, moderate aortic stenosis, DM, CAD, h/o bladder resection ileal conduit, SBO. Patient had several BM (>3000ml) overnight, Patient requiring more oxygen this am, taken to the CCU and placed on BIPAP.  Patient is minimally responsive, hypotensive (70/30) O2 sat 100%    Minimally responsive  NGT in place (50ml)  Abd softly distended  LUE AVF              Vital Signs Last 24 Hrs  T(C): 39 (15 Jul 2022 04:55), Max: 39 (15 Jul 2022 04:55)  T(F): 102.2 (15 Jul 2022 04:55), Max: 102.2 (15 Jul 2022 04:55)  HR: 104 (15 Jul 2022 07:50) (74 - 123)  BP: 72/38 (15 Jul 2022 07:50) (61/33 - 165/50)  BP(mean): 46 (15 Jul 2022 07:50) (40 - 87)  RR: 33 (15 Jul 2022 07:50) (13 - 37)  SpO2: 98% (15 Jul 2022 07:50) (84% - 100%)    Parameters below as of 15 Jul 2022 07:00  Patient On (Oxygen Delivery Method): nasal cannula, high flow  O2 Flow (L/min): 50  O2 Concentration (%): 100                          12.2   1.26  )-----------( 261      ( 15 Jul 2022 04:27 )             39.2       07-15    135  |  98  |  52<H>  ----------------------------<  204<H>  4.5   |  26  |  7.51<H>    Ca    9.3      15 Jul 2022 04:27  Phos  4.8     07-15  Mg     2.2     07-15    TPro  7.4  /  Alb  3.0<L>  /  TBili  0.7  /  DBili  x   /  AST  3<L>  /  ALT  12  /  AlkPhos  71  07-15      LIVER FUNCTIONS - ( 15 Jul 2022 04:27 )  Alb: 3.0 g/dL / Pro: 7.4 gm/dL / ALK PHOS: 71 U/L / ALT: 12 U/L / AST: 3 U/L / GGT: x             MEDICATIONS  (STANDING):  chlorhexidine 4% Liquid 1 Application(s) Topical <User Schedule>  dextrose 5%. 1000 milliLiter(s) (100 mL/Hr) IV Continuous <Continuous>  dextrose 5%. 1000 milliLiter(s) (50 mL/Hr) IV Continuous <Continuous>  dextrose 50% Injectable 25 Gram(s) IV Push once  dextrose 50% Injectable 12.5 Gram(s) IV Push once  dextrose 50% Injectable 25 Gram(s) IV Push once  glucagon  Injectable 1 milliGRAM(s) IntraMuscular once  heparin   Injectable 5000 Unit(s) SubCutaneous every 8 hours  insulin lispro (ADMELOG) corrective regimen sliding scale   SubCutaneous three times a day before meals  lidocaine/prilocaine Cream 1 Application(s) Topical <User Schedule>  pantoprazole  Injectable 40 milliGRAM(s) IV Push daily  phenylephrine    Infusion 0.5 MICROgram(s)/kG/Min (11.9 mL/Hr) IV Continuous <Continuous>  piperacillin/tazobactam IVPB.. 3.375 Gram(s) IV Intermittent every 8 hours    MEDICATIONS  (PRN):  dextrose Oral Gel 15 Gram(s) Oral once PRN Blood Glucose LESS THAN 70 milliGRAM(s)/deciliter  ondansetron Injectable 4 milliGRAM(s) IV Push every 6 hours PRN Nausea      MEDICATIONS  (STANDING):  chlorhexidine 4% Liquid 1 Application(s) Topical <User Schedule>  dextrose 5%. 1000 milliLiter(s) (100 mL/Hr) IV Continuous <Continuous>  dextrose 5%. 1000 milliLiter(s) (50 mL/Hr) IV Continuous <Continuous>  dextrose 50% Injectable 25 Gram(s) IV Push once  dextrose 50% Injectable 12.5 Gram(s) IV Push once  dextrose 50% Injectable 25 Gram(s) IV Push once  glucagon  Injectable 1 milliGRAM(s) IntraMuscular once  heparin   Injectable 5000 Unit(s) SubCutaneous every 8 hours  insulin lispro (ADMELOG) corrective regimen sliding scale   SubCutaneous three times a day before meals  lidocaine/prilocaine Cream 1 Application(s) Topical <User Schedule>  pantoprazole  Injectable 40 milliGRAM(s) IV Push daily  phenylephrine    Infusion 0.5 MICROgram(s)/kG/Min (11.9 mL/Hr) IV Continuous <Continuous>  piperacillin/tazobactam IVPB.. 3.375 Gram(s) IV Intermittent every 8 hours

## 2022-07-15 NOTE — CONSULT NOTE ADULT - CONSULT REASON
Management of ESRD
Cardiology Consult
Acute hypoxic respiratory failure
Medical Optimization
Medical Optimization

## 2022-07-15 NOTE — PROGRESS NOTE ADULT - SUBJECTIVE AND OBJECTIVE BOX
89-year-old male admitted with complaints of abdominal pain, nausea, vomiting and diagnosed with small bowel obstruction.  Noted to have abnormal cardiac enzymes.  No complaints of chest pain or shortness of breath.  No palpitations.  Previous history of moderate aortic stenosis.    The patient was seen and examined.  Overnight events noted.  Now on pressors.      PAST MEDICAL & SURGICAL HISTORY:  Bladder cancer  HTN (hypertension)  ESRD on dialysis MWF  Type 2 diabetes mellitus  Chronic CHF  Moderate aortic stenosis  History of bladder cancer s/p resection with neobladder  History of appendectomy  History of exploratory laparotomy  Perforated Gastric Ulcer, Peritonitis  History of percutaneous angioplasty  PCI w/ KAREN RCA 12/2016, KAREN to LAD and D1 on 11/1/2018  S/P arteriovenous (AV) fistula creation Right  History of cholecystectomy      CARDIAC WORKUP:      < from: TTE Echo Complete w/o Contrast w/ Doppler (07.14.22 @ 09:42) >   Moderate mitral annular calcification is present.   The mitral valve leaflets appear mildly calcified.   Mean transmitral gradient is 4 mmHg; this finding is consistent with mild mitral stenosis.   Trace mitral regurgitation is present.   The aortic valve appears moderately calcified. Valve opening seems to be restricted.   Peak and mean transaortic gradients are 51 and 25 mmHg respectively; this finding is consistent with moderate aortic stenosis.   The tricuspid valve leaflets are thin and pliable; valve motion is normal.   Trace tricuspid valve regurgitation is present.   The left ventricle is normal in size, wall thickness, wall motion and contractility.   Estimated left ventricular ejection fraction is 60-65 %.   Normal appearing right ventricle structure and function.      < from: Cardiac Cath Lab - Adult (11.01.18 @ 12:03) >   DiagnosticConclusions   One Vessel coronary artery disease (LAD) .   Mildly reduced left ventricular systolic function with segmental wall motion abnormalities. Estimated LV ejection fraction is 45 %.   Mild aortic valve stenosis.   Moderate pulmonary artery hypertension.   Elevated pulmonary capillary wedge pressure.     Interventional Conclusions     Successful Coronary Intervention KAREN of proximal LAD.   Successful Coronary Intervention KAREN of D 1 .      ROS: not obtainable.      Home Medications:  atorvastatin 40 mg oral tablet: 1 tab(s) orally once a day (in the morning) (13 Jul 2022 13:29)  carvedilol 6.25 mg oral tablet: 1 tab(s) orally 2 times a day (13 Jul 2022 13:29)  gabapentin 100 mg oral capsule: 1 cap(s) orally 3 times a day  *** Morning, Noon,Bedtime (13 Jul 2022 13:29)    MEDICATIONS  (STANDING):  chlorhexidine 4% Liquid 1 Application(s) Topical <User Schedule>  dextrose 5%. 1000 milliLiter(s) (100 mL/Hr) IV Continuous <Continuous>  dextrose 5%. 1000 milliLiter(s) (50 mL/Hr) IV Continuous <Continuous>  dextrose 50% Injectable 25 Gram(s) IV Push once  dextrose 50% Injectable 12.5 Gram(s) IV Push once  dextrose 50% Injectable 25 Gram(s) IV Push once  glucagon  Injectable 1 milliGRAM(s) IntraMuscular once  heparin   Injectable 5000 Unit(s) SubCutaneous every 8 hours  hydrocortisone sodium succinate Injectable 50 milliGRAM(s) IV Push every 6 hours  insulin lispro (ADMELOG) corrective regimen sliding scale   SubCutaneous every 6 hours  lidocaine/prilocaine Cream 1 Application(s) Topical <User Schedule>  norepinephrine Infusion 0.45 MICROgram(s)/kG/Min (26.8 mL/Hr) IV Continuous <Continuous>  pantoprazole  Injectable 40 milliGRAM(s) IV Push daily  phenylephrine    Infusion 0.5 MICROgram(s)/kG/Min (11.9 mL/Hr) IV Continuous <Continuous>  piperacillin/tazobactam IVPB.. 3.375 Gram(s) IV Intermittent every 8 hours  sodium chloride 0.9%. 1000 milliLiter(s) (100 mL/Hr) IV Continuous <Continuous>  vasopressin Infusion 0.04 Unit(s)/Min (2.4 mL/Hr) IV Continuous <Continuous>    MEDICATIONS  (PRN):  dextrose Oral Gel 15 Gram(s) Oral once PRN Blood Glucose LESS THAN 70 milliGRAM(s)/deciliter  ondansetron Injectable 4 milliGRAM(s) IV Push every 6 hours PRN Nausea      Vital Signs Last 24 Hrs  T(C): 38.1 (15 Jul 2022 12:50), Max: 39 (15 Jul 2022 04:55)  T(F): 100.6 (15 Jul 2022 12:50), Max: 102.2 (15 Jul 2022 04:55)  HR: 93 (15 Jul 2022 16:00) (81 - 123)  BP: 94/45 (15 Jul 2022 10:15) (61/33 - 165/50)  BP(mean): 55 (15 Jul 2022 10:15) (40 - 87)  RR: 24 (15 Jul 2022 16:00) (14 - 37)  SpO2: 97% (15 Jul 2022 16:00) (84% - 100%)    Parameters below as of 15 Jul 2022 15:45    O2 Flow (L/min): 30  O2 Concentration (%): 50  I&O's Summary    14 Jul 2022 07:01  -  15 Jul 2022 07:00  --------------------------------------------------------  IN: 0 mL / OUT: 3575 mL / NET: -3575 mL    15 Jul 2022 07:01  -  15 Jul 2022 17:35  --------------------------------------------------------  IN: 2490 mL / OUT: 0 mL / NET: 2490 mL        PHYSICAL EXAM:    General:                Comfortable, AAO X 1, in no distress. Daughter at bedside  HEENT:                  Atraumatic, PERRLA, EOMI, conjunctiva clear. NGT in  Neck:                     Supple, no JVD.  Chest:                   B/L symmetric air entry, no tachypnea  Heart:                     S1, S2 normal, + ESM.  Abdomen:              Distended.  Extremities:           no cyanosis, no edema. Peripheral pulses normal.  Neurologic:            Grossly nonfocal.       TELEMETRY:  Normal sinus rhythm with no tachy or vikki events     ECG:  NSR, PACs First deg AV block Nonspecific STT changes    LABS:                                  12.2   1.26  )-----------( 261      ( 15 Jul 2022 04:27 )             39.2          07-15    136  |  102  |  58<H>  ----------------------------<  176<H>  4.2   |  25  |  7.52<H>    Ca    8.6      15 Jul 2022 15:35  Phos  4.8     07-15  Mg     2.2     07-15    TPro  7.4  /  Alb  3.0<L>  /  TBili  0.7  /  DBili  x   /  AST  3<L>  /  ALT  12  /  AlkPhos  71  07-15    CARDIAC MARKERS ( 13 Jul 2022 18:00 )  x     / x     / 135 U/L / x     / x              ABG - ( 15 Jul 2022 07:54 )  pH, Arterial: 7.37  pH, Blood: x     /  pCO2: 40    /  pO2: 110   / HCO3: 23    / Base Excess: -2.0  /  SaO2: 99                   RADIOLOGY & ADDITIONAL STUDIES:    < from: CT Angio Chest PE Protocol w/ IV Cont (07.13.22 @ 19:42) >  No hilar and or mediastinal adenopathy is noted.    Heart is enlarged in size. Calcification of the aortic valve and the coronary arteries is noted. No pericardial effusion is noted. Pulmonary arteries are normal in caliber. No filling defects are noted. Right subclavian vein stent is noted.    No endobronchial lesions are noted. Centrilobular emphysema is noted bilaterally. 0.2 cm nodule is noted in the left upper lobe. Minimal atelectasis is noted involving portions of the lingula and both lower lobes. No pleural effusions are noted.    Below the diaphragm, visualized portions of the abdomen demonstrate nasogastric tube in place. Left kidney is small in size.    Degenerative changes of the spine are noted.    IMPRESSION: No pulmonary embolus is noted.      < from: CT Abdomen and Pelvis w/ Oral Cont and w/ IV Cont (07.13.22 @ 00:46) >  FINDINGS:  LOWER CHEST: Aortic valvular and coronary artery calcifications.   Dependent atelectatic changes at the lung bases.    LIVER: Within normal limits.  BILE DUCTS: Normal caliber.  GALLBLADDER: Cholecystectomy.  SPLEEN: Within normal limits.  PANCREAS: Fatty atrophy. Stable 1.4 x 1.9 cm cystic lesion in the pancreatic tail. ADRENALS: Within normal limits.  KIDNEYS/URETERS: Atrophic.    BLADDER: Neobladder.  REPRODUCTIVE ORGANS: Prostatectomy.    BOWEL: The stomach is moderately distended with fluid. There are dilated fluid-filled and fecalized loops of small bowel with a transition point in the mid lower abdomen, likely related to matted adhesions underlying the laparotomy incisional scar.. Small bowel anastomosis is decompressed.   There is mild mesenteric edema and interloop fluid. Distal small bowel is collapsed. Appendix is not clearly identified. The colon is collapsed.   Diverticulosis.  PERITONEUM: Trace amount of right subhepatic free fluid.  VESSELS: Aorta is not dilated. Moderate atherosclerotic vascular calcification.  RETROPERITONEUM/LYMPH NODES: No lymphadenopathy.  ABDOMINAL WALL: Within normal limits.  BONES: Vacuum gas phenomenon between L4/L5. Status post ORIF right proximal femur.    IMPRESSION:    High-grade small bowel obstruction with transition point in the lower abdomen, likely related to matted adhesions. Mild mesenteric edema and interloop fluid. Nasogastric tube placement may partially relate symptoms.    7/15/22  IMPRESSION: Partial small bowel obstruction.

## 2022-07-15 NOTE — PROGRESS NOTE ADULT - ASSESSMENT
89 yo with ESRD on MWF HD admitted with ileus vs SBO.  Abdominal pain improved wit NG drainage.  --ESRD : Continue TIW HD.  No UF due to significant GI fluid loss.   Continue maintenance IVF at 1L/day due to ongoing GI loss, until po intake can be started.  --HTN : hold all meds.  --GI : monitor with NG drainage.    7/14 SY  --ESRD : continue MWF schedule.  --HTN : BP stable off all meds.  --GI : back on NG drainage.  Surgery follow up appreciated.   For small bowel gastrografin study.  --Fluid/electrolytes stable.    7/15 MK   - esrd mwf, no indication for hd today    will await clinical stability and re-asses in am, sooner if clinically indicated   - sepsis with ? aspiration PNA     on pressors and abx as per CCM   - SBO; ng tube with + bm noted.   dw CCM team

## 2022-07-16 NOTE — PROVIDER CONTACT NOTE (OTHER) - DATE AND TIME:
13-Jul-2022 02:27
13-Jul-2022 03:39
16-Jul-2022 20:14
13-Jul-2022 10:13
13-Jul-2022 16:10
13-Jul-2022 18:00

## 2022-07-16 NOTE — PROCEDURE NOTE - AIR OBTAINED
Yes
[de-identified] : 68 yo M with PMH of anemia, ESRD on HD, CAD s/p stents, DLD, Afib on eliquis 5 mg, cholangitis s/p recent cholecystectomy comes to the clinic for the follow up.\par Patient recently admitted to [facility] for acute blood loss anemia on 10/24/2019 and was found to have hemoglobin of 4.9.  \par s/p 3 units prbc transfusion during the admission. \par s/p EGD 10/25 (report unavailable)\par s/p colonoscopy 10/29 showed diverticular disease, mild in desc colon; no stigmata of bleeding in colon or term ileum\par Pathology showed tubular adenoma for 1 ascending colon polyp\par s/p capsule endoscopy 10/31 - (report not available)\par s/p 2nd cap endo 11/4 - was negative\par On discharge, patient's hemoglobin was 9.2. \par \par Today he complains of dizziness and lightheadedness that is unrelated to time of day. Dizziness worse with standing up and on excessive exertion such as walking more than 100 meters.  Occasionally he gets severe lightheadedness when he first stands up in the morning that he falls back in his bed.  He never had trauma to head. But he is concerned regarding this new symptom.  \par Denies chest pain, shortness of breath, headache, fevers, chills, n/v/d, abdominal pain.

## 2022-07-16 NOTE — PROGRESS NOTE ADULT - REASON FOR ADMISSION
SBO
SMall bowel obstruction

## 2022-07-16 NOTE — PROGRESS NOTE ADULT - PROVIDER SPECIALTY LIST ADULT
Critical Care
Nephrology
Surgery
Surgery
Critical Care
Hospitalist
Surgery
Surgery
Cardiology
Nephrology
Nephrology
Surgery
Critical Care
Surgery
Surgery
Critical Care

## 2022-07-16 NOTE — PROGRESS NOTE ADULT - SUBJECTIVE AND OBJECTIVE BOX
Subjective:    Review of Systems:  All 10 systems reviewed in detailed and found to be negative with the exception of what has already been described above    Allergies:  lisinopril (Diarrhea)  No Known Allergies    Meds  MEDICATIONS  (STANDING):  chlorhexidine 0.12% Liquid 15 milliLiter(s) Oral Mucosa every 12 hours  chlorhexidine 4% Liquid 1 Application(s) Topical <User Schedule>  dextrose 5%. 1000 milliLiter(s) (100 mL/Hr) IV Continuous <Continuous>  dextrose 5%. 1000 milliLiter(s) (50 mL/Hr) IV Continuous <Continuous>  dextrose 50% Injectable 25 Gram(s) IV Push once  dextrose 50% Injectable 12.5 Gram(s) IV Push once  dextrose 50% Injectable 25 Gram(s) IV Push once  glucagon  Injectable 1 milliGRAM(s) IntraMuscular once  heparin   Injectable 5000 Unit(s) SubCutaneous every 8 hours  hydrocortisone sodium succinate Injectable 50 milliGRAM(s) IV Push every 6 hours  insulin lispro (ADMELOG) corrective regimen sliding scale   SubCutaneous every 6 hours  lidocaine/prilocaine Cream 1 Application(s) Topical <User Schedule>  norepinephrine Infusion 0.45 MICROgram(s)/kG/Min (26.8 mL/Hr) IV Continuous <Continuous>  pantoprazole  Injectable 40 milliGRAM(s) IV Push daily  piperacillin/tazobactam IVPB.. 3.375 Gram(s) IV Intermittent every 8 hours  sodium chloride 0.9%. 1000 milliLiter(s) (75 mL/Hr) IV Continuous <Continuous>  vasopressin Infusion 0.04 Unit(s)/Min (2.4 mL/Hr) IV Continuous <Continuous>    MEDICATIONS  (PRN):  dextrose Oral Gel 15 Gram(s) Oral once PRN Blood Glucose LESS THAN 70 milliGRAM(s)/deciliter  ondansetron Injectable 4 milliGRAM(s) IV Push every 6 hours PRN Nausea    Physical Exam  T(C): 37.4 (07-16-22 @ 05:48), Max: 38.4 (07-15-22 @ 18:00)  HR: 125 (07-16-22 @ 07:00) (82 - 125)  BP: 94/45 (07-15-22 @ 10:15) (72/56 - 114/41)  RR: 25 (07-16-22 @ 07:00) (17 - 32)  SpO2: 100% (07-16-22 @ 07:00) (90% - 100%)  Gen: Alert, oriented, no distress  HEENT: Anicteric sclera, moist mucous membranes, no JVD, no lymphadenopathy, good dentition  Cardio: Regular rhythm and rate, normal S1S2, no murmurs  Resp: Clear to auscultation bilaterally, no wheezing or rhonchi  GI: Nontender, nondistended, normoactive bowel sounds  Ext: No cyanosis, clubbing or edema  Neuro: Nonfocal    Labs:                        10.1   12.89 )-----------( 245      ( 16 Jul 2022 05:20 )             31.7     07-16    140  |  106  |  68<H>  ----------------------------<  120<H>  5.6<H>   |  22  |  7.96<H>    Ca    8.3<L>      16 Jul 2022 05:20  Phos  6.9     07-16  Mg     1.9     07-16    TPro  5.9<L>  /  Alb  2.1<L>  /  TBili  0.6  /  DBili  x   /  AST  44<H>  /  ALT  21  /  AlkPhos  42  07-16      < from: Xray Chest 1 View-PORTABLE IMMEDIATE (07.12.22 @ 22:44) >  PROCEDURE DATE:  07/12/2022          INTERPRETATION:  AP erect chest on July 12, 2022 at 10:37 PM. Concern is   perforation.    Elevated left hemidiaphragm noted.    Heart magnified by technique.    Right subclavian axillary stent again noted.    Present film shows increasing left base atelectasis compared to July 5, 2021.    IMPRESSION: Increasing left base atelectasis.    < from: CT Abdomen and Pelvis w/ Oral Cont and w/ IV Cont (07.13.22 @ 00:46) >  PROCEDURE DATE:  07/13/2022          INTERPRETATION:  CLINICAL INFORMATION: Abdominal pain after eating    COMPARISON: CT of November 1, 2020.    CONTRAST/COMPLICATIONS:  IV Contrast: Omnipaque 350  100 cc administered   0 cc discarded  Oral Contrast: Omnipaque 300   Fruit 2o  Complications: None reported at time of study completion    PROCEDURE:  CT of the Abdomen and Pelvis was performed.  Sagittal and coronal reformatswere performed.    FINDINGS:  LOWER CHEST: Aortic valvular and coronary artery calcifications.   Dependent atelectatic changes at the lung bases.    LIVER: Within normal limits.  BILE DUCTS: Normal caliber.  GALLBLADDER: Cholecystectomy.  SPLEEN: Within normal limits.  PANCREAS: Fatty atrophy. Stable 1.4 x 1.9 cm cystic lesion in the   pancreatic tail. ADRENALS: Within normal limits.  KIDNEYS/URETERS: Atrophic.    BLADDER: Neobladder.  REPRODUCTIVE ORGANS: Prostatectomy.    BOWEL: The stomach is moderately distended with fluid. There are dilated   fluid-filled and fecalized loops of small bowel with a transition point   in the mid lower abdomen, likely related to matted adhesions underlying   the laparotomy incisional scar.. Small bowel anastomosis is decompressed.   There is mild mesenteric edema and interloop fluid. Distal small bowel is   collapsed. Appendix is not clearly identified. The colon is collapsed.   Diverticulosis.  PERITONEUM: Trace amount of right subhepatic free fluid.  VESSELS: Aorta is not dilated. Moderate atherosclerotic vascular   calcification.  RETROPERITONEUM/LYMPH NODES: No lymphadenopathy.  ABDOMINAL WALL: Within normal limits.  BONES: Vacuum gas phenomenon between L4/L5. Status post ORIF right   proximal femur.    IMPRESSION:    High-grade small bowel obstruction with transition point in the lower   abdomen, likely related to matted adhesions. Mild mesenteric edema and   interloop fluid. Nasogastric tube placement may partially relate symptoms.    Additional nonemergent findings as described above.    < from: CT Angio Chest PE Protocol w/ IV Cont (07.13.22 @ 19:42) >  PROCEDURE DATE:  07/13/2022          INTERPRETATION:  Clinical information: Acute respiratory failure.   Evaluate for pulmonary embolus. Exam is compared to previous study of   11/3/2018.    CT angiogram of the chest was obtained following administration of   intravenous contrast. Approximately 90 cc of Omnipaque 350 was   administered and 10 cc was discarded. Coronal, sagittal and MIP images   were submitted for review.    No hilar and or mediastinal adenopathy is noted.    Heart is enlarged in size. Calcification of the aortic valve and the   coronary arteries is noted. No pericardial effusion is noted. Pulmonary   arteries are normal in caliber. No filling defects are noted. Right   subclavian vein stent is noted.    No endobronchial lesions are noted. Centrilobular emphysema is noted   bilaterally. 0.2 cm nodule is noted in the left upper lobe. Minimal   atelectasis is noted involving portions of the lingulaand both lower   lobes. No pleural effusions are noted.    Below the diaphragm, visualized portions of the abdomen demonstrate   nasogastric tube in place. Left kidney is small in size.    Degenerative changes of the spine are noted.    IMPRESSION: No pulmonary embolus is noted.    < from: Xray Abdomen 2 Views (07.14.22 @ 08:10) >  PROCEDURE DATE:  07/14/2022          INTERPRETATION:  Clinical information: Small bowel obstruction    Portable supine and upright abdominal radiographs    COMPARISON: July 13, 2022    FINDINGS: Enteric tube with tip in the proximal duodenum. Multiple mildly   dilated small bowel loops, not significantly changed. No free   intraperitoneal air. Surgical clips along both pelvic sidewalls   compatible with prior lymphadenectomy. Right hipopen reduction internal   fixation.    IMPRESSION:    Unchanged small bowel obstruction    < from: Xray Small Bowel Series (07.15.22 @ 08:20) >  PROCEDURE DATE:  07/15/2022          INTERPRETATION:  CLINICAL INFORMATION: Small bowel obstruction    TECHNIQUE: A limited small bowel series was performed following the   administration of 100 cc of Gastroview and serial abdominal radiographs   performed prior to and at 4 and 8 hours after contrast administration.    COMPARISON:  Same day abdominal x-ray    FINDINGS:   radiograph of the abdomen demonstrates the presence of   an enteric tube with tip overlying the stomach, surgical clips in the   pelvis, an overall paucity of bowel gas, several dilated small bowel   loops and status post right hip open reduction internal fixation.      After contrast was administered there is opacification of dilated small   bowel loops. Contrast is seen definitively within the colon at 8 hours   and possibly at 4 hours. There is no free intraperitoneal air.    IMPRESSION: Partial small bowel obstruction.      < end of copied text >           Subjective:  BRIEF HOSPITAL COURSE:   89M with PMHx bladder CA sp resxn with neobladder, sp appy, DM, ESRD on HD, HTN, mod AS, sp elap for perforated gastric ulcer, CAD sp KAREN, sp CCY admitted with SBO to stepdown. Upgraded to ICU following a complicated course with vomiting andaspiration with subsequent development of acute hypoxemic respiratory failure and septic shock.       Events last 24 hours: Intubated overnight for increasing hypoxia and lethargy. Titrating fio2 to 50%. Levophed being titrated down. NG tube to low suction.     Review of Systems:  All 10 systems reviewed in detailed and found to be negative with the exception of what has already been described above    Allergies:  lisinopril (Diarrhea)  No Known Allergies    Meds  MEDICATIONS  (STANDING):  chlorhexidine 0.12% Liquid 15 milliLiter(s) Oral Mucosa every 12 hours  chlorhexidine 4% Liquid 1 Application(s) Topical <User Schedule>  dextrose 5%. 1000 milliLiter(s) (100 mL/Hr) IV Continuous <Continuous>  dextrose 5%. 1000 milliLiter(s) (50 mL/Hr) IV Continuous <Continuous>  dextrose 50% Injectable 25 Gram(s) IV Push once  dextrose 50% Injectable 12.5 Gram(s) IV Push once  dextrose 50% Injectable 25 Gram(s) IV Push once  glucagon  Injectable 1 milliGRAM(s) IntraMuscular once  heparin   Injectable 5000 Unit(s) SubCutaneous every 8 hours  hydrocortisone sodium succinate Injectable 50 milliGRAM(s) IV Push every 6 hours  insulin lispro (ADMELOG) corrective regimen sliding scale   SubCutaneous every 6 hours  lidocaine/prilocaine Cream 1 Application(s) Topical <User Schedule>  norepinephrine Infusion 0.45 MICROgram(s)/kG/Min (26.8 mL/Hr) IV Continuous <Continuous>  pantoprazole  Injectable 40 milliGRAM(s) IV Push daily  piperacillin/tazobactam IVPB.. 3.375 Gram(s) IV Intermittent every 8 hours  sodium chloride 0.9%. 1000 milliLiter(s) (75 mL/Hr) IV Continuous <Continuous>  vasopressin Infusion 0.04 Unit(s)/Min (2.4 mL/Hr) IV Continuous <Continuous>    MEDICATIONS  (PRN):  dextrose Oral Gel 15 Gram(s) Oral once PRN Blood Glucose LESS THAN 70 milliGRAM(s)/deciliter  ondansetron Injectable 4 milliGRAM(s) IV Push every 6 hours PRN Nausea    Physical Exam  T(C): 37.4 (07-16-22 @ 05:48), Max: 38.4 (07-15-22 @ 18:00)  HR: 125 (07-16-22 @ 07:00) (82 - 125)  BP: 94/45 (07-15-22 @ 10:15) (72/56 - 114/41)  RR: 25 (07-16-22 @ 07:00) (17 - 32)  SpO2: 100% (07-16-22 @ 07:00) (90% - 100%)  Gen: Alert, oriented, no distress  HEENT: Anicteric sclera, moist mucous membranes, no JVD, no lymphadenopathy, good dentition  Cardio: Regular rhythm and rate, normal S1S2, no murmurs  Resp: Clear to auscultation bilaterally, no wheezing or rhonchi  GI: Nontender, nondistended, normoactive bowel sounds  Ext: No cyanosis, clubbing or edema  Neuro: Nonfocal    Labs:                        10.1   12.89 )-----------( 245      ( 16 Jul 2022 05:20 )             31.7     07-16    140  |  106  |  68<H>  ----------------------------<  120<H>  5.6<H>   |  22  |  7.96<H>    Ca    8.3<L>      16 Jul 2022 05:20  Phos  6.9     07-16  Mg     1.9     07-16    TPro  5.9<L>  /  Alb  2.1<L>  /  TBili  0.6  /  DBili  x   /  AST  44<H>  /  ALT  21  /  AlkPhos  42  07-16      < from: Xray Chest 1 View-PORTABLE IMMEDIATE (07.12.22 @ 22:44) >  PROCEDURE DATE:  07/12/2022          INTERPRETATION:  AP erect chest on July 12, 2022 at 10:37 PM. Concern is   perforation.    Elevated left hemidiaphragm noted.    Heart magnified by technique.    Right subclavian axillary stent again noted.    Present film shows increasing left base atelectasis compared to July 5, 2021.    IMPRESSION: Increasing left base atelectasis.    < from: CT Abdomen and Pelvis w/ Oral Cont and w/ IV Cont (07.13.22 @ 00:46) >  PROCEDURE DATE:  07/13/2022          INTERPRETATION:  CLINICAL INFORMATION: Abdominal pain after eating    COMPARISON: CT of November 1, 2020.    CONTRAST/COMPLICATIONS:  IV Contrast: Omnipaque 350  100 cc administered   0 cc discarded  Oral Contrast: Omnipaque 300   Fruit 2o  Complications: None reported at time of study completion    PROCEDURE:  CT of the Abdomen and Pelvis was performed.  Sagittal and coronal reformatswere performed.    FINDINGS:  LOWER CHEST: Aortic valvular and coronary artery calcifications.   Dependent atelectatic changes at the lung bases.    LIVER: Within normal limits.  BILE DUCTS: Normal caliber.  GALLBLADDER: Cholecystectomy.  SPLEEN: Within normal limits.  PANCREAS: Fatty atrophy. Stable 1.4 x 1.9 cm cystic lesion in the   pancreatic tail. ADRENALS: Within normal limits.  KIDNEYS/URETERS: Atrophic.    BLADDER: Neobladder.  REPRODUCTIVE ORGANS: Prostatectomy.    BOWEL: The stomach is moderately distended with fluid. There are dilated   fluid-filled and fecalized loops of small bowel with a transition point   in the mid lower abdomen, likely related to matted adhesions underlying   the laparotomy incisional scar.. Small bowel anastomosis is decompressed.   There is mild mesenteric edema and interloop fluid. Distal small bowel is   collapsed. Appendix is not clearly identified. The colon is collapsed.   Diverticulosis.  PERITONEUM: Trace amount of right subhepatic free fluid.  VESSELS: Aorta is not dilated. Moderate atherosclerotic vascular   calcification.  RETROPERITONEUM/LYMPH NODES: No lymphadenopathy.  ABDOMINAL WALL: Within normal limits.  BONES: Vacuum gas phenomenon between L4/L5. Status post ORIF right   proximal femur.    IMPRESSION:    High-grade small bowel obstruction with transition point in the lower   abdomen, likely related to matted adhesions. Mild mesenteric edema and   interloop fluid. Nasogastric tube placement may partially relate symptoms.    Additional nonemergent findings as described above.    < from: CT Angio Chest PE Protocol w/ IV Cont (07.13.22 @ 19:42) >  PROCEDURE DATE:  07/13/2022          INTERPRETATION:  Clinical information: Acute respiratory failure.   Evaluate for pulmonary embolus. Exam is compared to previous study of   11/3/2018.    CT angiogram of the chest was obtained following administration of   intravenous contrast. Approximately 90 cc of Omnipaque 350 was   administered and 10 cc was discarded. Coronal, sagittal and MIP images   were submitted for review.    No hilar and or mediastinal adenopathy is noted.    Heart is enlarged in size. Calcification of the aortic valve and the   coronary arteries is noted. No pericardial effusion is noted. Pulmonary   arteries are normal in caliber. No filling defects are noted. Right   subclavian vein stent is noted.    No endobronchial lesions are noted. Centrilobular emphysema is noted   bilaterally. 0.2 cm nodule is noted in the left upper lobe. Minimal   atelectasis is noted involving portions of the lingulaand both lower   lobes. No pleural effusions are noted.    Below the diaphragm, visualized portions of the abdomen demonstrate   nasogastric tube in place. Left kidney is small in size.    Degenerative changes of the spine are noted.    IMPRESSION: No pulmonary embolus is noted.    < from: Xray Abdomen 2 Views (07.14.22 @ 08:10) >  PROCEDURE DATE:  07/14/2022          INTERPRETATION:  Clinical information: Small bowel obstruction    Portable supine and upright abdominal radiographs    COMPARISON: July 13, 2022    FINDINGS: Enteric tube with tip in the proximal duodenum. Multiple mildly   dilated small bowel loops, not significantly changed. No free   intraperitoneal air. Surgical clips along both pelvic sidewalls   compatible with prior lymphadenectomy. Right hipopen reduction internal   fixation.    IMPRESSION:    Unchanged small bowel obstruction    < from: Xray Small Bowel Series (07.15.22 @ 08:20) >  PROCEDURE DATE:  07/15/2022          INTERPRETATION:  CLINICAL INFORMATION: Small bowel obstruction    TECHNIQUE: A limited small bowel series was performed following the   administration of 100 cc of Gastroview and serial abdominal radiographs   performed prior to and at 4 and 8 hours after contrast administration.    COMPARISON:  Same day abdominal x-ray    FINDINGS:   radiograph of the abdomen demonstrates the presence of   an enteric tube with tip overlying the stomach, surgical clips in the   pelvis, an overall paucity of bowel gas, several dilated small bowel   loops and status post right hip open reduction internal fixation.      After contrast was administered there is opacification of dilated small   bowel loops. Contrast is seen definitively within the colon at 8 hours   and possibly at 4 hours. There is no free intraperitoneal air.    IMPRESSION: Partial small bowel obstruction.      < end of copied text >           Subjective:  BRIEF HOSPITAL COURSE:   89M with PMHx bladder CA sp resxn with neobladder, sp appy, DM, ESRD on HD, HTN, mod AS, sp elap for perforated gastric ulcer, CAD sp KAREN, sp CCY admitted with SBO to stepdown. Upgraded to ICU following a complicated course with vomiting andaspiration with subsequent development of acute hypoxemic respiratory failure and septic shock.     Events last 24 hours: Intubated overnight for increasing hypoxia and lethargy. Titrating fio2 to 50%. Levophed being titrated down. NG tube to low suction.     Review of Systems:  All 10 systems reviewed in detailed and found to be negative with the exception of what has already been described above    Allergies:  lisinopril (Diarrhea)  No Known Allergies    Meds  MEDICATIONS  (STANDING):  chlorhexidine 0.12% Liquid 15 milliLiter(s) Oral Mucosa every 12 hours  chlorhexidine 4% Liquid 1 Application(s) Topical <User Schedule>  dextrose 5%. 1000 milliLiter(s) (100 mL/Hr) IV Continuous <Continuous>  dextrose 5%. 1000 milliLiter(s) (50 mL/Hr) IV Continuous <Continuous>  dextrose 50% Injectable 25 Gram(s) IV Push once  dextrose 50% Injectable 12.5 Gram(s) IV Push once  dextrose 50% Injectable 25 Gram(s) IV Push once  glucagon  Injectable 1 milliGRAM(s) IntraMuscular once  heparin   Injectable 5000 Unit(s) SubCutaneous every 8 hours  hydrocortisone sodium succinate Injectable 50 milliGRAM(s) IV Push every 6 hours  insulin lispro (ADMELOG) corrective regimen sliding scale   SubCutaneous every 6 hours  lidocaine/prilocaine Cream 1 Application(s) Topical <User Schedule>  norepinephrine Infusion 0.45 MICROgram(s)/kG/Min (26.8 mL/Hr) IV Continuous <Continuous>  pantoprazole  Injectable 40 milliGRAM(s) IV Push daily  piperacillin/tazobactam IVPB.. 3.375 Gram(s) IV Intermittent every 8 hours  sodium chloride 0.9%. 1000 milliLiter(s) (75 mL/Hr) IV Continuous <Continuous>  vasopressin Infusion 0.04 Unit(s)/Min (2.4 mL/Hr) IV Continuous <Continuous>    MEDICATIONS  (PRN):  dextrose Oral Gel 15 Gram(s) Oral once PRN Blood Glucose LESS THAN 70 milliGRAM(s)/deciliter  ondansetron Injectable 4 milliGRAM(s) IV Push every 6 hours PRN Nausea    Physical Exam  T(C): 37.4 (07-16-22 @ 05:48), Max: 38.4 (07-15-22 @ 18:00)  HR: 125 (07-16-22 @ 07:00) (82 - 125)  BP: 94/45 (07-15-22 @ 10:15) (72/56 - 114/41)  RR: 25 (07-16-22 @ 07:00) (17 - 32)  SpO2: 100% (07-16-22 @ 07:00) (90% - 100%)  Gen: intubated, sedated  HEENT: Anicteric sclera, moist mucous membranes  Cardio: tachy  Resp: Clear to auscultation bilaterally, no wheezing or rhonchi  GI: Ng tube, abdomina scar  Ext: No cyanosis, clubbing or edema  Neuro: Nonfocal  : RUE AVF    Labs:                        10.1   12.89 )-----------( 245      ( 16 Jul 2022 05:20 )             31.7     07-16    140  |  106  |  68<H>  ----------------------------<  120<H>  5.6<H>   |  22  |  7.96<H>    Ca    8.3<L>      16 Jul 2022 05:20  Phos  6.9     07-16  Mg     1.9     07-16    TPro  5.9<L>  /  Alb  2.1<L>  /  TBili  0.6  /  DBili  x   /  AST  44<H>  /  ALT  21  /  AlkPhos  42  07-16      < from: Xray Chest 1 View-PORTABLE IMMEDIATE (07.12.22 @ 22:44) >  PROCEDURE DATE:  07/12/2022          INTERPRETATION:  AP erect chest on July 12, 2022 at 10:37 PM. Concern is   perforation.    Elevated left hemidiaphragm noted.    Heart magnified by technique.    Right subclavian axillary stent again noted.    Present film shows increasing left base atelectasis compared to July 5, 2021.    IMPRESSION: Increasing left base atelectasis.    < from: CT Abdomen and Pelvis w/ Oral Cont and w/ IV Cont (07.13.22 @ 00:46) >  PROCEDURE DATE:  07/13/2022          INTERPRETATION:  CLINICAL INFORMATION: Abdominal pain after eating    COMPARISON: CT of November 1, 2020.    CONTRAST/COMPLICATIONS:  IV Contrast: Omnipaque 350  100 cc administered   0 cc discarded  Oral Contrast: Omnipaque 300   Fruit 2o  Complications: None reported at time of study completion    PROCEDURE:  CT of the Abdomen and Pelvis was performed.  Sagittal and coronal reformatswere performed.    FINDINGS:  LOWER CHEST: Aortic valvular and coronary artery calcifications.   Dependent atelectatic changes at the lung bases.    LIVER: Within normal limits.  BILE DUCTS: Normal caliber.  GALLBLADDER: Cholecystectomy.  SPLEEN: Within normal limits.  PANCREAS: Fatty atrophy. Stable 1.4 x 1.9 cm cystic lesion in the   pancreatic tail. ADRENALS: Within normal limits.  KIDNEYS/URETERS: Atrophic.    BLADDER: Neobladder.  REPRODUCTIVE ORGANS: Prostatectomy.    BOWEL: The stomach is moderately distended with fluid. There are dilated   fluid-filled and fecalized loops of small bowel with a transition point   in the mid lower abdomen, likely related to matted adhesions underlying   the laparotomy incisional scar.. Small bowel anastomosis is decompressed.   There is mild mesenteric edema and interloop fluid. Distal small bowel is   collapsed. Appendix is not clearly identified. The colon is collapsed.   Diverticulosis.  PERITONEUM: Trace amount of right subhepatic free fluid.  VESSELS: Aorta is not dilated. Moderate atherosclerotic vascular   calcification.  RETROPERITONEUM/LYMPH NODES: No lymphadenopathy.  ABDOMINAL WALL: Within normal limits.  BONES: Vacuum gas phenomenon between L4/L5. Status post ORIF right   proximal femur.    IMPRESSION:    High-grade small bowel obstruction with transition point in the lower   abdomen, likely related to matted adhesions. Mild mesenteric edema and   interloop fluid. Nasogastric tube placement may partially relate symptoms.    Additional nonemergent findings as described above.    < from: CT Angio Chest PE Protocol w/ IV Cont (07.13.22 @ 19:42) >  PROCEDURE DATE:  07/13/2022          INTERPRETATION:  Clinical information: Acute respiratory failure.   Evaluate for pulmonary embolus. Exam is compared to previous study of   11/3/2018.    CT angiogram of the chest was obtained following administration of   intravenous contrast. Approximately 90 cc of Omnipaque 350 was   administered and 10 cc was discarded. Coronal, sagittal and MIP images   were submitted for review.    No hilar and or mediastinal adenopathy is noted.    Heart is enlarged in size. Calcification of the aortic valve and the   coronary arteries is noted. No pericardial effusion is noted. Pulmonary   arteries are normal in caliber. No filling defects are noted. Right   subclavian vein stent is noted.    No endobronchial lesions are noted. Centrilobular emphysema is noted   bilaterally. 0.2 cm nodule is noted in the left upper lobe. Minimal   atelectasis is noted involving portions of the lingulaand both lower   lobes. No pleural effusions are noted.    Below the diaphragm, visualized portions of the abdomen demonstrate   nasogastric tube in place. Left kidney is small in size.    Degenerative changes of the spine are noted.    IMPRESSION: No pulmonary embolus is noted.    < from: Xray Abdomen 2 Views (07.14.22 @ 08:10) >  PROCEDURE DATE:  07/14/2022          INTERPRETATION:  Clinical information: Small bowel obstruction    Portable supine and upright abdominal radiographs    COMPARISON: July 13, 2022    FINDINGS: Enteric tube with tip in the proximal duodenum. Multiple mildly   dilated small bowel loops, not significantly changed. No free   intraperitoneal air. Surgical clips along both pelvic sidewalls   compatible with prior lymphadenectomy. Right hipopen reduction internal   fixation.    IMPRESSION:    Unchanged small bowel obstruction    < from: Xray Small Bowel Series (07.15.22 @ 08:20) >  PROCEDURE DATE:  07/15/2022          INTERPRETATION:  CLINICAL INFORMATION: Small bowel obstruction    TECHNIQUE: A limited small bowel series was performed following the   administration of 100 cc of Gastroview and serial abdominal radiographs   performed prior to and at 4 and 8 hours after contrast administration.    COMPARISON:  Same day abdominal x-ray    FINDINGS:   radiograph of the abdomen demonstrates the presence of   an enteric tube with tip overlying the stomach, surgical clips in the   pelvis, an overall paucity of bowel gas, several dilated small bowel   loops and status post right hip open reduction internal fixation.      After contrast was administered there is opacification of dilated small   bowel loops. Contrast is seen definitively within the colon at 8 hours   and possibly at 4 hours. There is no free intraperitoneal air.    IMPRESSION: Partial small bowel obstruction.      < end of copied text >           Subjective:  BRIEF HOSPITAL COURSE:   89M with PMHx bladder CA sp resxn with neobladder, sp appy, DM, ESRD on HD, HTN, mod AS, sp elap for perforated gastric ulcer, CAD sp [facility]AREN, sp [facility] admitted with SBO to stepdown. Upgraded to ICU following a complicated course with vomiting andaspiration with subsequent development of acute hypoxemic respiratory failure and septic shock.     Events last 24 hours: Intubated overnight for increasing hypoxia and lethargy. Titrating fio2 to 50%. Levophed being titrated down. NG tube to low suction. patients daughter updated at bedside    Review of Systems:  All 10 systems reviewed in detailed and found to be negative with the exception of what has already been described above    Allergies:  lisinopril (Diarrhea)  No Known Allergies    Meds  MEDICATIONS  (STANDING):  chlorhexidine 0.12% Liquid 15 milliLiter(s) Oral Mucosa every 12 hours  chlorhexidine 4% Liquid 1 Application(s) Topical <User Schedule>  dextrose 5%. 1000 milliLiter(s) (100 mL/Hr) IV Continuous <Continuous>  dextrose 5%. 1000 milliLiter(s) (50 mL/Hr) IV Continuous <Continuous>  dextrose 50% Injectable 25 Gram(s) IV Push once  dextrose 50% Injectable 12.5 Gram(s) IV Push once  dextrose 50% Injectable 25 Gram(s) IV Push once  glucagon  Injectable 1 milliGRAM(s) IntraMuscular once  heparin   Injectable 5000 Unit(s) SubCutaneous every 8 hours  hydrocortisone sodium succinate Injectable 50 milliGRAM(s) IV Push every 6 hours  insulin lispro (ADMELOG) corrective regimen sliding scale   SubCutaneous every 6 hours  lidocaine/prilocaine Cream 1 Application(s) Topical <User Schedule>  norepinephrine Infusion 0.45 MICROgram(s)/kG/Min (26.8 mL/Hr) IV Continuous <Continuous>  pantoprazole  Injectable 40 milliGRAM(s) IV Push daily  piperacillin/tazobactam IVPB.. 3.375 Gram(s) IV Intermittent every 8 hours  sodium chloride 0.9%. 1000 milliLiter(s) (75 mL/Hr) IV Continuous <Continuous>  vasopressin Infusion 0.04 Unit(s)/Min (2.4 mL/Hr) IV Continuous <Continuous>    MEDICATIONS  (PRN):  dextrose Oral Gel 15 Gram(s) Oral once PRN Blood Glucose LESS THAN 70 milliGRAM(s)/deciliter  ondansetron Injectable 4 milliGRAM(s) IV Push every 6 hours PRN Nausea    Physical Exam  T(C): 37.4 (07-16-22 @ 05:48), Max: 38.4 (07-15-22 @ 18:00)  HR: 125 (07-16-22 @ 07:00) (82 - 125)  BP: 94/45 (07-15-22 @ 10:15) (72/56 - 114/41)  RR: 25 (07-16-22 @ 07:00) (17 - 32)  SpO2: 100% (07-16-22 @ 07:00) (90% - 100%)  Gen: intubated, sedated  HEENT: Anicteric sclera, moist mucous membranes  Cardio: tachy  Resp: Clear to auscultation bilaterally, no wheezing or rhonchi  GI: Ng tube, abdomina scar  Ext: No cyanosis, clubbing or edema  Neuro: Nonfocal  : RUE AVF    Labs:                        10.1   12.89 )-----------( 245      ( 16 Jul 2022 05:20 )             31.7     07-16    140  |  106  |  68<H>  ----------------------------<  120<H>  5.6<H>   |  22  |  7.96<H>    Ca    8.3<L>      16 Jul 2022 05:20  Phos  6.9     07-16  Mg     1.9     07-16    TPro  5.9<L>  /  Alb  2.1<L>  /  TBili  0.6  /  DBili  x   /  AST  44<H>  /  ALT  21  /  AlkPhos  42  07-16      < from: Xray Chest 1 View-PORTABLE IMMEDIATE (07.12.22 @ 22:44) >  PROCEDURE DATE:  07/12/2022          INTERPRETATION:  AP erect chest on July 12, 2022 at 10:37 PM. Concern is   perforation.    Elevated left hemidiaphragm noted.    Heart magnified by technique.    Right subclavian axillary stent again noted.    Present film shows increasing left base atelectasis compared to July 5, 2021.    IMPRESSION: Increasing left base atelectasis.    < from: CT Abdomen and Pelvis w/ Oral Cont and w/ IV Cont (07.13.22 @ 00:46) >  PROCEDURE DATE:  07/13/2022          INTERPRETATION:  CLINICAL INFORMATION: Abdominal pain after eating    COMPARISON: CT of November 1, 2020.    CONTRAST/COMPLICATIONS:  IV Contrast: Omnipaque 350  100 cc administered   0 cc discarded  Oral Contrast: Omnipaque 300   Fruit 2o  Complications: None reported at time of study completion    PROCEDURE:  CT of the Abdomen and Pelvis was performed.  Sagittal and coronal reformatswere performed.    FINDINGS:  LOWER CHEST: Aortic valvular and coronary artery calcifications.   Dependent atelectatic changes at the lung bases.    LIVER: Within normal limits.  BILE DUCTS: Normal caliber.  GALLBLADDER: Cholecystectomy.  SPLEEN: Within normal limits.  PANCREAS: Fatty atrophy. Stable 1.4 x 1.9 cm cystic lesion in the   pancreatic tail. ADRENALS: Within normal limits.  KIDNEYS/URETERS: Atrophic.    BLADDER: Neobladder.  REPRODUCTIVE ORGANS: Prostatectomy.    BOWEL: The stomach is moderately distended with fluid. There are dilated   fluid-filled and fecalized loops of small bowel with a transition point   in the mid lower abdomen, likely related to matted adhesions underlying   the laparotomy incisional scar.. Small bowel anastomosis is decompressed.   There is mild mesenteric edema and interloop fluid. Distal small bowel is   collapsed. Appendix is not clearly identified. The colon is collapsed.   Diverticulosis.  PERITONEUM: Trace amount of right subhepatic free fluid.  VESSELS: Aorta is not dilated. Moderate atherosclerotic vascular   calcification.  RETROPERITONEUM/LYMPH NODES: No lymphadenopathy.  ABDOMINAL WALL: Within normal limits.  BONES: Vacuum gas phenomenon between L4/L5. Status post ORIF right   proximal femur.    IMPRESSION:    High-grade small bowel obstruction with transition point in the lower   abdomen, likely related to matted adhesions. Mild mesenteric edema and   interloop fluid. Nasogastric tube placement may partially relate symptoms.    Additional nonemergent findings as described above.    < from: CT Angio Chest PE Protocol w/ IV Cont (07.13.22 @ 19:42) >  PROCEDURE DATE:  07/13/2022          INTERPRETATION:  Clinical information: Acute respiratory failure.   Evaluate for pulmonary embolus. Exam is compared to previous study of   11/3/2018.    CT angiogram of the chest was obtained following administration of   intravenous contrast. Approximately 90 cc of Omnipaque 350 was   administered and 10 cc was discarded. Coronal, sagittal and MIP images   were submitted for review.    No hilar and or mediastinal adenopathy is noted.    Heart is enlarged in size. Calcification of the aortic valve and the   coronary arteries is noted. No pericardial effusion is noted. Pulmonary   arteries are normal in caliber. No filling defects are noted. Right   subclavian vein stent is noted.    No endobronchial lesions are noted. Centrilobular emphysema is noted   bilaterally. 0.2 cm nodule is noted in the left upper lobe. Minimal   atelectasis is noted involving portions of the lingulaand both lower   lobes. No pleural effusions are noted.    Below the diaphragm, visualized portions of the abdomen demonstrate   nasogastric tube in place. Left kidney is small in size.    Degenerative changes of the spine are noted.    IMPRESSION: No pulmonary embolus is noted.    < from: Xray Abdomen 2 Views (07.14.22 @ 08:10) >  PROCEDURE DATE:  07/14/2022          INTERPRETATION:  Clinical information: Small bowel obstruction    Portable supine and upright abdominal radiographs    COMPARISON: July 13, 2022    FINDINGS: Enteric tube with tip in the proximal duodenum. Multiple mildly   dilated small bowel loops, not significantly changed. No free   intraperitoneal air. Surgical clips along both pelvic sidewalls   compatible with prior lymphadenectomy. Right hipopen reduction internal   fixation.    IMPRESSION:    Unchanged small bowel obstruction    < from: Xray Small Bowel Series (07.15.22 @ 08:20) >  PROCEDURE DATE:  07/15/2022          INTERPRETATION:  CLINICAL INFORMATION: Small bowel obstruction    TECHNIQUE: A limited small bowel series was performed following the   administration of 100 cc of Gastroview and serial abdominal radiographs   performed prior to and at 4 and 8 hours after contrast administration.    COMPARISON:  Same day abdominal x-ray    FINDINGS:   radiograph of the abdomen demonstrates the presence of   an enteric tube with tip overlying the stomach, surgical clips in the   pelvis, an overall paucity of bowel gas, several dilated small bowel   loops and status post right hip open reduction internal fixation.      After contrast was administered there is opacification of dilated small   bowel loops. Contrast is seen definitively within the colon at 8 hours   and possibly at 4 hours. There is no free intraperitoneal air.    IMPRESSION: Partial small bowel obstruction.      < end of copied text >    Mode: AC/ CMV (Assist Control/ Continuous Mandatory Ventilation)  RR (machine): 26  TV (machine): 450  FiO2: 50  PEEP: 5  ITime: 1  MAP: 19  PIP: 24

## 2022-07-16 NOTE — PROGRESS NOTE ADULT - SUBJECTIVE AND OBJECTIVE BOX
NEPHROLOGY INTERVAL HPI/OVERNIGHT EVENTS:    Date of Service: 22 @ 11:42    --Intubated earlier due to increased lethargy and increased WOB.  On pressors.  7/15 now on pressors in ccu with inc lethargy, sugical discussion with family noted.  GOC discussion to occur when family arrives this am   had significant BM outpt ( 3L recorded in surgical note  --No acute distress.   Tolerated dialysis ok.  C/o thirst.  NG on drainage again.  CTA done --negative for PE or effusion.    HPI:  89 yo with ESRD on MWF/[facility] unit admitted with abdominal pain and vomiting.  PMHX significant for hx fo bladder ca--cystectomy and neobladder,  perf gastric ulcer with exp lap, DM, HTN and CAD.  CT ileus vs SBO.  Feeling improved post NG drainage of ~ 1 Liter bilious fluid.  Had similar episode in 2020--ilus vs SBO --improved with NG drainage without surgical intervetnion.    PMHX and PSHX.  --HTN  --Hx of Bladder CA   --Hx of Cystectomy and Neobladder.  --DM  --CAD ( post PCI)--[place] to RCA 2016, [place] to LAD and D1 in 2018  --CHF (HFpEF)  --Moderate Aortic Stenosis  --Hx of perforated gastric ulcer post exp lap.  --Ileus vs SBO in 2020.    MEDICATIONS  (STANDING):  chlorhexidine 0.12% Liquid 15 milliLiter(s) Oral Mucosa every 12 hours  chlorhexidine 4% Liquid 1 Application(s) Topical <User Schedule>  dextrose 5%. 1000 milliLiter(s) (100 mL/Hr) IV Continuous <Continuous>  dextrose 5%. 1000 milliLiter(s) (50 mL/Hr) IV Continuous <Continuous>  dextrose 50% Injectable 25 Gram(s) IV Push once  dextrose 50% Injectable 12.5 Gram(s) IV Push once  dextrose 50% Injectable 25 Gram(s) IV Push once  glucagon  Injectable 1 milliGRAM(s) IntraMuscular once  heparin   Injectable 5000 Unit(s) SubCutaneous every 8 hours  hydrocortisone sodium succinate Injectable 50 milliGRAM(s) IV Push every 6 hours  insulin lispro (ADMELOG) corrective regimen sliding scale   SubCutaneous every 6 hours  lidocaine/prilocaine Cream 1 Application(s) Topical <User Schedule>  norepinephrine Infusion 0.45 MICROgram(s)/kG/Min (26.8 mL/Hr) IV Continuous <Continuous>  pantoprazole  Injectable 40 milliGRAM(s) IV Push daily  piperacillin/tazobactam IVPB.. 3.375 Gram(s) IV Intermittent every 8 hours  sodium chloride 0.9%. 1000 milliLiter(s) (75 mL/Hr) IV Continuous <Continuous>  vasopressin Infusion 0.04 Unit(s)/Min (2.4 mL/Hr) IV Continuous <Continuous>    MEDICATIONS  (PRN):  acetaminophen  Suppository .. 650 milliGRAM(s) Rectal every 6 hours PRN Temp greater or equal to 38.5C (101.3F)  dextrose Oral Gel 15 Gram(s) Oral once PRN Blood Glucose LESS THAN 70 milliGRAM(s)/deciliter  ondansetron Injectable 4 milliGRAM(s) IV Push every 6 hours PRN Nausea    Vital Signs Last 24 Hrs  T(C): 38.4 (2022 08:52), Max: 38.4 (15 Jul 2022 18:00)  T(F): 101.2 (2022 08:52), Max: 101.2 (15 Jul 2022 18:00)  HR: 110 (2022 11:00) (82 - 125)  BP: --  BP(mean): --  RR: 26 (2022 11:00) (17 - 32)  SpO2: 95% (2022 11:00) (90% - 100%)    Parameters below as of 2022 04:30  Patient On (Oxygen Delivery Method): ventilator    O2 Concentration (%): 100  Daily     Daily Weight in k.2 (2022 05:48)    15 @ 07:01  -  -16 @ 07:00  --------------------------------------------------------  IN: 5511 mL / OUT: 450 mL / NET: 5061 mL    PHYSICAL EXAM:  GENERAL: On vent /sedated  CHEST/LUNG: fair air entry  HEART: S1S2 RRR  ABDOMEN: distension less,  no bs.  EXTREMITIES: no edema  SKIN:     LABS:                        10.1   12.89 )-----------( 245      ( 2022 05:20 )             31.7         140  |  106  |  68<H>  ----------------------------<  120<H>  5.6<H>   |  22  |  7.96<H>    Ca    8.3<L>      2022 05:20  Phos  6.9       Mg     1.9         TPro  5.9<L>  /  Alb  2.1<L>  /  TBili  0.6  /  DBili  x   /  AST  44<H>  /  ALT  21  /  AlkPhos  42          Magnesium, Serum: 1.9 mg/dL ( @ 05:20)  Phosphorus Level, Serum: 6.9 mg/dL ( @ 05:20)    ABG - ( 2022 06:13 )  pH, Arterial: 7.28  pH, Blood: x     /  pCO2: 40    /  pO2: 220   / HCO3: 19    / Base Excess: -7.5  /  SaO2: 100                   RADIOLOGY & ADDITIONAL TESTS:

## 2022-07-16 NOTE — PROGRESS NOTE ADULT - SUBJECTIVE AND OBJECTIVE BOX
Patient is a 89y old  Male who presents with a chief complaint of SMall bowel obstruction (16 Jul 2022 01:15)      BRIEF HOSPITAL COURSE:   89M with PMHx bladder CA sp resxn with neobladder, sp appy, DM, ESRD on HD, HTN, mod AS, sp elap for perforated gastric ulcer, CAD sp KAREN, sp CCY admitted with SBO to stepdown. Upgraded to ICU following a complicated course with vomiting andaspiration with subsequent development of acute hypoxemic respiratory failure and septic shock.       Events last 24 hours: febrile last evening, overnight afebrile, no HD yesterday, lethargic with progressive decline of mentation throughout the day,             on HFNC weaned to 50%/30L.      Events last 24 hours: ***    PAST MEDICAL & SURGICAL HISTORY:  Bladder cancer      HTN (hypertension)      ESRD on dialysis  MWF      Type 2 diabetes mellitus      Chronic CHF      Moderate aortic stenosis      History of bladder cancer  s/p resection with neobladder      History of appendectomy      History of exploratory laparotomy  Perforated Gastric Ulcer, Peritonitis      History of percutaneous angioplasty  PCI w/ KAREN RCA 12/2016, KAREN to LAD and D1 on 11/1/2018      S/P arteriovenous (AV) fistula creation  Right      History of cholecystectomy          Review of Systems:  unable to perform at this time due to pts AMS      Medications:  piperacillin/tazobactam IVPB.. 3.375 Gram(s) IV Intermittent every 8 hours    norepinephrine Infusion 0.45 MICROgram(s)/kG/Min IV Continuous <Continuous>      ondansetron Injectable 4 milliGRAM(s) IV Push every 6 hours PRN      heparin   Injectable 5000 Unit(s) SubCutaneous every 8 hours    pantoprazole  Injectable 40 milliGRAM(s) IV Push daily      dextrose 50% Injectable 25 Gram(s) IV Push once  dextrose 50% Injectable 12.5 Gram(s) IV Push once  dextrose 50% Injectable 25 Gram(s) IV Push once  dextrose Oral Gel 15 Gram(s) Oral once PRN  glucagon  Injectable 1 milliGRAM(s) IntraMuscular once  hydrocortisone sodium succinate Injectable 50 milliGRAM(s) IV Push every 6 hours  insulin lispro (ADMELOG) corrective regimen sliding scale   SubCutaneous every 6 hours  vasopressin Infusion 0.04 Unit(s)/Min IV Continuous <Continuous>    dextrose 5%. 1000 milliLiter(s) IV Continuous <Continuous>  dextrose 5%. 1000 milliLiter(s) IV Continuous <Continuous>  sodium chloride 0.9%. 1000 milliLiter(s) IV Continuous <Continuous>      chlorhexidine 0.12% Liquid 15 milliLiter(s) Oral Mucosa every 12 hours  chlorhexidine 4% Liquid 1 Application(s) Topical <User Schedule>  lidocaine/prilocaine Cream 1 Application(s) Topical <User Schedule>            ICU Vital Signs Last 24 Hrs  T(C): 37.3 (16 Jul 2022 00:20), Max: 38.4 (15 Jul 2022 18:00)  T(F): 99.2 (16 Jul 2022 00:20), Max: 101.2 (15 Jul 2022 18:00)  HR: 87 (16 Jul 2022 01:00) (82 - 106)  BP: 94/45 (15 Jul 2022 10:15) (61/33 - 114/41)  BP(mean): 55 (15 Jul 2022 10:15) (40 - 61)  ABP: 128/38 (16 Jul 2022 01:00) (105/30 - 137/42)  ABP(mean): 71 (16 Jul 2022 01:00) (55 - 75)  RR: 26 (16 Jul 2022 03:11) (17 - 33)  SpO2: 93% (16 Jul 2022 03:11) (92% - 100%)    O2 Parameters below as of 16 Jul 2022 03:11  Patient On (Oxygen Delivery Method): nasal cannula, high flow  O2 Flow (L/min): 30  O2 Concentration (%): 50        ABG - ( 16 Jul 2022 04:29 )  pH, Arterial: 7.16  pH, Blood: x     /  pCO2: 64    /  pO2: 70    / HCO3: 23    / Base Excess: -6.9  /  SaO2: 93                        LABS:                        12.2   1.26  )-----------( 261      ( 15 Jul 2022 04:27 )             39.2     07-15    136  |  102  |  58<H>  ----------------------------<  176<H>  4.2   |  25  |  7.52<H>    Ca    8.6      15 Jul 2022 15:35  Phos  4.8     07-15  Mg     2.2     07-15    TPro  7.4  /  Alb  3.0<L>  /  TBili  0.7  /  DBili  x   /  AST  3<L>  /  ALT  12  /  AlkPhos  71  07-15          CAPILLARY BLOOD GLUCOSE      POCT Blood Glucose.: 121 mg/dL (16 Jul 2022 04:26)        CULTURES:      Physical Examination:    General: on HFNC, awakens to voice but with some lethargy, withdraws to painful stimuli    HEENT: Pupils equal, reactive to light.  Symmetric.    PULM: Course BS bilaterally, no wheezing, diminished at bases bilaterally    CVS: Regular rate and rhythm    ABD: Softly distended, +BS, NGT with green bilious fluid, no facial grimacing with palpation    EXT: SCDs    SKIN: Warm and well perfused, no rashes noted.    RADIOLOGY:   ACC: [id number] EXAM:  ECHO TTE WO CON COMP W DOPP                          PROCEDURE DATE:  07/14/2022          INTERPRETATION:  Transthoracic Echocardiography Report (TTE)     Demographics     Patient name         TIM BERRIOS   Age           89 year(s)     Med Rec #            [id number]          Gender        Male     Account #            [id number]       Date of Birth 06/18/1932     Interpreting         Bc Grovse MD  Room Number   0334   Physician     Referring Physician  Wild Pineda      Sonographer   Shannon Meeks,                                                         [facility]                                                         Melanie Roberts     Date of study        07/14/2022 07:45                        AM     Height               72.05 in           Weight        141.1 pounds    Type of Study:     TTE procedure: ECHO TTE WO CON COMP W DOP     BP: 127/46 mmHg     Study Location: [facility] Quality: Technically Difficullt    Indications   1) I50.9 - Heart failure    M-Mode Measurements (cm)     LVEDd: 5.17 cm            LVESd: 3.36 cm   IVSEd: 0.93 cm   LVPWd: 0.93 cm            AO Root Dimension: 2.8 cm                             ACS: 1.2 cm                             LA: 4 cm                             LVOT: 2 cm    Doppler Measurements:     AV Velocity:356 cm/s                 MV Peak E-Wave: 122 cm/s   AV Peak Gradient: 50.69 mmHg         MV Peak A-Wave: 106 cm/s   AV Mean Gradient: 25 mmHg            MV E/A Ratio: 1.15 %   AV Area (Continuity):1.11 cm^2       MV Peak Gradient: 5.95 mmHg   TR Velocity:194 cm/s   TR Gradient:15.0544 mmHg   Estimated RAP:3 mmHg   RVSP:23 mmHg     Findings     Mitral Valve   Moderate mitral annular calcification is present.   The mitral valve leaflets appear mildly calcified.   Mean transmitral gradient is 4 mmHg; this finding is consistent with mild   mitral stenosis.   Trace mitral regurgitation is present.     Aortic Valve   The aortic valve appears moderately calcified. Valve opening seems to be   restricted.   Peak and mean transaortic gradients are 51 and 25 mmHg respectively; this   finding is consistent with moderate aortic stenosis.     Tricuspid Valve   The tricuspid valve leaflets are thin and pliable; valve motion is   normal.   Trace tricuspid valve regurgitation is present.     Pulmonic Valve   Normal appearing pulmonic valve structure.   Trace pulmonic valvular regurgitation is present.     Left Atrium   The left atrium is mildly dilated.     Left Ventricle   The left ventricle is normal in size, wall thickness, wall motion and   contractility.   Estimated left ventricular ejection fraction is 60-65 %.     Right Atrium   Normal appearing right atrium.     Right Ventricle   Normal appearing right ventricle structure and function.     Pericardial Effusion   No evidence of pericardial effusion.     Pleural Effusion   No evidence of pleural effusion.     Miscellaneous   The IVC appears normal.     Impression     Summary     Moderate mitral annular calcification is present.   The mitral valve leaflets appear mildly calcified.   Mean transmitral gradient is 4 mmHg; this finding is consistent with mild   mitral stenosis.   Trace mitral regurgitation is present.   The aortic valve appears moderately calcified. Valve opening seems to be   restricted.   Peak and mean transaortic gradients are 51 and 25 mmHg respectively; this   finding is consistent with moderate aortic stenosis.   The tricuspid valve leaflets are thin and pliable; valve motion is   normal.   Trace tricuspid valve regurgitation is present.   The left ventricle is normal in size, wall thickness, wall motion and   contractility.   Estimated left ventricular ejection fraction is 60-65 %.   Normal appearing right ventricle structure and function.     Signature     ----------------------------------------------------------------   Electronically signed by Bc Groves MD(Interpreting   physician) on 07/14/2022 12:55 PM    ACC: [id number] EXAM:  XR SM INTEST SNGL CON STDY                          PROCEDURE DATE:  07/15/2022          INTERPRETATION:  CLINICAL INFORMATION: Small bowel obstruction    TECHNIQUE: A limited small bowel series was performed following the   administration of 100 cc of Gastroview and serial abdominal radiographs   performed prior to and at 4 and 8 hours after contrast administration.    COMPARISON:  Same day abdominal x-ray    FINDINGS:   radiograph of the abdomen demonstrates the presence of   an enteric tube with tip overlying the stomach, surgical clips in the   pelvis, an overall paucity of bowel gas, several dilated small bowel   loops and status post right hip open reduction internal fixation.      After contrast was administered there is opacification of dilated small   bowel loops. Contrast is seen definitively within the colon at 8 hours   and possibly at 4 hours. There is no free intraperitoneal air.    IMPRESSION: Partial small bowel obstruction.    --- End of Report ---            JAMIA MEADOWS JR, MD; Attending Radiologist  This document has been electronically signed. Jul 15 2022  8:22AM      CRITICAL CARE TIME SPENT: 65 mins of additional CC time assessing problems of acute illness that poses high probability of life threatening deterioration or end organ damage/dysfunction.  Medical decision making including Initiating plan of care, reviewing data, reviewing radiology,direct patient bedside evaluation and interpretation of vital signs, any necessary ventilator management , discussion with multidisciplinary team, discussing goals of care with patient/family, all non inclusive of procedures    Patient is a 89y old  Male who presents with a chief complaint of SMall bowel obstruction (16 Jul 2022 01:15)      BRIEF HOSPITAL COURSE:   89M with PMHx bladder CA sp resxn with neobladder, sp appy, DM, ESRD on HD, HTN, mod AS, sp elap for perforated gastric ulcer, CAD sp KAREN, sp CCY admitted with SBO to stepdown. Upgraded to ICU following a complicated course with vomiting andaspiration with subsequent development of acute hypoxemic respiratory failure and septic shock.       Events last 24 hours: Called to bedside to evaluate pt for worsening AMS/unresponsiveness, BS 120s, no gag with no significant response to pain, ABG showed acute hypercarbia, remains in 2 pressor shock.       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 3.375 Gram(s) IV Intermittent every 8 hours    norepinephrine Infusion 0.45 MICROgram(s)/kG/Min IV Continuous <Continuous>      ondansetron Injectable 4 milliGRAM(s) IV Push every 6 hours PRN      heparin   Injectable 5000 Unit(s) SubCutaneous every 8 hours    pantoprazole  Injectable 40 milliGRAM(s) IV Push daily      dextrose 50% Injectable 25 Gram(s) IV Push once  dextrose 50% Injectable 12.5 Gram(s) IV Push once  dextrose 50% Injectable 25 Gram(s) IV Push once  dextrose Oral Gel 15 Gram(s) Oral once PRN  glucagon  Injectable 1 milliGRAM(s) IntraMuscular once  hydrocortisone sodium succinate Injectable 50 milliGRAM(s) IV Push every 6 hours  insulin lispro (ADMELOG) corrective regimen sliding scale   SubCutaneous every 6 hours  vasopressin Infusion 0.04 Unit(s)/Min IV Continuous <Continuous>    dextrose 5%. 1000 milliLiter(s) IV Continuous <Continuous>  dextrose 5%. 1000 milliLiter(s) IV Continuous <Continuous>  sodium chloride 0.9%. 1000 milliLiter(s) IV Continuous <Continuous>      chlorhexidine 0.12% Liquid 15 milliLiter(s) Oral Mucosa every 12 hours  chlorhexidine 4% Liquid 1 Application(s) Topical <User Schedule>  lidocaine/prilocaine Cream 1 Application(s) Topical <User Schedule>            ICU Vital Signs Last 24 Hrs  T(C): 37.3 (16 Jul 2022 00:20), Max: 38.4 (15 Jul 2022 18:00)  T(F): 99.2 (16 Jul 2022 00:20), Max: 101.2 (15 Jul 2022 18:00)  HR: 87 (16 Jul 2022 01:00) (82 - 106)  BP: 94/45 (15 Jul 2022 10:15) (61/33 - 114/41)  BP(mean): 55 (15 Jul 2022 10:15) (40 - 61)  ABP: 128/38 (16 Jul 2022 01:00) (105/30 - 137/42)  ABP(mean): 71 (16 Jul 2022 01:00) (55 - 75)  RR: 26 (16 Jul 2022 03:11) (17 - 33)  SpO2: 93% (16 Jul 2022 03:11) (92% - 100%)    O2 Parameters below as of 16 Jul 2022 03:11  Patient On (Oxygen Delivery Method): nasal cannula, high flow  O2 Flow (L/min): 30  O2 Concentration (%): 50        ABG - ( 16 Jul 2022 04:29 )  pH, Arterial: 7.16  pH, Blood: x     /  pCO2: 64    /  pO2: 70    / HCO3: 23    / Base Excess: -6.9  /  SaO2: 93          I&O's Summary    14 Jul 2022 07:01  -  15 Jul 2022 07:00  --------------------------------------------------------  IN: 0 mL / OUT: 3575 mL / NET: -3575 mL    15 Jul 2022 07:01  -  16 Jul 2022 05:09  --------------------------------------------------------  IN: 3308 mL / OUT: 350 mL / NET: 2958 mL      LABS:                        12.2   1.26  )-----------( 261      ( 15 Jul 2022 04:27 )             39.2     07-15    136  |  102  |  58<H>  ----------------------------<  176<H>  4.2   |  25  |  7.52<H>    Ca    8.6      15 Jul 2022 15:35  Phos  4.8     07-15  Mg     2.2     07-15    TPro  7.4  /  Alb  3.0<L>  /  TBili  0.7  /  DBili  x   /  AST  3<L>  /  ALT  12  /  AlkPhos  71  07-15          CAPILLARY BLOOD GLUCOSE      POCT Blood Glucose.: 121 mg/dL (16 Jul 2022 04:26)            Physical Examination:    General: unresponsive to pain/deep sternal rub, no gag with suctioning,     HEENT: Pupils equal, reactive to light.  Symmetric.    PULM: Course BS bilaterally, + rhonchi bilateral bases, no wheezing    CVS: Regular rate and rhythm    ABD: Softly distended, +BS, NGT with green bilious fluid    EXT: SCDs    SKIN: Warm , no rashes noted.    RADIOLOGY:   ACC: [id number] EXAM:  ECHO TTE WO CON COMP W DOPP                          PROCEDURE DATE:  07/14/2022          INTERPRETATION:  Transthoracic Echocardiography Report (TTE)     Demographics     Patient name         TIM BERRIOS   Age           89 year(s)     Med Rec #            [id number]          Gender        Male     Account #            [id number]       Date of Birth 06/18/1932     Interpreting         Bc Groves MD  Room Number   0334   Physician     Referring Physician  Wild Pineda      Sonographer   Shannon Meeks,                                                         [facility]                                                         Melanie Roberts     Date of study        07/14/2022 07:45                        AM     Height               72.05 in           Weight        141.1 pounds    Type of Study:     TTE procedure: ECHO TTE WO CON COMP W DOP     BP: 127/46 mmHg     Study Location: [facility] Quality: Technically Difficullt    Indications   1) I50.9 - Heart failure    M-Mode Measurements (cm)     LVEDd: 5.17 cm            LVESd: 3.36 cm   IVSEd: 0.93 cm   LVPWd: 0.93 cm            AO Root Dimension: 2.8 cm                             ACS: 1.2 cm                             LA: 4 cm                             LVOT: 2 cm    Doppler Measurements:     AV Velocity:356 cm/s                 MV Peak E-Wave: 122 cm/s   AV Peak Gradient: 50.69 mmHg         MV Peak A-Wave: 106 cm/s   AV Mean Gradient: 25 mmHg            MV E/A Ratio: 1.15 %   AV Area (Continuity):1.11 cm^2       MV Peak Gradient: 5.95 mmHg   TR Velocity:194 cm/s   TR Gradient:15.0544 mmHg   Estimated RAP:3 mmHg   RVSP:23 mmHg     Findings     Mitral Valve   Moderate mitral annular calcification is present.   The mitral valve leaflets appear mildly calcified.   Mean transmitral gradient is 4 mmHg; this finding is consistent with mild   mitral stenosis.   Trace mitral regurgitation is present.     Aortic Valve   The aortic valve appears moderately calcified. Valve opening seems to be   restricted.   Peak and mean transaortic gradients are 51 and 25 mmHg respectively; this   finding is consistent with moderate aortic stenosis.     Tricuspid Valve   The tricuspid valve leaflets are thin and pliable; valve motion is   normal.   Trace tricuspid valve regurgitation is present.     Pulmonic Valve   Normal appearing pulmonic valve structure.   Trace pulmonic valvular regurgitation is present.     Left Atrium   The left atrium is mildly dilated.     Left Ventricle   The left ventricle is normal in size, wall thickness, wall motion and   contractility.   Estimated left ventricular ejection fraction is 60-65 %.     Right Atrium   Normal appearing right atrium.     Right Ventricle   Normal appearing right ventricle structure and function.     Pericardial Effusion   No evidence of pericardial effusion.     Pleural Effusion   No evidence of pleural effusion.     Miscellaneous   The IVC appears normal.     Impression     Summary     Moderate mitral annular calcification is present.   The mitral valve leaflets appear mildly calcified.   Mean transmitral gradient is 4 mmHg; this finding is consistent with mild   mitral stenosis.   Trace mitral regurgitation is present.   The aortic valve appears moderately calcified. Valve opening seems to be   restricted.   Peak and mean transaortic gradients are 51 and 25 mmHg respectively; this   finding is consistent with moderate aortic stenosis.   The tricuspid valve leaflets are thin and pliable; valve motion is   normal.   Trace tricuspid valve regurgitation is present.   The left ventricle is normal in size, wall thickness, wall motion and   contractility.   Estimated left ventricular ejection fraction is 60-65 %.   Normal appearing right ventricle structure and function.     Signature     ----------------------------------------------------------------   Electronically signed by Bc Groves MD(Interpreting   physician) on 07/14/2022 12:55 PM    ACC: [id number] EXAM:  XR SM INTEST SNGL CON STDY                          PROCEDURE DATE:  07/15/2022          INTERPRETATION:  CLINICAL INFORMATION: Small bowel obstruction    TECHNIQUE: A limited small bowel series was performed following the   administration of 100 cc of Gastroview and serial abdominal radiographs   performed prior to and at 4 and 8 hours after contrast administration.    COMPARISON:  Same day abdominal x-ray    FINDINGS:   radiograph of the abdomen demonstrates the presence of   an enteric tube with tip overlying the stomach, surgical clips in the   pelvis, an overall paucity of bowel gas, several dilated small bowel   loops and status post right hip open reduction internal fixation.      After contrast was administered there is opacification of dilated small   bowel loops. Contrast is seen definitively within the colon at 8 hours   and possibly at 4 hours. There is no free intraperitoneal air.    IMPRESSION: Partial small bowel obstruction.    --- End of Report ---            JAMIA MEADOWS JR, MD; Attending Radiologist  This document has been electronically signed. Jul 15 2022  8:22AM      CRITICAL CARE TIME SPENT: 65 mins of additional CC time assessing problems of acute illness that poses high probability of life threatening deterioration or end organ damage/dysfunction.  Medical decision making including Initiating plan of care, reviewing data, reviewing radiology,direct patient bedside evaluation and interpretation of vital signs, any necessary ventilator management , discussion with multidisciplinary team, discussing goals of care with patient/family, all non inclusive of procedures

## 2022-07-16 NOTE — CHART NOTE - NSCHARTNOTEFT_GEN_A_CORE
Critical Care Update and Goals of CAre    I had a detailed conversation with the patients HCP/Son Grupo and his 2 sisters. I updated them regarding the patient's continued deterioration overnight and today. Including the need for intubation and ventilator support and worsening shock. Additionally attempts at HD were unsuccessful due to his shock state and the development of severe tachyarrhythmias.      They all understand that he has worsened tremendously and that the expectation for improvement and survival at this point is not realistic. They collectively agree that artificially keeping him alive and maintaining him artificially on life support this way is not what he would want. They collectively agree with proceeding with comfort measures and the removal of life support, to allow him to pass peacefully and naturally.]    They wish to attempt a Facetime call with their mother, the patient's wife, who herself is in a rehab facility. They will be doing this ASAP today and then will let us know when they are ready to proceed with the removal of Life support.  Will now initiate comfort measures only, will no longer escalate vasopressors and will DC at the time of extubation.       A total of 45 mins of additional time was spent discussing the care of this critically ill patient with the family, as well as documenting and providing care.

## 2022-07-16 NOTE — PROCEDURE NOTE - NSINDICATIONS_GEN_A_CORE
critical illness/emergency venous access
airway protection/critical patient/mental status change/respiratory failure

## 2022-07-16 NOTE — CHART NOTE - NSCHARTNOTESELECT_GEN_ALL_CORE
Event Note
Event Note
Hypoxia/Event Note
Event Note
Goals of Care Discussion
Goals of care/Event Note

## 2022-07-16 NOTE — PROGRESS NOTE ADULT - SUBJECTIVE AND OBJECTIVE BOX
89 yo male ESRD on HD, SBO, CHF, aortic stenosis, DM, CAD, cystectomy, ileal conduit, aspiration pneumonia, intubated earlier today, having Bms, one this am, NGT output 100ml. Remains on 2 vasopressors     Intubated  doesn't follow any commands on verbal nor physical stimuli  NGT in place  Abd soft, ND, midline scar, upper reducible soft ventral hernia   RUE AVF              Vital Signs Last 24 Hrs  T(C): 37.4 (16 Jul 2022 05:48), Max: 38.4 (15 Jul 2022 18:00)  T(F): 99.3 (16 Jul 2022 05:48), Max: 101.2 (15 Jul 2022 18:00)  HR: 125 (16 Jul 2022 07:00) (82 - 125)  BP: 94/45 (15 Jul 2022 10:15) (94/45 - 114/41)  BP(mean): 55 (15 Jul 2022 10:15) (55 - 57)  RR: 25 (16 Jul 2022 07:00) (17 - 32)  SpO2: 100% (16 Jul 2022 07:00) (90% - 100%)    Parameters below as of 16 Jul 2022 04:30  Patient On (Oxygen Delivery Method): ventilator    O2 Concentration (%): 100                          10.1   12.89 )-----------( 245      ( 16 Jul 2022 05:20 )             31.7       07-16    140  |  106  |  68<H>  ----------------------------<  120<H>  5.6<H>   |  22  |  7.96<H>    Ca    8.3<L>      16 Jul 2022 05:20  Phos  6.9     07-16  Mg     1.9     07-16    TPro  5.9<L>  /  Alb  2.1<L>  /  TBili  0.6  /  DBili  x   /  AST  44<H>  /  ALT  21  /  AlkPhos  42  07-16      LIVER FUNCTIONS - ( 16 Jul 2022 05:20 )  Alb: 2.1 g/dL / Pro: 5.9 gm/dL / ALK PHOS: 42 U/L / ALT: 21 U/L / AST: 44 U/L / GGT: x             MEDICATIONS  (STANDING):  chlorhexidine 0.12% Liquid 15 milliLiter(s) Oral Mucosa every 12 hours  chlorhexidine 4% Liquid 1 Application(s) Topical <User Schedule>  dextrose 5%. 1000 milliLiter(s) (100 mL/Hr) IV Continuous <Continuous>  dextrose 5%. 1000 milliLiter(s) (50 mL/Hr) IV Continuous <Continuous>  dextrose 50% Injectable 25 Gram(s) IV Push once  dextrose 50% Injectable 12.5 Gram(s) IV Push once  dextrose 50% Injectable 25 Gram(s) IV Push once  glucagon  Injectable 1 milliGRAM(s) IntraMuscular once  heparin   Injectable 5000 Unit(s) SubCutaneous every 8 hours  hydrocortisone sodium succinate Injectable 50 milliGRAM(s) IV Push every 6 hours  insulin lispro (ADMELOG) corrective regimen sliding scale   SubCutaneous every 6 hours  lidocaine/prilocaine Cream 1 Application(s) Topical <User Schedule>  norepinephrine Infusion 0.45 MICROgram(s)/kG/Min (26.8 mL/Hr) IV Continuous <Continuous>  pantoprazole  Injectable 40 milliGRAM(s) IV Push daily  piperacillin/tazobactam IVPB.. 3.375 Gram(s) IV Intermittent every 8 hours  sodium chloride 0.9%. 1000 milliLiter(s) (75 mL/Hr) IV Continuous <Continuous>  vasopressin Infusion 0.04 Unit(s)/Min (2.4 mL/Hr) IV Continuous <Continuous>    MEDICATIONS  (PRN):  dextrose Oral Gel 15 Gram(s) Oral once PRN Blood Glucose LESS THAN 70 milliGRAM(s)/deciliter  ondansetron Injectable 4 milliGRAM(s) IV Push every 6 hours PRN Nausea      MEDICATIONS  (STANDING):  chlorhexidine 0.12% Liquid 15 milliLiter(s) Oral Mucosa every 12 hours  chlorhexidine 4% Liquid 1 Application(s) Topical <User Schedule>  dextrose 5%. 1000 milliLiter(s) (100 mL/Hr) IV Continuous <Continuous>  dextrose 5%. 1000 milliLiter(s) (50 mL/Hr) IV Continuous <Continuous>  dextrose 50% Injectable 25 Gram(s) IV Push once  dextrose 50% Injectable 12.5 Gram(s) IV Push once  dextrose 50% Injectable 25 Gram(s) IV Push once  glucagon  Injectable 1 milliGRAM(s) IntraMuscular once  heparin   Injectable 5000 Unit(s) SubCutaneous every 8 hours  hydrocortisone sodium succinate Injectable 50 milliGRAM(s) IV Push every 6 hours  insulin lispro (ADMELOG) corrective regimen sliding scale   SubCutaneous every 6 hours  lidocaine/prilocaine Cream 1 Application(s) Topical <User Schedule>  norepinephrine Infusion 0.45 MICROgram(s)/kG/Min (26.8 mL/Hr) IV Continuous <Continuous>  pantoprazole  Injectable 40 milliGRAM(s) IV Push daily  piperacillin/tazobactam IVPB.. 3.375 Gram(s) IV Intermittent every 8 hours  sodium chloride 0.9%. 1000 milliLiter(s) (75 mL/Hr) IV Continuous <Continuous>  vasopressin Infusion 0.04 Unit(s)/Min (2.4 mL/Hr) IV Continuous <Continuous>       89 yo male ESRD on HD, SBO, CHF, aortic stenosis, DM, CAD, cystectomy, ileal conduit, aspiration pneumonia, intubated earlier today, having Bms, one this am, NGT output 100ml. Remains on 2 vasopressors     Intubated  doesn't follow any commands on verbal nor physical stimuli  NGT in place  Abd soft, ND, midline scar, upper reducible soft ventral hernia   RUE AVF      CXR Bilat opacities        Vital Signs Last 24 Hrs  T(C): 37.4 (16 Jul 2022 05:48), Max: 38.4 (15 Jul 2022 18:00)  T(F): 99.3 (16 Jul 2022 05:48), Max: 101.2 (15 Jul 2022 18:00)  HR: 125 (16 Jul 2022 07:00) (82 - 125)  BP: 94/45 (15 Jul 2022 10:15) (94/45 - 114/41)  BP(mean): 55 (15 Jul 2022 10:15) (55 - 57)  RR: 25 (16 Jul 2022 07:00) (17 - 32)  SpO2: 100% (16 Jul 2022 07:00) (90% - 100%)    Parameters below as of 16 Jul 2022 04:30  Patient On (Oxygen Delivery Method): ventilator    O2 Concentration (%): 100                          10.1   12.89 )-----------( 245      ( 16 Jul 2022 05:20 )             31.7       07-16    140  |  106  |  68<H>  ----------------------------<  120<H>  5.6<H>   |  22  |  7.96<H>    Ca    8.3<L>      16 Jul 2022 05:20  Phos  6.9     07-16  Mg     1.9     07-16    TPro  5.9<L>  /  Alb  2.1<L>  /  TBili  0.6  /  DBili  x   /  AST  44<H>  /  ALT  21  /  AlkPhos  42  07-16      LIVER FUNCTIONS - ( 16 Jul 2022 05:20 )  Alb: 2.1 g/dL / Pro: 5.9 gm/dL / ALK PHOS: 42 U/L / ALT: 21 U/L / AST: 44 U/L / GGT: x             MEDICATIONS  (STANDING):  chlorhexidine 0.12% Liquid 15 milliLiter(s) Oral Mucosa every 12 hours  chlorhexidine 4% Liquid 1 Application(s) Topical <User Schedule>  dextrose 5%. 1000 milliLiter(s) (100 mL/Hr) IV Continuous <Continuous>  dextrose 5%. 1000 milliLiter(s) (50 mL/Hr) IV Continuous <Continuous>  dextrose 50% Injectable 25 Gram(s) IV Push once  dextrose 50% Injectable 12.5 Gram(s) IV Push once  dextrose 50% Injectable 25 Gram(s) IV Push once  glucagon  Injectable 1 milliGRAM(s) IntraMuscular once  heparin   Injectable 5000 Unit(s) SubCutaneous every 8 hours  hydrocortisone sodium succinate Injectable 50 milliGRAM(s) IV Push every 6 hours  insulin lispro (ADMELOG) corrective regimen sliding scale   SubCutaneous every 6 hours  lidocaine/prilocaine Cream 1 Application(s) Topical <User Schedule>  norepinephrine Infusion 0.45 MICROgram(s)/kG/Min (26.8 mL/Hr) IV Continuous <Continuous>  pantoprazole  Injectable 40 milliGRAM(s) IV Push daily  piperacillin/tazobactam IVPB.. 3.375 Gram(s) IV Intermittent every 8 hours  sodium chloride 0.9%. 1000 milliLiter(s) (75 mL/Hr) IV Continuous <Continuous>  vasopressin Infusion 0.04 Unit(s)/Min (2.4 mL/Hr) IV Continuous <Continuous>    MEDICATIONS  (PRN):  dextrose Oral Gel 15 Gram(s) Oral once PRN Blood Glucose LESS THAN 70 milliGRAM(s)/deciliter  ondansetron Injectable 4 milliGRAM(s) IV Push every 6 hours PRN Nausea      MEDICATIONS  (STANDING):  chlorhexidine 0.12% Liquid 15 milliLiter(s) Oral Mucosa every 12 hours  chlorhexidine 4% Liquid 1 Application(s) Topical <User Schedule>  dextrose 5%. 1000 milliLiter(s) (100 mL/Hr) IV Continuous <Continuous>  dextrose 5%. 1000 milliLiter(s) (50 mL/Hr) IV Continuous <Continuous>  dextrose 50% Injectable 25 Gram(s) IV Push once  dextrose 50% Injectable 12.5 Gram(s) IV Push once  dextrose 50% Injectable 25 Gram(s) IV Push once  glucagon  Injectable 1 milliGRAM(s) IntraMuscular once  heparin   Injectable 5000 Unit(s) SubCutaneous every 8 hours  hydrocortisone sodium succinate Injectable 50 milliGRAM(s) IV Push every 6 hours  insulin lispro (ADMELOG) corrective regimen sliding scale   SubCutaneous every 6 hours  lidocaine/prilocaine Cream 1 Application(s) Topical <User Schedule>  norepinephrine Infusion 0.45 MICROgram(s)/kG/Min (26.8 mL/Hr) IV Continuous <Continuous>  pantoprazole  Injectable 40 milliGRAM(s) IV Push daily  piperacillin/tazobactam IVPB.. 3.375 Gram(s) IV Intermittent every 8 hours  sodium chloride 0.9%. 1000 milliLiter(s) (75 mL/Hr) IV Continuous <Continuous>  vasopressin Infusion 0.04 Unit(s)/Min (2.4 mL/Hr) IV Continuous <Continuous>

## 2022-07-16 NOTE — PROGRESS NOTE ADULT - ASSESSMENT
Impression:  1. Acute hypoxic/hypercarbic respiratory failure  2. Aspiration pneumonitis   3. Small bowel obstruction  4. septic shock  5. ESRD on HD   6. toxic metabolic encephalopathy    Plan:  Neuro - avoiding neurosuppressants    CV -  actively titrating pressors to keep MAP>65            Echo nml LV function, mod AS           IV stress steroids for CIRCI    Pulm -  cont HFNC with moderate level flow for PEEP effect to enhance alveolar recruitment              cont active titration of FiO2 for sats>90%              ABG without hypercarbia              will wean to <40% FiO2 before further wean of flow              pt not a candidate for NIPPV if decompensates given SBO and declining mentation              if respiratory decompensation will need to be intubated following a discussion with son for GOC repeat discussion              remains a trial of intubation at this time    GI -  PPI         NPO, NGT decompression         Sx following not a surgical candidate    Renal - ESRD on HD, no HD yesterday, gentle IV hydration decrease fluids to 75ml/hr, Renal following for ongoing HD needs.     Heme -  Pharmacologic DVT PPx  in addition to SCD's, no signs of active bleeding, H/H stable    ID - Empiric IV abx for aspiration PNA, Vanco level in am for repeat dosing, BCx pending, f/u results, Abx discontinuation/adjustments based on         clinical features and culture data.    Endo -  Aggressive glycemic control to limit FS glucose to < 180mg/dl, A1c 6.4, BS stable, cont ISS coverage    GOC: DNR, trial of intubation Impression:  1. Acute hypoxic/hypercarbic respiratory failure  2. Aspiration pneumonitis   3. Small bowel obstruction  4. septic shock  5. ESRD on HD   6. toxic metabolic encephalopathy    Plan:  Neuro - avoiding neurosuppressants, sedation with short acting agents only if needed for vent compliance and prevention of dysnchrony    CV -  actively titrating pressors to keep MAP>65            Echo nml LV function, mod AS           IV stress steroids for CIRCI    Pulm -  ABG with acute hypercarbia, unable to protect airway, not a candidate for NIPPV given AMS/SBO             I had an extensive conversation with the pts son Grupo regarding the status of his father and his ongoing decline. After our discussion he has              elected for a trial of intubation at this time. He expressed an understanding that this will likely have no benefit in his overall course, but has              stated that if this is the case that he would like to create sometime for other family members to be able to come to the bedside to see his              father and get their arm around the situation before saying goodbye.              pt was intubated             full vent support with LTV 6-8cc/kg IDW, keeping plts<30             actively titrating FiO2 to sats>90%             utilization of PEEP for alveolar recruitment if desats             repeat ABG at 7am             check Scx             full vent bundle added    GI -  PPI         NPO, NGT decompression         Sx following not a surgical candidate    Renal - ESRD on HD, no HD yesterday, IV hydration decrease fluids to 75ml/hr, Renal following for ongoing HD needs.     Heme -  Pharmacologic DVT PPx  in addition to SCD's, no signs of active bleeding, H/H stable    ID - Empiric IV abx for aspiration PNA, Vanco level this am for repeat dosing, BCx results remain pending, Abx discontinuation/adjustments based         on clinical features and culture data.    Endo -  Aggressive glycemic control to limit FS glucose to < 180mg/dl, A1c 6.4, BS stable, cont ISS coverage    GOC: remains a DNR, currently with trial of intubation

## 2022-07-16 NOTE — PROCEDURE NOTE - NSPROCDETAILS_GEN_ALL_CORE
guidewire recovered/lumen(s) aspirated and flushed/sterile dressing applied/sterile technique, catheter placed/ultrasound guidance with use of sterile gel and probe cove
location identified, draped/prepped, sterile technique used, needle inserted/introduced/positive blood return obtained via catheter/connected to a pressurized flush line/sutured in place/Seldinger technique/all materials/supplies accounted for at end of procedure
patient pre-oxygenated, tube inserted, placement confirmed

## 2022-07-16 NOTE — PROVIDER CONTACT NOTE (OTHER) - REASON
Callback
Callback
Expiration
Low diastolic BP
Low BP and [id number] O2 need
lactate 3.8 at 7 am. Repeat labs?

## 2022-07-16 NOTE — PROGRESS NOTE ADULT - ASSESSMENT
Impression:  1. Acute hypoxic respiratory failure  2. Aspiration pneumonitis   3. Small bowel obstruction  4. septic shock  5. ESRD on HD   6. toxic metabolic encephalopathy    Plan:  Neuro - avoiding neurosuppressants    CV -  actively titrating pressors to keep MAP>65            Echo nml LV function, mod AS           IV stress steroids for CIRCI    Pulm -  cont HFNC with moderate level flow for PEEP effect to enhance alveolar recruitment              cont active titration of FiO2 for sats>90%              ABG without hypercarbia              will wean to <40% FiO2 before further wean of flow              pt not a candidate for NIPPV if decompensates given SBO and declining mentation              if respiratory decompensation will need to be intubated following a discussion with son for GOC repeat discussion              remains a trial of intubation at this time    GI -  PPI         NPO, NGT decompression         Sx following not a surgical candidate    Renal - ESRD on HD, no HD yesterday, gentle IV hydration decrease fluids to 75ml/hr, Renal following for ongoing HD needs.     Heme -  Pharmacologic DVT PPx  in addition to SCD's, no signs of active bleeding, H/H stable    ID - Empiric IV abx for aspiration PNA, Vanco level in am for repeat dosing, BCx pending, f/u results, Abx discontinuation/adjustments based on         clinical features and culture data.    Endo -  Aggressive glycemic control to limit FS glucose to < 180mg/dl, A1c 6.4, BS stable, cont ISS coverage    GOC: DNR, trial of intubation

## 2022-07-16 NOTE — PROVIDER CONTACT NOTE (OTHER) - SITUATION
As per representative Delmi, patient is not a candidate due to age.  Reference number obtained [id number].

## 2022-07-16 NOTE — PROGRESS NOTE ADULT - ASSESSMENT
1. Acute hypoxic/hypercarbic respiratory failure  2. Aspiration pneumonitis   3. Small bowel obstruction  4. septic shock  5. ESRD on HD   6. toxic metabolic encephalopathy    Plan:  Neuro - avoiding neurosuppressants, sedation with short acting agents only if needed for vent compliance and prevention of dysnchrony    CV -  actively titrating pressors to keep MAP>65            Echo nml LV function, mod AS           IV stress steroids for CIRCI    Pulm -  ABG with acute hypercarbia, unable to protect airway, not a candidate for NIPPV given AMS/SBO             I had an extensive conversation with the pts son Grupo regarding the status of his father and his ongoing decline. After our discussion he has              elected for a trial of intubation at this time. He expressed an understanding that this will likely have no benefit in his overall course, but has              stated that if this is the case that he would like to create sometime for other family members to be able to come to the bedside to see his              father and get their arm around the situation before saying goodbye.              pt was intubated             full vent support with LTV 6-8cc/kg IDW, keeping plts<30             actively titrating FiO2 to sats>90%             utilization of PEEP for alveolar recruitment if desats             repeat ABG at 7am             check Scx             full vent bundle added    GI -  PPI         NPO, NGT decompression         Sx following not a surgical candidate    Renal - ESRD on HD, no HD yesterday, IV hydration decrease fluids to 75ml/hr, Renal following for ongoing HD needs.     Heme -  Pharmacologic DVT PPx  in addition to SCD's, no signs of active bleeding, H/H stable    ID - Empiric IV abx for aspiration PNA, Vanco level this am for repeat dosing, BCx results remain pending, Abx discontinuation/adjustments based         on clinical features and culture data.    Endo -  Aggressive glycemic control to limit FS glucose to < 180mg/dl, A1c 6.4, BS stable, cont ISS coverage    GOC: remains a DNR, currently with trial of intubation     1. Acute hypoxic/hypercarbic respiratory failure  2. Aspiration pneumonitis   3. Small bowel obstruction  4. septic shock  5. ESRD on HD   6. toxic metabolic encephalopathy    Plan:  Neuro - avoiding neurosuppressants, sedation with short acting agents only if needed for vent compliance and prevention of dysnchrony    CV -  actively titrating pressors to keep MAP>65 (vaso, levophed)           Echo nml LV function, mod AS           IV stress steroids    Pulm -  ABG with acute hypercarbia, unable to protect airway, not a candidate for NIPPV given AMS/SBO             Overnight team, discussion with son - elected for a trial of intubation. He expressed an understanding that this will likely have no benefit in his overall course, but has              stated that if this is the case that he would like to create some time for other family members to be able to come to the bedside and see him               full vent support with LTV 6-8cc/kg IDW, keeping plts<30             actively titrating FiO2 to sats>90%             utilization of PEEP for alveolar recruitment if desats             check Sputum culture    GI -  continue pantoprazole         NPO, NGT decompression         Surgery  following not a surgical candidate    Renal - ESRD on HD             -Renal following for ongoing HD needs.     Heme -  Pharmacologic DVT PPx  in addition to SCD's, no signs of active bleeding, H/H stable    ID - Empiric IV abx for aspiration PNA, on zosyn        -f/u vanco level    Endo -  Aggressive glycemic control to limit FS glucose to < 180mg/dl, A1c 6.4, BS stable, cont ISS coverage    GOC: remains a DNR, currently with trial of intubation  -palliative care consult

## 2022-07-16 NOTE — PROGRESS NOTE ADULT - SUBJECTIVE AND OBJECTIVE BOX
Patient is a 89y old  Male who presents with a chief complaint of SMall bowel obstruction (15 Jul 2022 17:34)      BRIEF HOSPITAL COURSE:   89M with PMHx bladder CA sp resxn with neobladder, sp appy, DM, ESRD on HD, HTN, mod AS, sp elap for perforated gastric ulcer, CAD sp KAREN, sp CCY admitted with SBO to stepdown. Upgraded to ICU following a complicated course with vomiting andaspiration with subsequent development of acute hypoxemic respiratory failure and septic shock.       Events last 24 hours: febrile last evening, overnight afebrile, no HD yesterday, lethargic with progressive decline of mentation throughout the day,             on HFNC weaned to 50%/30L.    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 3.375 Gram(s) IV Intermittent every 8 hours    norepinephrine Infusion 0.45 MICROgram(s)/kG/Min IV Continuous <Continuous>      ondansetron Injectable 4 milliGRAM(s) IV Push every 6 hours PRN      heparin   Injectable 5000 Unit(s) SubCutaneous every 8 hours    pantoprazole  Injectable 40 milliGRAM(s) IV Push daily      dextrose 50% Injectable 25 Gram(s) IV Push once  dextrose 50% Injectable 12.5 Gram(s) IV Push once  dextrose 50% Injectable 25 Gram(s) IV Push once  dextrose Oral Gel 15 Gram(s) Oral once PRN  glucagon  Injectable 1 milliGRAM(s) IntraMuscular once  hydrocortisone sodium succinate Injectable 50 milliGRAM(s) IV Push every 6 hours  insulin lispro (ADMELOG) corrective regimen sliding scale   SubCutaneous every 6 hours  vasopressin Infusion 0.04 Unit(s)/Min IV Continuous <Continuous>    dextrose 5%. 1000 milliLiter(s) IV Continuous <Continuous>  dextrose 5%. 1000 milliLiter(s) IV Continuous <Continuous>  sodium chloride 0.9%. 1000 milliLiter(s) IV Continuous <Continuous>      chlorhexidine 4% Liquid 1 Application(s) Topical <User Schedule>  lidocaine/prilocaine Cream 1 Application(s) Topical <User Schedule>            ICU Vital Signs Last 24 Hrs  T(C): 37.3 (16 Jul 2022 00:20), Max: 39 (15 Jul 2022 04:55)  T(F): 99.2 (16 Jul 2022 00:20), Max: 102.2 (15 Jul 2022 04:55)  HR: 92 (15 Jul 2022 19:00) (82 - 123)  BP: 94/45 (15 Jul 2022 10:15) (61/33 - 141/80)  BP(mean): 55 (15 Jul 2022 10:15) (40 - 87)  ABP: 109/35 (15 Jul 2022 19:00) (105/30 - 137/42)  ABP(mean): 60 (15 Jul 2022 19:00) (55 - 75)  RR: 25 (15 Jul 2022 19:00) (17 - 37)  SpO2: 94% (15 Jul 2022 19:00) (84% - 100%)    O2 Parameters below as of 15 Jul 2022 15:45    O2 Flow (L/min): 30  O2 Concentration (%): 50        ABG - ( 15 Jul 2022 07:54 )  pH, Arterial: 7.37  pH, Blood: x     /  pCO2: 40    /  pO2: 110   / HCO3: 23    / Base Excess: -2.0  /  SaO2: 99            I&O's Summary    14 Jul 2022 07:01  -  15 Jul 2022 07:00  --------------------------------------------------------  IN: 0 mL / OUT: 3575 mL / NET: -3575 mL    15 Jul 2022 07:01  -  16 Jul 2022 01:21  --------------------------------------------------------  IN: 3308 mL / OUT: 350 mL / NET: 2958 mL      LABS:                        12.2   1.26  )-----------( 261      ( 15 Jul 2022 04:27 )             39.2     07-15    136  |  102  |  58<H>  ----------------------------<  176<H>  4.2   |  25  |  7.52<H>    Ca    8.6      15 Jul 2022 15:35  Phos  4.8     07-15  Mg     2.2     07-15    TPro  7.4  /  Alb  3.0<L>  /  TBili  0.7  /  DBili  x   /  AST  3<L>  /  ALT  12  /  AlkPhos  71  07-15          CAPILLARY BLOOD GLUCOSE      POCT Blood Glucose.: 136 mg/dL (16 Jul 2022 00:21)        Physical Examination:    General: on HFNC, awakens to voice but with some lethargy, withdraws to painful stimuli    HEENT: Pupils equal, reactive to light.  Symmetric.    PULM: Course BS bilaterally, no wheezing, diminished at bases bilaterally    CVS: Regular rate and rhythm    ABD: Softly distended, +BS, NGT with green bilious fluid, no facial grimacing with palpation    EXT: SCDs    SKIN: Warm and well perfused, no rashes noted.    RADIOLOGY:   ACC: [id number] EXAM:  ECHO TTE WO CON COMP W DOPP                          PROCEDURE DATE:  07/14/2022          INTERPRETATION:  Transthoracic Echocardiography Report (TTE)     Demographics     Patient name         TIM BERRIOS   Age           89 year(s)     Med Rec #            [id number]          Gender        Male     Account #            [id number]       Date of Birth 06/18/1932     Interpreting         Bc Groves MD  Room Number   0334   Physician     Referring Physician  Wild Pineda      Sonographer   Shannon Meeks,                                                         [facility]                                                         Melanie Roberts     Date of study        07/14/2022 07:45                        AM     Height               72.05 in           Weight        141.1 pounds    Type of Study:     TTE procedure: ECHO TTE WO CON COMP W DOP     BP: 127/46 mmHg     Study Location: [facility] Quality: Technically Difficullt    Indications   1) I50.9 - Heart failure    M-Mode Measurements (cm)     LVEDd: 5.17 cm            LVESd: 3.36 cm   IVSEd: 0.93 cm   LVPWd: 0.93 cm            AO Root Dimension: 2.8 cm                             ACS: 1.2 cm                             LA: 4 cm                             LVOT: 2 cm    Doppler Measurements:     AV Velocity:356 cm/s                 MV Peak E-Wave: 122 cm/s   AV Peak Gradient: 50.69 mmHg         MV Peak A-Wave: 106 cm/s   AV Mean Gradient: 25 mmHg            MV E/A Ratio: 1.15 %   AV Area (Continuity):1.11 cm^2       MV Peak Gradient: 5.95 mmHg   TR Velocity:194 cm/s   TR Gradient:15.0544 mmHg   Estimated RAP:3 mmHg   RVSP:23 mmHg     Findings     Mitral Valve   Moderate mitral annular calcification is present.   The mitral valve leaflets appear mildly calcified.   Mean transmitral gradient is 4 mmHg; this finding is consistent with mild   mitral stenosis.   Trace mitral regurgitation is present.     Aortic Valve   The aortic valve appears moderately calcified. Valve opening seems to be   restricted.   Peak and mean transaortic gradients are 51 and 25 mmHg respectively; this   finding is consistent with moderate aortic stenosis.     Tricuspid Valve   The tricuspid valve leaflets are thin and pliable; valve motion is   normal.   Trace tricuspid valve regurgitation is present.     Pulmonic Valve   Normal appearing pulmonic valve structure.   Trace pulmonic valvular regurgitation is present.     Left Atrium   The left atrium is mildly dilated.     Left Ventricle   The left ventricle is normal in size, wall thickness, wall motion and   contractility.   Estimated left ventricular ejection fraction is 60-65 %.     Right Atrium   Normal appearing right atrium.     Right Ventricle   Normal appearing right ventricle structure and function.     Pericardial Effusion   No evidence of pericardial effusion.     Pleural Effusion   No evidence of pleural effusion.     Miscellaneous   The IVC appears normal.     Impression     Summary     Moderate mitral annular calcification is present.   The mitral valve leaflets appear mildly calcified.   Mean transmitral gradient is 4 mmHg; this finding is consistent with mild   mitral stenosis.   Trace mitral regurgitation is present.   The aortic valve appears moderately calcified. Valve opening seems to be   restricted.   Peak and mean transaortic gradients are 51 and 25 mmHg respectively; this   finding is consistent with moderate aortic stenosis.   The tricuspid valve leaflets are thin and pliable; valve motion is   normal.   Trace tricuspid valve regurgitation is present.   The left ventricle is normal in size, wall thickness, wall motion and   contractility.   Estimated left ventricular ejection fraction is 60-65 %.   Normal appearing right ventricle structure and function.     Signature     ----------------------------------------------------------------   Electronically signed by Bc Groves MD(Interpreting   physician) on 07/14/2022 12:55 PM    ACC: [id number] EXAM:  XR SM INTEST SNGL CON STDY                          PROCEDURE DATE:  07/15/2022          INTERPRETATION:  CLINICAL INFORMATION: Small bowel obstruction    TECHNIQUE: A limited small bowel series was performed following the   administration of 100 cc of Gastroview and serial abdominal radiographs   performed prior to and at 4 and 8 hours after contrast administration.    COMPARISON:  Same day abdominal x-ray    FINDINGS:   radiograph of the abdomen demonstrates the presence of   an enteric tube with tip overlying the stomach, surgical clips in the   pelvis, an overall paucity of bowel gas, several dilated small bowel   loops and status post right hip open reduction internal fixation.      After contrast was administered there is opacification of dilated small   bowel loops. Contrast is seen definitively within the colon at 8 hours   and possibly at 4 hours. There is no free intraperitoneal air.    IMPRESSION: Partial small bowel obstruction.    --- End of Report ---            JAMIA MEADOWS JR, MD; Attending Radiologist  This document has been electronically signed. Jul 15 2022  8:22AM      CRITICAL CARE TIME SPENT:  35 mins assessing presenting problems of acute illness that poses high probability of life threatening deterioration or end organ damage/dysfunction.  Medical decision making including Initiating plan of care, reviewing data, reviewing radiology, direct patient bedside evaluation and interpretation of vital signs, discussion with multidisciplinary team,  all non inclusive of procedures.   Patient is a 89y old  Male who presents with a chief complaint of SMall bowel obstruction (15 Jul 2022 17:34)      BRIEF HOSPITAL COURSE:   89M with PMHx bladder CA sp resxn with neobladder, sp appy, DM, ESRD on HD, HTN, mod AS, sp elap for perforated gastric ulcer, CAD sp KAREN, sp CCY admitted with SBO to stepdown. Upgraded to ICU following a complicated course with vomiting andaspiration with subsequent development of acute hypoxemic respiratory failure and septic shock.       Events last 24 hours: febrile last evening, overnight afebrile, no HD yesterday, lethargic with progressive decline of mentation throughout the day,             on HFNC weaned to 50%/30L.    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 3.375 Gram(s) IV Intermittent every 8 hours    norepinephrine Infusion 0.45 MICROgram(s)/kG/Min IV Continuous <Continuous>      ondansetron Injectable 4 milliGRAM(s) IV Push every 6 hours PRN      heparin   Injectable 5000 Unit(s) SubCutaneous every 8 hours    pantoprazole  Injectable 40 milliGRAM(s) IV Push daily      dextrose 50% Injectable 25 Gram(s) IV Push once  dextrose 50% Injectable 12.5 Gram(s) IV Push once  dextrose 50% Injectable 25 Gram(s) IV Push once  dextrose Oral Gel 15 Gram(s) Oral once PRN  glucagon  Injectable 1 milliGRAM(s) IntraMuscular once  hydrocortisone sodium succinate Injectable 50 milliGRAM(s) IV Push every 6 hours  insulin lispro (ADMELOG) corrective regimen sliding scale   SubCutaneous every 6 hours  vasopressin Infusion 0.04 Unit(s)/Min IV Continuous <Continuous>    dextrose 5%. 1000 milliLiter(s) IV Continuous <Continuous>  dextrose 5%. 1000 milliLiter(s) IV Continuous <Continuous>  sodium chloride 0.9%. 1000 milliLiter(s) IV Continuous <Continuous>      chlorhexidine 4% Liquid 1 Application(s) Topical <User Schedule>  lidocaine/prilocaine Cream 1 Application(s) Topical <User Schedule>            ICU Vital Signs Last 24 Hrs  T(C): 37.3 (16 Jul 2022 00:20), Max: 39 (15 Jul 2022 04:55)  T(F): 99.2 (16 Jul 2022 00:20), Max: 102.2 (15 Jul 2022 04:55)  HR: 92 (15 Jul 2022 19:00) (82 - 123)  BP: 94/45 (15 Jul 2022 10:15) (61/33 - 141/80)  BP(mean): 55 (15 Jul 2022 10:15) (40 - 87)  ABP: 109/35 (15 Jul 2022 19:00) (105/30 - 137/42)  ABP(mean): 60 (15 Jul 2022 19:00) (55 - 75)  RR: 25 (15 Jul 2022 19:00) (17 - 37)  SpO2: 94% (15 Jul 2022 19:00) (84% - 100%)    O2 Parameters below as of 15 Jul 2022 15:45    O2 Flow (L/min): 30  O2 Concentration (%): 50        ABG - ( 15 Jul 2022 07:54 )  pH, Arterial: 7.37  pH, Blood: x     /  pCO2: 40    /  pO2: 110   / HCO3: 23    / Base Excess: -2.0  /  SaO2: 99            I&O's Summary    14 Jul 2022 07:01  -  15 Jul 2022 07:00  --------------------------------------------------------  IN: 0 mL / OUT: 3575 mL / NET: -3575 mL    15 Jul 2022 07:01  -  16 Jul 2022 01:21  --------------------------------------------------------  IN: 3308 mL / OUT: 350 mL / NET: 2958 mL      LABS:                        12.2   1.26  )-----------( 261      ( 15 Jul 2022 04:27 )             39.2     07-15    136  |  102  |  58<H>  ----------------------------<  176<H>  4.2   |  25  |  7.52<H>    Ca    8.6      15 Jul 2022 15:35  Phos  4.8     07-15  Mg     2.2     07-15    TPro  7.4  /  Alb  3.0<L>  /  TBili  0.7  /  DBili  x   /  AST  3<L>  /  ALT  12  /  AlkPhos  71  07-15          CAPILLARY BLOOD GLUCOSE      POCT Blood Glucose.: 136 mg/dL (16 Jul 2022 00:21)        Physical Examination:    General: on HFNC, awakens to voice but with some lethargy, withdraws to painful stimuli    HEENT: Pupils equal, reactive to light.  Symmetric.    PULM: Course BS bilaterally, no wheezing, diminished at bases bilaterally    CVS: Regular rate and rhythm    ABD: Softly distended, +BS, NGT with green bilious fluid, no facial grimacing with palpation    EXT: SCDs    SKIN: Warm and well perfused, no rashes noted.    RADIOLOGY:   ACC: [id number] EXAM:  ECHO TTE WO CON COMP W DOPP                          PROCEDURE DATE:  07/14/2022          INTERPRETATION:  Transthoracic Echocardiography Report (TTE)     Demographics     Patient name         TIM BERRIOS   Age           89 year(s)     Med Rec #            [id number]          Gender        Male     Account #            [id number]       Date of Birth 06/18/1932     Interpreting         Bc Groves MD  Room Number   0334   Physician     Referring Physician  Wild Pineda      Sonographer   Shannon Meeks,                                                         [facility]                                                         Melanie Roberts     Date of study        07/14/2022 07:45                        AM     Height               72.05 in           Weight        141.1 pounds    Type of Study:     TTE procedure: ECHO TTE WO CON COMP W DOP     BP: 127/46 mmHg     Study Location: [facility] Quality: Technically Difficullt    Indications   1) I50.9 - Heart failure    M-Mode Measurements (cm)     LVEDd: 5.17 cm            LVESd: 3.36 cm   IVSEd: 0.93 cm   LVPWd: 0.93 cm            AO Root Dimension: 2.8 cm                             ACS: 1.2 cm                             LA: 4 cm                             LVOT: 2 cm    Doppler Measurements:     AV Velocity:356 cm/s                 MV Peak E-Wave: 122 cm/s   AV Peak Gradient: 50.69 mmHg         MV Peak A-Wave: 106 cm/s   AV Mean Gradient: 25 mmHg            MV E/A Ratio: 1.15 %   AV Area (Continuity):1.11 cm^2       MV Peak Gradient: 5.95 mmHg   TR Velocity:194 cm/s   TR Gradient:15.0544 mmHg   Estimated RAP:3 mmHg   RVSP:23 mmHg     Findings     Mitral Valve   Moderate mitral annular calcification is present.   The mitral valve leaflets appear mildly calcified.   Mean transmitral gradient is 4 mmHg; this finding is consistent with mild   mitral stenosis.   Trace mitral regurgitation is present.     Aortic Valve   The aortic valve appears moderately calcified. Valve opening seems to be   restricted.   Peak and mean transaortic gradients are 51 and 25 mmHg respectively; this   finding is consistent with moderate aortic stenosis.     Tricuspid Valve   The tricuspid valve leaflets are thin and pliable; valve motion is   normal.   Trace tricuspid valve regurgitation is present.     Pulmonic Valve   Normal appearing pulmonic valve structure.   Trace pulmonic valvular regurgitation is present.     Left Atrium   The left atrium is mildly dilated.     Left Ventricle   The left ventricle is normal in size, wall thickness, wall motion and   contractility.   Estimated left ventricular ejection fraction is 60-65 %.     Right Atrium   Normal appearing right atrium.     Right Ventricle   Normal appearing right ventricle structure and function.     Pericardial Effusion   No evidence of pericardial effusion.     Pleural Effusion   No evidence of pleural effusion.     Miscellaneous   The IVC appears normal.     Impression     Summary     Moderate mitral annular calcification is present.   The mitral valve leaflets appear mildly calcified.   Mean transmitral gradient is 4 mmHg; this finding is consistent with mild   mitral stenosis.   Trace mitral regurgitation is present.   The aortic valve appears moderately calcified. Valve opening seems to be   restricted.   Peak and mean transaortic gradients are 51 and 25 mmHg respectively; this   finding is consistent with moderate aortic stenosis.   The tricuspid valve leaflets are thin and pliable; valve motion is   normal.   Trace tricuspid valve regurgitation is present.   The left ventricle is normal in size, wall thickness, wall motion and   contractility.   Estimated left ventricular ejection fraction is 60-65 %.   Normal appearing right ventricle structure and function.     Signature     ----------------------------------------------------------------   Electronically signed by Bc Groves MD(Interpreting   physician) on 07/14/2022 12:55 PM    ACC: [id number] EXAM:  XR SM INTEST SNGL CON STDY                          PROCEDURE DATE:  07/15/2022          INTERPRETATION:  CLINICAL INFORMATION: Small bowel obstruction    TECHNIQUE: A limited small bowel series was performed following the   administration of 100 cc of Gastroview and serial abdominal radiographs   performed prior to and at 4 and 8 hours after contrast administration.    COMPARISON:  Same day abdominal x-ray    FINDINGS:   radiograph of the abdomen demonstrates the presence of   an enteric tube with tip overlying the stomach, surgical clips in the   pelvis, an overall paucity of bowel gas, several dilated small bowel   loops and status post right hip open reduction internal fixation.      After contrast was administered there is opacification of dilated small   bowel loops. Contrast is seen definitively within the colon at 8 hours   and possibly at 4 hours. There is no free intraperitoneal air.    IMPRESSION: Partial small bowel obstruction.    --- End of Report ---            JAMIA MEADOWS JR, MD; Attending Radiologist  This document has been electronically signed. Jul 15 2022  8:22AM      CRITICAL CARE TIME SPENT:  40 mins assessing presenting problems of acute illness that poses high probability of life threatening deterioration or end organ damage/dysfunction.  Medical decision making including Initiating plan of care, reviewing data, reviewing radiology, direct patient bedside evaluation and interpretation of vital signs, discussion with multidisciplinary team,  all non inclusive of procedures.

## 2022-07-16 NOTE — PROGRESS NOTE ADULT - ASSESSMENT
89 yo with ESRD on MWF HD admitted with ileus vs SBO.  Abdominal pain improved wit NG drainage.  --ESRD : Continue TIW HD.  No UF due to significant GI fluid loss.   Continue maintenance IVF at 1L/day due to ongoing GI loss, until po intake can be started.  --HTN : hold all meds.  --GI : monitor with NG drainage.    7/14 SY  --ESRD : continue MWF schedule.  --HTN : BP stable off all meds.  --GI : back on NG drainage.  Surgery follow up appreciated.   For small bowel gastrografin study.  --Fluid/electrolytes stable.    7/15 MK   - esrd mwf, no indication for hd today    will await clinical stability and re-asses in am, sooner if clinically indicated   - sepsis with ? aspiration PNA     on pressors and abx as per CCM   - SBO; ng tube with + bm noted.   dw CCM team     7/16 SY  --ESRD : HD held yesterday.   Will treat on pressors to prevent further worsening of electrolytes and acidosis.  --Asp PNA/ resp failure : continue abtx.  Change to q 12 hours.  --SBO : continue NG drainage.  --Continue pressor support.  --Can continue IVF for BP support and to replace GI loss.   89 yo with ESRD on MWF HD admitted with ileus vs SBO.  Abdominal pain improved wit NG drainage.  --ESRD : Continue TIW HD.  No UF due to significant GI fluid loss.   Continue maintenance IVF at 1L/day due to ongoing GI loss, until po intake can be started.  --HTN : hold all meds.  --GI : monitor with NG drainage.    7/14 SY  --ESRD : continue MWF schedule.  --HTN : BP stable off all meds.  --GI : back on NG drainage.  Surgery follow up appreciated.   For small bowel gastrografin study.  --Fluid/electrolytes stable.    7/15 MK   - esrd mwf, no indication for hd today    will await clinical stability and re-asses in am, sooner if clinically indicated   - sepsis with ? aspiration PNA     on pressors and abx as per CCM   - SBO; ng tube with + bm noted.   dw CCM team     7/16 SY  --ESRD : HD held yesterday.   Will treat on pressors to prevent further worsening of electrolytes and acidosis.  --Asp PNA/ resp failure : continue abtx.  Change to 2.25 q 8.  --SBO : continue NG drainage.  --Continue pressor support.  --Can continue IVF for BP support and to replace GI loss.   89 yo with ESRD on MWF HD admitted with ileus vs SBO.  Abdominal pain improved wit NG drainage.  --ESRD : Continue TIW HD.  No UF due to significant GI fluid loss.   Continue maintenance IVF at 1L/day due to ongoing GI loss, until po intake can be started.  --HTN : hold all meds.  --GI : monitor with NG drainage.    7/14 SY  --ESRD : continue MWF schedule.  --HTN : BP stable off all meds.  --GI : back on NG drainage.  Surgery follow up appreciated.   For small bowel gastrografin study.  --Fluid/electrolytes stable.    7/15 MK   - esrd mwf, no indication for hd today    will await clinical stability and re-asses in am, sooner if clinically indicated   - sepsis with ? aspiration PNA     on pressors and abx as per CCM   - SBO; ng tube with + bm noted.   dw CCM team     7/16 SY  --ESRD : HD held yesterday.   Will treat on pressors to prevent further worsening of electrolytes and acidosis.  --Asp PNA/ resp failure : continue abtx.  Change to q 12 hours.  --SBO : continue NG drainage.  --Continue pressor support.  --Can continue IVF for BP support and to replace GI loss.

## 2022-07-16 NOTE — PROCEDURE NOTE - ADDITIONAL PROCEDURE DETAILS
Procedure was performed independent of critical care time
Dx: acute hypercarbic respiratory failure, AMS, septic shock, aspiration pneumonitis, ESRD on HD  Procedural time noninclusive of other E/M time.

## 2022-07-16 NOTE — PROCEDURE NOTE - NSPOSTCAREGUIDE_GEN_A_CORE
Verbal/written post procedure instructions were given to patient/caregiver/Keep the cast/splint/dressing clean and dry
Verbal/written post procedure instructions were given to patient/caregiver/Keep the cast/splint/dressing clean and dry
Verbal/written post procedure instructions were given to patient/caregiver

## 2022-07-16 NOTE — PROGRESS NOTE ADULT - ASSESSMENT
89 yo male ESRD on HD, SBO, CHF, aortic stenosis, DM, CAD, cystectomy, ileal conduit, aspiration pneumonia, intubated earlier today, having Bms, one this am, NGT output 100ml. Remains on 2 vasopressors  -Critically ill  -Poor prognosis overall  -I think comfort care is best option for patient based on his advanced age and comorbidities. I don't see how patient will be able to be safely extubated in the near future  -NGT to LWS, having Bms  -IV Abx  -SCDS 89 yo male ESRD on HD, SBO, CHF, aortic stenosis, DM, CAD, cystectomy, ileal conduit, aspiration pneumonia, intubated earlier today, having Bms, one this am, NGT output 100ml. Remains on 2 vasopressors  -Critically ill  -Poor prognosis overall  -I think comfort care is best option for patient based on his advanced age and comorbidities. I don't see how patient will be able to be safely extubated in the near future  -NGT to LWS, having Bms  -IV Abx, CXR Bilat lung opacities likely pneumonia  -SCDS

## 2022-07-29 NOTE — PATIENT PROFILE ADULT - FUNCTIONAL ASSESSMENT - DAILY ACTIVITY ASSESSMENT TYPE
Blood sugars monitored at least every 2 hours during shift. Sugar as high as 564, gradually decreased to 202. Potassium now in normal range. Telemetry readying sinus rhythm and sinus tach. Skin and eyes jaundice. Lactulose given x1, refused second does as he has had 7 BM today. Abdomen is distended but soft. Several adjustments in insulin today, given as ordered. Denies pain. Able to make needs known. Talked at length with pt and mom about the importance of carb counting and insulin administration. Both had a hard time understanding that fruit is high in sugar/carbs. Discussed healthy alternatives such as vegetables and sugar free juice.    Admission

## 2022-08-31 NOTE — PATIENT PROFILE ADULT. - NS PRO OT REFERRAL QUES 2 YN
Occupational Therapy  . Occupational Therapy Treatment Note    Name: Jolly Paget MRN: [id number] :   1941   Date:  2022   Admission Date: 2022 Room:  -A     Primary Problem:      Restrictions/Precautions:          General Precautions, Fall Risk, WBAT Lt LE    Communication with other providers: Per chart review, patient is appropriate for therapeutic intervention. Co-Tx c PTA Regan Brian for transfer training and family education. Subjective:  Patient states:  \"I'm doing better. I took a couple of steps with the walker. \"  Pain:   Location, Type, Intensity (0/10 to 10/10):  0/10, denies pain at rest, reports 7/10 pain, Lt hip, c mobility. Objective:    Observation: Pt received seated in bedside chair c daughter present in room. Objective Measures:  Telemetry. Treatment, including education:  Therapeutic Activity Training:   Therapeutic activity training was instructed today. Cues were given for safety, sequence, UE/LE placement, awareness, and balance. Activities performed today included transfer training c daughter and patient for stand pivot transfers c RW, including management of gait belt and safe body positioning throughout. Pt and daughter verbalized understanding for all training and demonstrated teach back. Sit<>stands: Varied CGA to Min A c RW, increased assist for fatigue. Pt required cues for safe body positioning / hand placement. Stand StepTransfers: See below    Functional Mobility: Min A + CGA (for safety) ~5 ft and during caregiver training c use of CW to facilitate BUE support for pt's limitations of RUE    Self Care Training:   Cues were given for safety, sequence, UE/LE placement, visual cues, and balance. Activities performed today included LB dressing, toileting, hand hygiene, and grooming. LB Dressing: Max A for clothing mgmt, pt required touching assist to thread BLE and for hike.   Toilet Transfer: Varied CGA to Min A c RW ~3 ft, no

## 2022-12-09 NOTE — PATIENT PROFILE ADULT - DO YOU FEEL LIKE HURTING OTHERS?
The following issues were addressed/discussed at today's visit-  1.  Gastroesophageal reflux  2.  Recent fecal soilage    For gastroesophageal reflux is a chronic problem, recently worse.  She has been taking 20 mg of omeprazole every day.  Considering she has had a recent upper endoscopy done in 2021, I would be in favor increasing the dose of omeprazole to 40 mg once a day.  I recommend taking this medication first thing in the morning on an empty stomach, and wait about 45 minutes before taking breakfast.  In addition to this multiple dietary and lifestyle guidelines were recommended to the patient and an educational booklet regarding the same was given to her.  For this, we will recommend follow-up in the office in 3 to 4 months for reevaluation.    In addition to this, recently over the last couple of months, she has noticed fecal soilage without any advanced notice.  She feels like she is passing gas, but ends up having an accident without realizing.  Considering she has a history of spinal issues in the past, and the possibility that this may be related to a sensory issue involving the anorectum, I would recommend consultation with colorectal services for detailed anorectal evaluation.  I would recommend she make an appointment to see Dr. Hernandez's group by calling [phone].  Meanwhile, I would recommend that she start consuming fiber as well as a fiber supplement such as Metamucil powder 1 teaspoon with 6 to 8 ounces of water to be taken 3 times a day.  She should try to increase her fiber intake daily to about 40 g.   no

## 2022-12-23 NOTE — PRE-OP CHECKLIST - SITE MARKED BY SURGEON
Occupational Therapy      12/23/22 1000   Appointment Info   Signing Clinician's Name / Credentials (OT) Lianne Lezama OTR/L   Appointment Cancel Comments (OT) no skilled OT needs at this time     Per PT report pt is moving Independently no skilled OT needs at this time, OT will complete order please re-order if change in functional status. Thank you.     Lianne Lezama, HE, OTR/L, 12/23/2022, 10:38 AM     n/a

## 2023-03-20 NOTE — PHYSICAL THERAPY INITIAL EVALUATION ADULT - SITTING BALANCE: STATIC
Last OV: 1/6/2022  Next OV: Visit date not found  (around 7/6/2022    Last fill:10/10/22  Refills:0 normal balance

## 2023-05-03 NOTE — DISCHARGE NOTE PROVIDER - NSDCMRMEDTOKEN_GEN_ALL_CORE_FT
aspirin 81 mg oral delayed release tablet: 1 tab(s) orally once a day  atorvastatin 40 mg oral tablet: 1 tab(s) orally once a day (at bedtime)  calcium acetate 667 mg oral tablet: 3 tab(s) orally 3 times a day (after meals)  carvedilol 6.25 mg oral tablet: 1 tab(s) orally 2 times a day  gabapentin 100 mg oral capsule: 3 cap(s) orally once a day (at bedtime)  Levemir FlexPen 100 units/mL subcutaneous solution: 20 unit(s) subcutaneous once a day  Multiple Vitamins oral tablet: 1 tab(s) orally once a day  NIFEdipine 90 mg oral tablet, extended release: 1 tab(s) orally once a day   Hyperglycemia

## 2023-05-24 NOTE — ED PROVIDER NOTE - CPE EDP SKIN NORM
normal... Consent (Temporal Branch)/Introductory Paragraph: The rationale for Mohs was explained to the patient and consent was obtained. The risks, benefits and alternatives to therapy were discussed in detail. Specifically, the risks of damage to the temporal branch of the facial nerve, infection, scarring, bleeding, prolonged wound healing, incomplete removal, allergy to anesthesia, and recurrence were addressed. Prior to the procedure, the treatment site was clearly identified and confirmed by the patient. All components of Universal Protocol/PAUSE Rule completed.

## 2023-05-25 NOTE — ED ADULT NURSE NOTE - BRAND OF COVID-19 VACCINATION
Inpatient Rehabilitation - Occupational Therapy Treatment Note    [facility]     Patient Name: Radha Azevedo  : 1935  MRN: [id number]    Today's Date: 2023                 Admit Date: 2023         ICD-10-CM ICD-9-CM   1. Impaired gait and mobility  R26.89 781.2       Patient Active Problem List   Diagnosis   • Absolute anemia   • Benign essential HTN   • Benign localized hyperplasia of prostate without urinary obstruction   • Type 2 diabetes mellitus with peripheral angiopathy   • Hypertension   • Hyperlipidemia   • Type 2 diabetes mellitus   • Diabetes mellitus type 2, noninsulin dependent   • Avitaminosis D   • Dyslipidemia   • Decrease in the ability to hear   • Personal history of colonic polyps   • Subdural hematoma       Past Medical History:   Diagnosis Date   • Colon polyps     Followed by Dr. Leo Jaeger at [facility].   • Coronary artery disease    • Diabetes mellitus    • Hyperlipidemia    • Hypertension        Past Surgical History:   Procedure Laterality Date   • CARDIAC CATHETERIZATION     • CAROTID STENT      patient denies, but family states he did have a stent placed   • COLONOSCOPY N/A 2014    Dr. Leo Jaeger at [facility].   • COLONOSCOPY N/A 2016    Dr. Leo Jaeger at [facility].   • COLONOSCOPY N/A 03/15/2023    2 TUBULAR ADENOMA POLYPS IN SIGMOID, BARIUM ENEMA ORDERED, DR. LEO JAEGER AT [facility]   • CORONARY ANGIOPLASTY     • FRACTURE SURGERY Left 2023    IM nailing for L femur fracture             IRF OT ASSESSMENT FLOWSHEET (last 12 hours)     IRF OT Evaluation and Treatment     Row Name 23 1504          OT Time and Intention    Document Type daily treatment  -AF     Mode of Treatment occupational therapy  -AF     Patient Effort good  -AF     Row Name 23 1503          General Information    Patient/Family/Caregiver Comments/Observations pt sitting up in AM an dPM session, son present in AM session  -AF     Existing Precautions/Restrictions fall;weight  bearing  -AF     Row Name 05/25/23 1504          Pain Assessment    Pretreatment Pain Rating 3/10  -AF     Posttreatment Pain Rating 3/10  -AF     Pre/Posttreatment Pain Comment femur  -AF     Row Name 05/25/23 1504          Cognition/Psychosocial    Affect/Mental Status (Cognition) WFL  -AF     Orientation Status (Cognition) oriented x 3  -AF     Follows Commands (Cognition) follows two-step commands;repetition of directions required  -AF     Personal Safety Interventions fall prevention program maintained;gait belt;nonskid shoes/slippers when out of bed  -AF     Comment, Cognition MIN A with visual perceputal puzzle task and increased time  -AF     Row Name 05/25/23 1504          Range of Motion Comprehensive    Comment, General Range of Motion pt demos limited shoulder flexion in L shoulder states that maybe since the fall it has felt worse  -AF     Row Name 05/25/23 1504          Upper Body Dressing    Comment (Upper Body Dressing) ADL tasks completed prior to OT today  -AF     Row Name 05/25/23 1504          Toileting    Kent Level (Toileting) contact guard assist  -AF     Assistive Device Use (Toileting) grab bar/safety frame  -AF     Position (Toileting) supported standing  -AF     Comment (Toileting) RWX level  -AF     Row Name 05/25/23 1504          Functional Mobility    Functional Mobility- Comment RWX level in room CGA/SBA  -AF     Row Name 05/25/23 1504          Sit-Stand Transfer    Sit-Stand Kent (Transfers) standby assist;contact guard  -AF     Assistive Device (Sit-Stand Transfers) walker, front-wheeled  -AF     Row Name 05/25/23 1504          Stand-Sit Transfer    Stand-Sit Kent (Transfers) standby assist;contact guard  -AF     Assistive Device (Stand-Sit Transfers) walker, front-wheeled  -AF     Row Name 05/25/23 1504          Toilet Transfer    Comment, (Toilet Transfer) stood to use the rest room  -AF     Row Name 05/25/23 1504          Motor Skills    Therapeutic  Exercise shoulder;wrist  -AF     Row Name 05/25/23 1504          Shoulder (Therapeutic Exercise)    Shoulder (Therapeutic Exercise) AROM (active range of motion);AAROM (active assistive range of motion)  -AF     Shoulder AROM (Therapeutic Exercise) 10 repetitions;2 sets  pendulum with LUE after moist heat  -AF     Shoulder AAROM (Therapeutic Exercise) table slides, aBduction;table slides, flexion;sitting;left;5 repetitions  x 2 sets after moist heat  -AF     Shoulder Strengthening (Therapeutic Exercise) bilateral;scapular stabilization;sitting;resistance band;red;10 repetitions;2 sets  -AF     Row Name 05/25/23 1504          Elbow/Forearm (Therapeutic Exercise)    Elbow/Forearm Strengthening (Therapeutic Exercise) bilateral;supination;pronation;sitting;2 lb free weight;10 repetitions;2 sets  -AF     Row Name 05/25/23 1504          Wrist (Therapeutic Exercise)    Wrist (Therapeutic Exercise) strengthening exercise  -AF     Wrist Strengthening (Therapeutic Exercise) bilateral;flexion;extension;2 lb free weight;10 repetitions;2 sets  -AF     Row Name 05/25/23 1504          Hand (Therapeutic Exercise)    Hand Strengthening (Therapeutic Exercise) bilateral  with blue foam blocks, educated on  strength  -AF     Row Name 05/25/23 1504          Balance    Static Standing Balance standby assist  with use of grab bar and toileting tasks  -     Row Name 05/25/23 1504          Positioning and Restraints    Pre-Treatment Position sitting in chair/recliner  -AF     Post Treatment Position chair  -AF     In Chair sitting;call light within reach;encouraged to call for assist;exit alarm on  in PM  -AF     In Wheelchair sitting;exit alarm on;with SLP  in AM  -AF           User Key  (r) = Recorded By, (t) = Taken By, (c) = Cosigned By    Initials Name Effective Dates    AF Bernarda Ricketts, OTR 06/16/21 -                  Occupational Therapy Education     Title: PT OT SLP Therapies (In Progress)     Topic: Occupational Therapy  (In Progress)     Point: ADL training (In Progress)     Description:   Instruct learner(s) on proper safety adaptation and remediation techniques during self care or transfers.   Instruct in proper use of assistive devices.              Learning Progress Summary           Patient Eager, E,D, NR by KP at 5/24/2023 1506    Acceptance, E, VU by WN at 5/20/2023 2214    Acceptance, E, VU by WN at 5/19/2023 2232    Acceptance, E, VU by KN at 5/17/2023 1524                   Point: Home exercise program (Done)     Description:   Instruct learner(s) on appropriate technique for monitoring, assisting and/or progressing therapeutic exercises/activities.              Learning Progress Summary           Patient Acceptance, E, VU by AF at 5/25/2023 1510    Comment: educated on and discussed exs for home    Eager, E,D, NR by KP at 5/24/2023 1506    Acceptance, E, VU by WN at 5/20/2023 2214    Acceptance, E, VU by WN at 5/19/2023 2232    Acceptance, E, VU by KN at 5/17/2023 1524   Family Acceptance, E, VU by AF at 5/25/2023 1510    Comment: educated on and discussed exs for home                   Point: Precautions (Done)     Description:   Instruct learner(s) on prescribed precautions during self-care and functional transfers.              Learning Progress Summary           Patient Acceptance, E, VU by AF at 5/25/2023 1510    Comment: educated on and discussed exs for home    Eager, E,D, NR by KP at 5/24/2023 1506    Acceptance, E, VU by WN at 5/20/2023 2214    Acceptance, E, VU by WN at 5/19/2023 2232    Acceptance, E, VU by KN at 5/17/2023 1524   Family Acceptance, E, VU by AF at 5/25/2023 1510    Comment: educated on and discussed exs for home                   Point: Body mechanics (In Progress)     Description:   Instruct learner(s) on proper positioning and spine alignment during self-care, functional mobility activities and/or exercises.              Learning Progress Summary           Patient Eager, E,D, NR by KP at  5/24/2023 1506    Acceptance, E, VU by WN at 5/20/2023 2214    Acceptance, E, VU by WN at 5/19/2023 2232    Acceptance, E, VU by KN at 5/17/2023 1524                               User Key     Initials Effective Dates Name Provider Type Discipline    AF 06/16/21 -  Bernarda Ricketts, OTR Occupational Therapist OT    KP 06/16/21 -  Keena London PT Physical Therapist PT    RICKI 08/23/22 -  Kamaljit Elias RN Registered Nurse Nurse    DARVIN 08/02/22 -  Main De La Garza OT Occupational Therapist OT                    OT Recommendation and Plan                         Time Calculation:      Time Calculation- OT     Row Name 05/25/23 1515 05/25/23 1514          Time Calculation- OT    OT Start Time 1400  -AF 0900  -AF     OT Stop Time 1430  -AF 0930  -AF     OT Time Calculation (min) 30 min  -AF 30 min  -AF           User Key  (r) = Recorded By, (t) = Taken By, (c) = Cosigned By    Initials Name Provider Type    AF Bernarda Ricketts, OTR Occupational Therapist              Therapy Charges for Today     Code Description Service Date Service Provider Modifiers Qty    67603796954 HC OT SELF CARE/MGMT/TRAIN EA 15 MIN 5/24/2023 Bernarda Ricketts, OTR GO 2    49392750216 HC OT THERAPEUTIC ACT EA 15 MIN 5/24/2023 Bernarda Ricketts, OTR GO 1    69717780068 HC OT THER PROC EA 15 MIN 5/24/2023 Bernarda Ricketts, OTR GO 1    07136922369 HC OT SELF CARE/MGMT/TRAIN EA 15 MIN 5/25/2023 Bernarda Ricketts, OTR GO 1    60315556808 HC OT THER PROC EA 15 MIN 5/25/2023 Bernarda Ricketts, OTR GO 3                   LUH Burgos  5/25/2023   [employer] dose 1 and 2

## 2023-12-14 NOTE — PROCEDURE NOTE - NSANTIMICROB_VASC_A_CORE
If imaging (CT, MRI, MRA, etc.) was recommended during your visit and you would prefer to have this done at a facility other than [facility], please call the [facility] Pre-Service Department at [phone] to inform them where you would like your imaging done and ask them to start the prior authorization to ensure it is covered by your insurance. Thank you!          For Your Information   To manage your health all in once place (contact your provider, request refills, receive results quicker, attend virtual video visits, etc) sign up for a [facility] account. You can go online to [employer] and click Sign Up Now. You can also create an account on your mobile device by downloading the [employer] kyle from the [employer] Store or Google Play.      If your provider ordered TESTING today, you will typically be notified of your test results within 3-5 business days after testing has been completed, unless specified otherwise. If you have not received your results within 5 business days please call your provider's office.     If your provider ordered IMAGING or other specialized tests to be performed at [facility], you will typically be contacted to schedule this testing within 2 business days. If you have not received a call to schedule within that timeframe, please contact Central Scheduling at [phone] to schedule.       If you are in need of a medication refill please use one of the following options:  1. Call your pharmacy if there are refills remaining.  2. Online via the [employer] kyle- [email or web address]  3. Call your provider's office      PLEASE BE AWARE OF WHAT MEDICATIONS YOU ARE CURRENTLY TAKING, INCLUDING OVER-THE-COUNTER MEDICATIONS AND SUPPLEMENTS. REVIEW THE LIST INCLUDED WITH THIS PACKET AND BE SURE TO NOTIFY THE PROVIDER OF ANY CHANGES THAT NEED TO BE MADE.       You may be receiving a survey via mail or e-mail following your visit.  Please take the time to complete this, as  your feedback is very important to us!  We strive to make your experience exceptional and your comments help us with that goal.  We look forward to hearing from you!    For future visits, please arrive 15 minutes earlier than your scheduled appointment time to allow the nurse enough time to update your chart prior to seeing the provider. This ensures you receive the full amount of visit time with your provider. Thank you!     No

## 2023-12-26 NOTE — PATIENT PROFILE ADULT - NSPROPTRIGHTNOTIFY_GEN_A_NUR
NG tube to left nare discontinued per HCP, tolerated well no complaints no distress noted   
Pt discharged home by way of personal vehicle with son   
declines

## 2024-06-03 NOTE — PHYSICAL THERAPY INITIAL EVALUATION ADULT - SKIN INTEGRITY
mom was called and verbalized below results per   no further question     ----- Message from Kimo Cade MD sent at 6/1/2024 10:59 AM CDT -----  No allergies detected   receives HD R forearm

## 2024-06-13 NOTE — H&P ADULT - GENITOURINARY
Called pt's primary phone number to confirm appt with Dr. Gates tomorrow, 6/14. Pt's wife, Danica, answered and stated she thought he may not need the appt based on what the scan says today. Please call Danica back regarding this matter to let them know if the appt tomorrow is still needed. Best callback number is [phone].        Please advise.    negative

## 2024-06-21 NOTE — PHYSICAL THERAPY INITIAL EVALUATION ADULT - GAIT DEVIATIONS NOTED, PT EVAL
6/21/24 0945 per pt\"I bit down on the thing and that's why my lip is swollen\" ice given kmo rn  
Male
decreased heel to toe patterning B feet ,R ankle appears to over pronate mildly in stocking feet with mildly increased toe out R foot

## 2024-06-28 NOTE — ED PROVIDER NOTE - NSTIMEPROVIDERCAREINITIATE_GEN_ER
Called patient and spoke to Kathy patients . We were able to get him rescheduled for his appt with Elisa as he will be [place] that day. I confirmed the new date, time and same location with her.    23-Jul-2018 16:05

## 2025-02-10 NOTE — ED ADULT NURSE NOTE - CAS EDP DISCH TYPE
Consent: The patient's consent was obtained including but not limited to risks of crusting, scabbing, blistering, scarring, darker or lighter pigmentary change, recurrence, incomplete removal and infection. Post-Care Instructions: I reviewed with the patient in detail post-care instructions. Patient is to wear sunprotection, and avoid picking at any of the treated lesions. Pt may apply Vaseline to crusted or scabbing areas. Show Applicator Variable?: Yes Detail Level: Detailed Render Post-Care Instructions In Note?: no Number Of Freeze-Thaw Cycles: 1 freeze-thaw cycle Duration Of Freeze Thaw-Cycle (Seconds): 4 Nursing home

## 2025-05-12 NOTE — ED ADULT NURSE NOTE - ISOLATION TYPE:
Problem: Adult Inpatient Plan of Care  Goal: Plan of Care Review  Outcome: Progressing  Goal: Patient-Specific Goal (Individualized)  Outcome: Progressing  Goal: Absence of Hospital-Acquired Illness or Injury  Outcome: Progressing  Goal: Optimal Comfort and Wellbeing  Outcome: Progressing  Goal: Readiness for Transition of Care  Outcome: Progressing     Problem: Diabetes Comorbidity  Goal: Blood Glucose Level Within Targeted Range  Outcome: Progressing     Problem: Fall Injury Risk  Goal: Absence of Fall and Fall-Related Injury  Outcome: Progressing     Problem: Skin Injury Risk Increased  Goal: Skin Health and Integrity  Outcome: Progressing     Problem: Infection  Goal: Absence of Infection Signs and Symptoms  Outcome: Progressing      None